# Patient Record
Sex: MALE | Race: WHITE | NOT HISPANIC OR LATINO | Employment: OTHER | URBAN - METROPOLITAN AREA
[De-identification: names, ages, dates, MRNs, and addresses within clinical notes are randomized per-mention and may not be internally consistent; named-entity substitution may affect disease eponyms.]

---

## 2019-07-15 ENCOUNTER — HOSPITAL ENCOUNTER (OUTPATIENT)
Dept: RADIOLOGY | Facility: MEDICAL CENTER | Age: 64
End: 2019-07-15
Attending: INTERNAL MEDICINE
Payer: COMMERCIAL

## 2019-07-15 DIAGNOSIS — S27.809A RUPTURE OF DIAPHRAGM: ICD-10-CM

## 2019-07-15 DIAGNOSIS — K57.30 DIVERTICULOSIS OF LARGE INTESTINE WITHOUT DIVERTICULITIS: ICD-10-CM

## 2019-07-15 DIAGNOSIS — R10.13 ABDOMINAL PAIN, EPIGASTRIC: ICD-10-CM

## 2019-07-15 DIAGNOSIS — Z12.11 SPECIAL SCREENING FOR MALIGNANT NEOPLASMS, COLON: ICD-10-CM

## 2019-07-15 DIAGNOSIS — K31.89 GASTROPTOSIS: ICD-10-CM

## 2019-07-15 PROCEDURE — 74280 X-RAY XM COLON 2CNTRST STD: CPT

## 2019-10-14 ENCOUNTER — OFFICE VISIT (OUTPATIENT)
Dept: URGENT CARE | Facility: PHYSICIAN GROUP | Age: 64
End: 2019-10-14
Payer: COMMERCIAL

## 2019-10-14 ENCOUNTER — APPOINTMENT (OUTPATIENT)
Dept: RADIOLOGY | Facility: IMAGING CENTER | Age: 64
End: 2019-10-14
Attending: PHYSICIAN ASSISTANT
Payer: COMMERCIAL

## 2019-10-14 VITALS
BODY MASS INDEX: 23.7 KG/M2 | HEIGHT: 72 IN | WEIGHT: 175 LBS | SYSTOLIC BLOOD PRESSURE: 114 MMHG | TEMPERATURE: 98.6 F | OXYGEN SATURATION: 94 % | RESPIRATION RATE: 16 BRPM | DIASTOLIC BLOOD PRESSURE: 72 MMHG | HEART RATE: 78 BPM

## 2019-10-14 DIAGNOSIS — S61.411A LACERATION OF RIGHT HAND WITHOUT FOREIGN BODY, INITIAL ENCOUNTER: ICD-10-CM

## 2019-10-14 DIAGNOSIS — Z23 ENCOUNTER FOR ADMINISTRATION OF VACCINE: ICD-10-CM

## 2019-10-14 PROCEDURE — 90715 TDAP VACCINE 7 YRS/> IM: CPT | Performed by: PHYSICIAN ASSISTANT

## 2019-10-14 PROCEDURE — 99203 OFFICE O/P NEW LOW 30 MIN: CPT | Mod: 25 | Performed by: PHYSICIAN ASSISTANT

## 2019-10-14 PROCEDURE — 12001 RPR S/N/AX/GEN/TRNK 2.5CM/<: CPT | Performed by: PHYSICIAN ASSISTANT

## 2019-10-14 PROCEDURE — 73130 X-RAY EXAM OF HAND: CPT | Mod: TC,RT | Performed by: PHYSICIAN ASSISTANT

## 2019-10-14 PROCEDURE — 90471 IMMUNIZATION ADMIN: CPT | Performed by: PHYSICIAN ASSISTANT

## 2019-10-14 RX ORDER — AMOXICILLIN 500 MG/1
CAPSULE ORAL
COMMUNITY
Start: 2019-08-19 | End: 2022-09-30

## 2019-10-14 RX ORDER — CHLORHEXIDINE GLUCONATE ORAL RINSE 1.2 MG/ML
SOLUTION DENTAL
COMMUNITY
Start: 2019-08-19 | End: 2022-09-30

## 2019-10-14 RX ORDER — ROSUVASTATIN CALCIUM 5 MG/1
TABLET, COATED ORAL
COMMUNITY
Start: 2019-08-26 | End: 2022-09-30

## 2019-10-14 RX ORDER — IBUPROFEN 800 MG/1
TABLET ORAL
COMMUNITY
Start: 2019-08-19 | End: 2022-09-30

## 2019-10-14 RX ORDER — HYDROCODONE BITARTRATE AND ACETAMINOPHEN 10; 325 MG/1; MG/1
TABLET ORAL
COMMUNITY
Start: 2019-08-19 | End: 2022-09-30

## 2019-10-14 RX ORDER — INFLUENZA A VIRUS A/BRISBANE/02/2018 (H1N1) RECOMBINANT HEMAGGLUTININ ANTIGEN, INFLUENZA A VIRUS A/KANSAS/14/2017 (H3N2) RECOMBINANT HEMAGGLUTININ ANTIGEN, INFLUENZA B VIRUS B/PHUKET/3073/2013 RECOMBINANT HEMAGGLUTININ ANTIGEN, AND INFLUENZA B VIRUS B/MARYLAND/15/2016 RECOMBINANT HEMAGGLUTININ ANTIGEN 45; 45; 45; 45 UG/.5ML; UG/.5ML; UG/.5ML; UG/.5ML
INJECTION INTRAMUSCULAR
COMMUNITY
Start: 2019-09-23 | End: 2022-09-30

## 2019-10-14 RX ORDER — ONDANSETRON 4 MG/1
TABLET, ORALLY DISINTEGRATING ORAL
COMMUNITY
Start: 2019-08-19 | End: 2022-09-30

## 2019-10-14 ASSESSMENT — ENCOUNTER SYMPTOMS
DIZZINESS: 0
TINGLING: 0

## 2019-10-14 NOTE — PROGRESS NOTES
Subjective:   Eduardo Aponte is a 64 y.o. male who presents for Laceration (got struck by saw, Rt hand X this morning)        Patient cut hand on oscillating saw this morning.  He states that he is he is having bone pain is concerned that he may have fractured the underlying bone.  Unsure of date of last tetanus.  Denies swelling, pain, active bleeding, distal numbness and tingling, reduced range of motion.    Laceration    The incident occurred 1 to 3 hours ago. The laceration is located on the right hand. The laceration is 1 cm in size. The laceration mechanism was a metal edge. The pain is mild. The pain has been constant since onset. He reports no foreign bodies present. His tetanus status is unknown.     Review of Systems   Skin:        Laceration on back of right hand   Neurological: Negative for dizziness and tingling.       PMH:  has a past medical history of Dermatitis. He also has no past medical history of GERD (gastroesophageal reflux disease) or Ulcer.  MEDS:   Current Outpatient Medications:   •  rosuvastatin (CRESTOR) 5 MG Tab, , Disp: , Rfl:   •  ibuprofen (MOTRIN) 800 MG Tab, , Disp: , Rfl:   •  ondansetron (ZOFRAN ODT) 4 MG TABLET DISPERSIBLE, , Disp: , Rfl:   •  FLUBLOK QUADRIVALENT 0.5 ML Solution Prefilled Syringe injection, , Disp: , Rfl:   •  HYDROcodone/acetaminophen (NORCO)  MG Tab, , Disp: , Rfl:   •  chlorhexidine (PERIDEX) 0.12 % Solution, , Disp: , Rfl:   •  amoxicillin (AMOXIL) 500 MG Cap, , Disp: , Rfl:   ALLERGIES:   Allergies   Allergen Reactions   • Bee      SURGHX: No past surgical history on file.  SOCHX:  reports that he quit smoking about 9 years ago. His smoking use included cigarettes. He smoked 1.00 pack per day. He has never used smokeless tobacco. He reports that he drinks alcohol. He reports that he does not use drugs.  FH: Family history was reviewed, no pertinent findings to report   Objective:   /72   Pulse 78   Temp 37 °C (98.6 °F)   Resp 16   Ht 1.829  m (6')   Wt 79.4 kg (175 lb)   SpO2 94%   BMI 23.73 kg/m²   Physical Exam   Constitutional: He is oriented to person, place, and time. He appears well-developed and well-nourished.  Non-toxic appearance. No distress.   HENT:   Head: Normocephalic and atraumatic.   Right Ear: External ear normal.   Left Ear: External ear normal.   Nose: Nose normal.   Eyes: Conjunctivae and lids are normal.   Neck: Neck supple.   Cardiovascular: Normal rate and regular rhythm.   Pulmonary/Chest: Effort normal and breath sounds normal. No respiratory distress.   Abdominal: Normal appearance.   Musculoskeletal:   Right hand ranges fully.  Strength 5 out of 5.  Sensation intact and even bilaterally.  Cap refill less than 2.  Proximal second metacarpal tender to palpation.  No step-offs or visible deformity.   Neurological: He is alert and oriented to person, place, and time. No cranial nerve deficit or sensory deficit.   Skin: Skin is warm, dry and intact. Capillary refill takes less than 2 seconds.   1 cm V-shaped full-thickness laceration on dorsal aspect of right hand between digits 1 and 2.  No active bleeding.  No visible foreign body.  No tendon damage.   Psychiatric: He has a normal mood and affect. His speech is normal and behavior is normal. Judgment and thought content normal. Cognition and memory are normal.   Vitals reviewed.        Assessment/Plan:   1. Laceration of right hand without foreign body, initial encounter  - DX-HAND 3+ RIGHT; Future  - Tdap =>6yo IM    2. Encounter for administration of vaccine  - Tdap =>6yo IM    Other orders  - rosuvastatin (CRESTOR) 5 MG Tab  - ondansetron (ZOFRAN ODT) 4 MG TABLET DISPERSIBLE  - FLUBLOK QUADRIVALENT 0.5 ML Solution Prefilled Syringe injection  - ibuprofen (MOTRIN) 800 MG Tab  - HYDROcodone/acetaminophen (NORCO)  MG Tab  - chlorhexidine (PERIDEX) 0.12 % Solution  - amoxicillin (AMOXIL) 500 MG Cap    XR: No fracture or dislocation by my read.   Radiology  review:      FINDINGS:    There is a soft tissue defect projecting between the 1st and 2nd metacarpal heads.      There is no fracture.    Alignment is normal.    There are multiple sites of osteoarthritis including scaphoid/trapezium articulation, 1st carpometacarpal joint where there is radial subluxation, 1st metacarpophalangeal joint and multiple distal interphalangeal joint.    There is probably an old 5th metacarpal fracture.      Impression       1.  Negative for fracture or foreign body    2.  Multiple sites of osteoarthritis     Patient advised that there is no evidence of fracture.  Laceration repaired.  Tetanus updated.  Patient to return to clinic in 9 to 10 days for suture removal.  Monitor for infection.    Signs of infection, to include: redness, swelling, increased pain, purulent discharge, red streaking from wound site, N/V, and F/C discussed with patient.  Pt instructed to RTC or go to the ER if he or she should experience these.    Differential diagnosis, natural history, supportive care, and indications for immediate follow-up discussed.

## 2019-10-14 NOTE — PROCEDURES
Laceration Repair  Date/Time: 10/14/2019 10:02 AM  Performed by: Tim Perez P.A.-C.  Authorized by: Tim Perez P.A.-C.   Body area: upper extremity  Location details: right hand  Laceration length: 1 cm  Foreign bodies: no foreign bodies  Tendon involvement: none  Nerve involvement: none  Vascular damage: no  Anesthesia: local infiltration    Anesthesia:  Local Anesthetic: lidocaine 1% with epinephrine  Anesthetic total: 0.5 mL    Sedation:  Patient sedated: no    Irrigation solution: saline  Irrigation method: syringe  Amount of cleaning: standard  Debridement: none  Degree of undermining: none  Skin closure: 4-0 nylon  Number of sutures: 1  Technique: simple  Approximation: close  Approximation difficulty: simple  Dressing: pressure dressing

## 2019-10-23 ENCOUNTER — OFFICE VISIT (OUTPATIENT)
Dept: URGENT CARE | Facility: PHYSICIAN GROUP | Age: 64
End: 2019-10-23
Payer: COMMERCIAL

## 2019-10-23 VITALS
WEIGHT: 175 LBS | HEIGHT: 72 IN | HEART RATE: 80 BPM | OXYGEN SATURATION: 99 % | SYSTOLIC BLOOD PRESSURE: 106 MMHG | BODY MASS INDEX: 23.7 KG/M2 | DIASTOLIC BLOOD PRESSURE: 78 MMHG | TEMPERATURE: 97.7 F | RESPIRATION RATE: 16 BRPM

## 2019-10-23 DIAGNOSIS — Z48.02 ENCOUNTER FOR REMOVAL OF SUTURES: ICD-10-CM

## 2019-10-23 PROCEDURE — 99212 OFFICE O/P EST SF 10 MIN: CPT | Performed by: NURSE PRACTITIONER

## 2019-10-23 ASSESSMENT — ENCOUNTER SYMPTOMS
CHILLS: 0
SENSORY CHANGE: 0
FOCAL WEAKNESS: 0
FEVER: 0
MYALGIAS: 0
DIZZINESS: 0
TINGLING: 0

## 2019-10-23 NOTE — PROGRESS NOTES
Subjective:      Eduardo Aponte is a 64 y.o. male who presents with Suture / Staple Removal (Right hand - 1 suture)            Suture / Staple Removal   Treated in ED: 10/14/2019. He tried nothing since the wound repair. His temperature was unmeasured prior to arrival. There has been no drainage from the wound. There is no redness present. There is no swelling present. There is no pain present. He has no difficulty moving the affected extremity or digit.       Review of Systems   Constitutional: Negative for chills and fever.   Musculoskeletal: Negative for joint pain and myalgias.   Skin: Negative for rash.   Neurological: Negative for dizziness, tingling, sensory change and focal weakness.     Past Medical History:   Diagnosis Date   • Dermatitis     chronic    History reviewed. No pertinent surgical history.   Social History     Socioeconomic History   • Marital status:      Spouse name: Not on file   • Number of children: Not on file   • Years of education: Not on file   • Highest education level: Not on file   Occupational History   • Not on file   Social Needs   • Financial resource strain: Not on file   • Food insecurity:     Worry: Not on file     Inability: Not on file   • Transportation needs:     Medical: Not on file     Non-medical: Not on file   Tobacco Use   • Smoking status: Former Smoker     Packs/day: 1.00     Types: Cigarettes     Last attempt to quit: 2010     Years since quittin.0   • Smokeless tobacco: Never Used   Substance and Sexual Activity   • Alcohol use: Yes     Comment: occasional   • Drug use: No   • Sexual activity: Not on file     Comment:    Lifestyle   • Physical activity:     Days per week: Not on file     Minutes per session: Not on file   • Stress: Not on file   Relationships   • Social connections:     Talks on phone: Not on file     Gets together: Not on file     Attends Worship service: Not on file     Active member of club or organization: Not on  file     Attends meetings of clubs or organizations: Not on file     Relationship status: Not on file   • Intimate partner violence:     Fear of current or ex partner: Not on file     Emotionally abused: Not on file     Physically abused: Not on file     Forced sexual activity: Not on file   Other Topics Concern   • Not on file   Social History Narrative   • Not on file    Allergies: Bee         Objective:     /78   Pulse 80   Temp 36.5 °C (97.7 °F) (Temporal)   Resp 16   Ht 1.829 m (6')   Wt 79.4 kg (175 lb)   SpO2 99%   BMI 23.73 kg/m²      Physical Exam   Constitutional: He is oriented to person, place, and time. Vital signs are normal. He appears well-developed and well-nourished.   Musculoskeletal: Normal range of motion. He exhibits tenderness. He exhibits no edema or deformity.   Proximal second metacarpal TTP. Ecchymosis noted. Strength 5/5. ROM intact. Flexion and extension of hand fully intact   Neurological: He is alert and oriented to person, place, and time.   Skin: Skin is warm and dry. Capillary refill takes less than 2 seconds.   1 cm healing laceration noted to dorsal aspect of right hand between digits 1 and 2.  Granulated tissue noted at wound base.  No drainage noted.  No redness ecchymosis edema or erythema noted.   Psychiatric: He has a normal mood and affect. His behavior is normal. Judgment and thought content normal.   Vitals reviewed.              Assessment/Plan:     1. Encounter for removal of sutures    1 suture removed without difficulty.  Patient tolerated procedure well.    Discussed signs and symptoms of infection with patient and instructed him to return to clinic for any developing symptoms.    Supportive care, differential diagnoses, and indications for immediate follow-up discussed with patient.    Pathogenesis of diagnosis discussed including typical length and natural progression of injury. Patient expresses understanding and agrees to plan.     Please note that  this dictation was created using voice recognition software. I have made every reasonable attempt to correct obvious errors, but I expect that there are errors of grammar and possibly content that I did not discover before finalizing the note.

## 2020-01-27 ENCOUNTER — OFFICE VISIT (OUTPATIENT)
Dept: URGENT CARE | Facility: PHYSICIAN GROUP | Age: 65
End: 2020-01-27
Payer: COMMERCIAL

## 2020-01-27 VITALS
HEART RATE: 76 BPM | BODY MASS INDEX: 23.7 KG/M2 | RESPIRATION RATE: 18 BRPM | WEIGHT: 175 LBS | OXYGEN SATURATION: 98 % | HEIGHT: 72 IN | TEMPERATURE: 97.7 F | SYSTOLIC BLOOD PRESSURE: 118 MMHG | DIASTOLIC BLOOD PRESSURE: 74 MMHG

## 2020-01-27 DIAGNOSIS — H93.8X3 SENSATION OF FULLNESS IN BOTH EARS: ICD-10-CM

## 2020-01-27 DIAGNOSIS — H61.23 BILATERAL IMPACTED CERUMEN: ICD-10-CM

## 2020-01-27 PROCEDURE — 99214 OFFICE O/P EST MOD 30 MIN: CPT | Performed by: NURSE PRACTITIONER

## 2020-01-27 NOTE — PROGRESS NOTES
Subjective:      Eduardo Aponte is a 64 y.o. male who presents with Ear Fullness (left ear is plugged, trouble hearing for both ears, x 3 days)    Past Medical History:   Diagnosis Date   • Dermatitis     chronic     Social History     Socioeconomic History   • Marital status:      Spouse name: Not on file   • Number of children: Not on file   • Years of education: Not on file   • Highest education level: Not on file   Occupational History   • Not on file   Social Needs   • Financial resource strain: Not on file   • Food insecurity:     Worry: Not on file     Inability: Not on file   • Transportation needs:     Medical: Not on file     Non-medical: Not on file   Tobacco Use   • Smoking status: Former Smoker     Packs/day: 1.00     Types: Cigarettes     Last attempt to quit: 2010     Years since quittin.3   • Smokeless tobacco: Never Used   Substance and Sexual Activity   • Alcohol use: Yes     Comment: occasional   • Drug use: No   • Sexual activity: Not on file     Comment:    Lifestyle   • Physical activity:     Days per week: Not on file     Minutes per session: Not on file   • Stress: Not on file   Relationships   • Social connections:     Talks on phone: Not on file     Gets together: Not on file     Attends Jewish service: Not on file     Active member of club or organization: Not on file     Attends meetings of clubs or organizations: Not on file     Relationship status: Not on file   • Intimate partner violence:     Fear of current or ex partner: Not on file     Emotionally abused: Not on file     Physically abused: Not on file     Forced sexual activity: Not on file   Other Topics Concern   • Not on file   Social History Narrative   • Not on file     History reviewed. No pertinent family history.    Allergies: Bee    Patient is a 64-year-old male who presents today with complaint of bilateral impacted cerumen.  Patient has tried to wash his ears out at home without success.         Other   The problem occurs constantly. The problem has been unchanged. Associated symptoms comments: Decreased hearing. Nothing aggravates the symptoms. He has tried nothing for the symptoms.       Review of Systems   HENT: Positive for hearing loss.    All other systems reviewed and are negative.         Objective:     /74 (BP Location: Right arm, Patient Position: Sitting, BP Cuff Size: Adult)   Pulse 76   Temp 36.5 °C (97.7 °F) (Temporal)   Resp 18   Ht 1.829 m (6')   Wt 79.4 kg (175 lb)   SpO2 98%   BMI 23.73 kg/m²      Physical Exam  Vitals signs reviewed.   Constitutional:       Appearance: Normal appearance.   HENT:      Right Ear: External ear normal. There is impacted cerumen.      Left Ear: Ear canal and external ear normal. There is impacted cerumen.      Nose: Nose normal.      Mouth/Throat:      Mouth: Mucous membranes are moist.   Eyes:      Extraocular Movements: Extraocular movements intact.      Conjunctiva/sclera: Conjunctivae normal.      Pupils: Pupils are equal, round, and reactive to light.   Neurological:      Mental Status: He is alert.       EACs bilaterally filled with impacted cerumen.    Post lavage: Left EAC is clear and patient reports significant subjective improvement.  Attempted to irrigate right EAC as well, however patient began to experience pain.  At this time I will have patient continue to use eardrops at home for the right ear and return next week for reattempt.  Return sooner for recurrent pain or any worsening of symptoms          Assessment/Plan:       1. Sensation of fullness in both ears  2.  Bilateral impacted cerumen      See note above  Return otherwise to urgent care for any further questions or concerns

## 2021-06-29 ENCOUNTER — HOSPITAL ENCOUNTER (OUTPATIENT)
Dept: LAB | Facility: MEDICAL CENTER | Age: 66
End: 2021-06-29
Attending: FAMILY MEDICINE
Payer: COMMERCIAL

## 2021-06-29 LAB
ALBUMIN SERPL BCP-MCNC: 4.3 G/DL (ref 3.2–4.9)
ALBUMIN/GLOB SERPL: 1.7 G/DL
ALP SERPL-CCNC: 57 U/L (ref 30–99)
ALT SERPL-CCNC: 34 U/L (ref 2–50)
ANION GAP SERPL CALC-SCNC: 10 MMOL/L (ref 7–16)
AST SERPL-CCNC: 28 U/L (ref 12–45)
BILIRUB SERPL-MCNC: 0.3 MG/DL (ref 0.1–1.5)
BUN SERPL-MCNC: 16 MG/DL (ref 8–22)
CALCIUM SERPL-MCNC: 9.1 MG/DL (ref 8.5–10.5)
CHLORIDE SERPL-SCNC: 104 MMOL/L (ref 96–112)
CO2 SERPL-SCNC: 23 MMOL/L (ref 20–33)
CREAT SERPL-MCNC: 1.09 MG/DL (ref 0.5–1.4)
EST. AVERAGE GLUCOSE BLD GHB EST-MCNC: 128 MG/DL
FASTING STATUS PATIENT QL REPORTED: NORMAL
GLOBULIN SER CALC-MCNC: 2.5 G/DL (ref 1.9–3.5)
GLUCOSE SERPL-MCNC: 107 MG/DL (ref 65–99)
HBA1C MFR BLD: 6.1 % (ref 4–5.6)
POTASSIUM SERPL-SCNC: 4.7 MMOL/L (ref 3.6–5.5)
PROT SERPL-MCNC: 6.8 G/DL (ref 6–8.2)
SODIUM SERPL-SCNC: 137 MMOL/L (ref 135–145)

## 2021-06-29 PROCEDURE — 83036 HEMOGLOBIN GLYCOSYLATED A1C: CPT

## 2021-06-29 PROCEDURE — 36415 COLL VENOUS BLD VENIPUNCTURE: CPT

## 2021-06-29 PROCEDURE — 80053 COMPREHEN METABOLIC PANEL: CPT

## 2021-08-04 ENCOUNTER — HOSPITAL ENCOUNTER (OUTPATIENT)
Dept: RADIOLOGY | Facility: MEDICAL CENTER | Age: 66
End: 2021-08-04
Attending: FAMILY MEDICINE
Payer: COMMERCIAL

## 2021-08-04 DIAGNOSIS — M25.511 RIGHT SHOULDER PAIN, UNSPECIFIED CHRONICITY: ICD-10-CM

## 2021-08-04 PROCEDURE — 73030 X-RAY EXAM OF SHOULDER: CPT | Mod: RT

## 2021-12-20 ENCOUNTER — HOSPITAL ENCOUNTER (OUTPATIENT)
Dept: LAB | Facility: MEDICAL CENTER | Age: 66
End: 2021-12-20
Attending: FAMILY MEDICINE
Payer: MEDICARE

## 2021-12-20 LAB
ALBUMIN SERPL BCP-MCNC: 4.8 G/DL (ref 3.2–4.9)
ALBUMIN/GLOB SERPL: 2.2 G/DL
ALP SERPL-CCNC: 64 U/L (ref 30–99)
ALT SERPL-CCNC: 40 U/L (ref 2–50)
ANION GAP SERPL CALC-SCNC: 11 MMOL/L (ref 7–16)
AST SERPL-CCNC: 27 U/L (ref 12–45)
BASOPHILS # BLD AUTO: 1 % (ref 0–1.8)
BASOPHILS # BLD: 0.08 K/UL (ref 0–0.12)
BILIRUB SERPL-MCNC: 0.7 MG/DL (ref 0.1–1.5)
BUN SERPL-MCNC: 14 MG/DL (ref 8–22)
CALCIUM SERPL-MCNC: 9.4 MG/DL (ref 8.5–10.5)
CHLORIDE SERPL-SCNC: 103 MMOL/L (ref 96–112)
CHOLEST SERPL-MCNC: 181 MG/DL (ref 100–199)
CO2 SERPL-SCNC: 25 MMOL/L (ref 20–33)
CREAT SERPL-MCNC: 1.13 MG/DL (ref 0.5–1.4)
EOSINOPHIL # BLD AUTO: 0.16 K/UL (ref 0–0.51)
EOSINOPHIL NFR BLD: 1.9 % (ref 0–6.9)
ERYTHROCYTE [DISTWIDTH] IN BLOOD BY AUTOMATED COUNT: 44.3 FL (ref 35.9–50)
EST. AVERAGE GLUCOSE BLD GHB EST-MCNC: 123 MG/DL
FASTING STATUS PATIENT QL REPORTED: NORMAL
GLOBULIN SER CALC-MCNC: 2.2 G/DL (ref 1.9–3.5)
GLUCOSE SERPL-MCNC: 98 MG/DL (ref 65–99)
HBA1C MFR BLD: 5.9 % (ref 4–5.6)
HCT VFR BLD AUTO: 57.5 % (ref 42–52)
HDLC SERPL-MCNC: 43 MG/DL
HGB BLD-MCNC: 20 G/DL (ref 14–18)
IMM GRANULOCYTES # BLD AUTO: 0.03 K/UL (ref 0–0.11)
IMM GRANULOCYTES NFR BLD AUTO: 0.4 % (ref 0–0.9)
LDLC SERPL CALC-MCNC: 122 MG/DL
LYMPHOCYTES # BLD AUTO: 3.17 K/UL (ref 1–4.8)
LYMPHOCYTES NFR BLD: 37.8 % (ref 22–41)
MCH RBC QN AUTO: 31.5 PG (ref 27–33)
MCHC RBC AUTO-ENTMCNC: 34.8 G/DL (ref 33.7–35.3)
MCV RBC AUTO: 90.6 FL (ref 81.4–97.8)
MONOCYTES # BLD AUTO: 0.69 K/UL (ref 0–0.85)
MONOCYTES NFR BLD AUTO: 8.2 % (ref 0–13.4)
NEUTROPHILS # BLD AUTO: 4.25 K/UL (ref 1.82–7.42)
NEUTROPHILS NFR BLD: 50.7 % (ref 44–72)
NRBC # BLD AUTO: 0 K/UL
NRBC BLD-RTO: 0 /100 WBC
PLATELET # BLD AUTO: 268 K/UL (ref 164–446)
PMV BLD AUTO: 11.6 FL (ref 9–12.9)
POTASSIUM SERPL-SCNC: 4.7 MMOL/L (ref 3.6–5.5)
PROT SERPL-MCNC: 7 G/DL (ref 6–8.2)
RBC # BLD AUTO: 6.35 M/UL (ref 4.7–6.1)
SODIUM SERPL-SCNC: 139 MMOL/L (ref 135–145)
TRIGL SERPL-MCNC: 79 MG/DL (ref 0–149)
WBC # BLD AUTO: 8.4 K/UL (ref 4.8–10.8)

## 2021-12-20 PROCEDURE — 80061 LIPID PANEL: CPT

## 2021-12-20 PROCEDURE — 85025 COMPLETE CBC W/AUTO DIFF WBC: CPT

## 2021-12-20 PROCEDURE — 36415 COLL VENOUS BLD VENIPUNCTURE: CPT

## 2021-12-20 PROCEDURE — 80053 COMPREHEN METABOLIC PANEL: CPT

## 2021-12-20 PROCEDURE — 83036 HEMOGLOBIN GLYCOSYLATED A1C: CPT | Mod: GA

## 2022-03-28 ENCOUNTER — HOSPITAL ENCOUNTER (OUTPATIENT)
Dept: LAB | Facility: MEDICAL CENTER | Age: 67
End: 2022-03-28
Attending: FAMILY MEDICINE
Payer: MEDICARE

## 2022-03-28 LAB
ALBUMIN SERPL BCP-MCNC: 4.5 G/DL (ref 3.2–4.9)
ALBUMIN/GLOB SERPL: 2.1 G/DL
ALP SERPL-CCNC: 61 U/L (ref 30–99)
ALT SERPL-CCNC: 39 U/L (ref 2–50)
ANION GAP SERPL CALC-SCNC: 13 MMOL/L (ref 7–16)
AST SERPL-CCNC: 27 U/L (ref 12–45)
BILIRUB SERPL-MCNC: 0.8 MG/DL (ref 0.1–1.5)
BUN SERPL-MCNC: 15 MG/DL (ref 8–22)
CALCIUM SERPL-MCNC: 9.7 MG/DL (ref 8.5–10.5)
CHLORIDE SERPL-SCNC: 102 MMOL/L (ref 96–112)
CHOLEST SERPL-MCNC: 177 MG/DL (ref 100–199)
CO2 SERPL-SCNC: 24 MMOL/L (ref 20–33)
CREAT SERPL-MCNC: 1.05 MG/DL (ref 0.5–1.4)
EST. AVERAGE GLUCOSE BLD GHB EST-MCNC: 126 MG/DL
FASTING STATUS PATIENT QL REPORTED: NORMAL
GFR SERPLBLD CREATININE-BSD FMLA CKD-EPI: 79 ML/MIN/1.73 M 2
GLOBULIN SER CALC-MCNC: 2.1 G/DL (ref 1.9–3.5)
GLUCOSE SERPL-MCNC: 94 MG/DL (ref 65–99)
HBA1C MFR BLD: 6 % (ref 4–5.6)
HDLC SERPL-MCNC: 44 MG/DL
LDLC SERPL CALC-MCNC: 113 MG/DL
POTASSIUM SERPL-SCNC: 5.3 MMOL/L (ref 3.6–5.5)
PROT SERPL-MCNC: 6.6 G/DL (ref 6–8.2)
SODIUM SERPL-SCNC: 139 MMOL/L (ref 135–145)
TRIGL SERPL-MCNC: 101 MG/DL (ref 0–149)

## 2022-03-28 PROCEDURE — 83036 HEMOGLOBIN GLYCOSYLATED A1C: CPT | Mod: GA

## 2022-03-28 PROCEDURE — 80061 LIPID PANEL: CPT

## 2022-03-28 PROCEDURE — 36415 COLL VENOUS BLD VENIPUNCTURE: CPT

## 2022-03-28 PROCEDURE — 80053 COMPREHEN METABOLIC PANEL: CPT

## 2022-08-30 ENCOUNTER — HOSPITAL ENCOUNTER (OUTPATIENT)
Dept: LAB | Facility: MEDICAL CENTER | Age: 67
End: 2022-08-30
Attending: FAMILY MEDICINE
Payer: MEDICARE

## 2022-08-30 LAB
ALBUMIN SERPL BCP-MCNC: 4.3 G/DL (ref 3.2–4.9)
ALBUMIN/GLOB SERPL: 1.8 G/DL
ALP SERPL-CCNC: 66 U/L (ref 30–99)
ALT SERPL-CCNC: 54 U/L (ref 2–50)
ANION GAP SERPL CALC-SCNC: 11 MMOL/L (ref 7–16)
AST SERPL-CCNC: 25 U/L (ref 12–45)
BILIRUB SERPL-MCNC: 0.7 MG/DL (ref 0.1–1.5)
BUN SERPL-MCNC: 11 MG/DL (ref 8–22)
CALCIUM SERPL-MCNC: 9.4 MG/DL (ref 8.5–10.5)
CHLORIDE SERPL-SCNC: 103 MMOL/L (ref 96–112)
CHOLEST SERPL-MCNC: 165 MG/DL (ref 100–199)
CO2 SERPL-SCNC: 24 MMOL/L (ref 20–33)
CREAT SERPL-MCNC: 1.03 MG/DL (ref 0.5–1.4)
EST. AVERAGE GLUCOSE BLD GHB EST-MCNC: 120 MG/DL
FASTING STATUS PATIENT QL REPORTED: NORMAL
GFR SERPLBLD CREATININE-BSD FMLA CKD-EPI: 80 ML/MIN/1.73 M 2
GLOBULIN SER CALC-MCNC: 2.4 G/DL (ref 1.9–3.5)
GLUCOSE SERPL-MCNC: 118 MG/DL (ref 65–99)
HBA1C MFR BLD: 5.8 % (ref 4–5.6)
HDLC SERPL-MCNC: 46 MG/DL
LDLC SERPL CALC-MCNC: 102 MG/DL
POTASSIUM SERPL-SCNC: 4.3 MMOL/L (ref 3.6–5.5)
PROT SERPL-MCNC: 6.7 G/DL (ref 6–8.2)
PSA SERPL-MCNC: 0.82 NG/ML (ref 0–4)
SODIUM SERPL-SCNC: 138 MMOL/L (ref 135–145)
TRIGL SERPL-MCNC: 83 MG/DL (ref 0–149)

## 2022-08-30 PROCEDURE — 80061 LIPID PANEL: CPT

## 2022-08-30 PROCEDURE — 84153 ASSAY OF PSA TOTAL: CPT | Mod: GZ

## 2022-08-30 PROCEDURE — 80053 COMPREHEN METABOLIC PANEL: CPT

## 2022-08-30 PROCEDURE — 36415 COLL VENOUS BLD VENIPUNCTURE: CPT

## 2022-08-30 PROCEDURE — 83036 HEMOGLOBIN GLYCOSYLATED A1C: CPT | Mod: GZ

## 2022-09-07 ENCOUNTER — TELEPHONE (OUTPATIENT)
Dept: CARDIOLOGY | Facility: MEDICAL CENTER | Age: 67
End: 2022-09-07
Payer: MEDICARE

## 2022-09-07 NOTE — TELEPHONE ENCOUNTER
Message  Received: Yesterday  CAROL Arciniega R.N. Hey Liz this patient needs a new patient consultation appointment with me for atrial fibrillation.  September 28 at 7:45 AM there is a slot we can use this for his new patient appointment without adjusting anything else is fine with me.  Could you please set this up and let him know?  The referral is coming from Dr. Ordoñez.     Thanks!     TW       Called pt, LM and advised will also send [employer] [facility] message regarding scheduling [facility]. Pt scheduled for 09/28/22 at 7:45am. [employer] message sent.

## 2022-09-28 ENCOUNTER — OFFICE VISIT (OUTPATIENT)
Dept: CARDIOLOGY | Facility: MEDICAL CENTER | Age: 67
End: 2022-09-28
Payer: MEDICARE

## 2022-09-28 ENCOUNTER — TELEPHONE (OUTPATIENT)
Dept: CARDIOLOGY | Facility: MEDICAL CENTER | Age: 67
End: 2022-09-28

## 2022-09-28 VITALS
HEIGHT: 72 IN | OXYGEN SATURATION: 95 % | WEIGHT: 183 LBS | BODY MASS INDEX: 24.79 KG/M2 | DIASTOLIC BLOOD PRESSURE: 88 MMHG | RESPIRATION RATE: 16 BRPM | HEART RATE: 100 BPM | SYSTOLIC BLOOD PRESSURE: 134 MMHG

## 2022-09-28 DIAGNOSIS — I48.0 PAF (PAROXYSMAL ATRIAL FIBRILLATION) (HCC): ICD-10-CM

## 2022-09-28 DIAGNOSIS — Z91.89: ICD-10-CM

## 2022-09-28 DIAGNOSIS — R07.89 PRESSURE IN CHEST: ICD-10-CM

## 2022-09-28 DIAGNOSIS — R06.09 DOE (DYSPNEA ON EXERTION): ICD-10-CM

## 2022-09-28 DIAGNOSIS — E78.2 MIXED HYPERLIPIDEMIA: ICD-10-CM

## 2022-09-28 LAB — EKG IMPRESSION: NORMAL

## 2022-09-28 PROCEDURE — 99204 OFFICE O/P NEW MOD 45 MIN: CPT | Performed by: INTERNAL MEDICINE

## 2022-09-28 PROCEDURE — 93000 ELECTROCARDIOGRAM COMPLETE: CPT | Performed by: INTERNAL MEDICINE

## 2022-09-28 RX ORDER — FAMOTIDINE 10 MG
10 TABLET ORAL 2 TIMES DAILY
Status: ON HOLD | COMMUNITY
End: 2022-10-14

## 2022-09-28 RX ORDER — APIXABAN 5 MG/1
5 TABLET, FILM COATED ORAL 2 TIMES DAILY
COMMUNITY
Start: 2022-09-13 | End: 2023-03-06 | Stop reason: SDUPTHER

## 2022-09-28 RX ORDER — METOPROLOL TARTRATE 50 MG/1
50 TABLET, FILM COATED ORAL 2 TIMES DAILY
Qty: 180 TABLET | Refills: 3 | Status: SHIPPED | OUTPATIENT
Start: 2022-09-28 | End: 2023-01-26

## 2022-09-28 RX ORDER — ROSUVASTATIN CALCIUM 10 MG/1
10 TABLET, COATED ORAL
COMMUNITY
Start: 2022-07-03 | End: 2022-11-29

## 2022-09-28 ASSESSMENT — FIBROSIS 4 INDEX: FIB4 SCORE: 0.84

## 2022-09-28 NOTE — TELEPHONE ENCOUNTER
LM for pt that his new check in at time is 7:00 for a 9:00 Cardioversion on 10-6-22 with Dr. Piper.

## 2022-09-28 NOTE — TELEPHONE ENCOUNTER
Patient is scheduled on 10-6-22 for a Cardioversion w/anesthesia with Dr. Piper. No meds to stop and patient to check in at 9:00 for an 11:00 procedure. H&P was done on 9-28-22 by Dr. Ruff. Pre admit to call patient.This was scheduled with Dr. Piper due to  not available until 10-27-22.

## 2022-09-28 NOTE — PROGRESS NOTES
Chief Complaint   Patient presents with    Shortness of Breath     New patient       Subjective     Eduardo Aponte is a 67 y.o. male who presents today for initial consultation regarding dyspnea on exertion chest discomfort and atrial fibrillation.  He has been noticing several months of slowly progressive dyspnea on exertion particular when mowing the lawn associated with some chest tightness over the more recent weeks.  Presented to his primary physician who noted an irregular rhythm and he was found to be in atrial fibrillation.  He was initiated appropriately on low-dose metoprolol and Eliquis and has been tolerating these well.  He does not smoke currently but has a multi pack-year smoking history and quit 6 months ago.  Does not drink excessively or do drugs.  He is retired.  He has not had rest symptoms.  Today ECG reveals atrial fibrillation at a rate of 93 bpm.    Past Medical History:   Diagnosis Date    Dermatitis     chronic     History reviewed. No pertinent surgical history.  History reviewed. No pertinent family history.  Social History     Socioeconomic History    Marital status:      Spouse name: Not on file    Number of children: Not on file    Years of education: Not on file    Highest education level: Not on file   Occupational History    Not on file   Tobacco Use    Smoking status: Former     Packs/day: 1.00     Types: Cigarettes     Quit date: 2010     Years since quittin.0    Smokeless tobacco: Never   Vaping Use    Vaping Use: Never used   Substance and Sexual Activity    Alcohol use: Yes     Comment: occasional    Drug use: No    Sexual activity: Not on file     Comment:    Other Topics Concern    Not on file   Social History Narrative    Not on file     Social Determinants of Health     Financial Resource Strain: Not on file   Food Insecurity: Not on file   Transportation Needs: Not on file   Physical Activity: Not on file   Stress: Not on file   Social  Connections: Not on file   Intimate Partner Violence: Not on file   Housing Stability: Not on file     Allergies   Allergen Reactions    Bee      Outpatient Encounter Medications as of 9/28/2022   Medication Sig Dispense Refill    ELIQUIS 5 MG Tab 2 times a day.      rosuvastatin (CRESTOR) 10 MG Tab every 48 hours.      famotidine (PEPCID) 10 MG tablet Take 10 mg by mouth 2 times a day.      metoprolol tartrate (LOPRESSOR) 50 MG Tab Take 1 Tablet by mouth 2 times a day. 180 Tablet 3    [DISCONTINUED] metoprolol tartrate (LOPRESSOR) 25 MG Tab 2 times a day.      rosuvastatin (CRESTOR) 5 MG Tab  (Patient not taking: Reported on 9/28/2022)      ondansetron (ZOFRAN ODT) 4 MG TABLET DISPERSIBLE  (Patient not taking: Reported on 9/28/2022)      FLUBLOK QUADRIVALENT 0.5 ML Solution Prefilled Syringe injection       ibuprofen (MOTRIN) 800 MG Tab  (Patient not taking: Reported on 9/28/2022)      HYDROcodone/acetaminophen (NORCO)  MG Tab  (Patient not taking: Reported on 9/28/2022)      chlorhexidine (PERIDEX) 0.12 % Solution  (Patient not taking: Reported on 9/28/2022)      amoxicillin (AMOXIL) 500 MG Cap  (Patient not taking: Reported on 9/28/2022)       No facility-administered encounter medications on file as of 9/28/2022.     Review of Systems   All other systems reviewed and are negative.           Objective     /88 (BP Location: Left arm, Patient Position: Sitting)   Pulse 100   Resp 16   Ht 1.829 m (6')   Wt 83 kg (183 lb)   SpO2 95%   BMI 24.82 kg/m²     Physical Exam  Vitals and nursing note reviewed.   Constitutional:       General: He is not in acute distress.     Appearance: Normal appearance.   HENT:      Head: Normocephalic and atraumatic.      Right Ear: External ear normal.      Left Ear: External ear normal.      Nose: Nose normal.   Eyes:      Conjunctiva/sclera: Conjunctivae normal.   Cardiovascular:      Rate and Rhythm: Normal rate. Rhythm irregular.      Pulses: Normal pulses.       Heart sounds: No murmur heard.  Pulmonary:      Effort: Pulmonary effort is normal. No respiratory distress.      Breath sounds: Normal breath sounds.   Abdominal:      General: There is no distension.      Palpations: Abdomen is soft.   Musculoskeletal:      Cervical back: No rigidity or tenderness.      Right lower leg: No edema.      Left lower leg: No edema.   Skin:     General: Skin is warm and dry.      Capillary Refill: Capillary refill takes 2 to 3 seconds.   Neurological:      General: No focal deficit present.      Mental Status: He is alert and oriented to person, place, and time.   Psychiatric:         Mood and Affect: Mood normal.         Behavior: Behavior normal.         Thought Content: Thought content normal.     LABS:  Lab Results   Component Value Date/Time    CHOLSTRLTOT 165 08/30/2022 08:50 AM     (H) 08/30/2022 08:50 AM    HDL 46 08/30/2022 08:50 AM    TRIGLYCERIDE 83 08/30/2022 08:50 AM       Lab Results   Component Value Date/Time    WBC 8.4 12/20/2021 08:43 AM    RBC 6.35 (H) 12/20/2021 08:43 AM    HEMOGLOBIN 20.0 (H) 12/20/2021 08:43 AM    HEMATOCRIT 57.5 (H) 12/20/2021 08:43 AM    MCV 90.6 12/20/2021 08:43 AM    NEUTSPOLYS 50.70 12/20/2021 08:43 AM    LYMPHOCYTES 37.80 12/20/2021 08:43 AM    MONOCYTES 8.20 12/20/2021 08:43 AM    EOSINOPHILS 1.90 12/20/2021 08:43 AM    BASOPHILS 1.00 12/20/2021 08:43 AM     Lab Results   Component Value Date/Time    SODIUM 138 08/30/2022 08:50 AM    POTASSIUM 4.3 08/30/2022 08:50 AM    CHLORIDE 103 08/30/2022 08:50 AM    CO2 24 08/30/2022 08:50 AM    GLUCOSE 118 (H) 08/30/2022 08:50 AM    BUN 11 08/30/2022 08:50 AM    CREATININE 1.03 08/30/2022 08:50 AM     Lab Results   Component Value Date    HBA1C 5.8 (H) 08/30/2022      Lab Results   Component Value Date/Time    ALKPHOSPHAT 66 08/30/2022 08:50 AM    ASTSGOT 25 08/30/2022 08:50 AM    ALTSGPT 54 (H) 08/30/2022 08:50 AM    TBILIRUBIN 0.7 08/30/2022 08:50 AM      No results found for: BNPBTYPENAT    No results found for: TSH  No results found for: PROTHROMBTM, INR     EKG (9/28/2022 ):  I have personally reviewed the EKG this visit and discussed with the patient.    Atrial fibrillation 93 bpm             Assessment & Plan     1. PAF (paroxysmal atrial fibrillation) (HCC)  EKG    NM-CARDIAC STRESS TEST    EKG    CL-CARDIOVERSION    metoprolol tartrate (LOPRESSOR) 50 MG Tab      2. Mixed hyperlipidemia  EKG      3. Does not achieve target heart rate  EKG      4. ELMORE (dyspnea on exertion)  EC-ECHOCARDIOGRAM COMPLETE W/O CONT    EKG      5. Pressure in chest  EKG    EKG          Medical Decision Making: Today's Assessment/Status/Plan:          He is developed atrial fibrillation.  This may be the primary issue or result of underlying ischemic heart disease given his chest discomfort.  It is symptomatic with dyspnea on exertion.  His heart rates are better but not optimally controlled.  Recommend increasing his beta-blocker and we have discussed the pathophysiology and expected prognosis as well as treatment strategies for atrial fibrillation and ischemic heart disease today extensively.  I recommend stress testing on a treadmill, echocardiogram and increasing his Lopressor to 50 mg twice daily from 25 mg twice daily.  He has been on his anticoagulation without interruption for greater than 3 weeks so therefore I recommend scheduling him with a anesthesia assisted cardioversion without TYRA for symptom improvement.  We will follow-up after testing and procedures are completed.    Thank you for this interesting consultation. It was my pleasure to see Eduardo Aponte today.    Ritesh Ruff MD, FACC, [facility]  Division of Interventional Cardiology  [facility]

## 2022-09-30 ENCOUNTER — PRE-ADMISSION TESTING (OUTPATIENT)
Dept: ADMISSIONS | Facility: MEDICAL CENTER | Age: 67
End: 2022-09-30
Attending: OPHTHALMOLOGY
Payer: MEDICARE

## 2022-09-30 ENCOUNTER — HOSPITAL ENCOUNTER (OUTPATIENT)
Dept: CARDIOLOGY | Facility: MEDICAL CENTER | Age: 67
End: 2022-09-30
Attending: INTERNAL MEDICINE
Payer: MEDICARE

## 2022-09-30 DIAGNOSIS — R06.09 DOE (DYSPNEA ON EXERTION): ICD-10-CM

## 2022-09-30 DIAGNOSIS — Z01.812 PRE-OPERATIVE LABORATORY EXAMINATION: ICD-10-CM

## 2022-09-30 PROCEDURE — 93306 TTE W/DOPPLER COMPLETE: CPT

## 2022-10-02 LAB
LV EJECT FRACT  99904: 65
LV EJECT FRACT MOD 2C 99903: 68.57
LV EJECT FRACT MOD 4C 99902: 56.19
LV EJECT FRACT MOD BP 99901: 63.69

## 2022-10-02 PROCEDURE — 93306 TTE W/DOPPLER COMPLETE: CPT | Mod: 26 | Performed by: INTERNAL MEDICINE

## 2022-10-04 ENCOUNTER — HOSPITAL ENCOUNTER (OUTPATIENT)
Dept: RADIOLOGY | Facility: MEDICAL CENTER | Age: 67
End: 2022-10-04
Attending: INTERNAL MEDICINE
Payer: MEDICARE

## 2022-10-04 ENCOUNTER — PRE-ADMISSION TESTING (OUTPATIENT)
Dept: ADMISSIONS | Facility: MEDICAL CENTER | Age: 67
End: 2022-10-04
Attending: INTERNAL MEDICINE
Payer: MEDICARE

## 2022-10-04 DIAGNOSIS — Z01.812 PRE-OPERATIVE LABORATORY EXAMINATION: ICD-10-CM

## 2022-10-04 DIAGNOSIS — I48.0 PAF (PAROXYSMAL ATRIAL FIBRILLATION) (HCC): ICD-10-CM

## 2022-10-04 LAB
ANION GAP SERPL CALC-SCNC: 15 MMOL/L (ref 7–16)
BUN SERPL-MCNC: 14 MG/DL (ref 8–22)
CALCIUM SERPL-MCNC: 9.6 MG/DL (ref 8.5–10.5)
CHLORIDE SERPL-SCNC: 99 MMOL/L (ref 96–112)
CO2 SERPL-SCNC: 25 MMOL/L (ref 20–33)
CREAT SERPL-MCNC: 1.1 MG/DL (ref 0.5–1.4)
ERYTHROCYTE [DISTWIDTH] IN BLOOD BY AUTOMATED COUNT: 40.3 FL (ref 35.9–50)
GFR SERPLBLD CREATININE-BSD FMLA CKD-EPI: 74 ML/MIN/1.73 M 2
GLUCOSE SERPL-MCNC: 127 MG/DL (ref 65–99)
HCT VFR BLD AUTO: 59.1 % (ref 42–52)
HGB BLD-MCNC: 20.6 G/DL (ref 14–18)
MCH RBC QN AUTO: 30.7 PG (ref 27–33)
MCHC RBC AUTO-ENTMCNC: 34.9 G/DL (ref 33.7–35.3)
MCV RBC AUTO: 88.1 FL (ref 81.4–97.8)
PLATELET # BLD AUTO: 323 K/UL (ref 164–446)
PMV BLD AUTO: 11.8 FL (ref 9–12.9)
POTASSIUM SERPL-SCNC: 4.4 MMOL/L (ref 3.6–5.5)
RBC # BLD AUTO: 6.71 M/UL (ref 4.7–6.1)
SODIUM SERPL-SCNC: 139 MMOL/L (ref 135–145)
WBC # BLD AUTO: 10.2 K/UL (ref 4.8–10.8)

## 2022-10-04 PROCEDURE — 80048 BASIC METABOLIC PNL TOTAL CA: CPT

## 2022-10-04 PROCEDURE — A9502 TC99M TETROFOSMIN: HCPCS

## 2022-10-04 PROCEDURE — 85027 COMPLETE CBC AUTOMATED: CPT

## 2022-10-04 PROCEDURE — 93018 CV STRESS TEST I&R ONLY: CPT | Performed by: INTERNAL MEDICINE

## 2022-10-04 PROCEDURE — 36415 COLL VENOUS BLD VENIPUNCTURE: CPT

## 2022-10-04 PROCEDURE — 700111 HCHG RX REV CODE 636 W/ 250 OVERRIDE (IP)

## 2022-10-04 PROCEDURE — 78452 HT MUSCLE IMAGE SPECT MULT: CPT | Mod: 26 | Performed by: INTERNAL MEDICINE

## 2022-10-04 RX ORDER — REGADENOSON 0.08 MG/ML
0.4 INJECTION, SOLUTION INTRAVENOUS ONCE
Status: COMPLETED | OUTPATIENT
Start: 2022-10-04 | End: 2022-10-04

## 2022-10-04 RX ORDER — REGADENOSON 0.08 MG/ML
INJECTION, SOLUTION INTRAVENOUS
Status: COMPLETED
Start: 2022-10-04 | End: 2022-10-04

## 2022-10-04 RX ORDER — AMINOPHYLLINE 25 MG/ML
100 INJECTION, SOLUTION INTRAVENOUS
Status: DISCONTINUED | OUTPATIENT
Start: 2022-10-04 | End: 2022-10-05 | Stop reason: HOSPADM

## 2022-10-04 RX ADMIN — REGADENOSON 0.4 MG: 0.08 INJECTION, SOLUTION INTRAVENOUS at 09:37

## 2022-10-04 NOTE — OR NURSING
Lab called to state they were unable to run patients pt/ptt due to elevated H&H. Lab will need to be re-drawn DOS and nurse to call lab to request a special lab tube prior to the draw. Note placed on tahoe checklist.

## 2022-10-06 ENCOUNTER — HOSPITAL ENCOUNTER (OUTPATIENT)
Facility: MEDICAL CENTER | Age: 67
End: 2022-10-06
Attending: INTERNAL MEDICINE | Admitting: INTERNAL MEDICINE
Payer: MEDICARE

## 2022-10-06 ENCOUNTER — APPOINTMENT (OUTPATIENT)
Dept: CARDIOLOGY | Facility: MEDICAL CENTER | Age: 67
End: 2022-10-06
Attending: INTERNAL MEDICINE
Payer: MEDICARE

## 2022-10-06 ENCOUNTER — ANESTHESIA (OUTPATIENT)
Dept: CARDIOLOGY | Facility: MEDICAL CENTER | Age: 67
End: 2022-10-06
Payer: MEDICARE

## 2022-10-06 ENCOUNTER — ANESTHESIA EVENT (OUTPATIENT)
Dept: CARDIOLOGY | Facility: MEDICAL CENTER | Age: 67
End: 2022-10-06
Payer: MEDICARE

## 2022-10-06 VITALS
WEIGHT: 182.54 LBS | TEMPERATURE: 97.1 F | RESPIRATION RATE: 20 BRPM | SYSTOLIC BLOOD PRESSURE: 124 MMHG | HEIGHT: 72 IN | DIASTOLIC BLOOD PRESSURE: 89 MMHG | OXYGEN SATURATION: 93 % | HEART RATE: 97 BPM | BODY MASS INDEX: 24.72 KG/M2

## 2022-10-06 DIAGNOSIS — I48.0 PAF (PAROXYSMAL ATRIAL FIBRILLATION) (HCC): ICD-10-CM

## 2022-10-06 DIAGNOSIS — R07.9 CHEST PAIN, UNSPECIFIED TYPE: ICD-10-CM

## 2022-10-06 LAB
EKG IMPRESSION: NORMAL
INR PPP: 1.19 (ref 0.87–1.13)
PROTHROMBIN TIME: 14.9 SEC (ref 12–14.6)

## 2022-10-06 PROCEDURE — 160035 HCHG PACU - 1ST 60 MINS PHASE I

## 2022-10-06 PROCEDURE — 700105 HCHG RX REV CODE 258: Performed by: INTERNAL MEDICINE

## 2022-10-06 PROCEDURE — 85610 PROTHROMBIN TIME: CPT

## 2022-10-06 PROCEDURE — 160002 HCHG RECOVERY MINUTES (STAT)

## 2022-10-06 PROCEDURE — 93005 ELECTROCARDIOGRAM TRACING: CPT | Performed by: INTERNAL MEDICINE

## 2022-10-06 PROCEDURE — 700111 HCHG RX REV CODE 636 W/ 250 OVERRIDE (IP): Performed by: ANESTHESIOLOGY

## 2022-10-06 PROCEDURE — 92960 CARDIOVERSION ELECTRIC EXT: CPT

## 2022-10-06 PROCEDURE — 00410 ANES INTEG SYS CONV ARRHYT: CPT | Performed by: ANESTHESIOLOGY

## 2022-10-06 PROCEDURE — 160046 HCHG PACU - 1ST 60 MINS PHASE II

## 2022-10-06 PROCEDURE — 93010 ELECTROCARDIOGRAM REPORT: CPT | Performed by: INTERNAL MEDICINE

## 2022-10-06 PROCEDURE — 36415 COLL VENOUS BLD VENIPUNCTURE: CPT

## 2022-10-06 RX ORDER — ALBUTEROL SULFATE 2.5 MG/3ML
2.5 SOLUTION RESPIRATORY (INHALATION)
Status: DISCONTINUED | OUTPATIENT
Start: 2022-10-06 | End: 2022-10-06 | Stop reason: HOSPADM

## 2022-10-06 RX ORDER — LABETALOL HYDROCHLORIDE 5 MG/ML
5 INJECTION, SOLUTION INTRAVENOUS
Status: DISCONTINUED | OUTPATIENT
Start: 2022-10-06 | End: 2022-10-06 | Stop reason: HOSPADM

## 2022-10-06 RX ORDER — ONDANSETRON 2 MG/ML
4 INJECTION INTRAMUSCULAR; INTRAVENOUS
Status: DISCONTINUED | OUTPATIENT
Start: 2022-10-06 | End: 2022-10-06 | Stop reason: HOSPADM

## 2022-10-06 RX ORDER — HYDROMORPHONE HYDROCHLORIDE 1 MG/ML
0.2 INJECTION, SOLUTION INTRAMUSCULAR; INTRAVENOUS; SUBCUTANEOUS
Status: DISCONTINUED | OUTPATIENT
Start: 2022-10-06 | End: 2022-10-06 | Stop reason: HOSPADM

## 2022-10-06 RX ORDER — HYDROMORPHONE HYDROCHLORIDE 1 MG/ML
0.1 INJECTION, SOLUTION INTRAMUSCULAR; INTRAVENOUS; SUBCUTANEOUS
Status: DISCONTINUED | OUTPATIENT
Start: 2022-10-06 | End: 2022-10-06 | Stop reason: HOSPADM

## 2022-10-06 RX ORDER — DIPHENHYDRAMINE HYDROCHLORIDE 50 MG/ML
12.5 INJECTION INTRAMUSCULAR; INTRAVENOUS
Status: DISCONTINUED | OUTPATIENT
Start: 2022-10-06 | End: 2022-10-06 | Stop reason: HOSPADM

## 2022-10-06 RX ORDER — SODIUM CHLORIDE, SODIUM LACTATE, POTASSIUM CHLORIDE, CALCIUM CHLORIDE 600; 310; 30; 20 MG/100ML; MG/100ML; MG/100ML; MG/100ML
INJECTION, SOLUTION INTRAVENOUS CONTINUOUS
Status: DISCONTINUED | OUTPATIENT
Start: 2022-10-06 | End: 2022-10-06 | Stop reason: HOSPADM

## 2022-10-06 RX ORDER — HYDROMORPHONE HYDROCHLORIDE 1 MG/ML
0.4 INJECTION, SOLUTION INTRAMUSCULAR; INTRAVENOUS; SUBCUTANEOUS
Status: DISCONTINUED | OUTPATIENT
Start: 2022-10-06 | End: 2022-10-06 | Stop reason: HOSPADM

## 2022-10-06 RX ORDER — HYDRALAZINE HYDROCHLORIDE 20 MG/ML
5 INJECTION INTRAMUSCULAR; INTRAVENOUS
Status: DISCONTINUED | OUTPATIENT
Start: 2022-10-06 | End: 2022-10-06 | Stop reason: HOSPADM

## 2022-10-06 RX ORDER — FLECAINIDE ACETATE 50 MG/1
50 TABLET ORAL 2 TIMES DAILY
Qty: 60 TABLET | Refills: 5 | Status: ON HOLD
Start: 2022-10-06 | End: 2022-10-14

## 2022-10-06 RX ADMIN — PROPOFOL 50 MG: 10 INJECTION, EMULSION INTRAVENOUS at 09:17

## 2022-10-06 RX ADMIN — PROPOFOL 50 MG: 10 INJECTION, EMULSION INTRAVENOUS at 09:19

## 2022-10-06 RX ADMIN — SODIUM CHLORIDE, POTASSIUM CHLORIDE, SODIUM LACTATE AND CALCIUM CHLORIDE: 600; 310; 30; 20 INJECTION, SOLUTION INTRAVENOUS at 09:07

## 2022-10-06 ASSESSMENT — FIBROSIS 4 INDEX: FIB4 SCORE: 0.7

## 2022-10-06 NOTE — OR NURSING
PATIENT ARRIVED FROM PACU WITH RECONVERTED A-FIB S/P CARDIOVERSION. DR. MACEDO TO CALL PATIENT AND FAMILY. PATIENT TO RESUME ELIQUIS. NO PAIN 0/10, NO OBVIOUS BLEEDING AT SHOCK SITE, REDNESS.     PIV REMOVED AT 1040.    WIFE AND PT BELONGING TO ROOM BY 1020    PATIENT STATES HE IS READY TO GO HOME. DISCHARGE INSTRUCTIONS PROVIDED TO PATIENT AND FAMILY.     PATIENT WHEELED TO CAR VIA WHEELCHAIR

## 2022-10-06 NOTE — ANESTHESIA PREPROCEDURE EVALUATION
Date/Time: 10/06/22 0900    Scheduled providers: Robert Torres M.D.; Virgil Odell M.D.    Procedure: CL-CARDIOVERSION    Diagnosis:       PAF (paroxysmal atrial fibrillation) (HCC) [I48.0]      Paroxysmal atrial fibrillation [I48.0]    Indications:       See Associated Dx      With Dr. JEFFERSON on cath lab day please, dont use cath slot    Location: [facility] - [facility]ARDIOLOGY [facility]        Anes H&P:  PAST MEDICAL HISTORY:   67 y.o. male who presents for Procedure(s):  CL-CARDIOVERSION WITH ANESTHESIA - DR. TORRES.  He has current and past medical problems significant for:    Past Medical History:   Diagnosis Date   • Arrhythmia     A FIB   • Arthritis     HANDS AND SHOULDERS   • Breath shortness     WITH EXERTION   • Cataract     IOL 5 YEARS AGO   • Dermatitis     chronic   • Heart burn    • High cholesterol        SMOKING/ALCOHOL/RECREATIONAL DRUG USE:  Social History     Tobacco Use   • Smoking status: Former     Packs/day: 1.00     Types: Cigarettes     Quit date: 2010     Years since quittin.0   • Smokeless tobacco: Never   Vaping Use   • Vaping Use: Never used   Substance Use Topics   • Alcohol use: Yes     Comment: occasional   • Drug use: No     Social History     Substance and Sexual Activity   Drug Use No       PAST SURGICAL HISTORY:  History reviewed. No pertinent surgical history.    ALLERGIES:   Allergies   Allergen Reactions   • Bee        MEDICATIONS:  No current facility-administered medications on file prior to encounter.     Current Outpatient Medications on File Prior to Encounter   Medication Sig Dispense Refill   • ELIQUIS 5 MG Tab 2 times a day.     • rosuvastatin (CRESTOR) 10 MG Tab every 48 hours.     • famotidine (PEPCID) 10 MG tablet Take 10 mg by mouth 2 times a day.     • metoprolol tartrate (LOPRESSOR) 50 MG Tab Take 1 Tablet by mouth 2 times a day. 180 Tablet 3       LABS:  Lab Results   Component Value Date/Time    HEMOGLOBIN 20.6 (H)  10/04/2022 0755    HEMATOCRIT 59.1 (H) 10/04/2022 0755    WBC 10.2 10/04/2022 0755     Lab Results   Component Value Date/Time    SODIUM 139 10/04/2022 0755    POTASSIUM 4.4 10/04/2022 0755    CHLORIDE 99 10/04/2022 0755    CO2 25 10/04/2022 0755    GLUCOSE 127 (H) 10/04/2022 0755    BUN 14 10/04/2022 0755    CALCIUM 9.6 10/04/2022 0755       SARS-CoV2 result: Negative      PREVIOUS ANESTHETICS: See EMR  __________________________________________    Relevant Problems   No relevant active problems       Physical Exam    Airway   Mallampati: II  TM distance: >3 FB  Neck ROM: full       Cardiovascular - normal exam  Rhythm: regular  Rate: normal  (-) murmur     Dental - normal exam        Facial Hair   Pulmonary - normal exam  Breath sounds clear to auscultation     Abdominal    Neurological - normal exam                 Anesthesia Plan    ASA 3   ASA physical status 3 criteria: MI or angina - history (> 3 months)    Plan - MAC               Induction: intravenous    Postoperative Plan: Postoperative administration of opioids is intended.    Pertinent diagnostic labs and testing reviewed    Informed Consent:    Anesthetic plan and risks discussed with patient.    Use of blood products discussed with: patient whom consented to blood products.

## 2022-10-06 NOTE — OR NURSING
0930: Patient arrived to PACU, confirmed patient with OR RN, aox4, VSS, called EKG per orders. Patient denies pain and nausea.    0945: EKG completed, notified Dr. Piper of results via vault, left voicemail to patient's spouse. Patient tolerating po fluids, VSS    1000: Telephone report to SASKIA Lazo. Dr. Piper at bedside, ok to discharge confirmed

## 2022-10-06 NOTE — PROCEDURES
PROCEDURE IN DETAIL: The procedure was explained to the patient with risks and benefits including risk of stroke. The patient understands.  The patient has been compliant with his oral anticoagulation.  The pads were applied in the anterior and posterior approach. With synchronized biphasic waveform at 200J, one shock was successful in restoring sinus rhythm.  The patient had no immediate post-procedure complications. The rhythm was maintained and 12-lead EKG was requested.    IMPRESSION: Successful direct current cardioversion with restoration of sinus rhythm from atrial fibrillation with no immediate complication.

## 2022-10-06 NOTE — DISCHARGE INSTRUCTIONS
HOME CARE INSTRUCTIONS    ACTIVITY: Rest and take it easy for the first 24 hours.  A responsible adult is recommended to remain with you during that time.  It is normal to feel sleepy.  We encourage you to not do anything that requires balance, judgment or coordination.    FOR 24 HOURS DO NOT:  Drive, operate machinery or run household appliances.  Drink beer or alcoholic beverages.  Make important decisions or sign legal documents.    SPECIAL INSTRUCTIONS: Electrical Cardioversion, Care After  This sheet gives you information about how to care for yourself after your procedure. Your health care provider may also give you more specific instructions. If you have problems or questions, contact your health care provider.  What can I expect after the procedure?  After the procedure, it is common to have:  Some redness on the skin where the shocks were given.  Follow these instructions at home:    Do not drive for 24 hours if you were given a medicine to help you relax (sedative).  Take over-the-counter and prescription medicines only as told by your health care provider.  Ask your health care provider how to check your pulse. Check it often.  Rest for 48 hours after the procedure or as told by your health care provider.  Avoid or limit your caffeine use as told by your health care provider.  Contact a health care provider if:  You feel like your heart is beating too quickly or your pulse is not regular.  You have a serious muscle cramp that does not go away.  Get help right away if:    You have discomfort in your chest.  You are dizzy or you feel faint.  You have trouble breathing or you are short of breath.  Your speech is slurred.  You have trouble moving an arm or leg on one side of your body.  Your fingers or toes turn cold or blue.  This information is not intended to replace advice given to you by your health care provider. Make sure you discuss any questions you have with your health care provider.  Document  Released: 10/08/2014 Document Revised: 11/30/2018 Document Reviewed: 06/23/2017  [employer] Patient Education © 2020 [employer]      DIET: To avoid nausea, slowly advance diet as tolerated, avoiding spicy or greasy foods for the first day.  Add more substantial food to your diet according to your physician's instructions.  Babies can be fed formula or breast milk as soon as they are hungry.  INCREASE FLUIDS AND FIBER TO AVOID CONSTIPATION.      MEDICATIONS: Resume taking daily medication.  Take prescribed pain medication with food.  If no medication is prescribed, you may take non-aspirin pain medication if needed.  PAIN MEDICATION CAN BE VERY CONSTIPATING.  Take a stool softener or laxative such as senokot, pericolace, or milk of magnesia if needed.    A follow-up appointment should be arranged with your doctor, call to schedule.    You should CALL YOUR PHYSICIAN if you develop:  Fever greater than 101 degrees F.  Pain not relieved by medication, or persistent nausea or vomiting.  Excessive bleeding (blood soaking through dressing) or unexpected drainage from the wound.  Extreme redness or swelling around the incision site, drainage of pus or foul smelling drainage.  Inability to urinate or empty your bladder within 8 hours.  Problems with breathing or chest pain.    You should call 911 if you develop problems with breathing or chest pain.  If you are unable to contact your doctor or surgical center, you should go to the nearest emergency room or urgent care center.  Physician's telephone #: [phone]    MILD FLU-LIKE SYMPTOMS ARE NORMAL.  YOU MAY EXPERIENCE GENERALIZED MUSCLE ACHES, THROAT IRRITATION, HEADACHE AND/OR SOME NAUSEA.    If any questions arise, call your doctor.  If your doctor is not available, please feel free to call the Surgical Center at [phone].  The Center is open Monday through Friday from 7AM to 7PM.      A registered nurse may call you a few days after your surgery to see how you are  doing after your procedure.    You may also receive a survey in the mail within the next two weeks and we ask that you take a few moments to complete the survey and return it to us.  Our goal is to provide you with very good care and we value your comments.     Depression / Suicide Risk    As you are discharged from this [facility] facility, it is important to learn how to keep safe from harming yourself.    Recognize the warning signs:  Abrupt changes in personality, positive or negative- including increase in energy   Giving away possessions  Change in eating patterns- significant weight changes-  positive or negative  Change in sleeping patterns- unable to sleep or sleeping all the time   Unwillingness or inability to communicate  Depression  Unusual sadness, discouragement and loneliness  Talk of wanting to die  Neglect of personal appearance   Rebelliousness- reckless behavior  Withdrawal from people/activities they love  Confusion- inability to concentrate     If you or a loved one observes any of these behaviors or has concerns about self-harm, here's what you can do:  Talk about it- your feelings and reasons for harming yourself  Remove any means that you might use to hurt yourself (examples: pills, rope, extension cords, firearm)  Get professional help from the community (Mental Health, Substance Abuse, psychological counseling)  Do not be alone:Call your Safe Contact- someone whom you trust who will be there for you.  Call your local CRISIS HOTLINE 109-8532 or 774-624-5195  Call your local Children's Mobile Crisis Response Team [place] (510) 314-6151 or www.[employer]  Call the toll free National Suicide Prevention Hotlines   National Suicide Prevention Lifeline 503-401-ETJE (8802)  [place] [facility] 800-SUICIDE (176-8515)    I acknowledge receipt and understanding of these Home Care instructions.

## 2022-10-06 NOTE — ANESTHESIA TIME REPORT
Anesthesia Start and Stop Event Times     Date Time Event    10/6/2022 0907 Ready for Procedure     0907 Anesthesia Start     0936 Anesthesia Stop        Responsible Staff  10/06/22    Name Role Begin End    Virgil Odell M.D. Anesth 0907 0936        Overtime Reason:  no overtime (within assigned shift)    Comments:

## 2022-10-06 NOTE — ANESTHESIA POSTPROCEDURE EVALUATION
Patient: Eduardo Aponte    Procedure Summary     Date: 10/06/22 Room / Location: [facility] - ECHOCARDIOLOGY [facility]    Anesthesia Start: 0907 Anesthesia Stop: 0936    Procedure: CL-CARDIOVERSION Diagnosis:       PAF (paroxysmal atrial fibrillation) (HCC)      Paroxysmal atrial fibrillation      (See Associated Dx)      (With Dr. JEFFERSON on cath lab day please, dont use cath slot)    Scheduled Providers: Robert Piper M.D.; Virgil Odell M.D. Responsible Provider: Virgil Odell M.D.    Anesthesia Type: MAC ASA Status: 3          Final Anesthesia Type: MAC  Last vitals  BP   Blood Pressure : 114/74    Temp   36.2 °C (97.1 °F)    Pulse   88   Resp   20    SpO2   96 %      Anesthesia Post Evaluation    Patient location during evaluation: PACU  Patient participation: complete - patient participated  Level of consciousness: awake and alert    Airway patency: patent  Anesthetic complications: no  Cardiovascular status: hemodynamically stable  Respiratory status: acceptable  Hydration status: euvolemic    PONV: none          No notable events documented.     Nurse Pain Score: 0 (NPRS)

## 2022-10-07 LAB — EKG IMPRESSION: NORMAL

## 2022-10-07 PROCEDURE — 93010 ELECTROCARDIOGRAM REPORT: CPT | Performed by: INTERNAL MEDICINE

## 2022-10-11 ENCOUNTER — APPOINTMENT (OUTPATIENT)
Dept: RADIOLOGY | Facility: MEDICAL CENTER | Age: 67
DRG: 273 | End: 2022-10-11
Attending: EMERGENCY MEDICINE
Payer: MEDICARE

## 2022-10-11 ENCOUNTER — HOSPITAL ENCOUNTER (INPATIENT)
Facility: MEDICAL CENTER | Age: 67
LOS: 3 days | DRG: 273 | End: 2022-10-14
Attending: EMERGENCY MEDICINE | Admitting: INTERNAL MEDICINE
Payer: MEDICARE

## 2022-10-11 DIAGNOSIS — I50.30 HEART FAILURE WITH PRESERVED EJECTION FRACTION, UNSPECIFIED HF CHRONICITY (HCC): ICD-10-CM

## 2022-10-11 DIAGNOSIS — J96.01 ACUTE RESPIRATORY FAILURE WITH HYPOXIA (HCC): ICD-10-CM

## 2022-10-11 DIAGNOSIS — I48.19 PERSISTENT ATRIAL FIBRILLATION (HCC): ICD-10-CM

## 2022-10-11 DIAGNOSIS — R07.9 CHEST PAIN, UNSPECIFIED TYPE: ICD-10-CM

## 2022-10-11 PROBLEM — D75.1 ERYTHROCYTOSIS: Status: ACTIVE | Noted: 2022-10-11

## 2022-10-11 PROBLEM — I48.91 A-FIB (HCC): Status: ACTIVE | Noted: 2022-10-11

## 2022-10-11 PROBLEM — I50.1 PULMONARY EDEMA CARDIAC CAUSE (HCC): Status: ACTIVE | Noted: 2022-10-11

## 2022-10-11 LAB
ALBUMIN SERPL BCP-MCNC: 4.4 G/DL (ref 3.2–4.9)
ALBUMIN/GLOB SERPL: 2.1 G/DL
ALP SERPL-CCNC: 61 U/L (ref 30–99)
ALT SERPL-CCNC: 37 U/L (ref 2–50)
ANION GAP SERPL CALC-SCNC: 8 MMOL/L (ref 7–16)
AST SERPL-CCNC: 21 U/L (ref 12–45)
BASOPHILS # BLD AUTO: 0.9 % (ref 0–1.8)
BASOPHILS # BLD: 0.12 K/UL (ref 0–0.12)
BILIRUB SERPL-MCNC: 0.5 MG/DL (ref 0.1–1.5)
BUN SERPL-MCNC: 16 MG/DL (ref 8–22)
CALCIUM SERPL-MCNC: 9.2 MG/DL (ref 8.5–10.5)
CHLORIDE SERPL-SCNC: 104 MMOL/L (ref 96–112)
CO2 SERPL-SCNC: 28 MMOL/L (ref 20–33)
CREAT SERPL-MCNC: 1.04 MG/DL (ref 0.5–1.4)
EKG IMPRESSION: NORMAL
EOSINOPHIL # BLD AUTO: 0.19 K/UL (ref 0–0.51)
EOSINOPHIL NFR BLD: 1.5 % (ref 0–6.9)
ERYTHROCYTE [DISTWIDTH] IN BLOOD BY AUTOMATED COUNT: 42.1 FL (ref 35.9–50)
GFR SERPLBLD CREATININE-BSD FMLA CKD-EPI: 79 ML/MIN/1.73 M 2
GLOBULIN SER CALC-MCNC: 2.1 G/DL (ref 1.9–3.5)
GLUCOSE SERPL-MCNC: 123 MG/DL (ref 65–99)
HCT VFR BLD AUTO: 54.4 % (ref 42–52)
HGB BLD-MCNC: 18.6 G/DL (ref 14–18)
IMM GRANULOCYTES # BLD AUTO: 0.04 K/UL (ref 0–0.11)
IMM GRANULOCYTES NFR BLD AUTO: 0.3 % (ref 0–0.9)
LYMPHOCYTES # BLD AUTO: 3.25 K/UL (ref 1–4.8)
LYMPHOCYTES NFR BLD: 25.1 % (ref 22–41)
MAGNESIUM SERPL-MCNC: 1.9 MG/DL (ref 1.5–2.5)
MCH RBC QN AUTO: 30.6 PG (ref 27–33)
MCHC RBC AUTO-ENTMCNC: 34.2 G/DL (ref 33.7–35.3)
MCV RBC AUTO: 89.6 FL (ref 81.4–97.8)
MONOCYTES # BLD AUTO: 1.24 K/UL (ref 0–0.85)
MONOCYTES NFR BLD AUTO: 9.6 % (ref 0–13.4)
NEUTROPHILS # BLD AUTO: 8.1 K/UL (ref 1.82–7.42)
NEUTROPHILS NFR BLD: 62.6 % (ref 44–72)
NRBC # BLD AUTO: 0 K/UL
NRBC BLD-RTO: 0 /100 WBC
NT-PROBNP SERPL IA-MCNC: 2428 PG/ML (ref 0–125)
PHOSPHATE SERPL-MCNC: 2.9 MG/DL (ref 2.5–4.5)
PLATELET # BLD AUTO: 295 K/UL (ref 164–446)
PMV BLD AUTO: 11.7 FL (ref 9–12.9)
POTASSIUM SERPL-SCNC: 4.7 MMOL/L (ref 3.6–5.5)
PROT SERPL-MCNC: 6.5 G/DL (ref 6–8.2)
RBC # BLD AUTO: 6.07 M/UL (ref 4.7–6.1)
SODIUM SERPL-SCNC: 140 MMOL/L (ref 135–145)
TROPONIN T SERPL-MCNC: 10 NG/L (ref 6–19)
TROPONIN T SERPL-MCNC: 11 NG/L (ref 6–19)
TSH SERPL DL<=0.005 MIU/L-ACNC: 2.8 UIU/ML (ref 0.38–5.33)
TSH SERPL DL<=0.005 MIU/L-ACNC: 3.73 UIU/ML (ref 0.38–5.33)
WBC # BLD AUTO: 12.9 K/UL (ref 4.8–10.8)

## 2022-10-11 PROCEDURE — 700111 HCHG RX REV CODE 636 W/ 250 OVERRIDE (IP): Performed by: INTERNAL MEDICINE

## 2022-10-11 PROCEDURE — 36415 COLL VENOUS BLD VENIPUNCTURE: CPT

## 2022-10-11 PROCEDURE — 84484 ASSAY OF TROPONIN QUANT: CPT

## 2022-10-11 PROCEDURE — 71045 X-RAY EXAM CHEST 1 VIEW: CPT

## 2022-10-11 PROCEDURE — 93005 ELECTROCARDIOGRAM TRACING: CPT

## 2022-10-11 PROCEDURE — 96374 THER/PROPH/DIAG INJ IV PUSH: CPT

## 2022-10-11 PROCEDURE — 700105 HCHG RX REV CODE 258: Performed by: EMERGENCY MEDICINE

## 2022-10-11 PROCEDURE — 84100 ASSAY OF PHOSPHORUS: CPT

## 2022-10-11 PROCEDURE — 96375 TX/PRO/DX INJ NEW DRUG ADDON: CPT

## 2022-10-11 PROCEDURE — 80053 COMPREHEN METABOLIC PANEL: CPT

## 2022-10-11 PROCEDURE — 85025 COMPLETE CBC W/AUTO DIFF WBC: CPT

## 2022-10-11 PROCEDURE — A9270 NON-COVERED ITEM OR SERVICE: HCPCS | Performed by: INTERNAL MEDICINE

## 2022-10-11 PROCEDURE — 84443 ASSAY THYROID STIM HORMONE: CPT

## 2022-10-11 PROCEDURE — 700111 HCHG RX REV CODE 636 W/ 250 OVERRIDE (IP): Performed by: EMERGENCY MEDICINE

## 2022-10-11 PROCEDURE — 92960 CARDIOVERSION ELECTRIC EXT: CPT

## 2022-10-11 PROCEDURE — 94660 CPAP INITIATION&MGMT: CPT

## 2022-10-11 PROCEDURE — 770022 HCHG ROOM/CARE - ICU (200)

## 2022-10-11 PROCEDURE — 74175 CTA ABDOMEN W/CONTRAST: CPT

## 2022-10-11 PROCEDURE — 99291 CRITICAL CARE FIRST HOUR: CPT

## 2022-10-11 PROCEDURE — 83735 ASSAY OF MAGNESIUM: CPT

## 2022-10-11 PROCEDURE — 83880 ASSAY OF NATRIURETIC PEPTIDE: CPT

## 2022-10-11 PROCEDURE — 93005 ELECTROCARDIOGRAM TRACING: CPT | Performed by: EMERGENCY MEDICINE

## 2022-10-11 PROCEDURE — 5A2204Z RESTORATION OF CARDIAC RHYTHM, SINGLE: ICD-10-PCS | Performed by: EMERGENCY MEDICINE

## 2022-10-11 PROCEDURE — 99291 CRITICAL CARE FIRST HOUR: CPT | Performed by: INTERNAL MEDICINE

## 2022-10-11 PROCEDURE — 700102 HCHG RX REV CODE 250 W/ 637 OVERRIDE(OP): Performed by: INTERNAL MEDICINE

## 2022-10-11 PROCEDURE — 99222 1ST HOSP IP/OBS MODERATE 55: CPT | Performed by: INTERNAL MEDICINE

## 2022-10-11 PROCEDURE — 700117 HCHG RX CONTRAST REV CODE 255: Performed by: EMERGENCY MEDICINE

## 2022-10-11 RX ORDER — MAGNESIUM SULFATE HEPTAHYDRATE 40 MG/ML
2 INJECTION, SOLUTION INTRAVENOUS ONCE
Status: COMPLETED | OUTPATIENT
Start: 2022-10-11 | End: 2022-10-11

## 2022-10-11 RX ORDER — FUROSEMIDE 10 MG/ML
40 INJECTION INTRAMUSCULAR; INTRAVENOUS ONCE
Status: COMPLETED | OUTPATIENT
Start: 2022-10-11 | End: 2022-10-11

## 2022-10-11 RX ORDER — SODIUM CHLORIDE 9 MG/ML
1000 INJECTION, SOLUTION INTRAVENOUS ONCE
Status: COMPLETED | OUTPATIENT
Start: 2022-10-11 | End: 2022-10-11

## 2022-10-11 RX ORDER — FUROSEMIDE 10 MG/ML
40 INJECTION INTRAMUSCULAR; INTRAVENOUS
Status: COMPLETED | OUTPATIENT
Start: 2022-10-11 | End: 2022-10-12

## 2022-10-11 RX ORDER — DILTIAZEM HYDROCHLORIDE 5 MG/ML
10 INJECTION INTRAVENOUS ONCE
Status: COMPLETED | OUTPATIENT
Start: 2022-10-11 | End: 2022-10-11

## 2022-10-11 RX ORDER — FAMOTIDINE 20 MG/1
10 TABLET, FILM COATED ORAL 2 TIMES DAILY
Status: DISCONTINUED | OUTPATIENT
Start: 2022-10-11 | End: 2022-10-13

## 2022-10-11 RX ORDER — ETOMIDATE 2 MG/ML
INJECTION INTRAVENOUS
Status: COMPLETED | OUTPATIENT
Start: 2022-10-11 | End: 2022-10-11

## 2022-10-11 RX ORDER — FLECAINIDE ACETATE 50 MG/1
50 TABLET ORAL 2 TIMES DAILY
Status: DISCONTINUED | OUTPATIENT
Start: 2022-10-11 | End: 2022-10-11

## 2022-10-11 RX ORDER — METOPROLOL TARTRATE 50 MG/1
50 TABLET, FILM COATED ORAL 2 TIMES DAILY
Status: DISCONTINUED | OUTPATIENT
Start: 2022-10-11 | End: 2022-10-14 | Stop reason: HOSPADM

## 2022-10-11 RX ORDER — ETOMIDATE 2 MG/ML
8 INJECTION INTRAVENOUS ONCE
Status: DISCONTINUED | OUTPATIENT
Start: 2022-10-11 | End: 2022-10-11

## 2022-10-11 RX ORDER — ROSUVASTATIN CALCIUM 10 MG/1
10 TABLET, COATED ORAL
Status: DISCONTINUED | OUTPATIENT
Start: 2022-10-11 | End: 2022-10-14 | Stop reason: HOSPADM

## 2022-10-11 RX ADMIN — FAMOTIDINE 10 MG: 20 TABLET, FILM COATED ORAL at 13:27

## 2022-10-11 RX ADMIN — APIXABAN 5 MG: 5 TABLET, FILM COATED ORAL at 17:57

## 2022-10-11 RX ADMIN — ETOMIDATE 8 MG: 2 INJECTION INTRAVENOUS at 10:29

## 2022-10-11 RX ADMIN — FENTANYL CITRATE 50 MCG: 50 INJECTION, SOLUTION INTRAMUSCULAR; INTRAVENOUS at 10:03

## 2022-10-11 RX ADMIN — AMIODARONE HYDROCHLORIDE 150 MG: 1.5 INJECTION, SOLUTION INTRAVENOUS at 10:41

## 2022-10-11 RX ADMIN — FUROSEMIDE 40 MG: 10 INJECTION, SOLUTION INTRAMUSCULAR; INTRAVENOUS at 17:57

## 2022-10-11 RX ADMIN — SODIUM CHLORIDE 1000 ML: 9 INJECTION, SOLUTION INTRAVENOUS at 08:48

## 2022-10-11 RX ADMIN — FUROSEMIDE 40 MG: 10 INJECTION, SOLUTION INTRAMUSCULAR; INTRAVENOUS at 10:50

## 2022-10-11 RX ADMIN — METOPROLOL TARTRATE 50 MG: 50 TABLET, FILM COATED ORAL at 17:57

## 2022-10-11 RX ADMIN — DILTIAZEM HYDROCHLORIDE 10 MG: 5 INJECTION INTRAVENOUS at 08:48

## 2022-10-11 RX ADMIN — IOHEXOL 100 ML: 350 INJECTION, SOLUTION INTRAVENOUS at 16:30

## 2022-10-11 RX ADMIN — MAGNESIUM SULFATE HEPTAHYDRATE 2 G: 40 INJECTION, SOLUTION INTRAVENOUS at 13:25

## 2022-10-11 RX ADMIN — ROSUVASTATIN CALCIUM 10 MG: 10 TABLET, FILM COATED ORAL at 20:46

## 2022-10-11 ASSESSMENT — ENCOUNTER SYMPTOMS
FEVER: 0
SHORTNESS OF BREATH: 1
SORE THROAT: 0
WEAKNESS: 0
DEPRESSION: 0
SPEECH CHANGE: 0
ORTHOPNEA: 1
BACK PAIN: 0
DIZZINESS: 0
TREMORS: 0
BLOOD IN STOOL: 0
HEMOPTYSIS: 0
ABDOMINAL PAIN: 1
COUGH: 0
NAUSEA: 0
SPUTUM PRODUCTION: 0
MYALGIAS: 0
CONSTIPATION: 0
PND: 1
EYE DISCHARGE: 0
WEIGHT LOSS: 0
HALLUCINATIONS: 0
CLAUDICATION: 0
SENSORY CHANGE: 0
BLURRED VISION: 0
FOCAL WEAKNESS: 0
BRUISES/BLEEDS EASILY: 0
TINGLING: 0

## 2022-10-11 ASSESSMENT — PATIENT HEALTH QUESTIONNAIRE - PHQ9
1. LITTLE INTEREST OR PLEASURE IN DOING THINGS: NOT AT ALL
SUM OF ALL RESPONSES TO PHQ9 QUESTIONS 1 AND 2: 0
2. FEELING DOWN, DEPRESSED, IRRITABLE, OR HOPELESS: NOT AT ALL

## 2022-10-11 ASSESSMENT — COGNITIVE AND FUNCTIONAL STATUS - GENERAL
MOBILITY SCORE: 24
SUGGESTED CMS G CODE MODIFIER MOBILITY: CH
DAILY ACTIVITIY SCORE: 24
SUGGESTED CMS G CODE MODIFIER DAILY ACTIVITY: CH

## 2022-10-11 ASSESSMENT — CHA2DS2 SCORE
CHA2DS2 VASC SCORE: 1
AGE 75 OR GREATER: NO
CHF OR LEFT VENTRICULAR DYSFUNCTION: NO
AGE 65 TO 74: YES
SEX: MALE
DIABETES: NO
HYPERTENSION: NO
VASCULAR DISEASE: NO
PRIOR STROKE OR TIA OR THROMBOEMBOLISM: NO

## 2022-10-11 ASSESSMENT — LIFESTYLE VARIABLES
HAVE YOU EVER FELT YOU SHOULD CUT DOWN ON YOUR DRINKING: NO
CONSUMPTION TOTAL: NEGATIVE
EVER HAD A DRINK FIRST THING IN THE MORNING TO STEADY YOUR NERVES TO GET RID OF A HANGOVER: NO
AVERAGE NUMBER OF DAYS PER WEEK YOU HAVE A DRINK CONTAINING ALCOHOL: 0
ON A TYPICAL DAY WHEN YOU DRINK ALCOHOL HOW MANY DRINKS DO YOU HAVE: 0
ALCOHOL_USE: NO
TOTAL SCORE: 0
EVER FELT BAD OR GUILTY ABOUT YOUR DRINKING: NO
TOTAL SCORE: 0
TOTAL SCORE: 0
EVER_SMOKED: YES
HOW MANY TIMES IN THE PAST YEAR HAVE YOU HAD 5 OR MORE DRINKS IN A DAY: 0
HAVE PEOPLE ANNOYED YOU BY CRITICIZING YOUR DRINKING: NO

## 2022-10-11 ASSESSMENT — COPD QUESTIONNAIRES
DO YOU EVER COUGH UP ANY MUCUS OR PHLEGM?: NO/ONLY WITH OCCASIONAL COLDS OR INFECTIONS
DURING THE PAST 4 WEEKS HOW MUCH DID YOU FEEL SHORT OF BREATH: NONE/LITTLE OF THE TIME
COPD SCREENING SCORE: 4
HAVE YOU SMOKED AT LEAST 100 CIGARETTES IN YOUR ENTIRE LIFE: YES

## 2022-10-11 ASSESSMENT — FIBROSIS 4 INDEX
FIB4 SCORE: 0.7
FIB4 SCORE: 0.77

## 2022-10-11 ASSESSMENT — PULMONARY FUNCTION TESTS: EPAP_CMH2O: 8

## 2022-10-11 NOTE — ASSESSMENT & PLAN NOTE
Hx of protracted failed 2x cardioversion  On metoprolol and flecainide  Cardiology consulting  Check TSH, replace mag  Tele monitoring  Force diuresis  Reduce adrenergic tone with bipap  May need to discuss with EP

## 2022-10-11 NOTE — ED NOTES
Bedside report given to SASKIA Barrow. Patient transferred to [place] [id number] in stable condition.

## 2022-10-11 NOTE — ED PROVIDER NOTES
ED Provider Note    CHIEF COMPLAINT  Chief Complaint   Patient presents with    Atrial Fibrillation     Pt reports to hx new dx of a-fib in Sept, Cardioversion w/shock on Thurs, pt states to still be in a-fib, new med (flecainide) making pt sick. Pt finds himself self to be more SOB, and did not take the Flecainide today.    Chest Pain       HPI  Eduardo Aponte is a 67 y.o. male who presents to the emergency department with complaint of atrial fibrillation.  He states that he had new onset of atrial fibrillation in September and had a cardioversion last week with Dr. Piper.  Unfortunately, cardioversion resulted in a sinus rhythm only for proxy 1 minute and without A. fib.  He is placed on metoprolol 50 mg twice daily as well as flecainide.  Since that time he said chest discomfort in the center of his chest, pain to his joints, weakness in his arms and legs diffusely, and increasing shortness of breath.  Here he states the pain is the same has had before for the last week, slight radiation to his back and his upper arm with no alleviating alleviating factors.  Denies fever, shakes, chills, sweats, nausea, vomiting, swelling of his lower extremities.  His last echocardiogram revealed evidence of an EF of 65% last week and a negative stress test.  He states that he does not like taking flecainide as he believes this is causing his symptoms and his last dose was last night although he took metoprolol 50 mg earlier today.  The patient is taking Eliquis for last 5 weeks and takes it twice daily    REVIEW OF SYSTEMS  Positives as above. Pertinent negatives include fever, nausea, vomiting, swelling of his lower extremities, abdominal pain  All other 10 review of systems are negative    PAST MEDICAL HISTORY  Past Medical History:   Diagnosis Date    Arrhythmia     A FIB    Arthritis     HANDS AND SHOULDERS    Breath shortness     WITH EXERTION    Cataract     IOL 5 YEARS AGO    Dermatitis     chronic    Heart burn      High cholesterol        FAMILY HISTORY  Noncontributory    SOCIAL HISTORY  Social History     Socioeconomic History    Marital status:    Tobacco Use    Smoking status: Former     Packs/day: 1.00     Types: Cigarettes     Quit date: 2010     Years since quittin.0    Smokeless tobacco: Never   Vaping Use    Vaping Use: Never used   Substance and Sexual Activity    Alcohol use: Yes     Comment: occasional    Drug use: No       SURGICAL HISTORY  History reviewed. No pertinent surgical history.    CURRENT MEDICATIONS  Home Medications       Reviewed by Petty Louis (Pharmacy Tech) on 10/11/22 at [employer] [facility]  Med List Status: Complete     Medication Last Dose Status   ELIQUIS 5 MG Tab 10/11/2022 Active   famotidine (PEPCID) 10 MG tablet 10/10/2022 Active   flecainide (TAMBOCOR) 50 MG tablet 10/10/2022 Active   metoprolol tartrate (LOPRESSOR) 50 MG Tab 10/11/2022 Active   rosuvastatin (CRESTOR) 10 MG Tab 10/9/2022 Active                    ALLERGIES  Allergies   Allergen Reactions    Bee        PHYSICAL EXAM  VITAL SIGNS: /72   Pulse 95   Temp 36.7 °C (98.1 °F) (Temporal)   Resp (!) 24   Ht 1.829 m (6')   Wt 83 kg (182 lb 15.7 oz)   SpO2 95%   BMI 24.82 kg/m²      Constitutional: Well developed, Well nourished, No acute distress, Non-toxic appearance.   Eyes: PERRLA, EOMI, Conjunctiva normal, No discharge.   Cardiovascular: Tachycardic with irregular irregular rhythm, no murmurs, No rubs, No gallops, and intact distal pulses.   Thorax & Lungs:  No respiratory distress, slight rales and rhonchi bilateral lower lung field, no chest wall tenderness.   Abdomen: Bowel sounds normal, Soft, No tenderness, No guarding, No rebound, No pulsatile masses.   Skin: Warm, Dry, No erythema, No rash.   Extremities: Full range of motion, no deformity, no pedal edema.  Neurologic: Alert & oriented x 3, No focal deficits noted, acting appropriately on exam.  Psychiatric: Affect normal for clinical  presentation.      Results for orders placed or performed during the hospital encounter of 10/11/22   CBC with Differential   Result Value Ref Range    WBC 12.9 (H) 4.8 - 10.8 K/uL    RBC 6.07 4.70 - 6.10 M/uL    Hemoglobin 18.6 (H) 14.0 - 18.0 g/dL    Hematocrit 54.4 (H) 42.0 - 52.0 %    MCV 89.6 81.4 - 97.8 fL    MCH 30.6 27.0 - 33.0 pg    MCHC 34.2 33.7 - 35.3 g/dL    RDW 42.1 35.9 - 50.0 fL    Platelet Count 295 164 - 446 K/uL    MPV 11.7 9.0 - 12.9 fL    Neutrophils-Polys 62.60 44.00 - 72.00 %    Lymphocytes 25.10 22.00 - 41.00 %    Monocytes 9.60 0.00 - 13.40 %    Eosinophils 1.50 0.00 - 6.90 %    Basophils 0.90 0.00 - 1.80 %    Immature Granulocytes 0.30 0.00 - 0.90 %    Nucleated RBC 0.00 /100 WBC    Neutrophils (Absolute) 8.10 (H) 1.82 - 7.42 K/uL    Lymphs (Absolute) 3.25 1.00 - 4.80 K/uL    Monos (Absolute) 1.24 (H) 0.00 - 0.85 K/uL    Eos (Absolute) 0.19 0.00 - 0.51 K/uL    Baso (Absolute) 0.12 0.00 - 0.12 K/uL    Immature Granulocytes (abs) 0.04 0.00 - 0.11 K/uL    NRBC (Absolute) 0.00 K/uL   Complete Metabolic Panel (CMP)   Result Value Ref Range    Sodium 140 135 - 145 mmol/L    Potassium 4.7 3.6 - 5.5 mmol/L    Chloride 104 96 - 112 mmol/L    Co2 28 20 - 33 mmol/L    Anion Gap 8.0 7.0 - 16.0    Glucose 123 (H) 65 - 99 mg/dL    Bun 16 8 - 22 mg/dL    Creatinine 1.04 0.50 - 1.40 mg/dL    Calcium 9.2 8.5 - 10.5 mg/dL    AST(SGOT) 21 12 - 45 U/L    ALT(SGPT) 37 2 - 50 U/L    Alkaline Phosphatase 61 30 - 99 U/L    Total Bilirubin 0.5 0.1 - 1.5 mg/dL    Albumin 4.4 3.2 - 4.9 g/dL    Total Protein 6.5 6.0 - 8.2 g/dL    Globulin 2.1 1.9 - 3.5 g/dL    A-G Ratio 2.1 g/dL   Troponin   Result Value Ref Range    Troponin T 11 6 - 19 ng/L   MAGNESIUM   Result Value Ref Range    Magnesium 1.9 1.5 - 2.5 mg/dL   proBrain Natriuretic Peptide, NT   Result Value Ref Range    NT-proBNP 2428 (H) 0 - 125 pg/mL   PHOSPHORUS   Result Value Ref Range    Phosphorus 2.9 2.5 - 4.5 mg/dL   ESTIMATED GFR   Result Value Ref Range     GFR (CKD-EPI) 79 >60 mL/min/1.73 m 2   EKG   Result Value Ref Range    Report       [facility] Emergency Dept[facility]    Test Date:  2022-10-11  Pt Name:    [facility]              Department: ER  MRN:        [id number]                      Room:  Gender:     Male                         Technician: [id number]  :        1955                   Requested By:ER TRIAGE PROTOCOL  Order #:    [id number]                    Reading MD: GLORIA LUBIN DO    Measurements  Intervals                                Axis  Rate:       121                          P:  TN:                                      QRS:        29  QRSD:       92                           T:          51  QT:         360  QTc:        511    Interpretive Statements  Atrial fibrillation  Borderline prolonged QT interval  Compared to ECG 10/06/2022 09:42:09  No significant changes  Electronically Signed On 10- 8:21:01 PDT by GLORIA LUBIN DO     EKG (NOW)   Result Value Ref Range    Report       [facility] Emergency Dept.    Test Date:  2022-10-11  Pt Name:    [facility]              Department: ER  MRN:        [id number]                      Room:       RD 10  Gender:     Male                         Technician: [id number]  :        1955                   Requested By:GLORIA LUBIN  Order #:    [id number]                    Reading MD:    Measurements  Intervals                                Axis  Rate:       83                           P:  TN:                                      QRS:        13  QRSD:       92                           T:          40  QT:         364  QTc:        428    Interpretive Statements  Atrial fibrillation  Low voltage, extremity leads  Minimal ST elevation, inferior leads  Compared to ECG 10/11/2022 07:43:16  Low QRS voltage now present  ST (T wave) deviation now present     EKG   Result Value Ref Range    Report       [facility]  Emergency Dept.    Test Date:  2022-10-11  Pt Name:    FABIANO TRONCOSO              Department: ER  MRN:        [id number]                      Room:       RD 10  Gender:     Male                         Technician: [id number]  :        1955                   Requested By:GLORIA LUBIN  Order #:    [id number]                    Reading MD:    Measurements  Intervals                                Axis  Rate:       107                          P:  TN:                                      QRS:        -13  QRSD:       145                          T:          -63  QT:         383  QTc:        511    Interpretive Statements  Atrial fibrillation  Nonspecific intraventricular conduction delay  Borderline abnrm T, anterolateral leads  Artifact in lead(s) I,III,aVR,aVL,aVF,V5  Compared to ECG 10/11/2022 09:30:57  Intraventricular conduction delay now present  ST (T wave) deviation no longer present           RADIOLOGY/PROCEDURES  CT-CTA COMPLETE THORACOABDOMINAL AORTA   Final Result      1.  No aortic aneurysm or dissection identified.   2.  Mild to moderate diffuse atherosclerosis of the aorta and its branch vessels.   3.  Severe diffuse interstitial pulmonary edema.   4.  Diverticulosis without evidence of diverticulitis.                        DX-CHEST-PORTABLE (1 VIEW)   Final Result      Bilateral interstitial edema. No focal consolidation or pleural effusions.        Conscious Sedation Procedure Note    Indication: cardioversion    Consent: I have discussed with the patient and/or the patient representative the indication, alternatives, and the possible risks and/or complications of the planned procedure and the anesthesia methods. The patient and/or patient representative appear to understand and agree to proceed.  Verbal and written consent obtained    Physician Involvement: The attending physician was present and supervising this procedure.    Pre-Sedation Documentation and Exam: I have personally completed a  history, physical exam & review of systems for this patient (see notes).  Vital signs have been reviewed (see flow sheet for vitals).  I have reviewed the patient's history and review of systems.  Airway Assessment: normal  f3  Prior History of Anesthesia Complications: none    ASA Classification: Class 3 - A patient with severe systemic disease that limits activity but is not incapacitating    Sedation/ Anesthesia Plan: intravenous sedation    Medications Used: etomidate intravenously    Monitoring and Safety: The patient was placed on a cardiac monitor and vital signs, pulse oximetry and level of consciousness were continuously evaluated throughout the procedure. The patient was closely monitored until recovery from the medications was complete and the patient had returned to baseline status. Respiratory therapy was on standby at all times during the procedure.      (The following sections must be completed)  Post-Sedation Vital Signs: Vital signs were reviewed and were stable after the procedure (see flow sheet for vitals)            Intraservice Time: Greater than 10 minutes    Post-Sedation Exam: Lungs: Slight rales and rhonchi bilaterally and Cardiovascular: Tachycardic, irregular rhythm           Complications: The patient had profound myoclonus for approximately 2 minutes, no respiratory distress, following that, the patient became more sedate muscle tone normalized    I provided both the sedation and procedure, a nurse was present at the bedside for the entire procedure.     Cardioversion Procedure Note    Indication: atrial fibrillation    Consent: The patient was counseled regarding the procedure, its indications, risks, potential complications and alternatives, and any questions were answered. Consent was obtained to proceed.    Pre-Medication: etomidate 8mg intravenously    Procedure: The patient was placed in the supine position and the chest area was exposed. The cardioversion pads were applied in the  standard manner and configuration.    Attempt #1: The defibrillator was set on the synchronous mode and charged to 200 joules.  A charge was then delivered which resulted in conversion to normal sinus rhythm for approximately 2 minutes then back into atrial fibrillation with RVR    The patient tolerated the procedure  profound myoclonus with etomidate                                                                               .    Complications: Arrhythmia returned with atrial fibrillation after approximate 2 minutes of sinus rhythm.          COURSE & MEDICAL DECISION MAKING  Pertinent Labs & Imaging studies reviewed. (See chart for details)  This is a pleasant 67-year-old gentleman presents with atrial fibrillation with RVR, chest pain, shortness of breath.  Initially reviewed the patient's ejection fraction from last week was 65% and therefore I ordered IV fluids, diltiazem 10 mg IV to slow down his heart rate.  He had a transient hypotensive episode with his blood pressure dropping to 76 systolic but shortly thereafter bounced back up in the 100 teen range.  After the patient's blood pressure was back to normal and the patient's heart rate was normal, he started experiencing severe pain rating to his back and his left arm.  At that time he did notify me that his dad  from an aortic abnormality.  Code aorta was called.  The patient with a CT scan for a CTA thoracicoabdominal evaluation that was negative for acute aortic dissection or aneurysm.  He does have evidence of severe interstitial edema.  IV fluids were stopped after 500 mL.  I did discuss the patient with Dr. Horvath with cardiology who recommends cardioversion of this patient secondary to his increased edema as well as his transient hypotension.  For this reason, cardioversion was attempted.  He did cardiovert to sinus rhythm for approximately 2 minutes but then went back into  atrial fibrillation.  I have started the patient on amiodarone 150 mg  IV for rate control and possible rhythm control.  I discussed the patient Dr. Horvath at 10:45 AM who is at the patient's bedside for evaluation.  I have given the patient Lasix 40 mg IV secondary to his severe pulmonary edema.  After evaluating the patient, Dr. Cliff Guzman states the patient is decompensating with creased work of breathing and hypoxia is recommending BiPAP.  That reason, BiPAP was ordered emergently at 10:55 AM I discussed the patient with Dr. Rao who graciously excepts hospitalization of this patient.  He is placed on BiPAP and is breathing much better.    CRITICAL CARE  The very real possibilty of a deterioration of this patient's condition required the highest level of my preparedness for sudden, emergent intervention.  I provided critical care services, which included medication orders, frequent reevaluations of the patient's condition and response to treatment, ordering and reviewing test results, and discussing the case with various consultants.  The critical care time associated with the care of the patient was 50 minutes. Review chart for interventions. This time is exclusive of any other billable procedures.      FINAL IMPRESSION  Atrial fibrillation with RVR  Acute uncompensated congestive heart failure  Hypoxia  Procedural sedation  Cardioversion  Critical care time 50 minutes           Electronically signed by: Kris Garcia D.O., 10/11/2022 8:22 AM

## 2022-10-11 NOTE — ED NOTES
Rounded with patient, resting in gurney. Work of breathing decreased and patient denies feeling SOB. Vital signs have improved. Patient in stable condition.

## 2022-10-11 NOTE — ED NOTES
Respiratory at bedside. Bipap in place. Oxygen saturation improved to 92%. Patient tolerating well.

## 2022-10-11 NOTE — CONSULTS
Critical Care Consultation    Date of consult: 10/11/2022    Referring Physician  Kris Garcia M.D.    Reason for Consultation  Afib w/ rvr, bipap    History of Presenting Illness  67 y.o. male who presented 10/11/2022 with prior tobacco abuse, afib that has been followed closely by cardiology. He recently was admitted for cardioversion without success and went back into afib 1 minute later so he was placed on flecainide. He since feels like he has been allergic to the flecainide causing him belly pain and fatigue. He then last night was unable to sleep since he was short of breath in bed could lay back or he likes to sleep on his side. He presented today in afib w/ rvr was given diltiazem and some fluids. He then became unstable with hypotension was cardioverted and lasted 2 minutes then went back into afib. There was concerns of aortic dissection and CTA chest and abdomen and no acute findings other then pulmonary edema, diverticulosis and atherosclerosis. He was given amiodarone bolus, lasix and cardiology consulted Dr Felipe Reyes. He was placed on bipap and I was consulted for further evaluation. Patient would like to be full code.     Code Status  Full Code    Review of Systems  Review of Systems   Constitutional:  Positive for malaise/fatigue. Negative for fever and weight loss.   HENT:  Negative for nosebleeds and sore throat.    Eyes:  Negative for blurred vision and discharge.   Respiratory:  Positive for shortness of breath. Negative for cough, hemoptysis and sputum production.    Cardiovascular:  Positive for orthopnea and PND. Negative for chest pain, claudication and leg swelling.   Gastrointestinal:  Positive for abdominal pain. Negative for blood in stool, constipation and nausea.   Genitourinary:  Negative for dysuria, frequency and hematuria.   Musculoskeletal:  Negative for back pain, joint pain and myalgias.   Neurological:  Negative for dizziness, tingling, tremors, sensory change, speech  change, focal weakness and weakness.   Endo/Heme/Allergies:  Does not bruise/bleed easily.   Psychiatric/Behavioral:  Negative for depression and hallucinations.      Past Medical History   has a past medical history of Arrhythmia, Arthritis, Breath shortness, Cataract, Dermatitis, Heart burn, and High cholesterol.    He has no past medical history of GERD (gastroesophageal reflux disease) or Ulcer.    Surgical History   has no past surgical history on file.    Family History  family history is not on file.    Social History   reports that he quit smoking about 12 years ago. His smoking use included cigarettes. He smoked an average of 1 pack per day. He has never used smokeless tobacco. He reports current alcohol use. He reports that he does not use drugs.    Medications  Home Medications       Reviewed by Petty Louis (Pharmacy Tech) on 10/11/22 at [facility]  Med List Status: Complete     Medication Last Dose Status   ELIQUIS 5 MG Tab 10/11/2022 Active   famotidine (PEPCID) 10 MG tablet 10/10/2022 Active   flecainide (TAMBOCOR) 50 MG tablet 10/10/2022 Active   metoprolol tartrate (LOPRESSOR) 50 MG Tab 10/11/2022 Active   rosuvastatin (CRESTOR) 10 MG Tab 10/9/2022 Active                  Current Facility-Administered Medications   Medication Dose Route Frequency Provider Last Rate Last Admin    furosemide (LASIX) injection 40 mg  40 mg Intravenous BID DIURETIC Kennedy Rao M.D.        apixaban (ELIQUIS) tablet 5 mg  5 mg Oral BID Kennedy Rao M.D.        famotidine (PEPCID) tablet 10 mg  10 mg Oral BID Kennedy Rao M.D.        flecainide (TAMBOCOR) tablet 50 mg  50 mg Oral BID Kennedy Rao M.D.        metoprolol tartrate (LOPRESSOR) tablet 50 mg  50 mg Oral BID Kennedy Rao M.D.        rosuvastatin (CRESTOR) tablet 10 mg  10 mg Oral Q48HRS Kennedy Rao M.D.        magnesium sulfate IVPB premix 2 g  2 g Intravenous Once Kennedy Rao M.D.           Allergies  Allergies   Allergen  Reactions    Bee        Vital Signs last 24 hours  Temp:  [36.5 °C (97.7 °F)-36.7 °C (98.1 °F)] 36.5 °C (97.7 °F)  Pulse:  [] 98  Resp:  [15-39] 16  BP: ()/() 114/81  SpO2:  [88 %-99 %] 96 %    Physical Exam  Physical Exam  Vitals and nursing note reviewed.   Constitutional:       General: He is not in acute distress.     Appearance: Normal appearance. He is not ill-appearing.      Comments: Patient sitting up on Bipap   HENT:      Head: Normocephalic.      Nose: No congestion.      Mouth/Throat:      Mouth: Mucous membranes are moist.   Eyes:      Pupils: Pupils are equal, round, and reactive to light.   Cardiovascular:      Rate and Rhythm: Normal rate. Rhythm irregular.      Heart sounds: No murmur heard.    No friction rub.   Pulmonary:      Effort: No respiratory distress.      Breath sounds: No stridor. No wheezing, rhonchi or rales.   Abdominal:      General: There is no distension.      Palpations: There is no mass.      Tenderness: There is no abdominal tenderness. There is no guarding or rebound.      Hernia: No hernia is present.   Musculoskeletal:         General: No swelling or tenderness.      Cervical back: No rigidity.   Skin:     Coloration: Skin is not jaundiced or pale.   Neurological:      Mental Status: He is alert and oriented to person, place, and time.      Cranial Nerves: No cranial nerve deficit.      Sensory: No sensory deficit.   Psychiatric:         Mood and Affect: Mood normal.       Fluids    Intake/Output Summary (Last 24 hours) at 10/11/2022 1306  Last data filed at 10/11/2022 1204  Gross per 24 hour   Intake --   Output 150 ml   Net -150 ml       Laboratory  Recent Results (from the past 48 hour(s))   EKG    Collection Time: 10/11/22  7:43 AM   Result Value Ref Range    Report       [facility] Emergency Dept[facility]    Test Date:  2022-10-11  Pt Name:    FABIANO TRONCOSO              Department: ER  MRN:        [id number]                       Room:  Gender:     Male                         Technician: [id number]  :        1955                   Requested By:ER TRIAGE PROTOCOL  Order #:    [id number]                    Reading MD: GLORIA LUBIN DO    Measurements  Intervals                                Axis  Rate:       121                          P:  MD:                                      QRS:        29  QRSD:       92                           T:          51  QT:         360  QTc:        511    Interpretive Statements  Atrial fibrillation  Borderline prolonged QT interval  Compared to ECG 10/06/2022 09:42:09  No significant changes  Electronically Signed On 10- 8:21:01 PDT by GLORIA LUBIN DO     CBC with Differential    Collection Time: 10/11/22  7:57 AM   Result Value Ref Range    WBC 12.9 (H) 4.8 - 10.8 K/uL    RBC 6.07 4.70 - 6.10 M/uL    Hemoglobin 18.6 (H) 14.0 - 18.0 g/dL    Hematocrit 54.4 (H) 42.0 - 52.0 %    MCV 89.6 81.4 - 97.8 fL    MCH 30.6 27.0 - 33.0 pg    MCHC 34.2 33.7 - 35.3 g/dL    RDW 42.1 35.9 - 50.0 fL    Platelet Count 295 164 - 446 K/uL    MPV 11.7 9.0 - 12.9 fL    Neutrophils-Polys 62.60 44.00 - 72.00 %    Lymphocytes 25.10 22.00 - 41.00 %    Monocytes 9.60 0.00 - 13.40 %    Eosinophils 1.50 0.00 - 6.90 %    Basophils 0.90 0.00 - 1.80 %    Immature Granulocytes 0.30 0.00 - 0.90 %    Nucleated RBC 0.00 /100 WBC    Neutrophils (Absolute) 8.10 (H) 1.82 - 7.42 K/uL    Lymphs (Absolute) 3.25 1.00 - 4.80 K/uL    Monos (Absolute) 1.24 (H) 0.00 - 0.85 K/uL    Eos (Absolute) 0.19 0.00 - 0.51 K/uL    Baso (Absolute) 0.12 0.00 - 0.12 K/uL    Immature Granulocytes (abs) 0.04 0.00 - 0.11 K/uL    NRBC (Absolute) 0.00 K/uL   Complete Metabolic Panel (CMP)    Collection Time: 10/11/22  7:57 AM   Result Value Ref Range    Sodium 140 135 - 145 mmol/L    Potassium 4.7 3.6 - 5.5 mmol/L    Chloride 104 96 - 112 mmol/L    Co2 28 20 - 33 mmol/L    Anion Gap 8.0 7.0 - 16.0    Glucose 123 (H) 65 - 99 mg/dL    Bun 16 8 - 22  mg/dL    Creatinine 1.04 0.50 - 1.40 mg/dL    Calcium 9.2 8.5 - 10.5 mg/dL    AST(SGOT) 21 12 - 45 U/L    ALT(SGPT) 37 2 - 50 U/L    Alkaline Phosphatase 61 30 - 99 U/L    Total Bilirubin 0.5 0.1 - 1.5 mg/dL    Albumin 4.4 3.2 - 4.9 g/dL    Total Protein 6.5 6.0 - 8.2 g/dL    Globulin 2.1 1.9 - 3.5 g/dL    A-G Ratio 2.1 g/dL   Troponin    Collection Time: 10/11/22  7:57 AM   Result Value Ref Range    Troponin T 11 6 - 19 ng/L   MAGNESIUM    Collection Time: 10/11/22  7:57 AM   Result Value Ref Range    Magnesium 1.9 1.5 - 2.5 mg/dL   proBrain Natriuretic Peptide, NT    Collection Time: 10/11/22  7:57 AM   Result Value Ref Range    NT-proBNP 2428 (H) 0 - 125 pg/mL   PHOSPHORUS    Collection Time: 10/11/22  7:57 AM   Result Value Ref Range    Phosphorus 2.9 2.5 - 4.5 mg/dL   ESTIMATED GFR    Collection Time: 10/11/22  7:57 AM   Result Value Ref Range    GFR (CKD-EPI) 79 >60 mL/min/1.73 m 2   TSH WITH REFLEX TO FT4    Collection Time: 10/11/22  7:57 AM   Result Value Ref Range    TSH 3.730 0.380 - 5.330 uIU/mL   EKG (NOW)    Collection Time: 10/11/22  9:30 AM   Result Value Ref Range    Report       [facility] Emergency Dept.    Test Date:  2022-10-11  Pt Name:    FABIANO TRONCOSO              Department: ER  MRN:        [id number]                      Room:        10  Gender:     Male                         Technician: [id number]  :        1955                   Requested By:GLORIA LUBIN  Order #:    [id number]                    Reading MD: GLORIA LUBIN, DO    Measurements  Intervals                                Axis  Rate:       83                           P:  ID:                                      QRS:        13  QRSD:       92                           T:          40  QT:         364  QTc:        428    Interpretive Statements  Atrial fibrillation  Low voltage, extremity leads  Minimal ST elevation, inferior leads  Compared to ECG 10/11/2022 07:43:16  Low QRS voltage now  present  ST (T wave) deviation now present  Electronically Signed On 10- 10:46:52 PDT by GLORIA LUBIN DO     EKG    Collection Time: 10/11/22 10:31 AM   Result Value Ref Range    Report       [facility] Emergency Dept.    Test Date:  2022-10-11  Pt Name:    FABIANO TRONCOSO              Department: ER  MRN:        [id number]                      Room:        10  Gender:     Male                         Technician: [id number]  :        1955                   Requested By:GLORIA LUBIN  Order #:    [id number]                    Reading MD: GLORIA LUBIN DO    Measurements  Intervals                                Axis  Rate:       107                          P:  MS:                                      QRS:        -13  QRSD:       145                          T:          -63  QT:         383  QTc:        511    Interpretive Statements  Atrial fibrillation  Nonspecific intraventricular conduction delay  Borderline abnrm T, anterolateral leads  Artifact in lead(s) I,III,aVR,aVL,aVF,V5  Compared to ECG 10/11/2022 09:30:57  Intraventricular conduction delay now present  ST (T wave) deviation no longer present  Electronically Signed On 10-11-20 22 10:46:48 PDT by GLORIA LUBIN DO     TROPONIN    Collection Time: 10/11/22 11:00 AM   Result Value Ref Range    Troponin T 10 6 - 19 ng/L       Imaging  CT scan and CXR, EKG    Assessment/Plan  A-fib (HCC)  Assessment & Plan  Hx of protracted failed 2x cardioversion  On metoprolol and flecainide  Cardiology consulting  Check TSH, replace mag  Tele monitoring  Force diuresis  Reduce adrenergic tone with bipap  May need to discuss with EP    Acute respiratory failure with hypoxia (HCC)  Assessment & Plan  Related to pulmonary edema related to afib with RVR  Force diuresis  Bipap PRN was able to remove in ER monitor need to restart  Oxy mask at 10L  Rate control afib    Erythrocytosis  Assessment & Plan  Hx of tobacco  abuse  Serial monitor   Currently hypoxic so workup negated      Discussed patient condition and risk of morbidity and/or mortality with RN, RT, Code status disscussed, Patient, and cardiology.    The patient remains critically ill from afib w/ RVR and bipap therapy.  Critical care time = 55 minutes in directly providing and coordinating critical care and extensive data review.  No time overlap and excludes procedures.

## 2022-10-11 NOTE — ED NOTES
Patient's wife called RN to room for worsening chest pain and SOB. Patient oxygen saturation at 86% on 6L. Switched oxygen delivery device to oxymask at 10L, improved to 90%.

## 2022-10-11 NOTE — ASSESSMENT & PLAN NOTE
Related to pulmonary edema related to afib with RVR  Force diuresis  Bipap PRN was able to remove in ER monitor need to restart  Oxy mask at 10L  Rate control afib

## 2022-10-11 NOTE — ED NOTES
Patient complaining of increased SOB, increased oxygen to 15L oxymask. Cardiologist ordered for bipap. Respiratory therapist called.

## 2022-10-11 NOTE — CARE PLAN
Problem: Pain - Standard  Goal: Alleviation of pain or a reduction in pain to the patient’s comfort goal  Outcome: Progressing     Problem: Knowledge Deficit - Standard  Goal: Patient and family/care givers will demonstrate understanding of plan of care, disease process/condition, diagnostic tests and medications  Outcome: Progressing   The patient is Stable - Low risk of patient condition declining or worsening         Progress made toward(s) clinical / shift goals:  Discussed plan of care and pain management with patient. Patient understands and agrees with plan

## 2022-10-11 NOTE — CONSULTS
"      CARDIOLOGY CONSULTATION NOTE      Date of Consultation: 10/11/2022  Consulting Provider: Kris Garcia MD    Patient Name: Eduardo Aponte    YOB: 1955  MRN: [id number]    Reason for Consultation:   Symptomatic A fib with RVR and hypotension    History of Present Illness:   Eduardo Aponte is a 67-year-old male followed in our clinic with  for paroxysmal atrial fibrillation last seen on 22. Also with polycythemia, dyslipidemia, prediabetes, prior smoker recently quit 2022, his metoprolol was increased to 50 mg twice daily after his recent cardiology visit, with briefly successful cardioversion on 10/6/2022, with recent echocardiogram showing normal LVEF with moderate MR, signs of pulmonary hypertension and a nuclear stress test without evidence of ischemia, is presenting with recurrent symptomatic atrial fibrillation on flecainide and anticoagulation, intermittently hypotensive in the ER down to 79 mmHg systolic, on 2L-8L oxygen supplementation.  He was found to have elevated NT proBNP at 2428 with normal troponin T.    He reports feeling unwell over last week or so, worsening SOB with any exertion, orthopnea, low appetite, palpitations, occasional chest and abdominal pain with back pain. Also noticed feeling more \"puffy\" last few days.     He attributes not feeling well partially to flecainide and prefers not to resume it. Adherent to Apixaban BID.    In the ER, was given 500ml IV fluids, 10mg IV diltiazem, tachypneic, unsuccessful DCCV x 1, rates 100's in A flutter. Given 40mg IV lasix and 150mg IV amiodarone bolus.    Wife at bedside.    CTA in ER excluded Ao dissection (his father  of AAA rupture in his 80's).    No TSH value available for my review.    Family History: father with AAA,  in his 80's.    Medical History:     Past Medical History:   Diagnosis Date    Arrhythmia     A FIB    Arthritis     HANDS AND SHOULDERS    Breath shortness     WITH " EXERTION    Cataract     IOL 5 YEARS AGO    Dermatitis     chronic    Heart burn     High cholesterol        Surgical History:   History reviewed. No pertinent surgical history.    Family History:   No family history on file.    Social History:    reports that he quit smoking about 12 years ago. His smoking use included cigarettes. He smoked an average of 1 pack per day. He has never used smokeless tobacco. He reports current alcohol use. He reports that he does not use drugs.  The patient is a prior smoker.    Medications and Allergies:     No current facility-administered medications for this encounter.     Current Outpatient Medications   Medication Sig Dispense Refill    flecainide (TAMBOCOR) 50 MG tablet Take 1 Tablet by mouth 2 times a day. 60 Tablet 5    ELIQUIS 5 MG Tab 2 times a day.      rosuvastatin (CRESTOR) 10 MG Tab every 48 hours.      famotidine (PEPCID) 10 MG tablet Take 10 mg by mouth 2 times a day.      metoprolol tartrate (LOPRESSOR) 50 MG Tab Take 1 Tablet by mouth 2 times a day. 180 Tablet 3       Allergies   Allergen Reactions    Bee        Review of Systems:   A pertinent review of systems was performed and was unremarkable except as per HPI above.    Vital Signs:   BP (!) 79/63   Pulse 91   Temp 36.7 °C (98.1 °F) (Temporal)   Resp (!) 21   Ht 1.829 m (6')   Wt 83 kg (182 lb 15.7 oz)   SpO2 90%   BMI 24.82 kg/m²   Vitals:    10/11/22 0747 10/11/22 0818 10/11/22 0820 10/11/22 0907   BP:   (!) 87/69 (!) 79/63   Pulse:   (!) 101 91   Resp:  16  (!) 21   Temp:       TempSrc:       SpO2:   92% 90%   Weight: 83 kg (182 lb 15.7 oz)      Height:         Body mass index is 24.82 kg/m².  Oxygen Therapy:  Pulse Oximetry: 90 %, O2 (LPM): 3, O2 Delivery Device: Nasal Cannula  Physical Exam    Physical Examination:     General: Ill appearing, tachypneic  HEENT: EOM grossly intact, no scleral icterus, no pharyngeal erythema.   Neck:  + JVD, no bruits, trachea midline  Cardiovascular: irregular rate  and rhythm Normal S1, S2. Grade 2/6 holosystolic murmur around the apex, no R/G. No LE edema.  2+ radial pulses  Pulmonary: CTAB. No wheezing but with bibasilar crackles. tachypneic  Abdomen: Soft, NT, no darlene hepatomegaly.  MSK/Ext: No clubbing or cyanosis.  Skin: Warm and dry, no rashes.  Neurological: CN III-XII grossly intact. No gross focal motor deficits.   Psych: A&O x 3, appropriate affect.    Laboratories:   Estimated Creatinine Clearance: 76.7 mL/min (by C-G formula based on SCr of 1.04 mg/dL).  Recent Labs     10/11/22  0757   CREATININE 1.04   BUN 16   POTASSIUM 4.7   SODIUM 140   CALCIUM 9.2   MAGNESIUM 1.9   CO2 28   ALBUMIN 4.4     Recent Labs     10/11/22  0757   GLUCOSE 123*     Recent Labs     10/11/22  0757   ASTSGOT 21   ALTSGPT 37   ALKPHOSPHAT 61     Recent Labs     10/11/22  0757   WBC 12.9*   HEMOGLOBIN 18.6*   PLATELETCT 295     Recent Labs     10/11/22  0757   TROPONINT 11   NTPROBNP 2428*     Lab Results   Component Value Date/Time     (H) 08/30/2022 0850     (H) 03/28/2022 0828     (H) 12/20/2021 0843     Lab Results   Component Value Date/Time    HDL 46 08/30/2022 0850    HDL 44 03/28/2022 0828    HDL 43 12/20/2021 0843       Lab Results   Component Value Date/Time    TRIGLYCERIDE 83 08/30/2022 0850    TRIGLYCERIDE 101 03/28/2022 0828    TRIGLYCERIDE 79 12/20/2021 0843       Lab Results   Component Value Date/Time    CHOLSTRLTOT 165 08/30/2022 0850    CHOLSTRLTOT 177 03/28/2022 0828    CHOLSTRLTOT 181 12/20/2021 0843           Assessment and Recommendations:   # Recurrent atrial flutter with RVR, unsuccessful DCCV attempts x 2 over last 2 weeks  # Hypoxemia with tachypnea, Pulmonary edema, HFpEF with underlying MR and A flutter with RVR. Elevated NTproBNP  # On chronic anticoagulation, adherent  # On flecainide 50mg BID, high risk medication  # Polycythemia  # Mild dyslipidemia    - IV diuresis, BiPAP if needed  - continue amiodarone drip protocol for at least next  24 hours  - continue home metoprolol 50mg BID  - continue Apixaban 5mg PO BID  - continue rosuvastatin   - hold flecainide  - monitor on telemetry  - plan for TYRA to better assess MR once close to euvolemia and hypoxemia resolves, can be done as an OP as well.  - referral to EP for A flutter ablation will be placed    We will continue to follow. Please contact me with any questions.    Felipe Horvath MD, MPH [facility]  Interventional Cardiologist  [facility]   of Clinical Internal Medicine - [facility] Surjit CISNEROS

## 2022-10-11 NOTE — ED TRIAGE NOTES
Chief Complaint   Patient presents with    Atrial Fibrillation     Pt reports to hx new dx of a-fib in Sept, Cardioversion w/shock on Thurs, pt states to still be in a-fib, new med (flecainide) making pt sick. Pt finds himself self to be more SOB, and did not take the Flecainide today.    Chest Pain     Explained to pt triage process, made pt aware to tell this RN/staff of any changes/concerns, pt verbalized understanding of process and instructions given. Pt to ER vu.

## 2022-10-11 NOTE — ED NOTES
Patient ambulated to Red 10 with steady gait but SOB. Patient changed into gown and placed on monitor. Bed locked and in lowest position. Call light within reach. Warm blanket provided. Wife at bedside.

## 2022-10-12 PROBLEM — E87.6 HYPOKALEMIA: Status: ACTIVE | Noted: 2022-10-12

## 2022-10-12 LAB
ANION GAP SERPL CALC-SCNC: 11 MMOL/L (ref 7–16)
ANION GAP SERPL CALC-SCNC: 11 MMOL/L (ref 7–16)
BUN SERPL-MCNC: 13 MG/DL (ref 8–22)
BUN SERPL-MCNC: 17 MG/DL (ref 8–22)
CALCIUM SERPL-MCNC: 5.5 MG/DL (ref 8.5–10.5)
CALCIUM SERPL-MCNC: 8.8 MG/DL (ref 8.5–10.5)
CHLORIDE SERPL-SCNC: 115 MMOL/L (ref 96–112)
CHLORIDE SERPL-SCNC: 96 MMOL/L (ref 96–112)
CO2 SERPL-SCNC: 19 MMOL/L (ref 20–33)
CO2 SERPL-SCNC: 28 MMOL/L (ref 20–33)
CREAT SERPL-MCNC: 0.69 MG/DL (ref 0.5–1.4)
CREAT SERPL-MCNC: 1.25 MG/DL (ref 0.5–1.4)
ERYTHROCYTE [DISTWIDTH] IN BLOOD BY AUTOMATED COUNT: 41.4 FL (ref 35.9–50)
GFR SERPLBLD CREATININE-BSD FMLA CKD-EPI: 102 ML/MIN/1.73 M 2
GFR SERPLBLD CREATININE-BSD FMLA CKD-EPI: 63 ML/MIN/1.73 M 2
GLUCOSE BLD STRIP.AUTO-MCNC: 155 MG/DL (ref 65–99)
GLUCOSE SERPL-MCNC: 260 MG/DL (ref 65–99)
GLUCOSE SERPL-MCNC: 91 MG/DL (ref 65–99)
HCT VFR BLD AUTO: 41.9 % (ref 42–52)
HGB BLD-MCNC: 14.7 G/DL (ref 14–18)
MAGNESIUM SERPL-MCNC: 1.5 MG/DL (ref 1.5–2.5)
MAGNESIUM SERPL-MCNC: 3.2 MG/DL (ref 1.5–2.5)
MCH RBC QN AUTO: 31.1 PG (ref 27–33)
MCHC RBC AUTO-ENTMCNC: 35.1 G/DL (ref 33.7–35.3)
MCV RBC AUTO: 88.6 FL (ref 81.4–97.8)
PLATELET # BLD AUTO: 207 K/UL (ref 164–446)
PMV BLD AUTO: 11.2 FL (ref 9–12.9)
POTASSIUM SERPL-SCNC: 2.6 MMOL/L (ref 3.6–5.5)
POTASSIUM SERPL-SCNC: 3.9 MMOL/L (ref 3.6–5.5)
RBC # BLD AUTO: 4.73 M/UL (ref 4.7–6.1)
SODIUM SERPL-SCNC: 135 MMOL/L (ref 135–145)
SODIUM SERPL-SCNC: 145 MMOL/L (ref 135–145)
WBC # BLD AUTO: 11.8 K/UL (ref 4.8–10.8)

## 2022-10-12 PROCEDURE — 770020 HCHG ROOM/CARE - TELE (206)

## 2022-10-12 PROCEDURE — 99221 1ST HOSP IP/OBS SF/LOW 40: CPT | Performed by: HOSPITALIST

## 2022-10-12 PROCEDURE — 99232 SBSQ HOSP IP/OBS MODERATE 35: CPT | Mod: FS | Performed by: INTERNAL MEDICINE

## 2022-10-12 PROCEDURE — 83735 ASSAY OF MAGNESIUM: CPT

## 2022-10-12 PROCEDURE — 700111 HCHG RX REV CODE 636 W/ 250 OVERRIDE (IP): Performed by: STUDENT IN AN ORGANIZED HEALTH CARE EDUCATION/TRAINING PROGRAM

## 2022-10-12 PROCEDURE — 700111 HCHG RX REV CODE 636 W/ 250 OVERRIDE (IP): Performed by: INTERNAL MEDICINE

## 2022-10-12 PROCEDURE — 80048 BASIC METABOLIC PNL TOTAL CA: CPT

## 2022-10-12 PROCEDURE — 700102 HCHG RX REV CODE 250 W/ 637 OVERRIDE(OP): Performed by: STUDENT IN AN ORGANIZED HEALTH CARE EDUCATION/TRAINING PROGRAM

## 2022-10-12 PROCEDURE — 85027 COMPLETE CBC AUTOMATED: CPT

## 2022-10-12 PROCEDURE — 700102 HCHG RX REV CODE 250 W/ 637 OVERRIDE(OP): Performed by: INTERNAL MEDICINE

## 2022-10-12 PROCEDURE — A9270 NON-COVERED ITEM OR SERVICE: HCPCS | Performed by: STUDENT IN AN ORGANIZED HEALTH CARE EDUCATION/TRAINING PROGRAM

## 2022-10-12 PROCEDURE — 82962 GLUCOSE BLOOD TEST: CPT

## 2022-10-12 PROCEDURE — A9270 NON-COVERED ITEM OR SERVICE: HCPCS | Performed by: INTERNAL MEDICINE

## 2022-10-12 PROCEDURE — 36415 COLL VENOUS BLD VENIPUNCTURE: CPT

## 2022-10-12 RX ORDER — POTASSIUM CHLORIDE 20 MEQ/1
40 TABLET, EXTENDED RELEASE ORAL 2 TIMES DAILY
Status: DISCONTINUED | OUTPATIENT
Start: 2022-10-12 | End: 2022-10-12

## 2022-10-12 RX ORDER — POTASSIUM CHLORIDE 7.45 MG/ML
10 INJECTION INTRAVENOUS
Status: DISCONTINUED | OUTPATIENT
Start: 2022-10-12 | End: 2022-10-12

## 2022-10-12 RX ORDER — MAGNESIUM SULFATE HEPTAHYDRATE 40 MG/ML
4 INJECTION, SOLUTION INTRAVENOUS ONCE
Status: COMPLETED | OUTPATIENT
Start: 2022-10-12 | End: 2022-10-12

## 2022-10-12 RX ORDER — CALCIUM CARBONATE 500 MG/1
500 TABLET, CHEWABLE ORAL DAILY
Status: DISCONTINUED | OUTPATIENT
Start: 2022-10-12 | End: 2022-10-12

## 2022-10-12 RX ORDER — POTASSIUM CHLORIDE 20 MEQ/1
40 TABLET, EXTENDED RELEASE ORAL 3 TIMES DAILY
Status: DISCONTINUED | OUTPATIENT
Start: 2022-10-12 | End: 2022-10-12

## 2022-10-12 RX ORDER — POTASSIUM CHLORIDE 20 MEQ/1
40 TABLET, EXTENDED RELEASE ORAL DAILY
Status: COMPLETED | OUTPATIENT
Start: 2022-10-13 | End: 2022-10-14

## 2022-10-12 RX ORDER — CALCIUM CARBONATE 500 MG/1
500 TABLET, CHEWABLE ORAL DAILY
Status: DISCONTINUED | OUTPATIENT
Start: 2022-10-12 | End: 2022-10-14 | Stop reason: HOSPADM

## 2022-10-12 RX ADMIN — FAMOTIDINE 10 MG: 20 TABLET, FILM COATED ORAL at 06:26

## 2022-10-12 RX ADMIN — POTASSIUM CHLORIDE 40 MEQ: 1500 TABLET, EXTENDED RELEASE ORAL at 03:43

## 2022-10-12 RX ADMIN — APIXABAN 5 MG: 5 TABLET, FILM COATED ORAL at 06:26

## 2022-10-12 RX ADMIN — APIXABAN 5 MG: 5 TABLET, FILM COATED ORAL at 17:12

## 2022-10-12 RX ADMIN — POTASSIUM CHLORIDE 10 MEQ: 7.46 INJECTION, SOLUTION INTRAVENOUS at 07:59

## 2022-10-12 RX ADMIN — METOPROLOL TARTRATE 50 MG: 50 TABLET, FILM COATED ORAL at 17:12

## 2022-10-12 RX ADMIN — MAGNESIUM SULFATE HEPTAHYDRATE 4 G: 40 INJECTION, SOLUTION INTRAVENOUS at 03:44

## 2022-10-12 RX ADMIN — FUROSEMIDE 40 MG: 10 INJECTION, SOLUTION INTRAMUSCULAR; INTRAVENOUS at 17:13

## 2022-10-12 RX ADMIN — POTASSIUM CHLORIDE 10 MEQ: 7.46 INJECTION, SOLUTION INTRAVENOUS at 06:50

## 2022-10-12 RX ADMIN — FUROSEMIDE 40 MG: 10 INJECTION, SOLUTION INTRAMUSCULAR; INTRAVENOUS at 06:26

## 2022-10-12 RX ADMIN — CALCIUM CARBONATE 500 MG: 500 TABLET, CHEWABLE ORAL at 03:43

## 2022-10-12 RX ADMIN — FAMOTIDINE 10 MG: 20 TABLET, FILM COATED ORAL at 17:12

## 2022-10-12 RX ADMIN — METOPROLOL TARTRATE 50 MG: 50 TABLET, FILM COATED ORAL at 06:26

## 2022-10-12 ASSESSMENT — ENCOUNTER SYMPTOMS
HEARTBURN: 0
EYES NEGATIVE: 1
FALLS: 0
SPUTUM PRODUCTION: 0
DOUBLE VISION: 0
HEMATOLOGIC/LYMPHATIC NEGATIVE: 1
CONSTITUTIONAL NEGATIVE: 1
NAUSEA: 0
LOSS OF CONSCIOUSNESS: 0
DIZZINESS: 1
SORE THROAT: 0
CHILLS: 0
PALPITATIONS: 0
FEVER: 0
BACK PAIN: 0
MUSCULOSKELETAL NEGATIVE: 1
ABDOMINAL PAIN: 0
COUGH: 0
GASTROINTESTINAL NEGATIVE: 1
HEADACHES: 0
BLURRED VISION: 0
SHORTNESS OF BREATH: 0
CHEST TIGHTNESS: 0
ENDOCRINE NEGATIVE: 1
PSYCHIATRIC NEGATIVE: 1
NEUROLOGICAL NEGATIVE: 1

## 2022-10-12 ASSESSMENT — FIBROSIS 4 INDEX: FIB4 SCORE: 1.1

## 2022-10-12 ASSESSMENT — PAIN DESCRIPTION - PAIN TYPE: TYPE: ACUTE PAIN

## 2022-10-12 NOTE — CARE PLAN
The patient is Watcher - Medium risk of patient condition declining or worsening    Shift Goals  Clinical Goals: Stable HR and rhythm    Progress made toward(s) clinical / shift goals:    Problem: Pain - Standard  Goal: Alleviation of pain or a reduction in pain to the patient’s comfort goal  10/12/2022 0515 by Christin Bush R.N.  Outcome: Progressing  10/12/2022 0514 by Christin Bush R.N.  Outcome: Progressing     Problem: Knowledge Deficit - Standard  Goal: Patient and family/care givers will demonstrate understanding of plan of care, disease process/condition, diagnostic tests and medications  10/12/2022 0515 by Christin Bush R.N.  Outcome: Progressing  10/12/2022 0514 by Christin Bush R.N.  Outcome: Progressing     Problem: Hemodynamics  Goal: Patient's hemodynamics, fluid balance and neurologic status will be stable or improve  Outcome: Progressing

## 2022-10-12 NOTE — PROGRESS NOTES
"Cardiology Follow Up Progress Note    Date of Service  10/12/2022    Attending Physician  Elizabeth Orellana M.D.    Chief Complaint   Afib with RVR    HPI  Eduardo Aponte is a 67 y.o. male with a history of paroxysmal afib, polycythemia, DLD, prediabetes, former smoker (quit 2022).  admitted 10/11/2022 with afib and shortness of breath. He was recently seen by Dr. Ruff in 2022 for new onset afib, his metoprolol was increased to 50 mg BID and he was referred at that time for TYRA cardioversion on 10/06/2022 which was initially successful, however based on EKG results from that day it appears that he went back into afib shortly after DCCV. He was then started on flecainide. Recent echo shows a LVEF of 65%, moderate MR and mild TR, and stress test from 10/04/2022 shows normal LV size, EF, and wall motion. In the ED he was complaining of \"feeling unwell over last week or so, worsening SOB with any exertion, orthopnea, low appetite, palpitations, occasional chest and abdominal pain with back pain. Also noticed feeling more \"puffy\" last few days.\" He prefers to not continue with flecainide as he feels this was partially responsible for his symptoms.    \"In the ER, was given 500ml IV fluids, 10mg IV diltiazem, tachypneic, unsuccessful DCCV x 1, rates 100's in A flutter. Given 40mg IV lasix and 150mg IV amiodarone bolus. CTA in ER excluded Ao dissection (his father  of AAA rupture in his 80's).\"    Interim Events  No overnight cardiac events  Telemetry: aflutter -/.082/- rate 80-90's  SBP   No chest pain, no palpitations.  He says that he is feeling much better, he has not ambulated but had no shortness of breath with standing or at rest. Currently on 1L NC. He reports never wearing the BiPAP as he was unable to tolerate the mask.  Mild to moderate JVD present, no ROSE, crackles heard over RLL.      Review of Systems  Review of Systems   Constitutional: Negative.    HENT: Negative.     Eyes: " Negative.    Respiratory:  Negative for chest tightness and shortness of breath.    Cardiovascular:  Negative for chest pain, palpitations and leg swelling.   Gastrointestinal: Negative.    Endocrine: Negative.    Genitourinary: Negative.    Musculoskeletal: Negative.    Skin: Negative.    Neurological: Negative.    Hematological: Negative.    Psychiatric/Behavioral: Negative.       Vital signs in last 24 hours  Temp:  [35.9 °C (96.7 °F)-36.5 °C (97.7 °F)] 36 °C (96.8 °F)  Pulse:  [] 83  Resp:  [10-43] 20  BP: ()/() 107/74  SpO2:  [88 %-99 %] 95 %    Physical Exam  Physical Exam  Constitutional:       Appearance: Normal appearance.   HENT:      Head: Normocephalic and atraumatic.      Mouth/Throat:      Pharynx: Oropharynx is clear.   Eyes:      Extraocular Movements: Extraocular movements intact.      Pupils: Pupils are equal, round, and reactive to light.   Neck:      Vascular: JVD present.      Comments: Mild-moderate JVD  Cardiovascular:      Rate and Rhythm: Normal rate. Rhythm irregularly irregular.      Pulses: Normal pulses.      Heart sounds: Normal heart sounds. No murmur heard.    No friction rub.   Pulmonary:      Effort: Pulmonary effort is normal. No respiratory distress.      Breath sounds: Examination of the left-lower field reveals decreased breath sounds. Decreased breath sounds present.      Comments: Crackles in RLL  Abdominal:      General: Abdomen is flat.      Palpations: Abdomen is soft.   Musculoskeletal:         General: Normal range of motion.      Cervical back: Normal range of motion and neck supple.   Skin:     General: Skin is warm and dry.   Neurological:      General: No focal deficit present.      Mental Status: He is alert and oriented to person, place, and time.   Psychiatric:         Mood and Affect: Mood normal.         Behavior: Behavior normal.       Lab Review  Lab Results   Component Value Date/Time    WBC 11.8 (H) 10/12/2022 01:30 AM    RBC 4.73 10/12/2022  01:30 AM    HEMOGLOBIN 14.7 10/12/2022 01:30 AM    HEMATOCRIT 41.9 (L) 10/12/2022 01:30 AM    MCV 88.6 10/12/2022 01:30 AM    MCH 31.1 10/12/2022 01:30 AM    MCHC 35.1 10/12/2022 01:30 AM    MPV 11.2 10/12/2022 01:30 AM      Lab Results   Component Value Date/Time    SODIUM 145 10/12/2022 01:30 AM    POTASSIUM 2.6 (LL) 10/12/2022 01:30 AM    CHLORIDE 115 (H) 10/12/2022 01:30 AM    CO2 19 (L) 10/12/2022 01:30 AM    GLUCOSE 91 10/12/2022 01:30 AM    BUN 13 10/12/2022 01:30 AM    CREATININE 0.69 10/12/2022 01:30 AM      Lab Results   Component Value Date/Time    ASTSGOT 21 10/11/2022 07:57 AM    ALTSGPT 37 10/11/2022 07:57 AM     Lab Results   Component Value Date/Time    CHOLSTRLTOT 165 08/30/2022 08:50 AM     (H) 08/30/2022 08:50 AM    HDL 46 08/30/2022 08:50 AM    TRIGLYCERIDE 83 08/30/2022 08:50 AM    TROPONINT 10 10/11/2022 11:00 AM       Recent Labs     10/11/22  0757   NTPROBNP 2428*       Cardiac Imaging and Procedures Review  EKG:  My personal interpretation of the EKG dated 10/11/2022 is afib -/.085/-    Echocardiogram:  09/30/2022  CONCLUSIONS  No prior study is available for comparison.   Normal left ventricular size, thickness, and systolic function.  The left ventricular ejection fraction is visually estimated to be 65%.  Moderate mitral regurgitation with eccentric jet.  Mild tricuspid regurgitation.  Estimated right ventricular systolic pressure is 30-35 mmHg.    Imaging  Chest X-Ray:  10/11/2022  IMPRESSION:   Bilateral interstitial edema. No focal consolidation or pleural effusions.     Stress Test:  10/04/2022  No evidence of significant jeopardized viable myocardium or prior myocardial    infarction.   Normal left ventricular size, ejection fraction, and wall motion.    CTA 10/11/2022  IMPRESSION:  1.  No aortic aneurysm or dissection identified.  2.  Mild to moderate diffuse atherosclerosis of the aorta and its branch vessels.  3.  Severe diffuse interstitial pulmonary edema.  4.   Diverticulosis without evidence of diverticulitis.    Assessment/Plan  Recurrent atrial flutter with RVR  Hypoxemia with tachypnea, Pulmonary edema, HFpEF with underlying MR and A flutter with RVR. Elevated NTproBNP  Chronic anticoagulation, adherent  On flecainide 50mg BID, high risk medication (patient stopped taking)  Polycythemia  Dyslipidemia    -unsuccessful DCCV attempts x 2 over last 2 weeks  -plan for repeat TYRA/DCCV 10/12/2022, NPO at midnight  - better assessment of Mitral valve with TYRA  -EP eval for possible ablation  -wean O2  -strict I&Os  -ambulate  -apixaban (Eliquis) 5 mg PO BID  -Lasix 40 mg IV, responding well, near euvolemic, last dose this afternoon  -rosuvastatin 10 mg PO q 48 hrs    Thank you for allowing me to participate in the care of this patient.  We will continue to follow.    Please contact me with any questions.    I personally spent a total of 13 minutes which includes face-to-face time and non-face-to-face time spent on preparing to see the patient, reviewing hospital notes and tests, obtaining history from the patient, performing a medically appropriate exam, counseling and educating the patient, ordering medications/tests/procedures/referrals as clinically indicated, and documenting information in the electronic medical record.      BINU Castle.   Cardiologist, [facility]  [phone]

## 2022-10-12 NOTE — PROGRESS NOTES
Report called to SASKIA Bianchi. Pt transferred to room T819 with all belongings. Wife notified of room change.

## 2022-10-12 NOTE — PROGRESS NOTES
4 Eyes Skin Assessment Completed by Pushpa RN and Roxanne RN.    Head WDL  Ears WDL  Nose WDL  Mouth WDL  Neck WDL  Breast/Chest WDL  Shoulder Blades WDL  Spine WDL  (R) Arm/Elbow/Hand WDL  (L) Arm/Elbow/Hand WDL  Abdomen WDL  Groin WDL  Scrotum/Coccyx/Buttocks Redness and Blanching  (R) Leg WDL  (L) Leg WDL  (R) Heel/Foot/Toe WDL  (L) Heel/Foot/Toe WDL          Devices In Places ECG, Blood Pressure Cuff, and Pulse Ox      Interventions In Place Gray Ear Foams, Pillows, and Low Air Loss Mattress    Possible Skin Injury No    Pictures Uploaded Into Epic N/A  Wound Consult Placed N/A  RN Wound Prevention Protocol Ordered No

## 2022-10-12 NOTE — ASSESSMENT & PLAN NOTE
Secondary to pulmonary edema from A. fib with RVR  Status post IV diuresis  Wean oxygen as tolerated

## 2022-10-12 NOTE — PROGRESS NOTES
4 Eyes Skin Assessment Completed by SASKIA Bianchi and SASKIA Lazo.    Head WDL  Ears WDL  Nose WDL  Mouth WDL  Neck WDL  Breast/Chest WDL  Shoulder Blades WDL  Spine WDL  (R) Arm/Elbow/Hand Redness and Blanching  (L) Arm/Elbow/Hand Redness and Blanching  Abdomen WDL  Groin WDL  Scrotum/Coccyx/Buttocks Redness and Blanching  (R) Leg Rash  (L) Leg Rash  (R) Heel/Foot/Toe Redness and Blanching  (L) Heel/Foot/Toe Redness and Blanching          Devices In Places Tele Box, Pulse Ox, and Nasal Cannula      Interventions In Place NC W/Ear Foams, Pillows, and Low Air Loss Mattress    Possible Skin Injury No    Pictures Uploaded Into Epic N/A  Wound Consult Placed N/A  RN Wound Prevention Protocol Ordered No

## 2022-10-12 NOTE — CONSULTS
Hospital Medicine Consultation    Date of Service  10/12/2022    Referring Physician  Yu Bhagat M.D.    Consulting Physician  Yu Bhagat M.D.    Reason for Consultation  Medical management    History of Presenting Illness  67 y.o. male with past medical history of PAF s/p unsuccessful cardioversion on 10/6/22, polycythemia, DL D, prediabetes who presented 10/11/2022 with feeling as if he is still in A. fib.  After his cardioversion on 10/6 he was placed on flecainide and anticoagulation.  While in ER he was given diltiazem and fluids however shortly thereafter became hypotensive and was cardioverted however after 2 minutes again returned into A. fib.  CTA of chest and abdomen were done with no acute findings.  He was given amiodarone bolus and started on IV Lasix.  He was briefly placed on BiPAP for acute hypoxic respiratory failure secondary to pulmonary edema related to A. fib with RVR.  He has now been weaned off of supplemental oxygen.  Plan for TYRA on 10/13 with reattempt of cardioversion and to assess his MR severity.    This morning during my exam patient is feeling well and eating lunch.  He states earlier today while he was receiving the IV potassium he suddenly began to feel dizzy, clammy.  This resolved after cessation of IV potassium.  He currently denies any complaints.    Review of Systems  Review of Systems   Constitutional:  Negative for chills and fever.   HENT:  Negative for congestion, hearing loss and sore throat.    Eyes:  Negative for blurred vision and double vision.   Respiratory:  Negative for cough, sputum production and shortness of breath.    Cardiovascular:  Negative for chest pain and palpitations.   Gastrointestinal:  Negative for abdominal pain, heartburn and nausea.   Genitourinary:  Negative for dysuria and urgency.   Musculoskeletal:  Negative for back pain and falls.   Skin:  Negative for itching and rash.   Neurological:  Positive for dizziness. Negative for loss of  consciousness and headaches.   All other systems reviewed and are negative.    Past Medical History   has a past medical history of Arrhythmia, Arthritis, Breath shortness, Cataract, Dermatitis, Heart burn, and High cholesterol.    He has no past medical history of GERD (gastroesophageal reflux disease) or Ulcer.    Surgical History   has no past surgical history on file.    Family History  family history is not on file.    Social History   reports that he quit smoking about 12 years ago. His smoking use included cigarettes. He smoked an average of 1 pack per day. He has never used smokeless tobacco. He reports current alcohol use. He reports that he does not use drugs.    Medications  Prior to Admission Medications   Prescriptions Last Dose Informant Patient Reported? Taking?   ELIQUIS 5 MG Tab 10/11/2022 at AM Patient Yes No   Sig: Take 5 mg by mouth 2 times a day.   famotidine (PEPCID) 10 MG tablet 10/10/2022 at PM Patient Yes No   Sig: Take 10 mg by mouth 2 times a day.   flecainide (TAMBOCOR) 50 MG tablet 10/10/2022 at PM Patient No No   Sig: Take 1 Tablet by mouth 2 times a day.   metoprolol tartrate (LOPRESSOR) 50 MG Tab 10/11/2022 at AM Patient No No   Sig: Take 1 Tablet by mouth 2 times a day.   rosuvastatin (CRESTOR) 10 MG Tab 10/9/2022 at UNK Patient Yes No   Sig: Take 10 mg by mouth every 48 hours.      Facility-Administered Medications: None       Allergies  Allergies   Allergen Reactions    Bee        Physical Exam  Temp:  [35.9 °C (96.7 °F)-36.5 °C (97.7 °F)] 36.5 °C (97.7 °F)  Pulse:  [] 77  Resp:  [10-43] 16  BP: ()/(60-85) 92/65  SpO2:  [91 %-98 %] 91 %    Physical Exam  Vitals and nursing note reviewed.   Constitutional:       Appearance: Normal appearance.   HENT:      Head: Normocephalic and atraumatic.      Right Ear: External ear normal.      Left Ear: External ear normal.      Nose: Nose normal.      Mouth/Throat:      Mouth: Mucous membranes are moist.      Pharynx: Oropharynx is  clear.   Eyes:      Extraocular Movements: Extraocular movements intact.      Pupils: Pupils are equal, round, and reactive to light.   Cardiovascular:      Rate and Rhythm: Normal rate. Rhythm irregular.      Heart sounds: No murmur heard.  Pulmonary:      Effort: Pulmonary effort is normal. No respiratory distress.      Breath sounds: Normal breath sounds.   Abdominal:      General: Abdomen is flat. Bowel sounds are normal. There is no distension.      Palpations: Abdomen is soft.      Tenderness: There is no abdominal tenderness.   Musculoskeletal:      Cervical back: Normal range of motion and neck supple.      Right lower leg: No edema.      Left lower leg: No edema.   Skin:     General: Skin is warm and dry.   Neurological:      General: No focal deficit present.      Mental Status: He is alert and oriented to person, place, and time.   Psychiatric:         Mood and Affect: Mood normal.         Behavior: Behavior normal.       Fluids  Date 10/12/22 0700 - 10/13/22 0659   Shift 6484-6863 6185-7142 9655-0234 24 Hour Total   INTAKE   Shift Total       OUTPUT   Urine 950   950   Shift Total 950   950   Weight (kg) 80.2 80.2 80.2 80.2       Laboratory  Recent Labs     10/11/22  0757 10/12/22  0130   WBC 12.9* 11.8*   RBC 6.07 4.73   HEMOGLOBIN 18.6* 14.7   HEMATOCRIT 54.4* 41.9*   MCV 89.6 88.6   MCH 30.6 31.1   MCHC 34.2 35.1   RDW 42.1 41.4   PLATELETCT 295 207   MPV 11.7 11.2     Recent Labs     10/11/22  0757 10/12/22  0130 10/12/22  1025   SODIUM 140 145 135   POTASSIUM 4.7 2.6* 3.9   CHLORIDE 104 115* 96   CO2 28 19* 28   GLUCOSE 123* 91 260*   BUN 16 13 17   CREATININE 1.04 0.69 1.25   CALCIUM 9.2 5.5* 8.8                     Imaging  CT-CTA COMPLETE THORACOABDOMINAL AORTA   Final Result      1.  No aortic aneurysm or dissection identified.   2.  Mild to moderate diffuse atherosclerosis of the aorta and its branch vessels.   3.  Severe diffuse interstitial pulmonary edema.   4.  Diverticulosis without  evidence of diverticulitis.                        DX-CHEST-PORTABLE (1 VIEW)   Final Result      Bilateral interstitial edema. No focal consolidation or pleural effusions.      EC-TYRA W/O CONT    (Results Pending)   CL-CARDIOVERSION    (Results Pending)       Assessment/Plan  * Pulmonary edema cardiac cause (HCC)- (present on admission)  Assessment & Plan  Continue IV diuresis  Due to above    Hypokalemia  Assessment & Plan  Was critically low at 2.6 this a.m.  Patient is status post aggressive repletion with both IV and p.o. potassium  Is now improved to normal  Check potassium tomorrow    Acute respiratory failure with hypoxia (HCC)  Assessment & Plan  Secondary to pulmonary edema from A. fib with RVR  Continue IV diuresis until tonight  Wean oxygen as tolerated    A-fib (HCC)  Assessment & Plan  Patient has had 2 unsuccessful cardioversions  Plan for TYRA on 10/13 for another attempt of cardioversion along with to further assess mitral valve severity

## 2022-10-12 NOTE — PROGRESS NOTES
Brief ICU progress note  Patient was admitted overnight with A. fib RVR, and hypoxemic respiratory failure from what appears to be acute CHF exacerbation likely exacerbated in part by A. fib RVR into flash pulmonary edema.   Patient briefly on BiPAP overnight, has been off since yesterday afternoon, on room air at this time satting 98%, with heart rate in the 80s to 90s since yesterday afternoon. no complaints of respiratory distress chest pain, physical exam is noted for continued lower extremity edema, minimal crackles.  Patient diuresed 3 L yesterday.     Discussed with cardiology plan to continue diuresing today, with plan for TYRA and retrial of cardioversion tomorrow.  Agrees okay for downgrade to telemetry.     Discussed with hospitalist, patient stable for downgrade to telemetry.

## 2022-10-12 NOTE — ASSESSMENT & PLAN NOTE
Patient has had 2 unsuccessful cardioversions  Rate note controlled with lopressor and flecainide not maintaining NSR  Patient to undergo EP study with ablation on 10/13  He will need colchicine 0.6 mg twice daily for 2 weeks and PPI for 1 month  Continue Eliquis

## 2022-10-13 ENCOUNTER — APPOINTMENT (OUTPATIENT)
Dept: CARDIOLOGY | Facility: MEDICAL CENTER | Age: 67
DRG: 273 | End: 2022-10-13
Attending: NURSE PRACTITIONER
Payer: MEDICARE

## 2022-10-13 ENCOUNTER — ANESTHESIA EVENT (OUTPATIENT)
Dept: CARDIOLOGY | Facility: MEDICAL CENTER | Age: 67
DRG: 273 | End: 2022-10-13
Payer: MEDICARE

## 2022-10-13 ENCOUNTER — PATIENT OUTREACH (OUTPATIENT)
Dept: SCHEDULING | Facility: IMAGING CENTER | Age: 67
End: 2022-10-13
Payer: MEDICARE

## 2022-10-13 ENCOUNTER — ANESTHESIA (OUTPATIENT)
Dept: CARDIOLOGY | Facility: MEDICAL CENTER | Age: 67
DRG: 273 | End: 2022-10-13
Payer: MEDICARE

## 2022-10-13 ENCOUNTER — APPOINTMENT (OUTPATIENT)
Dept: CARDIOLOGY | Facility: MEDICAL CENTER | Age: 67
DRG: 273 | End: 2022-10-13
Attending: PHYSICIAN ASSISTANT
Payer: MEDICARE

## 2022-10-13 LAB
ANION GAP SERPL CALC-SCNC: 15 MMOL/L (ref 7–16)
BUN SERPL-MCNC: 23 MG/DL (ref 8–22)
CALCIUM SERPL-MCNC: 9.1 MG/DL (ref 8.5–10.5)
CHLORIDE SERPL-SCNC: 100 MMOL/L (ref 96–112)
CO2 SERPL-SCNC: 22 MMOL/L (ref 20–33)
CREAT SERPL-MCNC: 1 MG/DL (ref 0.5–1.4)
EKG IMPRESSION: NORMAL
ERYTHROCYTE [DISTWIDTH] IN BLOOD BY AUTOMATED COUNT: 40.4 FL (ref 35.9–50)
GFR SERPLBLD CREATININE-BSD FMLA CKD-EPI: 83 ML/MIN/1.73 M 2
GLUCOSE SERPL-MCNC: 114 MG/DL (ref 65–99)
HCT VFR BLD AUTO: 56.5 % (ref 42–52)
HGB BLD-MCNC: 19.5 G/DL (ref 14–18)
MAGNESIUM SERPL-MCNC: 2.5 MG/DL (ref 1.5–2.5)
MCH RBC QN AUTO: 30.2 PG (ref 27–33)
MCHC RBC AUTO-ENTMCNC: 34.5 G/DL (ref 33.7–35.3)
MCV RBC AUTO: 87.6 FL (ref 81.4–97.8)
PLATELET # BLD AUTO: 254 K/UL (ref 164–446)
PMV BLD AUTO: 11.9 FL (ref 9–12.9)
POTASSIUM SERPL-SCNC: 4.2 MMOL/L (ref 3.6–5.5)
RBC # BLD AUTO: 6.45 M/UL (ref 4.7–6.1)
SODIUM SERPL-SCNC: 137 MMOL/L (ref 135–145)
WBC # BLD AUTO: 11.5 K/UL (ref 4.8–10.8)

## 2022-10-13 PROCEDURE — 36620 INSERTION CATHETER ARTERY: CPT | Performed by: ANESTHESIOLOGY

## 2022-10-13 PROCEDURE — C1894 INTRO/SHEATH, NON-LASER: HCPCS

## 2022-10-13 PROCEDURE — A9270 NON-COVERED ITEM OR SERVICE: HCPCS | Performed by: STUDENT IN AN ORGANIZED HEALTH CARE EDUCATION/TRAINING PROGRAM

## 2022-10-13 PROCEDURE — 700102 HCHG RX REV CODE 250 W/ 637 OVERRIDE(OP): Performed by: STUDENT IN AN ORGANIZED HEALTH CARE EDUCATION/TRAINING PROGRAM

## 2022-10-13 PROCEDURE — 700105 HCHG RX REV CODE 258: Performed by: ANESTHESIOLOGY

## 2022-10-13 PROCEDURE — 5A2204Z RESTORATION OF CARDIAC RHYTHM, SINGLE: ICD-10-PCS | Performed by: INTERNAL MEDICINE

## 2022-10-13 PROCEDURE — A9270 NON-COVERED ITEM OR SERVICE: HCPCS | Performed by: HOSPITALIST

## 2022-10-13 PROCEDURE — 85347 COAGULATION TIME ACTIVATED: CPT

## 2022-10-13 PROCEDURE — 85027 COMPLETE CBC AUTOMATED: CPT

## 2022-10-13 PROCEDURE — 700102 HCHG RX REV CODE 250 W/ 637 OVERRIDE(OP): Performed by: HOSPITALIST

## 2022-10-13 PROCEDURE — 02583ZZ DESTRUCTION OF CONDUCTION MECHANISM, PERCUTANEOUS APPROACH: ICD-10-PCS | Performed by: INTERNAL MEDICINE

## 2022-10-13 PROCEDURE — 93005 ELECTROCARDIOGRAM TRACING: CPT | Performed by: INTERNAL MEDICINE

## 2022-10-13 PROCEDURE — 93010 ELECTROCARDIOGRAM REPORT: CPT | Mod: 59 | Performed by: INTERNAL MEDICINE

## 2022-10-13 PROCEDURE — 700102 HCHG RX REV CODE 250 W/ 637 OVERRIDE(OP): Performed by: INTERNAL MEDICINE

## 2022-10-13 PROCEDURE — 700101 HCHG RX REV CODE 250: Performed by: ANESTHESIOLOGY

## 2022-10-13 PROCEDURE — 700111 HCHG RX REV CODE 636 W/ 250 OVERRIDE (IP): Performed by: ANESTHESIOLOGY

## 2022-10-13 PROCEDURE — 80048 BASIC METABOLIC PNL TOTAL CA: CPT

## 2022-10-13 PROCEDURE — A9270 NON-COVERED ITEM OR SERVICE: HCPCS | Performed by: INTERNAL MEDICINE

## 2022-10-13 PROCEDURE — 93312 ECHO TRANSESOPHAGEAL: CPT | Mod: 26,59 | Performed by: ANESTHESIOLOGY

## 2022-10-13 PROCEDURE — 4A0234Z MEASUREMENT OF CARDIAC ELECTRICAL ACTIVITY, PERCUTANEOUS APPROACH: ICD-10-PCS | Performed by: INTERNAL MEDICINE

## 2022-10-13 PROCEDURE — 93320 DOPPLER ECHO COMPLETE: CPT | Mod: 26 | Performed by: ANESTHESIOLOGY

## 2022-10-13 PROCEDURE — 160002 HCHG RECOVERY MINUTES (STAT)

## 2022-10-13 PROCEDURE — 00537 ANES CARDIAC EP PROCEDURES: CPT | Performed by: ANESTHESIOLOGY

## 2022-10-13 PROCEDURE — 93312 ECHO TRANSESOPHAGEAL: CPT

## 2022-10-13 PROCEDURE — 99233 SBSQ HOSP IP/OBS HIGH 50: CPT | Performed by: HOSPITALIST

## 2022-10-13 PROCEDURE — 770020 HCHG ROOM/CARE - TELE (206)

## 2022-10-13 PROCEDURE — 160035 HCHG PACU - 1ST 60 MINS PHASE I

## 2022-10-13 PROCEDURE — 93656 COMPRE EP EVAL ABLTJ ATR FIB: CPT | Performed by: INTERNAL MEDICINE

## 2022-10-13 PROCEDURE — 93657 TX L/R ATRIAL FIB ADDL: CPT | Performed by: INTERNAL MEDICINE

## 2022-10-13 PROCEDURE — 160036 HCHG PACU - EA ADDL 30 MINS PHASE I

## 2022-10-13 PROCEDURE — 02K83ZZ MAP CONDUCTION MECHANISM, PERCUTANEOUS APPROACH: ICD-10-PCS | Performed by: INTERNAL MEDICINE

## 2022-10-13 PROCEDURE — 99223 1ST HOSP IP/OBS HIGH 75: CPT | Mod: 25 | Performed by: INTERNAL MEDICINE

## 2022-10-13 PROCEDURE — 83735 ASSAY OF MAGNESIUM: CPT

## 2022-10-13 PROCEDURE — 93655 ICAR CATH ABLTJ DSCRT ARRHYT: CPT | Performed by: INTERNAL MEDICINE

## 2022-10-13 PROCEDURE — 93319 3D ECHO IMG CGEN CAR ANOMAL: CPT | Performed by: ANESTHESIOLOGY

## 2022-10-13 PROCEDURE — 700111 HCHG RX REV CODE 636 W/ 250 OVERRIDE (IP)

## 2022-10-13 RX ORDER — HYDROMORPHONE HYDROCHLORIDE 1 MG/ML
0.4 INJECTION, SOLUTION INTRAMUSCULAR; INTRAVENOUS; SUBCUTANEOUS
Status: DISCONTINUED | OUTPATIENT
Start: 2022-10-13 | End: 2022-10-13 | Stop reason: HOSPADM

## 2022-10-13 RX ORDER — SUCRALFATE 1 G/1
1 TABLET ORAL
Status: DISCONTINUED | OUTPATIENT
Start: 2022-10-13 | End: 2022-10-14 | Stop reason: HOSPADM

## 2022-10-13 RX ORDER — LIDOCAINE HYDROCHLORIDE 40 MG/ML
SOLUTION TOPICAL PRN
Status: DISCONTINUED | OUTPATIENT
Start: 2022-10-13 | End: 2022-10-13 | Stop reason: SURG

## 2022-10-13 RX ORDER — SODIUM CHLORIDE, SODIUM LACTATE, POTASSIUM CHLORIDE, CALCIUM CHLORIDE 600; 310; 30; 20 MG/100ML; MG/100ML; MG/100ML; MG/100ML
INJECTION, SOLUTION INTRAVENOUS
Status: DISCONTINUED | OUTPATIENT
Start: 2022-10-13 | End: 2022-10-13 | Stop reason: SURG

## 2022-10-13 RX ORDER — OXYCODONE HYDROCHLORIDE AND ACETAMINOPHEN 5; 325 MG/1; MG/1
1 TABLET ORAL
Status: DISCONTINUED | OUTPATIENT
Start: 2022-10-13 | End: 2022-10-13 | Stop reason: HOSPADM

## 2022-10-13 RX ORDER — HYDROMORPHONE HYDROCHLORIDE 1 MG/ML
0.2 INJECTION, SOLUTION INTRAMUSCULAR; INTRAVENOUS; SUBCUTANEOUS
Status: DISCONTINUED | OUTPATIENT
Start: 2022-10-13 | End: 2022-10-13 | Stop reason: HOSPADM

## 2022-10-13 RX ORDER — LIDOCAINE HYDROCHLORIDE 20 MG/ML
INJECTION, SOLUTION EPIDURAL; INFILTRATION; INTRACAUDAL; PERINEURAL PRN
Status: DISCONTINUED | OUTPATIENT
Start: 2022-10-13 | End: 2022-10-13 | Stop reason: SURG

## 2022-10-13 RX ORDER — HEPARIN SODIUM 1000 [USP'U]/ML
INJECTION, SOLUTION INTRAVENOUS; SUBCUTANEOUS
Status: COMPLETED
Start: 2022-10-13 | End: 2022-10-13

## 2022-10-13 RX ORDER — COLCHICINE 0.6 MG/1
0.6 TABLET ORAL DAILY
Status: DISCONTINUED | OUTPATIENT
Start: 2022-10-13 | End: 2022-10-13

## 2022-10-13 RX ORDER — HEPARIN SODIUM 200 [USP'U]/100ML
INJECTION, SOLUTION INTRAVENOUS
Status: COMPLETED
Start: 2022-10-13 | End: 2022-10-13

## 2022-10-13 RX ORDER — MIDAZOLAM HYDROCHLORIDE 1 MG/ML
INJECTION INTRAMUSCULAR; INTRAVENOUS
Status: COMPLETED
Start: 2022-10-13 | End: 2022-10-13

## 2022-10-13 RX ORDER — SODIUM CHLORIDE, SODIUM LACTATE, POTASSIUM CHLORIDE, CALCIUM CHLORIDE 600; 310; 30; 20 MG/100ML; MG/100ML; MG/100ML; MG/100ML
INJECTION, SOLUTION INTRAVENOUS CONTINUOUS
Status: DISCONTINUED | OUTPATIENT
Start: 2022-10-13 | End: 2022-10-13 | Stop reason: HOSPADM

## 2022-10-13 RX ORDER — OMEPRAZOLE 20 MG/1
20 CAPSULE, DELAYED RELEASE ORAL
Status: DISCONTINUED | OUTPATIENT
Start: 2022-10-14 | End: 2022-10-13

## 2022-10-13 RX ORDER — PHENYLEPHRINE HYDROCHLORIDE 10 MG/ML
INJECTION, SOLUTION INTRAMUSCULAR; INTRAVENOUS; SUBCUTANEOUS PRN
Status: DISCONTINUED | OUTPATIENT
Start: 2022-10-13 | End: 2022-10-13 | Stop reason: SURG

## 2022-10-13 RX ORDER — DIPHENHYDRAMINE HYDROCHLORIDE 50 MG/ML
12.5 INJECTION INTRAMUSCULAR; INTRAVENOUS
Status: DISCONTINUED | OUTPATIENT
Start: 2022-10-13 | End: 2022-10-13 | Stop reason: HOSPADM

## 2022-10-13 RX ORDER — ONDANSETRON 2 MG/ML
4 INJECTION INTRAMUSCULAR; INTRAVENOUS
Status: DISCONTINUED | OUTPATIENT
Start: 2022-10-13 | End: 2022-10-13 | Stop reason: HOSPADM

## 2022-10-13 RX ORDER — ACETAMINOPHEN 325 MG/1
650 TABLET ORAL EVERY 4 HOURS PRN
Status: DISCONTINUED | OUTPATIENT
Start: 2022-10-13 | End: 2022-10-14 | Stop reason: HOSPADM

## 2022-10-13 RX ORDER — OMEPRAZOLE 20 MG/1
20 CAPSULE, DELAYED RELEASE ORAL DAILY
Status: DISCONTINUED | OUTPATIENT
Start: 2022-10-13 | End: 2022-10-14 | Stop reason: HOSPADM

## 2022-10-13 RX ORDER — HALOPERIDOL 5 MG/ML
1 INJECTION INTRAMUSCULAR
Status: DISCONTINUED | OUTPATIENT
Start: 2022-10-13 | End: 2022-10-13 | Stop reason: HOSPADM

## 2022-10-13 RX ORDER — IPRATROPIUM BROMIDE AND ALBUTEROL SULFATE 2.5; .5 MG/3ML; MG/3ML
3 SOLUTION RESPIRATORY (INHALATION)
Status: DISCONTINUED | OUTPATIENT
Start: 2022-10-13 | End: 2022-10-13 | Stop reason: HOSPADM

## 2022-10-13 RX ORDER — HYDROMORPHONE HYDROCHLORIDE 1 MG/ML
0.1 INJECTION, SOLUTION INTRAMUSCULAR; INTRAVENOUS; SUBCUTANEOUS
Status: DISCONTINUED | OUTPATIENT
Start: 2022-10-13 | End: 2022-10-13 | Stop reason: HOSPADM

## 2022-10-13 RX ORDER — MEPERIDINE HYDROCHLORIDE 25 MG/ML
12.5 INJECTION INTRAMUSCULAR; INTRAVENOUS; SUBCUTANEOUS
Status: DISCONTINUED | OUTPATIENT
Start: 2022-10-13 | End: 2022-10-13 | Stop reason: HOSPADM

## 2022-10-13 RX ORDER — LIDOCAINE HYDROCHLORIDE 40 MG/ML
SOLUTION TOPICAL
Status: COMPLETED
Start: 2022-10-13 | End: 2022-10-13

## 2022-10-13 RX ORDER — PROTAMINE SULFATE 10 MG/ML
INJECTION, SOLUTION INTRAVENOUS
Status: COMPLETED
Start: 2022-10-13 | End: 2022-10-13

## 2022-10-13 RX ORDER — ONDANSETRON 2 MG/ML
INJECTION INTRAMUSCULAR; INTRAVENOUS PRN
Status: DISCONTINUED | OUTPATIENT
Start: 2022-10-13 | End: 2022-10-13 | Stop reason: SURG

## 2022-10-13 RX ORDER — OXYCODONE HYDROCHLORIDE AND ACETAMINOPHEN 5; 325 MG/1; MG/1
2 TABLET ORAL
Status: DISCONTINUED | OUTPATIENT
Start: 2022-10-13 | End: 2022-10-13 | Stop reason: HOSPADM

## 2022-10-13 RX ORDER — COLCHICINE 0.6 MG/1
0.6 TABLET ORAL 2 TIMES DAILY
Status: DISCONTINUED | OUTPATIENT
Start: 2022-10-13 | End: 2022-10-14 | Stop reason: HOSPADM

## 2022-10-13 RX ORDER — DEXAMETHASONE SODIUM PHOSPHATE 4 MG/ML
INJECTION, SOLUTION INTRA-ARTICULAR; INTRALESIONAL; INTRAMUSCULAR; INTRAVENOUS; SOFT TISSUE PRN
Status: DISCONTINUED | OUTPATIENT
Start: 2022-10-13 | End: 2022-10-13 | Stop reason: SURG

## 2022-10-13 RX ORDER — AMIODARONE HYDROCHLORIDE 200 MG/1
200 TABLET ORAL DAILY
Status: DISCONTINUED | OUTPATIENT
Start: 2022-10-13 | End: 2022-10-14 | Stop reason: HOSPADM

## 2022-10-13 RX ADMIN — CALCIUM CARBONATE 500 MG: 500 TABLET, CHEWABLE ORAL at 06:08

## 2022-10-13 RX ADMIN — ONDANSETRON 4 MG: 2 INJECTION INTRAMUSCULAR; INTRAVENOUS at 14:04

## 2022-10-13 RX ADMIN — FENTANYL CITRATE 25 MCG: 50 INJECTION, SOLUTION INTRAMUSCULAR; INTRAVENOUS at 15:01

## 2022-10-13 RX ADMIN — PROPOFOL 20 MG: 10 INJECTION, EMULSION INTRAVENOUS at 14:06

## 2022-10-13 RX ADMIN — SODIUM CHLORIDE, POTASSIUM CHLORIDE, SODIUM LACTATE AND CALCIUM CHLORIDE: 600; 310; 30; 20 INJECTION, SOLUTION INTRAVENOUS at 11:43

## 2022-10-13 RX ADMIN — MIDAZOLAM 2 MG: 1 INJECTION INTRAMUSCULAR; INTRAVENOUS at 11:47

## 2022-10-13 RX ADMIN — METOPROLOL TARTRATE 50 MG: 50 TABLET, FILM COATED ORAL at 06:08

## 2022-10-13 RX ADMIN — POTASSIUM CHLORIDE 40 MEQ: 1500 TABLET, EXTENDED RELEASE ORAL at 06:09

## 2022-10-13 RX ADMIN — PROTAMINE SULFATE 50 MG: 10 INJECTION, SOLUTION INTRAVENOUS at 14:06

## 2022-10-13 RX ADMIN — HEPARIN SODIUM 10000 UNITS: 1000 INJECTION, SOLUTION INTRAVENOUS; SUBCUTANEOUS at 14:03

## 2022-10-13 RX ADMIN — METOPROLOL TARTRATE 50 MG: 50 TABLET, FILM COATED ORAL at 19:06

## 2022-10-13 RX ADMIN — APIXABAN 5 MG: 5 TABLET, FILM COATED ORAL at 06:08

## 2022-10-13 RX ADMIN — ROCURONIUM BROMIDE 20 MG: 10 INJECTION, SOLUTION INTRAVENOUS at 12:35

## 2022-10-13 RX ADMIN — PHENYLEPHRINE HYDROCHLORIDE 200 MCG: 10 INJECTION INTRAVENOUS at 13:53

## 2022-10-13 RX ADMIN — AMIODARONE HYDROCHLORIDE 200 MG: 200 TABLET ORAL at 15:28

## 2022-10-13 RX ADMIN — OMEPRAZOLE 20 MG: 20 CAPSULE, DELAYED RELEASE ORAL at 19:06

## 2022-10-13 RX ADMIN — FAMOTIDINE 10 MG: 20 TABLET, FILM COATED ORAL at 06:09

## 2022-10-13 RX ADMIN — APIXABAN 5 MG: 5 TABLET, FILM COATED ORAL at 19:06

## 2022-10-13 RX ADMIN — HEPARIN SODIUM 8000 UNITS: 200 INJECTION, SOLUTION INTRAVENOUS at 11:49

## 2022-10-13 RX ADMIN — PROPOFOL 30 MG: 10 INJECTION, EMULSION INTRAVENOUS at 12:36

## 2022-10-13 RX ADMIN — LIDOCAINE HYDROCHLORIDE 100 MG: 20 INJECTION, SOLUTION EPIDURAL; INFILTRATION; INTRACAUDAL at 11:51

## 2022-10-13 RX ADMIN — NOREPINEPHRINE BITARTRATE 6 MCG/MIN: 1 INJECTION, SOLUTION, CONCENTRATE INTRAVENOUS at 11:52

## 2022-10-13 RX ADMIN — PROPOFOL 150 MG: 10 INJECTION, EMULSION INTRAVENOUS at 11:51

## 2022-10-13 RX ADMIN — FENTANYL CITRATE 25 MCG: 50 INJECTION, SOLUTION INTRAMUSCULAR; INTRAVENOUS at 15:45

## 2022-10-13 RX ADMIN — COLCHICINE 0.6 MG: 0.6 TABLET, FILM COATED ORAL at 19:06

## 2022-10-13 RX ADMIN — DEXAMETHASONE SODIUM PHOSPHATE 8 MG: 4 INJECTION, SOLUTION INTRA-ARTICULAR; INTRALESIONAL; INTRAMUSCULAR; INTRAVENOUS; SOFT TISSUE at 11:51

## 2022-10-13 RX ADMIN — SUGAMMADEX 200 MG: 100 INJECTION, SOLUTION INTRAVENOUS at 14:07

## 2022-10-13 RX ADMIN — SUCRALFATE 1 G: 1 TABLET ORAL at 20:51

## 2022-10-13 RX ADMIN — ROCURONIUM BROMIDE 70 MG: 10 INJECTION, SOLUTION INTRAVENOUS at 11:51

## 2022-10-13 RX ADMIN — LIDOCAINE HYDROCHLORIDE 4 ML: 40 SOLUTION TOPICAL at 11:52

## 2022-10-13 RX ADMIN — ROSUVASTATIN CALCIUM 10 MG: 10 TABLET, FILM COATED ORAL at 20:51

## 2022-10-13 ASSESSMENT — PAIN DESCRIPTION - PAIN TYPE
TYPE: SURGICAL PAIN
TYPE: CHRONIC PAIN
TYPE: SURGICAL PAIN

## 2022-10-13 ASSESSMENT — PAIN SCALES - GENERAL: PAIN_LEVEL: 0

## 2022-10-13 ASSESSMENT — COGNITIVE AND FUNCTIONAL STATUS - GENERAL
SUGGESTED CMS G CODE MODIFIER DAILY ACTIVITY: CH
SUGGESTED CMS G CODE MODIFIER MOBILITY: CH
DAILY ACTIVITIY SCORE: 24
MOBILITY SCORE: 24

## 2022-10-13 ASSESSMENT — ENCOUNTER SYMPTOMS
FEVER: 0
EYES NEGATIVE: 1
GASTROINTESTINAL NEGATIVE: 1
HEMATOLOGIC/LYMPHATIC NEGATIVE: 1
NEUROLOGICAL NEGATIVE: 1
PALPITATIONS: 0
PSYCHIATRIC NEGATIVE: 1
CONSTITUTIONAL NEGATIVE: 1
COUGH: 0
ABDOMINAL PAIN: 0
CHEST TIGHTNESS: 0
ENDOCRINE NEGATIVE: 1
MUSCULOSKELETAL NEGATIVE: 1
HEADACHES: 0
VOMITING: 0
SHORTNESS OF BREATH: 0
CHILLS: 0
DIZZINESS: 0
NAUSEA: 0

## 2022-10-13 ASSESSMENT — FIBROSIS 4 INDEX: FIB4 SCORE: 0.9

## 2022-10-13 NOTE — PROGRESS NOTES
Hospital Medicine Daily Progress Note    Date of Service  10/13/2022    Chief Complaint  Eduardo Aponte is a 67 y.o. male admitted 10/11/2022 with a fib with RVR    Hospital Course  67 y.o. male with past medical history of PAF s/p unsuccessful cardioversion on 10/6/22, polycythemia, DL D, prediabetes who presented 10/11/2022 with feeling as if he is still in A. fib.  After his cardioversion on 10/6 he was placed on flecainide and anticoagulation.  While in ER he was given diltiazem and fluids however shortly thereafter became hypotensive and was cardioverted however after 2 minutes again returned into A. fib.  CTA of chest and abdomen were done with no acute findings.  He was given amiodarone bolus and started on IV Lasix.  He was briefly placed on BiPAP for acute hypoxic respiratory failure secondary to pulmonary edema related to A. fib with RVR.  He has now been weaned off of supplemental oxygen.  Pt underwent EP study with A. fib ablation on 10/13.       Interval Problem Update  No acute events overnight  Patient pending TYRA ablation today  He denies any new complaints      I have discussed this patient's plan of care and discharge plan at IDT rounds today with Case Management, Nursing, Nursing leadership, and other members of the IDT team.    Consultants/Specialty  cardiology    Code Status  Full Code    Disposition  Patient is not medically cleared for discharge.   Anticipate discharge to to home with close outpatient follow-up.  I have placed the appropriate orders for post-discharge needs.    Review of Systems  Review of Systems   Constitutional:  Negative for chills and fever.   Respiratory:  Negative for cough and shortness of breath.    Cardiovascular:  Negative for chest pain and palpitations.   Gastrointestinal:  Negative for abdominal pain, nausea and vomiting.   Genitourinary:  Negative for dysuria and urgency.   Neurological:  Negative for dizziness and headaches.   All other systems reviewed and  are negative.     Physical Exam  Temp:  [36.2 °C (97.1 °F)-37.2 °C (99 °F)] 36.2 °C (97.1 °F)  Pulse:  [] 90  Resp:  [16-18] 16  BP: ()/(60-78) 131/64  SpO2:  [90 %-97 %] 97 %    Physical Exam  Vitals and nursing note reviewed.   Constitutional:       Appearance: Normal appearance.   Cardiovascular:      Rate and Rhythm: Normal rate and regular rhythm.      Heart sounds: No murmur heard.  Pulmonary:      Effort: Pulmonary effort is normal. No respiratory distress.      Breath sounds: Normal breath sounds.   Neurological:      General: No focal deficit present.      Mental Status: He is alert and oriented to person, place, and time.       Fluids    Intake/Output Summary (Last 24 hours) at 10/13/2022 1504  Last data filed at 10/13/2022 1432  Gross per 24 hour   Intake 900 ml   Output 1345 ml   Net -445 ml       Laboratory  Recent Labs     10/11/22  0757 10/12/22  0130 10/13/22  0128   WBC 12.9* 11.8* 11.5*   RBC 6.07 4.73 6.45*   HEMOGLOBIN 18.6* 14.7 19.5*   HEMATOCRIT 54.4* 41.9* 56.5*   MCV 89.6 88.6 87.6   MCH 30.6 31.1 30.2   MCHC 34.2 35.1 34.5   RDW 42.1 41.4 40.4   PLATELETCT 295 207 254   MPV 11.7 11.2 11.9     Recent Labs     10/12/22  0130 10/12/22  1025 10/13/22  0330   SODIUM 145 135 137   POTASSIUM 2.6* 3.9 4.2   CHLORIDE 115* 96 100   CO2 19* 28 22   GLUCOSE 91 260* 114*   BUN 13 17 23*   CREATININE 0.69 1.25 1.00   CALCIUM 5.5* 8.8 9.1                   Imaging  EC-TYRA W/O CONT         CT-CTA COMPLETE THORACOABDOMINAL AORTA   Final Result      1.  No aortic aneurysm or dissection identified.   2.  Mild to moderate diffuse atherosclerosis of the aorta and its branch vessels.   3.  Severe diffuse interstitial pulmonary edema.   4.  Diverticulosis without evidence of diverticulitis.                        DX-CHEST-PORTABLE (1 VIEW)   Final Result      Bilateral interstitial edema. No focal consolidation or pleural effusions.      CL-CARDIOVERSION    (Results Pending)   CL-EP ABLATION  AFIB-AFLUTTER    (Results Pending)        Assessment/Plan  * Atrial fibrillation with RVR (HCC)  Assessment & Plan  Patient has had 2 unsuccessful cardioversions  Rate note controlled with lopressor and flecainide not maintaining NSR  Patient to undergo EP study with ablation on 10/13  He will need colchicine 0.6 mg twice daily for 2 weeks and PPI for 1 month  Continue Eliquis    Hypokalemia  Assessment & Plan  Resolved    Acute respiratory failure with hypoxia (HCC)  Assessment & Plan  Secondary to pulmonary edema from A. fib with RVR  Status post IV diuresis  Wean oxygen as tolerated    Pulmonary edema cardiac cause (HCC)- (present on admission)  Assessment & Plan  Resolved  Due to above       VTE prophylaxis: therapeutic anticoagulation with Eliquis    I have performed a physical exam and reviewed and updated ROS and Plan today (10/13/2022). In review of yesterday's note (10/12/2022), there are no changes except as documented above.

## 2022-10-13 NOTE — HOSPITAL COURSE
67 y.o. male with past medical history of PAF s/p unsuccessful cardioversion on 10/6/22, polycythemia, DL D, prediabetes who presented 10/11/2022 with feeling as if he is still in A. fib.  After his cardioversion on 10/6 he was placed on flecainide and anticoagulation.  While in ER he was given diltiazem and fluids however shortly thereafter became hypotensive and was cardioverted however after 2 minutes again returned into A. fib.  CTA of chest and abdomen were done with no acute findings.  He was given amiodarone bolus and started on IV Lasix.  He was briefly placed on BiPAP for acute hypoxic respiratory failure secondary to pulmonary edema related to A. fib with RVR.  He has now been weaned off of supplemental oxygen.  Pt underwent EP study with A. fib ablation on 10/13.

## 2022-10-13 NOTE — HEART FAILURE PROGRAM
Patient is admitted primarily for rapid atrial fibrillation which has caused decompensation of heart failure.    Patient to undergo TYRA/ablation this evening with anesthesia.    Decompensated heart failure with preserved ejection fraction (HFpEF).   EF: 65  Valvular concerns: moderate MR, messaged Valve Coordinator    Discharge Planning  Residence: local  Insurance: Medicare and [place]  Referral to disease management program specializing in heart failure care- requested that schedulers notify me if patient cannot be scheduled at the Heart Failure [facility]  Date of Last Discharge: 10/6/22- that admission coded for atrial fibrillation  LACE Risk Score: 26  General Risk Score: 4  Opted in for Meds to Beds? Yes  Placed SW order to inquire as to whether or not patient's insurance covers outpatient care coordination    Special Clinical Considerations Pertinent to HF    Pneumococcal vaccine: missing, messaged pharmacist and BSRN  Influenza vaccine: same  Nicotine Status: quit 12 y/a per hospitalist consult on 10/12/22  Documentation of nicotine cessation counseling: n/a  DM dx: dm not diagnosed  Atrial arrhythmia dx: yes on apixaban    Nurses: Please document HF education in the education tab and populate the new and improved HF Care Plan. These tools will guide day to day care of the HF patient.    Providers: below are Guideline Directed Medical Therapy (GDMT) for HFpEF. If any cannot be prescribed by discharge, would you please note the clinical reason for each in your discharge summary? Thanks! Violet  Anticoagulation for atrial arrhythmia, if applicable  Glycemic control for DM + HF, if applicable  Lipid lowering medication for DM + HF, if applicable  Pneumococcal vaccine, if not previously received, If refused PLEASE DOCUMENT  Influenza vaccine for the current flu season, if not previously received If refused PLEASE DOCUMENT  Documentation of nicotine cessation counseling, if applicable  Acute Referral to disease  management program specializing in heart failure care- asked that schedulers notify me if patient is not able to be seen at the Heart Failure Clinic  7 calendar day f/u appointment scheduled prior to discharge, appearing on signed after visit summary.

## 2022-10-13 NOTE — OR NURSING
1430 Patient and handoff received from Cath lab team. Patient awake, slightly confused post anesthesia, VSS. Patient in NSR on monitor. Bilateral groin sites CDI and soft to palpation.     1500 Patient waking up more, oriented now. Reports pain in right groin, will medicate per MAR. VSS.     1520 EKG at bedside for post-op STAT EKG ordered.     1525 Clarified with Dr. Glover regarding ordered amiodarone dose, Dr. Glover requests to be given now.     1600 report called to Melva KRUGER on T8. Patient resting, VSS.    1615 Patient Hob elevated to 30 degrees. Groin sites remain CDI and soft. Patient placed on transport.     1633 Patient transported via bed back to assigned room by Lauryn KRUGER and JANIE on tele monitoring. No belongings with patient in PACU.

## 2022-10-13 NOTE — OP REPORT
[facility]   Electrophysiology Procedure Note    Procedure(s) Performed:   1) Atrial fibrillation ablation and EP study  2) Ablation of additional atrial fibrillation (LA posterior wall)  3) Ablation of additional atrial fibrillation (anterior LA wall AF focal trigger ablation)  4) Ablation of additional mechanism of tachycardia (anterior mitral line)  5) Ablation of additional mechanism of tachycardia (RA flutter line)    Indication(s):  Persistent atrial fibrillation    Physician(s): Sherice Glover M.D.     Resident/Assistant(s): None     Anesthesia: General endotracheal anesthetic.     Specimen(s) Removed: None     Estimated Blood Loss:  30cc     Complications:  None     Description of Procedure:   After informed written consent, the patient was brought to the EP lab in the fasting, non-sedated state. The patient was prepped and draped in the usual sterile fashion. Femoral venous access was obtained using the modified Seldinger technique. In the left femoral vein, 1 sheaths (8 Fr) was inserted over 0.035” guidewires. In the right femoral vein, 3 sheaths (8,8,7 Fr) were inserted over 0.035” guidewires. A deflectable decapolar catheter was advanced to the CS position. Baseline rhythm was coarse atrial fibrillation. An intracardiac echo (ICE) catheter was advanced to the right atrium. ICE was used to identify the CTI, atrial septum, left atrial appendage, and pulmonary veins and kory the anatomic structures to superimpose on our 3D electroanatomic map using CARTOSound. During the procedure, ICE was utilized to localize the ablation catheter, monitor for thrombus formation, and to exclude pericardial effusion. We proceeded for an 8 Fr RAMP sheath and ablation was performed along the CTI to prevent RA flutter. This was done with a Biosense ST SF ablation catheter at 40W. Patient remained in AF after flutter line. We attempted cardioversion at 200J biphasic energy. Patient remained in sinus very briefly before  immediately reverting back into AF. We were unable to check for durable block along the line. Pt did spontaneously terminate fib intermittently after the flutter line was complete only to immediate revert back into AF. Each time with spontaneous termination with noted run of somewhat consistent PACs that initiated AF. Intravenous heparin was administered to maintain -350 seconds. Double transseptal left heart catheterization was performed under intracardiac echo and hemodynamic guidance. A KIYATEC needle was inserted into the 8.5 Fr sheaths (ML1) for transseptal puncture and placement of sheaths in the left atrium. The initial ML1 was exchanged for a [facility] ablation catheter. The ML1 was then re-used for a second trans-septal. Mapping of the pulmonary veins and left atrium was performed using CARTO3 FAM and a deflectable multipolar mapping catheter (Skipjump). This showed 4 pulmonary veins and low anterior wall scar. During intermittent spontaneous termination we noted PACs initiating sustained runs of AF with earliest signal mapping to the anterior wall patchy scar. This area was ablated. AF would spontaneously terminate during ablation and restart. This area was consolidated. However patient reverted back to sustained AF.   Wide  circumferential ablation was performed using an 3.5 mm deflectable irrigated force contact catheter (Biosense ST SF) at primarily 30-40 W power using a single loop approach starting from the floor line (L sided esophagus), up the coumadin ridge, and then across the roof and down the anterior R sided PV antrum. During ablation of our roof line, AF spontaneously organized into AFL  msec with distal to proximal CS activation. After single loop was created we went ahead and completed an anterior flutter line connecting the area of patchy scar we initially targeted for AF triggers to the roof line. This terminated the AFL, resulting in durable sinus rhythm. We  noted delayed activation across the mitral line with latest activation just lateral to the line with septal activation consistent with block. We noted very delayed activation within PVs and LA posterior wall. This mapped to a leak along the mid roof and ablation here isolated the posterior wall and PVs. Next we checked for durable block along the CTI pacing from the septum, showing block along the CTI with differential pacing and trans-isthmus conduction time of > 180 msec. At the end of the procedure, heparin was reversed with protamine, the catheter and sheaths were removed, and hemostasis was achieved by manual compression along with Vascade MVP closure device. Following recovery from anesthesia, the patient was transferred to the PACU in good condition.       Total ablation time: 2425 seconds    Fluoroscopy time: 4.2 minutes     Electrophysiologic Findings:    1. Sinus  740 ms, HV 57 ms.   2. Atypical clockwise mitral re-entry ( ms)    Impressions:    1. Persistent fibrillation  2. Successful RF ablation pulmonary vein isolation procedure.    3. Successful RF ablation of LA anterior wall focal triggers  4. Successful RF ablation of CTI dependent flutter  5. Successful RF ablation of mitral flutter     Recommendations:  1. Resume anticoagulation.  2. Start colchicine x 2 weeks, carafate x 2 weeks and daily PPI x 1 month.  3. Transfer to monitored bedrest.

## 2022-10-13 NOTE — ANESTHESIA TIME REPORT
Anesthesia Start and Stop Event Times     Date Time Event    10/13/2022 1142 Ready for Procedure     1143 Anesthesia Start     1432 Anesthesia Stop        Responsible Staff  10/13/22    Name Role Begin End    Vincent Pat M.D. Anesth 1143 1432        Overtime Reason:  no overtime (within assigned shift)    Comments:

## 2022-10-13 NOTE — ANESTHESIA PROCEDURE NOTES
Arterial Line  Performed by: Vincent Pat M.D.  Authorized by: Vincent Pat M.D.     Start Time:  10/13/2022 12:01 PM  Localization: ultrasound guidance  Image captured, interpreted and electronically stored.  Patient Location:  OR  Indication: continuous blood pressure monitoring        Catheter Size:  20 G  Seldinger Technique?: Yes    Laterality:  Right  Site:  Radial artery  Line Secured:  Antimicrobial disc, tape and transparent dressing  Events: patient tolerated procedure well with no complications     Placed with one attempt. Positional, required armboard.

## 2022-10-13 NOTE — PROGRESS NOTES
"Cardiology Follow Up Progress Note    Date of Service  10/13/2022    Attending Physician  Yu Bhagat M.D.    Chief Complaint   Afib with RVR     HPI  Eduardo Aponte is a 67 y.o. male with a history of paroxysmal afib, polycythemia, DLD, prediabetes, former smoker (quit 2022).  admitted 10/11/2022 with afib and shortness of breath. He was recently seen by Dr. Ruff in 2022 for new onset afib, his metoprolol was increased to 50 mg BID and he was referred at that time for TYRA cardioversion on 10/06/2022 which was initially successful, however based on EKG results from that day it appears that he went back into afib shortly after DCCV. He was then started on flecainide. Recent echo shows a LVEF of 65%, moderate MR and mild TR, and stress test from 10/04/2022 shows normal LV size, EF, and wall motion. In the ED he was complaining of \"feeling unwell over last week or so, worsening SOB with any exertion, orthopnea, low appetite, palpitations, occasional chest and abdominal pain with back pain. Also noticed feeling more \"puffy\" last few days.\" He prefers to not continue with flecainide as he feels this was partially responsible for his symptoms.     \"In the ER, was given 500ml IV fluids, 10mg IV diltiazem, tachypneic, unsuccessful DCCV x 1, rates 100's in A flutter. Given 40mg IV lasix and 150mg IV amiodarone bolus. CTA in ER excluded Ao dissection (his father  of AAA rupture in his 80's).\"    Interim Events  No overnight cardiac events   Telemetry: aflutter rate , -/.07/-  SBP   No chest pain  Ablation 10/13/2022, patient in SR on exam      Review of Systems  Review of Systems   Constitutional: Negative.    HENT: Negative.     Eyes: Negative.    Respiratory:  Negative for chest tightness and shortness of breath.    Cardiovascular:  Negative for chest pain, palpitations and leg swelling.   Gastrointestinal: Negative.    Endocrine: Negative.    Genitourinary: Negative.  "   Musculoskeletal: Negative.    Skin: Negative.    Neurological: Negative.    Hematological: Negative.    Psychiatric/Behavioral: Negative.       Vital signs in last 24 hours  Temp:  [36.5 °C (97.7 °F)-37.2 °C (99 °F)] 36.9 °C (98.4 °F)  Pulse:  [] 111  Resp:  [16-19] 17  BP: ()/(60-78) 106/78  SpO2:  [90 %-98 %] 92 %    Physical Exam  Physical Exam  Constitutional:       Appearance: Normal appearance.   HENT:      Head: Normocephalic and atraumatic.      Mouth/Throat:      Pharynx: Oropharynx is clear.   Eyes:      Extraocular Movements: Extraocular movements intact.      Pupils: Pupils are equal, round, and reactive to light.   Cardiovascular:      Rate and Rhythm: Normal rate and regular rhythm.      Pulses: Normal pulses.      Heart sounds: Normal heart sounds. No murmur heard.    No friction rub.   Pulmonary:      Effort: Pulmonary effort is normal. No respiratory distress.      Breath sounds: Normal breath sounds.   Abdominal:      General: Abdomen is flat.      Palpations: Abdomen is soft.   Musculoskeletal:         General: Normal range of motion.      Cervical back: Normal range of motion and neck supple.   Skin:     General: Skin is warm and dry.   Neurological:      General: No focal deficit present.      Mental Status: He is alert and oriented to person, place, and time.   Psychiatric:         Mood and Affect: Mood normal.         Behavior: Behavior normal.       Lab Review  Lab Results   Component Value Date/Time    WBC 11.5 (H) 10/13/2022 01:28 AM    RBC 6.45 (H) 10/13/2022 01:28 AM    HEMOGLOBIN 19.5 (H) 10/13/2022 01:28 AM    HEMATOCRIT 56.5 (H) 10/13/2022 01:28 AM    MCV 87.6 10/13/2022 01:28 AM    MCH 30.2 10/13/2022 01:28 AM    MCHC 34.5 10/13/2022 01:28 AM    MPV 11.9 10/13/2022 01:28 AM      Lab Results   Component Value Date/Time    SODIUM 137 10/13/2022 03:30 AM    POTASSIUM 4.2 10/13/2022 03:30 AM    CHLORIDE 100 10/13/2022 03:30 AM    CO2 22 10/13/2022 03:30 AM    GLUCOSE 114 (H)  10/13/2022 03:30 AM    BUN 23 (H) 10/13/2022 03:30 AM    CREATININE 1.00 10/13/2022 03:30 AM      Lab Results   Component Value Date/Time    ASTSGOT 21 10/11/2022 07:57 AM    ALTSGPT 37 10/11/2022 07:57 AM     Lab Results   Component Value Date/Time    CHOLSTRLTOT 165 08/30/2022 08:50 AM     (H) 08/30/2022 08:50 AM    HDL 46 08/30/2022 08:50 AM    TRIGLYCERIDE 83 08/30/2022 08:50 AM    TROPONINT 10 10/11/2022 11:00 AM       Recent Labs     10/11/22  0757   NTPROBNP 2428*       Cardiac Imaging and Procedures Review  EKG:  My personal interpretation of the EKG dated 10/11/2022 is afib -/.085/-     Echocardiogram:  09/30/2022  CONCLUSIONS  No prior study is available for comparison.   Normal left ventricular size, thickness, and systolic function.  The left ventricular ejection fraction is visually estimated to be 65%.  Moderate mitral regurgitation with eccentric jet.  Mild tricuspid regurgitation.  Estimated right ventricular systolic pressure is 30-35 mmHg.     Imaging  Chest X-Ray:  10/11/2022  IMPRESSION:   Bilateral interstitial edema. No focal consolidation or pleural effusions.      Stress Test:  10/04/2022  No evidence of significant jeopardized viable myocardium or prior myocardial    infarction.   Normal left ventricular size, ejection fraction, and wall motion.     CTA 10/11/2022  IMPRESSION:  1.  No aortic aneurysm or dissection identified.  2.  Mild to moderate diffuse atherosclerosis of the aorta and its branch vessels.  3.  Severe diffuse interstitial pulmonary edema.  4.  Diverticulosis without evidence of diverticulitis.     Assessment/Plan  Recurrent atrial flutter with RVR  Hypoxemia with tachypnea, Pulmonary edema, HFpEF with underlying MR and A flutter with RVR. Elevated NTproBNP  Chronic anticoagulation, adherent  On flecainide 50mg BID, high risk medication (patient stopped taking)  Polycythemia  Dyslipidemia     -unsuccessful DCCV attempts x 2 over last 2 weeks  -TYRA/DCCV 10/12/2022,  cancelled in favor of ablation   - better assessment of Mitral valve with TYRA   -wean O2  -strict I&Os  -ambulate  -apixaban (Eliquis) 5 mg PO BID  -rosuvastatin 10 mg PO q 48 hrs    I personally spent a total of 13 minutes which includes face-to-face time and non-face-to-face time spent on preparing to see the patient, reviewing hospital notes and tests, obtaining history from the patient, performing a medically appropriate exam, counseling and educating the patient, ordering medications/tests/procedures/referrals as clinically indicated, and documenting information in the electronic medical record.     Thank you for allowing me to participate in the care of this patient.  We will continue to follow.    Please contact me with any questions.    [email or web address].   Cardiologist, [facility]  [phone]

## 2022-10-13 NOTE — CARE PLAN
The patient is Watcher - Medium risk of patient condition declining or worsening    Shift Goals Manage heart rate and O2 needs  Clinical Goals: Manage heart rate, and O2  Patient Goals: sleep  Family Goals: n/a    Progress made toward(s) clinical / shift goals:    Problem: Pain - Standard  Goal: Alleviation of pain or a reduction in pain to the patient’s comfort goal  Outcome: Progressing   Q4 pain assessments in place. Pt educated on pain scale. RN aware of pt's goal pain. PRN medication orders followed.   Problem: Fall Risk  Goal: Patient will remain free from falls  Outcome: Progressing   Barba angel fall scale utilized. Bed alarm in place. Pt is up self. Educated pt and family to call for appropriate assistance prior to ambulating.     Patient is not progressing towards the following goals:

## 2022-10-13 NOTE — ANESTHESIA PROCEDURE NOTES
Airway    Date/Time: 10/13/2022 11:52 AM  Performed by: Vincent Pat M.D.  Authorized by: Vincent Pat M.D.     Location:  OR  Urgency:  Elective  Indications for Airway Management:  Anesthesia      Spontaneous Ventilation: absent    Sedation Level:  Deep  Preoxygenated: Yes    Patient Position:  Sniffing  Final Airway Type:  Endotracheal airway  Final Endotracheal Airway:  ETT  Cuffed: Yes    Technique Used for Successful ETT Placement:  Direct laryngoscopy  Devices/Methods Used in Placement:  Cricoid pressure    Insertion Site:  Oral  Blade Type:  Carmela  Laryngoscope Blade/Videolaryngoscope Blade Size:  4  ETT Size (mm):  8.0  Measured from:  Teeth  ETT to Teeth (cm):  23  Placement Verified by: auscultation and capnometry    Cormack-Lehane Classification:  Grade IIa - partial view of glottis  Number of Attempts at Approach:  1

## 2022-10-13 NOTE — PROGRESS NOTES
Assumed care, bedside report received from Vincent KRUGER. Pt. Is Afib on the monitor. Initial assessment completed, orders reviewed, call light within reach, and hourly rounding in place. POC addressed with patient, no additional questions at this time.

## 2022-10-13 NOTE — CONSULTS
EP Consult Note    DOS: 10/13/2022    Consulting physician: Felipe Horvath    Chief complaint/Reason for consult: Persistent AF    HPI:  Pt is a 68 yo M. He has a history of HL. He has been progressively more short of breath with exertion for several months. Similarly has felt intermittent palpitations. Found to be in AF and saw Dr. Ruff last month. At that time echo and nuclear stress were ordered as well as cardioversion and initiation of antiarrhythmics with flecainide. Echo showed preserved function with mild-moderate eccentric MR. Normal perfusion study. Cardioversion lasted only minutes.    He presented again in rapid AF and mild congestive failure. Underwent diuresis and now feeling better. Plan was for TYRA/DCCV later today but cardiology wondering if he may be a candidate for AF ablation.    ROS (+ in BOLD):  Constitutional: Fevers/chills/fatigue/weightloss  HEENT: Blurry vision/eye pain/sore throat/hearing loss  Respiratory: Shortness of breath/cough  Cardiovascular: Chest pain/palpitations/edema/orthopnea/syncope  GI: Nausea/vomitting/diarrhea  MSK: Arthralgias/myagias/muscle weakness  Skin: Rash/sores  Neurological: Numbness/tremors/vertigo  Endocrine: Excessive thirst/polyuria/cold intolerance/heat intolerance  Psych: Depression/anxiety    Past Medical History:   Diagnosis Date    Arrhythmia     A FIB    Arthritis     HANDS AND SHOULDERS    Breath shortness     WITH EXERTION    Cataract     IOL 5 YEARS AGO    Dermatitis     chronic    Heart burn     High cholesterol        History reviewed. No pertinent surgical history.    Social History     Socioeconomic History    Marital status:      Spouse name: Not on file    Number of children: Not on file    Years of education: Not on file    Highest education level: Not on file   Occupational History    Not on file   Tobacco Use    Smoking status: Former     Packs/day: 1.00     Types: Cigarettes     Quit date: 2010     Years since quittin.0     Smokeless tobacco: Never   Vaping Use    Vaping Use: Never used   Substance and Sexual Activity    Alcohol use: Yes     Comment: occasional    Drug use: No    Sexual activity: Not on file     Comment:    Other Topics Concern    Not on file   Social History Narrative    Not on file     Social Determinants of Health     Financial Resource Strain: Not on file   Food Insecurity: Not on file   Transportation Needs: Not on file   Physical Activity: Not on file   Stress: Not on file   Social Connections: Not on file   Intimate Partner Violence: Not on file   Housing Stability: Not on file       FHx: Father with AF    Allergies   Allergen Reactions    Bee        Current Facility-Administered Medications   Medication Dose Route Frequency Provider Last Rate Last Admin    calcium carbonate (Tums) chewable tab 500 mg  500 mg Oral DAILY Sravani Hernandez M.D.   500 mg at 10/13/22 0608    potassium chloride SA (Kdur) tablet 40 mEq  40 mEq Oral DAILY Jaskarin CAROL Bhagat   40 mEq at 10/13/22 0609    apixaban (ELIQUIS) tablet 5 mg  5 mg Oral BID Kennedy Rao M.D.   5 mg at 10/13/22 0608    famotidine (PEPCID) tablet 10 mg  10 mg Oral BID Kennedy Rao M.D.   10 mg at 10/13/22 0609    metoprolol tartrate (LOPRESSOR) tablet 50 mg  50 mg Oral BID Kenneyd Rao M.D.   50 mg at 10/13/22 0608    rosuvastatin (CRESTOR) tablet 10 mg  10 mg Oral Q48HRS Kennedy Rao M.D.   10 mg at 10/11/22 2046       Physical Exam:  Vitals:    10/12/22 1900 10/12/22 2300 10/13/22 0600 10/13/22 0900   BP: 110/75 110/75 106/78 107/75   Pulse: 90 88 (!) 111 92   Resp: 18 17 17 17   Temp: 37.2 °C (99 °F) 37.1 °C (98.8 °F) 36.9 °C (98.4 °F)    TempSrc: Temporal Temporal Temporal Temporal   SpO2: 91% 90% 92% 92%   Weight:       Height:         General appearance: NAD, conversant   Eyes: anicteric sclerae, moist conjunctivae; no lid-lag; PERRLA  HENT: Atraumatic; oropharynx clear with moist mucous membranes and no mucosal ulcerations;  normal hard and soft palate  Neck: Trachea midline; FROM, supple, no thyromegaly or lymphadenopathy  Lungs: CTA, with normal respiratory effort and no intercostal retractions  CV: irregularly irregular, 2/6 systolic murmur, no JVD   Abdomen: Soft, non-tender; no masses or HSM  Extremities: No peripheral edema or extremity lymphadenopathy  Skin: Normal temperature, turgor and texture; no rash, ulcers or subcutaneous nodules  Psych: Appropriate affect, alert and oriented to person, place and time    Data:  Labs reviewed    Prior echo/stress results reviewed:  Normal EF, eccentric MR, normal perfusion study    CXR interpreted by me:  Bilateral interstitial edema    EKG interpreted by me:   AF    Impression/Plan:  1) Persistent atrial fibrillation  2) Acute on chronic HFPEF  3) Eccentric MR    -I discussed options for rhythm control for him with symptomatic persistent AF causing HF symptoms failing initial trial of IC antiarrhythmic  -We discussed amiodarone along with alternatives such as sotalol therapy  -We discussed overall more durable outcomes with ablation   -Risks/benefits of each strategy discussed, all questions answered, and he would like to proceed with AF ablation  -All questions answered, will plan on TYRA/ablation this PM with anesthesia, keep NPO    Sherice Glover MD

## 2022-10-13 NOTE — CARE PLAN
The patient is Stable - Low risk of patient condition declining or worsening    Shift Goals  Clinical Goals: Monitor Vitals  Patient Goals: cardioversion  Family Goals: n/a    Problem: Knowledge Deficit - Standard  Goal: Patient and family/care givers will demonstrate understanding of plan of care, disease process/condition, diagnostic tests and medications  Outcome: Progressing     Problem: Hemodynamics  Goal: Patient's hemodynamics, fluid balance and neurologic status will be stable or improve  Outcome: Progressing

## 2022-10-13 NOTE — ANESTHESIA PROCEDURE NOTES
TYRA    Date/Time: 10/13/2022 12:08 PM  Performed by: Vincent Pat M.D.  Authorized by: Vincent Pat M.D.     Start Time:10/13/2022 12:08 PM  Preanesthetic Checklist: patient identified, IV checked, site marked, risks and benefits discussed, surgical consent, monitors and equipment checked, pre-op evaluation and timeout performed    Indication for TYRA: diagnostic   Patient Location: OR  Intubated: Yes  Bite Block: Yes  Heart Visualized: Yes  Insertion: atraumatic    **See FULL TYRA report in patient's chart via CV Syn[employer]**

## 2022-10-13 NOTE — PROGRESS NOTES
Assumed care of patient at bedside report from day RN. Updated on POC. Patient currently A & O x 4; on  room air; up self; without complaints of acute pain. Assessment completed Call light within reach. Whiteboard updated. Fall precautions in place. Bed locked and in lowest position. All questions answered. No other needs indicated at this time.

## 2022-10-13 NOTE — ANESTHESIA PREPROCEDURE EVALUATION
Date/Time: 10/13/22 1130    Scheduled providers: Sherice Glover M.D.    Procedure: CL-EP ABLATION AFIB-AFLUTTER    Diagnosis:       Pulmonary edema cardiac cause (HCC) [I50.1]      Pulmonary edema cardiac cause (HCC) [I50.1]    Indications: See Assoicated Dx    [facility]          Relevant Problems   CARDIAC   (positive) A-fib (HCC)      Other   (positive) Acute respiratory failure with hypoxia (HCC)   (positive) Pulmonary edema cardiac cause (HCC)   - Hyperlipidemia  - GERD  - No previous problems with anesthesia    TTE 10/2/2022:  CONCLUSIONS  No prior study is available for comparison.   Normal left ventricular size, thickness, and systolic function.  The left ventricular ejection fraction is visually estimated to be 65%.  Moderate mitral regurgitation with eccentric jet.  Mild tricuspid regurgitation.  Estimated right ventricular systolic pressure is 30-35 mmHg.    Physical Exam    Airway   Mallampati: II  TM distance: >3 FB  Neck ROM: full       Cardiovascular   Rhythm: irregular  Rate: abnormal  (-) murmur     Dental - normal exam           Pulmonary - normal exam  Breath sounds clear to auscultation     Abdominal    Neurological - normal exam                 Anesthesia Plan    ASA 3   ASA physical status 3 criteria: respiratory insufficiency or compromise    Plan - general       Airway plan will be ETT  TYRA Planned  (Possible arterial line)      Induction: intravenous    Postoperative Plan: Postoperative administration of opioids is intended.    Pertinent diagnostic labs and testing reviewed    Informed Consent:    Anesthetic plan and risks discussed with patient.    Use of blood products discussed with: patient whom consented to blood products.

## 2022-10-14 ENCOUNTER — PHARMACY VISIT (OUTPATIENT)
Dept: PHARMACY | Facility: MEDICAL CENTER | Age: 67
End: 2022-10-14
Payer: COMMERCIAL

## 2022-10-14 VITALS
BODY MASS INDEX: 23.92 KG/M2 | RESPIRATION RATE: 16 BRPM | WEIGHT: 176.59 LBS | HEIGHT: 72 IN | HEART RATE: 88 BPM | TEMPERATURE: 98.1 F | OXYGEN SATURATION: 93 % | DIASTOLIC BLOOD PRESSURE: 79 MMHG | SYSTOLIC BLOOD PRESSURE: 118 MMHG

## 2022-10-14 PROBLEM — J96.01 ACUTE RESPIRATORY FAILURE WITH HYPOXIA (HCC): Status: RESOLVED | Noted: 2022-10-11 | Resolved: 2022-10-14

## 2022-10-14 PROBLEM — E87.6 HYPOKALEMIA: Status: RESOLVED | Noted: 2022-10-12 | Resolved: 2022-10-14

## 2022-10-14 LAB
ANION GAP SERPL CALC-SCNC: 9 MMOL/L (ref 7–16)
BUN SERPL-MCNC: 22 MG/DL (ref 8–22)
CALCIUM SERPL-MCNC: 8.5 MG/DL (ref 8.5–10.5)
CHLORIDE SERPL-SCNC: 100 MMOL/L (ref 96–112)
CO2 SERPL-SCNC: 26 MMOL/L (ref 20–33)
CREAT SERPL-MCNC: 1.03 MG/DL (ref 0.5–1.4)
ERYTHROCYTE [DISTWIDTH] IN BLOOD BY AUTOMATED COUNT: 40.5 FL (ref 35.9–50)
GFR SERPLBLD CREATININE-BSD FMLA CKD-EPI: 80 ML/MIN/1.73 M 2
GLUCOSE SERPL-MCNC: 164 MG/DL (ref 65–99)
HCT VFR BLD AUTO: 45.7 % (ref 42–52)
HGB BLD-MCNC: 15.9 G/DL (ref 14–18)
LV EJECT FRACT  99904: 65
MAGNESIUM SERPL-MCNC: 1.9 MG/DL (ref 1.5–2.5)
MCH RBC QN AUTO: 30.6 PG (ref 27–33)
MCHC RBC AUTO-ENTMCNC: 34.8 G/DL (ref 33.7–35.3)
MCV RBC AUTO: 88.1 FL (ref 81.4–97.8)
PLATELET # BLD AUTO: 226 K/UL (ref 164–446)
PMV BLD AUTO: 11.5 FL (ref 9–12.9)
POTASSIUM SERPL-SCNC: 4.7 MMOL/L (ref 3.6–5.5)
RBC # BLD AUTO: 5.19 M/UL (ref 4.7–6.1)
SODIUM SERPL-SCNC: 135 MMOL/L (ref 135–145)
WBC # BLD AUTO: 19.6 K/UL (ref 4.8–10.8)

## 2022-10-14 PROCEDURE — 700111 HCHG RX REV CODE 636 W/ 250 OVERRIDE (IP): Performed by: HOSPITALIST

## 2022-10-14 PROCEDURE — 700102 HCHG RX REV CODE 250 W/ 637 OVERRIDE(OP): Performed by: INTERNAL MEDICINE

## 2022-10-14 PROCEDURE — 3E0234Z INTRODUCTION OF SERUM, TOXOID AND VACCINE INTO MUSCLE, PERCUTANEOUS APPROACH: ICD-10-PCS | Performed by: INTERNAL MEDICINE

## 2022-10-14 PROCEDURE — 3E02340 INTRODUCTION OF INFLUENZA VACCINE INTO MUSCLE, PERCUTANEOUS APPROACH: ICD-10-PCS | Performed by: INTERNAL MEDICINE

## 2022-10-14 PROCEDURE — 700102 HCHG RX REV CODE 250 W/ 637 OVERRIDE(OP): Performed by: STUDENT IN AN ORGANIZED HEALTH CARE EDUCATION/TRAINING PROGRAM

## 2022-10-14 PROCEDURE — 99239 HOSP IP/OBS DSCHRG MGMT >30: CPT | Performed by: INTERNAL MEDICINE

## 2022-10-14 PROCEDURE — 700111 HCHG RX REV CODE 636 W/ 250 OVERRIDE (IP): Performed by: NURSE PRACTITIONER

## 2022-10-14 PROCEDURE — RXMED WILLOW AMBULATORY MEDICATION CHARGE: Performed by: INTERNAL MEDICINE

## 2022-10-14 PROCEDURE — 90471 IMMUNIZATION ADMIN: CPT

## 2022-10-14 PROCEDURE — 85027 COMPLETE CBC AUTOMATED: CPT

## 2022-10-14 PROCEDURE — 90677 PCV20 VACCINE IM: CPT | Performed by: HOSPITALIST

## 2022-10-14 PROCEDURE — 700102 HCHG RX REV CODE 250 W/ 637 OVERRIDE(OP): Performed by: HOSPITALIST

## 2022-10-14 PROCEDURE — A9270 NON-COVERED ITEM OR SERVICE: HCPCS | Performed by: STUDENT IN AN ORGANIZED HEALTH CARE EDUCATION/TRAINING PROGRAM

## 2022-10-14 PROCEDURE — 90662 IIV NO PRSV INCREASED AG IM: CPT | Performed by: HOSPITALIST

## 2022-10-14 PROCEDURE — A9270 NON-COVERED ITEM OR SERVICE: HCPCS | Performed by: INTERNAL MEDICINE

## 2022-10-14 PROCEDURE — 80048 BASIC METABOLIC PNL TOTAL CA: CPT

## 2022-10-14 PROCEDURE — A9270 NON-COVERED ITEM OR SERVICE: HCPCS | Performed by: HOSPITALIST

## 2022-10-14 PROCEDURE — 83735 ASSAY OF MAGNESIUM: CPT

## 2022-10-14 RX ORDER — FUROSEMIDE 20 MG/1
20 TABLET ORAL
Qty: 30 TABLET | Refills: 0 | Status: SHIPPED | OUTPATIENT
Start: 2022-10-14 | End: 2022-10-24 | Stop reason: SDUPTHER

## 2022-10-14 RX ORDER — COLCHICINE 0.6 MG/1
0.6 TABLET ORAL 2 TIMES DAILY
Qty: 28 TABLET | Refills: 0 | Status: SHIPPED | OUTPATIENT
Start: 2022-10-14 | End: 2022-10-28

## 2022-10-14 RX ORDER — FUROSEMIDE 10 MG/ML
20 INJECTION INTRAMUSCULAR; INTRAVENOUS ONCE
Status: COMPLETED | OUTPATIENT
Start: 2022-10-14 | End: 2022-10-14

## 2022-10-14 RX ORDER — ACETAMINOPHEN 325 MG/1
650 TABLET ORAL EVERY 4 HOURS PRN
Qty: 30 TABLET | Refills: 0 | COMMUNITY
Start: 2022-10-14 | End: 2022-10-24

## 2022-10-14 RX ORDER — SUCRALFATE 1 G/1
1 TABLET ORAL
Qty: 56 TABLET | Refills: 0 | Status: SHIPPED | OUTPATIENT
Start: 2022-10-14 | End: 2022-10-28

## 2022-10-14 RX ORDER — OMEPRAZOLE 20 MG/1
20 CAPSULE, DELAYED RELEASE ORAL DAILY
Qty: 30 CAPSULE | Refills: 0 | Status: SHIPPED | OUTPATIENT
Start: 2022-10-15 | End: 2022-11-29

## 2022-10-14 RX ORDER — AMIODARONE HYDROCHLORIDE 200 MG/1
200 TABLET ORAL DAILY
Qty: 30 TABLET | Refills: 0 | Status: SHIPPED | OUTPATIENT
Start: 2022-10-15 | End: 2022-11-22

## 2022-10-14 RX ORDER — POTASSIUM CHLORIDE 750 MG/1
20 TABLET, EXTENDED RELEASE ORAL
Qty: 30 EACH | Refills: 0 | Status: SHIPPED | OUTPATIENT
Start: 2022-10-14 | End: 2022-10-24 | Stop reason: SDUPTHER

## 2022-10-14 RX ADMIN — METOPROLOL TARTRATE 50 MG: 50 TABLET, FILM COATED ORAL at 04:40

## 2022-10-14 RX ADMIN — SUCRALFATE 1 G: 1 TABLET ORAL at 11:56

## 2022-10-14 RX ADMIN — SUCRALFATE 1 G: 1 TABLET ORAL at 06:24

## 2022-10-14 RX ADMIN — INFLUENZA A VIRUS A/VICTORIA/2570/2019 IVR-215 (H1N1) ANTIGEN (FORMALDEHYDE INACTIVATED), INFLUENZA A VIRUS A/DARWIN/9/2021 SAN-010 (H3N2) ANTIGEN (FORMALDEHYDE INACTIVATED), INFLUENZA B VIRUS B/PHUKET/3073/2013 ANTIGEN (FORMALDEHYDE INACTIVATED), AND INFLUENZA B VIRUS B/MICHIGAN/01/2021 ANTIGEN (FORMALDEHYDE INACTIVATED) 0.7 ML: 60; 60; 60; 60 INJECTION, SUSPENSION INTRAMUSCULAR at 14:00

## 2022-10-14 RX ADMIN — APIXABAN 5 MG: 5 TABLET, FILM COATED ORAL at 04:40

## 2022-10-14 RX ADMIN — PNEUMOCOCCAL 20-VALENT CONJUGATE VACCINE 0.5 ML
2.2; 2.2; 2.2; 2.2; 2.2; 2.2; 2.2; 2.2; 2.2; 2.2; 2.2; 2.2; 2.2; 2.2; 2.2; 2.2; 4.4; 2.2; 2.2; 2.2 INJECTION, SUSPENSION INTRAMUSCULAR at 14:55

## 2022-10-14 RX ADMIN — AMIODARONE HYDROCHLORIDE 200 MG: 200 TABLET ORAL at 04:40

## 2022-10-14 RX ADMIN — COLCHICINE 0.6 MG: 0.6 TABLET, FILM COATED ORAL at 04:39

## 2022-10-14 RX ADMIN — FUROSEMIDE 20 MG: 10 INJECTION, SOLUTION INTRAMUSCULAR; INTRAVENOUS at 09:26

## 2022-10-14 RX ADMIN — CALCIUM CARBONATE 500 MG: 500 TABLET, CHEWABLE ORAL at 04:39

## 2022-10-14 RX ADMIN — POTASSIUM CHLORIDE 40 MEQ: 1500 TABLET, EXTENDED RELEASE ORAL at 04:40

## 2022-10-14 ASSESSMENT — ENCOUNTER SYMPTOMS
PSYCHIATRIC NEGATIVE: 1
ENDOCRINE NEGATIVE: 1
CHEST TIGHTNESS: 0
MUSCULOSKELETAL NEGATIVE: 1
HEMATOLOGIC/LYMPHATIC NEGATIVE: 1
FEVER: 0
DIZZINESS: 0
SHORTNESS OF BREATH: 0
BLOOD IN STOOL: 0
EYES NEGATIVE: 1
ABDOMINAL PAIN: 0
CONSTITUTIONAL NEGATIVE: 1
NEUROLOGICAL NEGATIVE: 1
PALPITATIONS: 0
GASTROINTESTINAL NEGATIVE: 1

## 2022-10-14 NOTE — PROGRESS NOTES
Cardiology Follow Up Progress Note    Date of Service  10/14/2022    Attending Physician  Shey Watts M.D.    Chief Complaint   RAMÍREZ Aponte is a 67 y.o. male admitted 10/11/2022 with HFpEF and recurrent AFL with RVR.    S/P unsuccessful DCCV X 2 over last 2 weeks. EP consulted, underwent successful AF/AFL ablation with Dr. Glover on 10/13/2022.     Interim Events  No acute events overnight.   Remains in NSR.   Bilateral femoral access sites are soft with CDI dressing in place.   Some GI upset with AM medications.     Review of Systems  Review of Systems   Constitutional:  Negative for fever.   Respiratory:  Negative for chest tightness and shortness of breath.    Cardiovascular:  Negative for chest pain, palpitations and leg swelling.   Gastrointestinal:  Negative for abdominal pain and blood in stool.   Genitourinary:  Negative for hematuria.   Musculoskeletal:  Negative for gait problem.   Neurological:  Negative for dizziness and syncope.   All other systems reviewed and are negative.    Vital signs in last 24 hours  Temp:  [36.2 °C (97.1 °F)-36.8 °C (98.2 °F)] 36.7 °C (98.1 °F)  Pulse:  [77-96] 88  Resp:  [11-20] 16  BP: (100-131)/(64-83) 118/79  SpO2:  [89 %-98 %] 93 %    Physical Exam  Physical Exam  Constitutional:       General: He is not in acute distress.     Appearance: Normal appearance.   HENT:      Head: Normocephalic.   Eyes:      Extraocular Movements: Extraocular movements intact.   Cardiovascular:      Rate and Rhythm: Normal rate and regular rhythm.      Pulses: Normal pulses.      Heart sounds: Normal heart sounds. No murmur heard.  Pulmonary:      Effort: Pulmonary effort is normal.      Breath sounds: Normal breath sounds.   Abdominal:      General: There is no distension.      Palpations: Abdomen is soft.      Tenderness: There is no abdominal tenderness.   Musculoskeletal:         General: No swelling.      Cervical back: Normal range of motion.      Right lower leg: No  edema.      Left lower leg: No edema.   Skin:     General: Skin is warm and dry.      Comments: Bilateral femoral access sites are soft with CDI dressing in place.    Neurological:      Mental Status: He is alert and oriented to person, place, and time.   Psychiatric:         Mood and Affect: Mood normal.         Thought Content: Thought content normal.         Judgment: Judgment normal.       Lab Review  Lab Results   Component Value Date/Time    WBC 19.6 (H) 10/14/2022 05:52 AM    RBC 5.19 10/14/2022 05:52 AM    HEMOGLOBIN 15.9 10/14/2022 05:52 AM    HEMATOCRIT 45.7 10/14/2022 05:52 AM    MCV 88.1 10/14/2022 05:52 AM    MCH 30.6 10/14/2022 05:52 AM    MCHC 34.8 10/14/2022 05:52 AM    MPV 11.5 10/14/2022 05:52 AM      Lab Results   Component Value Date/Time    SODIUM 135 10/14/2022 05:52 AM    POTASSIUM 4.7 10/14/2022 05:52 AM    CHLORIDE 100 10/14/2022 05:52 AM    CO2 26 10/14/2022 05:52 AM    GLUCOSE 164 (H) 10/14/2022 05:52 AM    BUN 22 10/14/2022 05:52 AM    CREATININE 1.03 10/14/2022 05:52 AM      Lab Results   Component Value Date/Time    ASTSGOT 21 10/11/2022 07:57 AM    ALTSGPT 37 10/11/2022 07:57 AM     Lab Results   Component Value Date/Time    CHOLSTRLTOT 165 08/30/2022 08:50 AM     (H) 08/30/2022 08:50 AM    HDL 46 08/30/2022 08:50 AM    TRIGLYCERIDE 83 08/30/2022 08:50 AM    TROPONINT 10 10/11/2022 11:00 AM       No results for input(s): NTPROBNP in the last 72 hours.    Cardiac Imaging and Procedures Review  Echocardiogram:  09/30/2022  CONCLUSIONS  No prior study is available for comparison.   Normal left ventricular size, thickness, and systolic function.  The left ventricular ejection fraction is visually estimated to be 65%.  Moderate mitral regurgitation with eccentric jet.  Mild tricuspid regurgitation.  Estimated right ventricular systolic pressure is 30-35 mmHg.     TYRA  10/13/2022  Intraoperative TYRA performed prior to Afib ablation by Dr. Glover.  Ejection fraction visually estimated at  65%. Normal ventricular   function.  No notable thrombus in GENET.  Moderate mitral regurgitation. A2 leaflet prolapse with posteriorly   directed eccentric regurgitant jet.     Imaging  Chest X-Ray:  10/11/2022  IMPRESSION:   Bilateral interstitial edema. No focal consolidation or pleural effusions.      Stress Test:  10/04/2022  No evidence of significant jeopardized viable myocardium or prior myocardial    infarction.   Normal left ventricular size, ejection fraction, and wall motion.     CTA 10/11/2022  IMPRESSION:  1.  No aortic aneurysm or dissection identified.  2.  Mild to moderate diffuse atherosclerosis of the aorta and its branch vessels.  3.  Severe diffuse interstitial pulmonary edema.  4.  Diverticulosis without evidence of diverticulitis.    Assessment/Plan  Persistent Atrial Fibrillation:  - S/P successful AF/AFL ablation (PVI, LA anterior wall, CTI and mitral flutter).  - Continue Amiodarone 200 mg daily, will re-evaluate continuation at follow up.  - OAC with Eliquis 5 mg BID, tolerating well no bleeding problems.   - Healing expectations and duration discussed. Rationale for PPI, Carafate and colchicine was explained. Discussed the importance of uninterrupted OAC for at least 2 months.   - Post ablation discharge instructions reviewed as below.   - Okay to reduced colchicine to daily if GI upset continues.     Thank you for allowing me to participate in the care of this patient.    EP will sign off.     Please contact me with any questions.    BINU Ribeiro.   Cardiology, [facility]  [phone]    [facility] Post Ablation Patient Instructions:  No lifting > 10 lbs x 1 week.      No soaking in baths, hot tubs, pools x 1 week.  May shower the day after discharge and take off groin dressings and leave  sites uncovered.  Continue to monitor sites daily for warmth, redness, discolored drainage.  It is common to  have a small lump in the area where the cather was (usually the size of a marble); this will go away but takes approximately 6 weeks to normalize.     3.   Please take all medications as prescribed to you; please do not stop any medications prescribed post ablation unless directed by your healthcare provider.      4.   Please do not miss any doses of your blood thinner (if you have been started on, or take chronic blood thinners) without discussion with your healthcare provider first.     5.   Please walk and take deep breaths after discharge.  After discharge, if you experience neurological changes/signs of stroke or high fever you should be seen in the emergency dept.     6.   It is possible you may experience some chest discomfort or chest tightness post ablation.  This is usually secondary to inflammation and irritation of the tissues at the area of the ablation.  If this occurs, it is advised to try 400 mg of Ibuprofen with food as needed up to three times a day for a maximum of two days.  This should help to decrease pain and tissue inflammation.          **Please notify the office ([phone]) if this occurs.         ** DO NOT TAKE Ibuprofen IF HISTORY OF ALLERGY, SIGNIFICANT BLEEDING OR KIDNEY DISEASE WITHOUT DISCUSSING WITH YOUR CARDIOLOGY PROVIDER FIRST.          ** If pain becomes severe or you have additional symptoms you may need to be medically evaluated; please contact the cardiology office ([phone]) for further direction.     7. It is possible that you may experience arrhythmia/Atrial Fibrillation post ablation.  This is secondary to irritation and inflammation of the cardiac tissues from the ablation.  If you have atrial fibrillation all day or feel poorly with it, please notify your cardiologist's via phone ([phone]) or [employer].      8.  Please contact call our office ([phone]) or message via adjust message if you have any questions or concerns post procedurally.    9. You need to  be seen for post ablation follow up 3-4 weeks post procedure. An appointment is scheduled for you.  Please contact the office ([phone]) if you need to change your appointment.

## 2022-10-14 NOTE — ANESTHESIA POSTPROCEDURE EVALUATION
Patient: Eduardo Aponte    Procedure Summary     Date: 10/13/22 Room / Location: [facility] - CATH LAB [facility]    Anesthesia Start: 1143 Anesthesia Stop: 1432    Procedure: CL-EP ABLATION AFIB-AFLUTTER Diagnosis:       Pulmonary edema cardiac cause (HCC)      Pulmonary edema cardiac cause (HCC)      (See Assoicated Dx)    Scheduled Providers: Sherice Glover M.D.; Vincent Pat M.D. Responsible Provider: Vincent Pat M.D.    Anesthesia Type: general ASA Status: 3          Final Anesthesia Type: general  Last vitals  BP   Blood Pressure : 109/72    Temp   36.5 °C (97.7 °F)    Pulse   96   Resp   17    SpO2   93 %      Anesthesia Post Evaluation    Patient location during evaluation: PACU  Patient participation: complete - patient participated  Level of consciousness: awake and alert  Pain score: 0    Airway patency: patent  Anesthetic complications: no  Cardiovascular status: hemodynamically stable  Respiratory status: acceptable  Hydration status: euvolemic    PONV: none          There were no known notable events for this encounter.     Nurse Pain Score: 0 (NPRS)

## 2022-10-14 NOTE — DISCHARGE SUMMARY
Case Management Discharge Planning    Admission Date: 10/11/2022  GMLOS: 3.4  ALOS: 3    6-Clicks ADL Score: 24  6-Clicks Mobility Score: 24      Anticipated Discharge Dispo: Discharge Disposition: Discharged to home/self care (01)    DME Needed: Yes    DME Ordered: Yes    Action(s) Taken: Choice obtained, Referral(s) sent, and DME Face to Face     Escalations Completed: None    Medically Clear: Yes    Next Steps: Order received for home oxygen. Discussed with patient. Choice form completed. Referrals sent. Pending an accepting provider.    Barriers to Discharge: DME

## 2022-10-14 NOTE — CARE PLAN
The patient is Stable - Low risk of patient condition declining or worsening    Shift Goals  Clinical Goals: monitor surgical sites  Patient Goals: no oxygen  Family Goals: na    Progress made toward(s) clinical / shift goals:    Problem: Pain - Standard  Goal: Alleviation of pain or a reduction in pain to the patient’s comfort goal  Outcome: Progressing  Note: Patient denies pain and knows to inform staff if pain starts to occur.      Problem: Fall Risk  Goal: Patient will remain free from falls  Outcome: Progressing  Note: Patient calls appropriately and does not try to get out of bed by himself.       Patient is not progressing towards the following goals:

## 2022-10-14 NOTE — CARE PLAN
The patient is Stable - Low risk of patient condition declining or worsening    Shift Goals  Clinical Goals: discharge  Patient Goals: go home  Family Goals: NA    Progress made toward(s) clinical / shift goals:    Problem: Pain - Standard  Goal: Alleviation of pain or a reduction in pain to the patient’s comfort goal  Outcome: Met     Problem: Knowledge Deficit - Standard  Goal: Patient and family/care givers will demonstrate understanding of plan of care, disease process/condition, diagnostic tests and medications  Outcome: Met     Problem: Hemodynamics  Goal: Patient's hemodynamics, fluid balance and neurologic status will be stable or improve  Outcome: Progressing       Patient is not progressing towards the following goals:

## 2022-10-14 NOTE — FACE TO FACE
"Face to Face Note  -  Durable Medical Equipment    Shey Watts M.D. - NPI: [id number]  I certify that this patient is under my care and that they had a durable medical equipment(DME)face to face encounter by myself that meets the physician DME face-to-face encounter requirements with this patient on:    Date of encounter:   Patient:                    MRN:                       YOB: 2021  [facility]  [id number]  1955     The encounter with the patient was in whole, or in part, for the following medical condition, which is the primary reason for durable medical equipment:  CHF    I certify that, based on my findings, the following durable medical equipment is medically necessary:    Oxygen   HOME O2 Saturation Measurements:(Values must be present for Home Oxygen orders)  Room air sat at rest: 89  Room air sat with amb: 86  With liters of O2: 2, O2 sat at rest with O2: 93  With Liters of O2: 2, O2 sat with amb with O2 : 90  Is the patient mobile?: Yes  If patient feels more short of breath, they can go up to 6 liters per minute and contact healthcare provider.    Supporting Symptoms: The patient requires supplemental oxygen, as the following interventions have been tried with limited or no improvement: \"Incentive spirometry.    My Clinical findings support the need for the above equipment due to:  Hypoxia  "

## 2022-10-14 NOTE — DISCHARGE PLANNING
Received Choice form at 1315  Agency/Facility Name: [facility]  Referral sent per Choice form @ [phone]  Agency/Facility Name: [facility]  Spoke To: Torito   Outcome: DPA confirmed portable concentrator will be delivered bedside within 1 - 2 hours.    RN MASON notified

## 2022-10-14 NOTE — PROGRESS NOTES
D/c instructions given, educated on worsening s/s. Pt understands and questions answered. Iv removed and pt has meds

## 2022-10-14 NOTE — DISCHARGE PLANNING
Meds-to-Beds: Discharge prescription orders listed below delivered to patient's bedside. SASKIA Kaur notified. Written information regarding the dispensed prescriptions was provided to the patient including the phone number of the pharmacy to call for any questions. Patient elected to have co-payment billed to patient account.      Other discharge prescription orders placed after time of delivery.    Current Outpatient Medications   Medication Sig Dispense Refill    [START ON 10/15/2022] amiodarone (CORDARONE) 200 MG Tab Take 1 Tablet by mouth every day. 30 Tablet 0    colchicine (COLCRYS) 0.6 MG Tab Take 1 Tablet by mouth 2 times a day for 14 days. 28 Tablet 0    [START ON 10/15/2022] omeprazole (PRILOSEC) 20 MG delayed-release capsule Take 1 Capsule by mouth every day. 30 Capsule 0    sucralfate (CARAFATE) 1 GM Tab Take 1 Tablet by mouth 4 Times a Day,Before Meals and at Bedtime for 14 days. 56 Tablet 0    furosemide (LASIX) 20 MG Tab Take 1 Tablet by mouth 1 time a day as needed (shortness of breath, weight gain 3lbs overnight/ 5lbs within a week). 30 Tablet 0      Mira Gilmore, PharmD

## 2022-10-14 NOTE — PROGRESS NOTES
Assumed care of patient, received bedside report from Melva KRUGER. Patient is A&O X 4. Pain 0/10. Vital signs stable during day shift, on 2 L of oxygen NC. On tele monitor, SR 87. POC discussed with patient. Patient verbalized understanding. All questions answered. Call light within reach and fall precautions in place. Bed alarm on, bed locked and in lowest position.

## 2022-10-14 NOTE — PROGRESS NOTES
Pt back from EP. VSS. Pt A&Ox4 denies pain. Access sites soft, no evidence of hematoma,  dressing CDI. NSR on monitor.

## 2022-10-14 NOTE — DISCHARGE INSTRUCTIONS
[facility] Post Ablation Patient Instructions:  No lifting > 10 lbs x 1 week.      No soaking in baths, hot tubs, pools x 1 week.  May shower the day after discharge and take off groin dressings and leave  sites uncovered.  Continue to monitor sites daily for warmth, redness, discolored drainage.  It is common to have a small lump in the area where the cather was (usually the size of a marble); this will go away but takes approximately 6 weeks to normalize.     3.   Please take all medications as prescribed to you; please do not stop any medications prescribed post ablation unless directed by your healthcare provider.      4.   Please do not miss any doses of your blood thinner (if you have been started on, or take chronic blood thinners) without discussion with your healthcare provider first.     5.   Please walk and take deep breaths after discharge.  After discharge, if you experience neurological changes/signs of stroke or high fever you should be seen in the emergency dept.     6.   It is possible you may experience some chest discomfort or chest tightness post ablation.  This is usually secondary to inflammation and irritation of the tissues at the area of the ablation.  If this occurs, it is advised to try 400 mg of Ibuprofen with food as needed up to three times a day for a maximum of two days.  This should help to decrease pain and tissue inflammation.          **Please notify the office ([phone]) if this occurs.         ** DO NOT TAKE Ibuprofen IF HISTORY OF ALLERGY, SIGNIFICANT BLEEDING OR KIDNEY DISEASE WITHOUT DISCUSSING WITH YOUR CARDIOLOGY PROVIDER FIRST.          ** If pain becomes severe or you have additional symptoms you may need to be medically evaluated; please contact the cardiology office ([phone]) for further direction.     7. It is possible that you may experience arrhythmia/Atrial Fibrillation post ablation.  This is secondary to irritation and  inflammation of the cardiac tissues from the ablation.  If you have atrial fibrillation all day or feel poorly with it, please notify your cardiologist's via phone ([phone]) or mychart.      8.  Please contact call our office ([phone]) or message via [employer] message if you have any questions or concerns post procedurally.    9. You need to be seen for post ablation follow up 3-4 weeks post procedure. An appointment is scheduled for you.  Please contact the office ([phone]) if you need to change your appointment.          Discharge Instructions per Shey Watts M.D.    Mr. Aponte,    You were admitted to [facility] with atrial fibrillation and underwent an ablation.  You will be started on colchicine x2 weeks, Carafate x2 weeks and omeprazole for a month.  You were started on lasix/furosemide 20mg daily as needed for shortness of breath, lower extremity edema or weight gain 3lbs in 1 day or 5lbs in a week.  You will have follow-up with your primary care provider as well as cardiology.        Return to ER if you develop chest pain, shortness of breath, lightheadedness/dizziness, palpitations, fevers or any other concerning symptoms.      Discharge Instructions    Discharged to home by car with relative. Discharged via wheelchair, hospital escort: Yes.  Special equipment needed: Not Applicable    Be sure to schedule a follow-up appointment with your primary care doctor or any specialists as instructed.     Discharge Plan:   Diet Plan: Discussed  Activity Level: Discussed  Confirmed Follow up Appointment: Patient to Call and Schedule Appointment  Confirmed Symptoms Management: Discussed  Medication Reconciliation Updated: Yes  Influenza Vaccine Given - only chart on this line when given: Influenza Vaccine Given (See MAR)    I understand that a diet low in cholesterol, fat, and sodium is recommended for good health. Unless I have been given specific instructions below for another diet, I accept this  instruction as my diet prescription.   Other diet: cardiac    Special Instructions: None    -Is this patient being discharged with medication to prevent blood clots?  No    Is patient discharged on Warfarin / Coumadin?   No

## 2022-10-14 NOTE — PROGRESS NOTES
"Cardiology Follow Up Progress Note    Date of Service  10/14/2022    Attending Physician  Yu Bhagat M.D.    Chief Complaint   Afib with RVR     HPI  Eduardo Aponte is a 67 y.o. male with a past medical history of paroxysmal afib, polycythemia, DLD, prediabetes, former smoker (quit 2022).  admitted 10/11/2022 with afib and shortness of breath. He was recently seen by Dr. Ruff in 2022 for new onset afib, his metoprolol was increased to 50 mg BID and he was referred at that time for TYRA cardioversion on 10/06/2022 which was initially successful, however based on EKG results from that day it appears that he went back into afib shortly after DCCV. He was then started on flecainide. Recent echo shows a LVEF of 65%, moderate MR and mild TR, and stress test from 10/04/2022 shows normal LV size, EF, and wall motion. In the ED he was complaining of \"feeling unwell over last week or so, worsening SOB with any exertion, orthopnea, low appetite, palpitations, occasional chest and abdominal pain with back pain.      \"In the ER, was given 500ml IV fluids, 10mg IV diltiazem, tachypneic, unsuccessful DCCV x 1, rates 100's in A flutter. Given 40mg IV lasix and 150mg IV amiodarone bolus. CTA in ER excluded Ao dissection (his father  of AAA rupture in his 80's).\"    Interim Events  No overnight cardiac events   Telemetry: NSR   Vs stable   On 2L of oxygen therapy   Ablation access site bilateral groin is soft and good peripheral pulses       Review of Systems  Review of Systems   Constitutional: Negative.    HENT: Negative.     Eyes: Negative.    Respiratory:  Negative for chest tightness and shortness of breath.    Cardiovascular:  Negative for chest pain, palpitations and leg swelling.   Gastrointestinal: Negative.    Endocrine: Negative.    Genitourinary: Negative.    Musculoskeletal: Negative.    Skin: Negative.    Neurological: Negative.    Hematological: Negative.    Psychiatric/Behavioral: Negative.  "      Vital signs in last 24 hours  Temp:  [36.2 °C (97.1 °F)-36.8 °C (98.2 °F)] 36.8 °C (98.2 °F)  Pulse:  [77-96] 85  Resp:  [11-20] 18  BP: (100-131)/(64-83) 117/78  SpO2:  [89 %-98 %] 89 %    Physical Exam  Physical Exam  Constitutional:       Appearance: Normal appearance.   HENT:      Head: Normocephalic and atraumatic.      Mouth/Throat:      Pharynx: Oropharynx is clear.   Eyes:      Extraocular Movements: Extraocular movements intact.      Pupils: Pupils are equal, round, and reactive to light.   Cardiovascular:      Rate and Rhythm: Normal rate and regular rhythm.      Pulses: Normal pulses.      Heart sounds: Normal heart sounds. No murmur heard.    No friction rub.   Pulmonary:      Effort: Pulmonary effort is normal. No respiratory distress.      Breath sounds: Normal breath sounds.   Abdominal:      General: Abdomen is flat.      Palpations: Abdomen is soft.   Musculoskeletal:         General: Normal range of motion.      Cervical back: Normal range of motion and neck supple.   Skin:     General: Skin is warm and dry.   Neurological:      General: No focal deficit present.      Mental Status: He is alert and oriented to person, place, and time.   Psychiatric:         Mood and Affect: Mood normal.         Behavior: Behavior normal.       Lab Review  Lab Results   Component Value Date/Time    WBC 19.6 (H) 10/14/2022 05:52 AM    RBC 5.19 10/14/2022 05:52 AM    HEMOGLOBIN 15.9 10/14/2022 05:52 AM    HEMATOCRIT 45.7 10/14/2022 05:52 AM    MCV 88.1 10/14/2022 05:52 AM    MCH 30.6 10/14/2022 05:52 AM    MCHC 34.8 10/14/2022 05:52 AM    MPV 11.5 10/14/2022 05:52 AM      Lab Results   Component Value Date/Time    SODIUM 135 10/14/2022 05:52 AM    POTASSIUM 4.7 10/14/2022 05:52 AM    CHLORIDE 100 10/14/2022 05:52 AM    CO2 26 10/14/2022 05:52 AM    GLUCOSE 164 (H) 10/14/2022 05:52 AM    BUN 22 10/14/2022 05:52 AM    CREATININE 1.03 10/14/2022 05:52 AM      Lab Results   Component Value Date/Time    ASTSGOT 21  10/11/2022 07:57 AM    ALTSGPT 37 10/11/2022 07:57 AM     Lab Results   Component Value Date/Time    CHOLSTRLTOT 165 08/30/2022 08:50 AM     (H) 08/30/2022 08:50 AM    HDL 46 08/30/2022 08:50 AM    TRIGLYCERIDE 83 08/30/2022 08:50 AM    TROPONINT 10 10/11/2022 11:00 AM       No results for input(s): NTPROBNP in the last 72 hours.      Cardiac Imaging and Procedures Review  EKG:  My personal interpretation of the EKG dated 10/13/2022 in NSR      Echocardiogram:  09/30/2022  CONCLUSIONS  No prior study is available for comparison.   Normal left ventricular size, thickness, and systolic function.  The left ventricular ejection fraction is visually estimated to be 65%.  Moderate mitral regurgitation with eccentric jet.  Mild tricuspid regurgitation.  Estimated right ventricular systolic pressure is 30-35 mmHg.    TYRA  10/13/2022  Intraoperative TYRA performed prior to Afib ablation by Dr. Glover.  Ejection fraction visually estimated at 65%. Normal ventricular   function.  No notable thrombus in GENET.  Moderate mitral regurgitation. A2 leaflet prolapse with posteriorly   directed eccentric regurgitant jet.     Imaging  Chest X-Ray:  10/11/2022  IMPRESSION:   Bilateral interstitial edema. No focal consolidation or pleural effusions.      Stress Test:  10/04/2022  No evidence of significant jeopardized viable myocardium or prior myocardial    infarction.   Normal left ventricular size, ejection fraction, and wall motion.     CTA 10/11/2022  IMPRESSION:  1.  No aortic aneurysm or dissection identified.  2.  Mild to moderate diffuse atherosclerosis of the aorta and its branch vessels.  3.  Severe diffuse interstitial pulmonary edema.  4.  Diverticulosis without evidence of diverticulitis.     Assessment/Plan     Recurrent atrial flutter with RVR   -unsuccessful DCCV attempts x 2 over last 2 weeks  - underwent successful Successful RF ablation pulmonary vein isolation procedure.  Successful RF ablation of LA anterior wall  focal triggers. Successful RF ablation of CTI dependent flutter. Successful RF ablation of mitral flutter with Dr. Glover 10/13/2022  - continue eliquis 5mg BID   - wean off oxygen   - continue colchicine x 2 weeks (if diarrhea occurs can reduce to once a day), carafate x 2 weeks and daily PPI x 1 month.         2. HFpEF   With afib with RVR and moderate MR  - one time dose of furosemide 20mg IV  - recommend prn furosemide 20mg qd (for SOB or weight gain 3lbs overnight/ 5lbs within a week)  - TYRA showed moderate MR     Future Appointments   Date Time Provider Department Center   10/24/2022  8:45 AM BINU Puckett. [facility] None         I personally spent a total of 18 minutes which includes face-to-face time and non-face-to-face time spent on preparing to see the patient, reviewing hospital notes and tests, obtaining history from the patient, performing a medically appropriate exam, counseling and educating the patient, ordering medications/tests/procedures/referrals as clinically indicated, and documenting information in the electronic medical record.     Thank you for allowing me to participate in the care of this patient.  Cardiology will sign off.     Please contact me with any questions.    BINU Jose.

## 2022-10-14 NOTE — HEART FAILURE PROGRAM
Message bedside RN, Vincent Sena. Let him know that the influenza and pneumococcal vaccines are on the MAR but they are about to time out. Asked that he please offer them to Mr. Fay GANDHI and if he refuses them, 'administer' them as refused on the MAR.    Appointments are appropriate:     FACUNDO Youssef D.O. PCP - General Family Medicine [phone] [address] [facility] [place] [address]     Next Steps: Go on 10/19/2022  Appointment:   Instructions: Please go to your follow up appointment with Garth Ordoñez D.O. on Wednesday October 19,2022 at 10:15am.           Oct 24, 2022  8:45 AM   Heart Failure New Patient with CARMEN Puckett   [facility] (--) [address]   [place] [address]   [phone]      Nov 29, 2022  1:30 PM   Established Patient with CARMEN Mireles   [facility] (--) [address]   [place] [address]   [phone]

## 2022-10-18 LAB — ACT BLD: 416 SEC (ref 74–137)

## 2022-10-24 ENCOUNTER — PATIENT MESSAGE (OUTPATIENT)
Dept: CARDIOLOGY | Facility: MEDICAL CENTER | Age: 67
End: 2022-10-24

## 2022-10-24 ENCOUNTER — TELEPHONE (OUTPATIENT)
Dept: CARDIOLOGY | Facility: MEDICAL CENTER | Age: 67
End: 2022-10-24

## 2022-10-24 ENCOUNTER — OFFICE VISIT (OUTPATIENT)
Dept: CARDIOLOGY | Facility: MEDICAL CENTER | Age: 67
End: 2022-10-24
Payer: MEDICARE

## 2022-10-24 VITALS
OXYGEN SATURATION: 93 % | HEART RATE: 75 BPM | BODY MASS INDEX: 25.06 KG/M2 | RESPIRATION RATE: 16 BRPM | WEIGHT: 185 LBS | HEIGHT: 72 IN | SYSTOLIC BLOOD PRESSURE: 110 MMHG | DIASTOLIC BLOOD PRESSURE: 70 MMHG

## 2022-10-24 DIAGNOSIS — I48.0 PAF (PAROXYSMAL ATRIAL FIBRILLATION) (HCC): ICD-10-CM

## 2022-10-24 DIAGNOSIS — I50.30 HEART FAILURE WITH PRESERVED EJECTION FRACTION, UNSPECIFIED HF CHRONICITY (HCC): ICD-10-CM

## 2022-10-24 DIAGNOSIS — Z98.890 STATUS POST ABLATION OF ATRIAL FIBRILLATION: ICD-10-CM

## 2022-10-24 DIAGNOSIS — I50.1 PULMONARY EDEMA CARDIAC CAUSE (HCC): ICD-10-CM

## 2022-10-24 DIAGNOSIS — J96.01 ACUTE RESPIRATORY FAILURE WITH HYPOXIA (HCC): ICD-10-CM

## 2022-10-24 DIAGNOSIS — Z86.79 STATUS POST ABLATION OF ATRIAL FIBRILLATION: ICD-10-CM

## 2022-10-24 DIAGNOSIS — I34.0 MODERATE MITRAL REGURGITATION BY PRIOR ECHOCARDIOGRAM: ICD-10-CM

## 2022-10-24 DIAGNOSIS — D68.69 SECONDARY HYPERCOAGULABILITY DISORDER (HCC): ICD-10-CM

## 2022-10-24 DIAGNOSIS — I48.91 ATRIAL FIBRILLATION WITH RVR (HCC): ICD-10-CM

## 2022-10-24 LAB — EKG IMPRESSION: NORMAL

## 2022-10-24 PROCEDURE — 93000 ELECTROCARDIOGRAM COMPLETE: CPT | Performed by: INTERNAL MEDICINE

## 2022-10-24 PROCEDURE — 99215 OFFICE O/P EST HI 40 MIN: CPT | Performed by: NURSE PRACTITIONER

## 2022-10-24 RX ORDER — FUROSEMIDE 20 MG/1
20 TABLET ORAL DAILY
Qty: 90 TABLET | Refills: 3 | Status: SHIPPED
Start: 2022-10-24 | End: 2023-04-26

## 2022-10-24 RX ORDER — POTASSIUM CHLORIDE 750 MG/1
20 TABLET, EXTENDED RELEASE ORAL
Qty: 30 EACH | Refills: 5 | Status: SHIPPED
Start: 2022-10-24 | End: 2022-12-02

## 2022-10-24 RX ORDER — TRAMADOL HYDROCHLORIDE 50 MG/1
TABLET ORAL
COMMUNITY
Start: 2022-10-20 | End: 2023-02-01

## 2022-10-24 ASSESSMENT — ENCOUNTER SYMPTOMS
ORTHOPNEA: 0
BRUISES/BLEEDS EASILY: 0
ABDOMINAL PAIN: 0
BLOOD IN STOOL: 0
COUGH: 0
FALLS: 0
LOSS OF CONSCIOUSNESS: 0
PND: 0
CLAUDICATION: 0
MYALGIAS: 0
PALPITATIONS: 0
DIZZINESS: 0
TINGLING: 0
WEIGHT LOSS: 0
DIARRHEA: 1
BLURRED VISION: 0
SHORTNESS OF BREATH: 1
NAUSEA: 1
FEVER: 0
VOMITING: 0

## 2022-10-24 ASSESSMENT — MINNESOTA LIVING WITH HEART FAILURE QUESTIONNAIRE (MLHF)
MAKING YOU WORRY: 4
TIRED, FATIGUED OR LOW ON ENERGY: 4
COSTING YOU MONEY FOR MEDICAL CARE: 1
DIFFICULTY SOCIALIZING WITH FAMILY OR FRIENDS: 3
SWELLING IN ANKLES OR LEGS: 0
HAVING TO SIT OR LIE DOWN DURING THE DAY: 5
MAKING YOU FEEL DEPRESSED: 2
DIFFICULTY SLEEPING WELL AT NIGHT: 3
DIFFICULTY GOING AWAY FROM HOME: 4
DIFFICULTY WITH RECREATIONAL PASTIMES, SPORTS, HOBBIES: 5
WORKING AROUND THE HOUSE OR YARD DIFFICULT: 4
DIFFICULTY WITH SEXUAL ACTIVITIES: 4
WALKING ABOUT OR CLIMBING STAIRS DIFFICULT: 4
DIFFICULTY WORKING TO EARN A LIVING: 0
MAKING YOU STAY IN A HOSPITAL: 4
EATING LESS FOODS YOU LIKE: 2
DIFFICULTY TO CONCENTRATE OR REMEMBERING THINGS: 2
FEELING LIKE A BURDEN TO FAMILY AND FRIENDS: 3
TOTAL_SCORE: 62
MAKING YOU SHORT OF BREATH: 4
LOSS OF SELF CONTROL IN YOUR LIFE: 1
GIVING YOU SIDE EFFECTS FROM TREATMENTS: 3

## 2022-10-24 ASSESSMENT — FIBROSIS 4 INDEX: FIB4 SCORE: 1.01

## 2022-10-24 ASSESSMENT — 6 MINUTE WALK TEST (6MWT): TOTAL DISTANCE WALKED (METERS): 0

## 2022-10-24 NOTE — TELEPHONE ENCOUNTER
Patient called back, his phone battery had  during the previous call. Scheduled consult with Dr. Hua.

## 2022-10-24 NOTE — PROGRESS NOTES
Chief Complaint   Patient presents with    Atrial Fibrillation     F/v dx: PAF (paroxysmal atrial fibrillation) (HCC)    Congestive Heart Failure       Subjective     Eduardo Aponte is a 67 y.o. male who presents today as heart failure new after recent successful A. fib/flutter ablation with Dr. Glover on 10/13/2022.  Moderate MR was noted on TYRA.  Patient was last seen in office by Dr. Ruff on 9/28/2022.  Patient has additional medical problems with polycythemia, dyslipidemia, pre-DM, former smoker (quit 4/2020).    Today, patient reports he continues to experience dyspnea with exertion including activities around his home.  Patient also reports decreased appetite due to abdominal fullness which she was previously treating with Pepcid.  Patient reports he is only used his Lasix 3 times last week since discharge.  Patient also reports nausea and loose stool with diuretic and colchicine therapy.  Patient's colchicine therapy to completed in 2 days.  We will follow-up with patient regarding his loose stool.     EKG in office personally interpreted by me as sinus rhythm.     Based on physical examination findings, patient is slightly fluid volume overloaded.  + JVD, lungs are clear to auscultation, no pitting edema in bilateral lower extremities, + ascites. Dry weight is TBD.  Weight in office today is 185 pounds. Groin site CDI. Bruising noted on forearms.     We discussed increasing Lasix with potassium supplementation daily.  We will also initiate Jardiance after colchicine therapy is completed to reduce the risk of major adverse cardiovascular events (cardiovascular death, non-fatal MI or non-fatal stroke) and to reduce the juan of cardiovascular death and hospitalization for heart failure in adults with heart failure with NYHA class II-IV as Class 2a recommendation from ACC guidelines for HFpEF.  We will have patient follow-up if no improvement in nausea or diarrhea.  Moderate MR noted on TYRA.  Once patient  euvolemic, we will reevaluate.  Close follow-up with pharmacotherapy clinic and EP for further medication optimization.      Past Medical History:   Diagnosis Date    Arrhythmia     A FIB    Arthritis     HANDS AND SHOULDERS    Breath shortness     WITH EXERTION    Cataract     IOL 5 YEARS AGO    Dermatitis     chronic    Heart burn     High cholesterol      History reviewed. No pertinent surgical history.  History reviewed. No pertinent family history.  Social History     Socioeconomic History    Marital status:      Spouse name: Not on file    Number of children: Not on file    Years of education: Not on file    Highest education level: Not on file   Occupational History    Not on file   Tobacco Use    Smoking status: Former     Packs/day: 1.00     Types: Cigarettes     Quit date: 2010     Years since quittin.0    Smokeless tobacco: Never   Vaping Use    Vaping Use: Never used   Substance and Sexual Activity    Alcohol use: Yes     Comment: occasional    Drug use: No    Sexual activity: Not on file     Comment:    Other Topics Concern    Not on file   Social History Narrative    Not on file     Social Determinants of Health     Financial Resource Strain: Not on file   Food Insecurity: Not on file   Transportation Needs: Not on file   Physical Activity: Not on file   Stress: Not on file   Social Connections: Not on file   Intimate Partner Violence: Not on file   Housing Stability: Not on file     Allergies   Allergen Reactions    Bee      Outpatient Encounter Medications as of 10/24/2022   Medication Sig Dispense Refill    traMADol (ULTRAM) 50 MG Tab       potassium chloride SA (K-DUR) 10 MEQ Tab CR Take 2 Tablets by mouth 1 time a day as needed (when taking lasix/furosemide). 30 Each 5    furosemide (LASIX) 20 MG Tab Take 1 Tablet by mouth every day. 90 Tablet 3    Empagliflozin 10 MG Tab Take 1 Tablet by mouth every day. 90 Tablet 3    amiodarone (CORDARONE) 200 MG Tab Take 1 Tablet by  mouth every day. 30 Tablet 0    colchicine (COLCRYS) 0.6 MG Tab Take 1 Tablet by mouth 2 times a day for 14 days. 28 Tablet 0    omeprazole (PRILOSEC) 20 MG delayed-release capsule Take 1 Capsule by mouth every day. 30 Capsule 0    sucralfate (CARAFATE) 1 GM Tab Take 1 Tablet by mouth 4 Times a Day,Before Meals and at Bedtime for 14 days. 56 Tablet 0    ELIQUIS 5 MG Tab Take 5 mg by mouth 2 times a day.      rosuvastatin (CRESTOR) 10 MG Tab Take 10 mg by mouth every 48 hours. 5mg      metoprolol tartrate (LOPRESSOR) 50 MG Tab Take 1 Tablet by mouth 2 times a day. 180 Tablet 3    [DISCONTINUED] acetaminophen (TYLENOL) 325 MG Tab Take 2 Tablets by mouth every four hours as needed (pain). (Patient not taking: Reported on 10/24/2022) 30 Tablet 0    [DISCONTINUED] furosemide (LASIX) 20 MG Tab Take 1 Tablet by mouth 1 time a day as needed (shortness of breath, weight gain 3lbs overnight/ 5lbs within a week). 30 Tablet 0    [DISCONTINUED] potassium chloride SA (K-DUR) 10 MEQ Tab CR Take 2 Tablets by mouth 1 time a day as needed (when taking lasix/furosemide). 30 Each 0     No facility-administered encounter medications on file as of 10/24/2022.     Review of Systems   Constitutional:  Positive for malaise/fatigue. Negative for fever and weight loss.   Eyes:  Negative for blurred vision.   Respiratory:  Positive for shortness of breath. Negative for cough.    Cardiovascular:  Negative for chest pain, palpitations, orthopnea, claudication, leg swelling and PND.   Gastrointestinal:  Positive for diarrhea and nausea. Negative for abdominal pain, blood in stool and vomiting.   Genitourinary:  Negative for dysuria, frequency and hematuria.   Musculoskeletal:  Negative for falls and myalgias.   Neurological:  Negative for dizziness, tingling and loss of consciousness.   Endo/Heme/Allergies:  Does not bruise/bleed easily.            Objective     /70 (BP Location: Left arm, Patient Position: Sitting, BP Cuff Size: Adult)    Pulse 75   Resp 16   Ht 1.829 m (6')   Wt 83.9 kg (185 lb)   SpO2 93%   BMI 25.09 kg/m²     Physical Exam  Vitals reviewed.   Constitutional:       Appearance: He is well-developed.   HENT:      Head: Normocephalic and atraumatic.   Eyes:      Pupils: Pupils are equal, round, and reactive to light.   Neck:      Vascular: JVD present.   Cardiovascular:      Rate and Rhythm: Normal rate and regular rhythm.      Heart sounds: Normal heart sounds. No murmur heard.    No friction rub. No gallop.   Pulmonary:      Effort: Pulmonary effort is normal. No respiratory distress.   Abdominal:      General: Bowel sounds are normal. There is distension.   Musculoskeletal:      Right lower leg: No edema.      Left lower leg: No edema.   Skin:     General: Skin is warm and dry.      Findings: No erythema.   Neurological:      Mental Status: He is alert and oriented to person, place, and time.   Psychiatric:         Behavior: Behavior normal.          Lab Results   Component Value Date/Time    CHOLSTRLTOT 165 08/30/2022 08:50 AM     (H) 08/30/2022 08:50 AM    HDL 46 08/30/2022 08:50 AM    TRIGLYCERIDE 83 08/30/2022 08:50 AM       Lab Results   Component Value Date/Time    SODIUM 135 10/14/2022 05:52 AM    POTASSIUM 4.7 10/14/2022 05:52 AM    CHLORIDE 100 10/14/2022 05:52 AM    CO2 26 10/14/2022 05:52 AM    GLUCOSE 164 (H) 10/14/2022 05:52 AM    BUN 22 10/14/2022 05:52 AM    CREATININE 1.03 10/14/2022 05:52 AM     Lab Results   Component Value Date/Time    ALKPHOSPHAT 61 10/11/2022 07:57 AM    ASTSGOT 21 10/11/2022 07:57 AM    ALTSGPT 37 10/11/2022 07:57 AM    TBILIRUBIN 0.5 10/11/2022 07:57 AM      Echocardiogram:  09/30/2022  CONCLUSIONS  No prior study is available for comparison.   Normal left ventricular size, thickness, and systolic function.  The left ventricular ejection fraction is visually estimated to be 65%.  Moderate mitral regurgitation with eccentric jet.  Mild tricuspid regurgitation.  Estimated right  ventricular systolic pressure is 30-35 mmHg.     TYRA  10/13/2022  Intraoperative TYRA performed prior to Afib ablation by Dr. Glover.  Ejection fraction visually estimated at 65%. Normal ventricular   function.  No notable thrombus in GENET.  Moderate mitral regurgitation. A2 leaflet prolapse with posteriorly   directed eccentric regurgitant jet.    Stress Test:  10/04/2022  No evidence of significant jeopardized viable myocardium or prior myocardial    infarction.   Normal left ventricular size, ejection fraction, and wall motion.     CTA 10/11/2022  IMPRESSION:  1.  No aortic aneurysm or dissection identified.  2.  Mild to moderate diffuse atherosclerosis of the aorta and its branch vessels.  3.  Severe diffuse interstitial pulmonary edema.  4.  Diverticulosis without evidence of diverticulitis.    Assessment & Plan     1. Atrial fibrillation with RVR ([facility])  EKG    Referral to Pharmacotherapy Service    Basic Metabolic Panel    REFERRAL TO CARDIOLOGY      2. PAF (paroxysmal atrial fibrillation) ([facility])  Referral to Pharmacotherapy Service    Basic Metabolic Panel    REFERRAL TO CARDIOLOGY      3. Acute respiratory failure with hypoxia ([facility])  potassium chloride SA (K-DUR) 10 MEQ Tab CR    Referral to Pharmacotherapy Service    Basic Metabolic Panel    REFERRAL TO CARDIOLOGY      4. Pulmonary edema cardiac cause ([facility])  Referral to Pharmacotherapy Service    Basic Metabolic Panel    REFERRAL TO CARDIOLOGY      5. Moderate mitral regurgitation by prior echocardiogram  Referral to Pharmacotherapy Service    Basic Metabolic Panel    REFERRAL TO CARDIOLOGY      6. Heart failure with preserved ejection fraction, unspecified HF chronicity ([facility])  furosemide (LASIX) 20 MG Tab    Referral to Pharmacotherapy Service    Basic Metabolic Panel    REFERRAL TO CARDIOLOGY      7. Secondary hypercoagulability disorder ([facility])        8. Status post ablation of atrial fibrillation            Medical Decision Making: Today's  Assessment/Status/Plan:        Afib/Aflutter S/P ablation  -EKG shows SR  -groin sites CDI  - Successful RF ablation pulmonary vein isolation procedure.  Successful RF ablation of LA anterior wall focal triggers. Successful RF ablation of CTI dependent flutter. Successful RF ablation of mitral flutter with Dr. Glover 10/13/2022  - continue eliquis 5mg BID   -continue colchicine x 2 weeks (if diarrhea occurs can reduce to once a day), carafate x 2 weeks and daily PPI x 1 month.  -Continue medications until FU with RANULFO Murry Due to possible reoccurrence of afib.     Valvular cardiomyopathy; moderate MR on TYRA  Stage C, Class III, LVEF 65%:   -Increase Lasix to 20 mg daily with 20 M EQ potassium supplementation  -Add Jardiance 10 mg daily  -BMP in 2 weeks  -Consider spironolactone once tolerating Jardiance  -Pharmacotherapy clinic follow-up for medication optimization  -Reinforced s/sx of worsening heart failure with patient and weight monitoring. Pt verbalizes understanding. Pt to call office or RTC if present.   -Referral to structural heart clinic placed    FU in clinic in 4 weeks with pharmacotherapy clinic or heart failure clinic. Sooner if needed.    Patient verbalizes understanding and agrees with the plan of care.     I personally spent a total of 46 minutes which includes face-to-face time and non-face-to-face time spent on preparing to see the patient, reviewing prior notes and tests, obtaining history from the patient, performing a medically appropriate exam, counseling and educating the patient, ordering medications/tests/procedures/referrals as clinically indicated, and documenting information in the electronic medical record.    SARAH PuckettRLEONARDO.   [facility]  [phone]    PLEASE NOTE: This Note was created using voice recognition Software. I have made every reasonable attempt to correct obvious errors, but I expect that there are errors of  grammar and possibly content that I did not discover before finalizing the note

## 2022-11-01 ENCOUNTER — OFFICE VISIT (OUTPATIENT)
Dept: CARDIOLOGY | Facility: MEDICAL CENTER | Age: 67
End: 2022-11-01
Payer: MEDICARE

## 2022-11-01 VITALS
OXYGEN SATURATION: 93 % | BODY MASS INDEX: 24.52 KG/M2 | DIASTOLIC BLOOD PRESSURE: 66 MMHG | HEIGHT: 72 IN | RESPIRATION RATE: 16 BRPM | WEIGHT: 181 LBS | SYSTOLIC BLOOD PRESSURE: 102 MMHG | HEART RATE: 78 BPM

## 2022-11-01 DIAGNOSIS — I34.0 NONRHEUMATIC MITRAL VALVE REGURGITATION: ICD-10-CM

## 2022-11-01 PROCEDURE — 99214 OFFICE O/P EST MOD 30 MIN: CPT | Performed by: INTERNAL MEDICINE

## 2022-11-01 RX ORDER — SUCRALFATE 1 G/1
1 TABLET ORAL
COMMUNITY
End: 2022-11-22

## 2022-11-01 RX ORDER — COLCHICINE 0.6 MG/1
0.6 TABLET ORAL DAILY
COMMUNITY
End: 2022-11-22

## 2022-11-01 ASSESSMENT — FIBROSIS 4 INDEX: FIB4 SCORE: 1.01

## 2022-11-01 NOTE — PROGRESS NOTES
Cardiology follow-up consultation Note    Date of note:    11/1/2022      Patient Name: Eduardo Aponte     YOB: 1955  MRN:              [id number]    Chief Complaint: Mitral regurgitation    Eduardo Aponte is a 67 y.o. male  patient presented today for consultation regarding mitral regurgitation.  Recently he underwent atrial fibrillation ablation.  He continues to feel tired, shortness of breath with exertion.  Denies chest pains.        Past Medical History:   Diagnosis Date    Arrhythmia     A FIB    Arthritis     HANDS AND SHOULDERS    Breath shortness     WITH EXERTION    Cataract     IOL 5 YEARS AGO    Dermatitis     chronic    Heart burn     High cholesterol              Current Outpatient Medications   Medication Sig Dispense Refill    colchicine (COLCRYS) 0.6 MG Tab Take 0.6 mg by mouth every day.      sucralfate (CARAFATE) 1 GM Tab Take 1 g by mouth 4 Times a Day,Before Meals and at Bedtime.      traMADol (ULTRAM) 50 MG Tab       potassium chloride SA (K-DUR) 10 MEQ Tab CR Take 2 Tablets by mouth 1 time a day as needed (when taking lasix/furosemide). 30 Each 5    furosemide (LASIX) 20 MG Tab Take 1 Tablet by mouth every day. 90 Tablet 3    Empagliflozin 10 MG Tab Take 1 Tablet by mouth every day. 90 Tablet 3    amiodarone (CORDARONE) 200 MG Tab Take 1 Tablet by mouth every day. 30 Tablet 0    omeprazole (PRILOSEC) 20 MG delayed-release capsule Take 1 Capsule by mouth every day. 30 Capsule 0    ELIQUIS 5 MG Tab Take 5 mg by mouth 2 times a day.      rosuvastatin (CRESTOR) 10 MG Tab Take 10 mg by mouth every 48 hours. 5mg      metoprolol tartrate (LOPRESSOR) 50 MG Tab Take 1 Tablet by mouth 2 times a day. 180 Tablet 3     No current facility-administered medications for this visit.             Physical Exam:  Ambulatory Vitals  /66 (BP Location: Left arm, Patient Position: Sitting, BP Cuff Size: Adult)   Pulse 78   Resp 16   Ht 1.829 m (6')   Wt 82.1 kg (181 lb)   SpO2 93%     Oxygen Therapy:  Pulse Oximetry: 93 %  BP Readings from Last 4 Encounters:   11/01/22 102/66   10/24/22 110/70   10/14/22 118/79   10/06/22 124/89       Weight/BMI: Body mass index is 24.55 kg/m².  Wt Readings from Last 4 Encounters:   11/01/22 82.1 kg (181 lb)   10/24/22 83.9 kg (185 lb)   10/13/22 80.1 kg (176 lb 9.4 oz)   10/06/22 82.8 kg (182 lb 8.7 oz)           General: Well appearing and in no apparent distress  Neck: carotid bruits absent  Lungs: respiratory sounds  normal  Heart: Regular rhythm,  No palpable thrills on palpation, murmurs 2 x 6 systolic murmur apical area, no rubs,   Lower extremity edema absent.     Transesophageal echocardiogram reviewed, independently interpreted, moderate mitral regurgitation    Medical Decision Making:  Problem List Items Addressed This Visit    None  Visit Diagnoses       Nonrheumatic mitral valve regurgitation        Relevant Orders    EC-ECHOCARDIOGRAM COMPLETE W/O CONT          His mitral regurgitation likely will improve now he is in sinus rhythm.  Advised to continue to monitor his symptoms, repeat echocardiogram in 3 months.  He can stop amiodarone.  Continue furosemide for now, Farxiga, Crestor.      This note was dictated using Dragon speech recognition software.    Kyle TALAMANTES  Interventional cardiologist  [facility], [address]  [address] [address]  [place] [address]  Phone: [phone]  Fax: [phone]

## 2022-11-04 ENCOUNTER — PATIENT MESSAGE (OUTPATIENT)
Dept: HEALTH INFORMATION MANAGEMENT | Facility: OTHER | Age: 67
End: 2022-11-04

## 2022-11-07 ENCOUNTER — HOSPITAL ENCOUNTER (OUTPATIENT)
Dept: LAB | Facility: MEDICAL CENTER | Age: 67
End: 2022-11-07
Attending: NURSE PRACTITIONER
Payer: MEDICARE

## 2022-11-07 DIAGNOSIS — J96.01 ACUTE RESPIRATORY FAILURE WITH HYPOXIA (HCC): ICD-10-CM

## 2022-11-07 DIAGNOSIS — I50.1 PULMONARY EDEMA CARDIAC CAUSE (HCC): ICD-10-CM

## 2022-11-07 DIAGNOSIS — I50.30 HEART FAILURE WITH PRESERVED EJECTION FRACTION, UNSPECIFIED HF CHRONICITY (HCC): ICD-10-CM

## 2022-11-07 DIAGNOSIS — I48.0 PAF (PAROXYSMAL ATRIAL FIBRILLATION) (HCC): ICD-10-CM

## 2022-11-07 DIAGNOSIS — I34.0 MODERATE MITRAL REGURGITATION BY PRIOR ECHOCARDIOGRAM: ICD-10-CM

## 2022-11-07 DIAGNOSIS — I48.91 ATRIAL FIBRILLATION WITH RVR (HCC): ICD-10-CM

## 2022-11-07 LAB
ANION GAP SERPL CALC-SCNC: 11 MMOL/L (ref 7–16)
BUN SERPL-MCNC: 15 MG/DL (ref 8–22)
CALCIUM SERPL-MCNC: 9.3 MG/DL (ref 8.5–10.5)
CHLORIDE SERPL-SCNC: 103 MMOL/L (ref 96–112)
CO2 SERPL-SCNC: 24 MMOL/L (ref 20–33)
CREAT SERPL-MCNC: 1.23 MG/DL (ref 0.5–1.4)
GFR SERPLBLD CREATININE-BSD FMLA CKD-EPI: 65 ML/MIN/1.73 M 2
GLUCOSE SERPL-MCNC: 131 MG/DL (ref 65–99)
POTASSIUM SERPL-SCNC: 4.1 MMOL/L (ref 3.6–5.5)
SODIUM SERPL-SCNC: 138 MMOL/L (ref 135–145)

## 2022-11-07 PROCEDURE — 36415 COLL VENOUS BLD VENIPUNCTURE: CPT

## 2022-11-07 PROCEDURE — 80048 BASIC METABOLIC PNL TOTAL CA: CPT

## 2022-11-22 ENCOUNTER — NON-PROVIDER VISIT (OUTPATIENT)
Dept: CARDIOLOGY | Facility: MEDICAL CENTER | Age: 67
End: 2022-11-22
Payer: MEDICARE

## 2022-11-22 VITALS
HEIGHT: 72 IN | WEIGHT: 183 LBS | SYSTOLIC BLOOD PRESSURE: 120 MMHG | HEART RATE: 84 BPM | BODY MASS INDEX: 24.79 KG/M2 | DIASTOLIC BLOOD PRESSURE: 70 MMHG

## 2022-11-22 DIAGNOSIS — I48.91 ATRIAL FIBRILLATION WITH RVR (HCC): ICD-10-CM

## 2022-11-22 DIAGNOSIS — I50.30 HEART FAILURE WITH PRESERVED EJECTION FRACTION, UNSPECIFIED HF CHRONICITY (HCC): ICD-10-CM

## 2022-11-22 PROCEDURE — 99211 OFF/OP EST MAY X REQ PHY/QHP: CPT | Performed by: INTERNAL MEDICINE

## 2022-11-22 RX ORDER — EMPAGLIFLOZIN 10 MG/1
10 TABLET, FILM COATED ORAL DAILY
Qty: 30 TABLET | Refills: 0 | Status: SHIPPED | OUTPATIENT
Start: 2022-11-22 | End: 2022-11-29

## 2022-11-22 RX ORDER — EMPAGLIFLOZIN 10 MG/1
10 TABLET, FILM COATED ORAL DAILY
Qty: 30 TABLET | Refills: 6 | Status: SHIPPED | OUTPATIENT
Start: 2022-11-22 | End: 2022-11-29

## 2022-11-22 ASSESSMENT — FIBROSIS 4 INDEX: FIB4 SCORE: 1.01

## 2022-11-22 NOTE — PATIENT INSTRUCTIONS
Aayush Jacques, PharmD, MS, BCACP, LCC  [facility]  Phone: [phone]  Fax: [phone]  On call: [phone]  General scheduling/information [phone]  For emergencies please dial [phone]  Please do not use Visualead for urgent matters, call the phone numbers listed above.  Visualead messages are answered Monday through Friday.              W Plasty Text: The lesion was extirpated to the level of the fat with a #15 scalpel blade.  Given the location of the defect, shape of the defect and the proximity to free margins a W-plasty was deemed most appropriate for repair.  Using a sterile surgical marker, the appropriate transposition arms of the W-plasty were drawn incorporating the defect and placing the expected incisions within the relaxed skin tension lines where possible.    The area thus outlined was incised deep to adipose tissue with a #15 scalpel blade.  The skin margins were undermined to an appropriate distance in all directions utilizing iris scissors.  The opposing transposition arms were then transposed into place in opposite direction and anchored with interrupted buried subcutaneous sutures.

## 2022-11-22 NOTE — PROGRESS NOTES
CHF Pharmacotherapy visit:  11/22/22  [unfilled]  [phone]  [phone]    Informed written consent was given on: 11/22/2022    MARIA ESTHER Aponte is here for CHF with preserved ejection fraction  Pertinent Interval History since last visit:   New patient appointment  Most recent EF:   CONCLUSIONS 9/30/2022  No prior study is available for comparison.   Normal left ventricular size, thickness, and systolic function.  The left ventricular ejection fraction is visually estimated to be 65%.  Moderate mitral regurgitation with eccentric jet.  Mild tricuspid regurgitation.  Estimated right ventricular systolic pressure is 30-35 mmHg  Diastolic function is difficult to assess with arrhythmia.    Vitals:    11/22/22 0918   BP: 120/70   Pulse: 84     Home BP and HR:  N/a  Change in weight: Stable  Exercise habits: no regular exercise program   Diet: common adult      Current Outpatient Medications:     Jardiance, 10 mg, Oral, DAILY    Jardiance, 10 mg, Oral, DAILY    potassium chloride SA, 20 mEq, Oral, QDAY PRN, Taking    furosemide, 20 mg, Oral, DAILY, Taking    Eliquis, 5 mg, Oral, BID, Taking    rosuvastatin, 10 mg, Oral, Q48HRS, Taking    metoprolol tartrate, 50 mg, Oral, BID, Taking    traMADol,     omeprazole, 20 mg, Oral, DAILY    Current Adherence to CHF and other therapies:  Partial    DATA REVIEW  INR   Date Value Ref Range Status   10/06/2022 1.19 (H) 0.87 - 1.13 Final     Comment:     INR - Non-therapeutic Reference Range: 0.87-1.13  INR - Therapeutic Reference Range: 2.0-4.0       No results found for: POCINR   Lab Results   Component Value Date/Time    HBA1C 5.8 (H) 08/30/2022 08:50 AM          Lab Results   Component Value Date/Time    CHOLSTRLTOT 165 08/30/2022 08:50 AM     (H) 08/30/2022 08:50 AM    HDL 46 08/30/2022 08:50 AM    TRIGLYCERIDE 83 08/30/2022 08:50 AM       Lab Results   Component Value Date/Time    SODIUM 138 11/07/2022 10:09 AM    POTASSIUM 4.1 11/07/2022 10:09 AM     CHLORIDE 103 2022 10:09 AM    CO2 24 2022 10:09 AM    GLUCOSE 131 (H) 2022 10:09 AM    BUN 15 2022 10:09 AM    CREATININE 1.23 2022 10:09 AM     Lab Results   Component Value Date/Time    ALKPHOSPHAT 61 10/11/2022 07:57 AM    ASTSGOT 21 10/11/2022 07:57 AM    ALTSGPT 37 10/11/2022 07:57 AM    TBILIRUBIN 0.5 10/11/2022 07:57 AM    INR 1.19 (H) 10/06/2022 07:43 AM    ALBUMIN 4.4 10/11/2022 07:57 AM      No results found for: MALBCRT, MICROALBUR  Calculated creatinine clearance: 65 ml/min   Significant changes to laboratory values since last visit that require repeat labs:  N/a  Other Pertinent Blood Work:   N/a    Immunization History   Administered Date(s) Administered    Influenza Vaccine Adult HD 10/16/2021, 10/14/2022    Influenza Vaccine Quad Inj (Preserved) 10/22/2018    Influenza Vaccine Quad Recombinant 2019    Pneumococcal Conjugate Vaccine (PCV20) 10/14/2022    Tdap Vaccine 10/14/2019         SOCIAL HISTORY  Social History     Tobacco Use   Smoking Status Former    Packs/day: 1.00    Types: Cigarettes    Quit date: 2010    Years since quittin.1   Smokeless Tobacco Never        Recent Imaging Studies:    None since last visit    ASSESSMENT AND PLAN  CHF & HTN:  BP at goal   SOB with exertion   No Edema   Status post ablation for Afib -still on Eliquis 5mg bid   Mitral regurgitation  CHF medications:  Entresto or ACE/ARB: None  Beta blocker: Metoprolol tartrate 50 mg twice daily (has 90days and would like to use them up first before going to the )  Diuretic: Furosemide 20mg daily and as needed for weight gain  Aldosterone antagonist: None  SGLT-2 Inhibitor: Start Jardiance 10 mg once daily   To reduce the risk of major adverse cardiovascular events (cardiovascular death, non-fatal MI or non-fatal stroke) and to reduce the risk of cardiovascular death and hospitalization for heart failure in adults with heart failure with NYHA class II-IV as Class 2a  recommendation from ACC guidelines for HFpEF     2. DM   Pre-DM       Lifestyle   Lifestyle Recommendations From Today's Visit:   Continue to eat DASH/MED style diet.   Continue to exercise as tolerated.  Salt restriction to <2300mg daily       Blood Work Ordered At Today's visit: cmp  Studies Ordered at Todays Visit:  Follow-Up: 12 week(s)    Aayush Jacques, Marie  [facility]  Phone: [phone], Fax: [phone]    This note was created using voice recognition software (Dragon). The accuracy of the dictation is limited by the abilities of the software. I have reviewed the note prior to signing, however some errors in grammar and context are still possible. If you have any questions related to this note please do not hesitate to contact our office.     CC:  FACUNDO Youssef, ANGÉLICA Land*

## 2022-11-29 ENCOUNTER — TELEPHONE (OUTPATIENT)
Dept: CARDIOLOGY | Facility: MEDICAL CENTER | Age: 67
End: 2022-11-29
Payer: MEDICARE

## 2022-11-29 ENCOUNTER — OFFICE VISIT (OUTPATIENT)
Dept: CARDIOLOGY | Facility: MEDICAL CENTER | Age: 67
End: 2022-11-29
Payer: MEDICARE

## 2022-11-29 VITALS
HEART RATE: 74 BPM | RESPIRATION RATE: 16 BRPM | DIASTOLIC BLOOD PRESSURE: 70 MMHG | HEIGHT: 72 IN | OXYGEN SATURATION: 95 % | WEIGHT: 181 LBS | BODY MASS INDEX: 24.52 KG/M2 | SYSTOLIC BLOOD PRESSURE: 118 MMHG

## 2022-11-29 DIAGNOSIS — I34.0 MODERATE MITRAL REGURGITATION BY PRIOR ECHOCARDIOGRAM: ICD-10-CM

## 2022-11-29 DIAGNOSIS — I48.91 ATRIAL FIBRILLATION WITH RVR (HCC): ICD-10-CM

## 2022-11-29 DIAGNOSIS — I50.32 CHRONIC HEART FAILURE WITH PRESERVED EJECTION FRACTION (HCC): ICD-10-CM

## 2022-11-29 DIAGNOSIS — Z79.01 CHRONIC ANTICOAGULATION: ICD-10-CM

## 2022-11-29 DIAGNOSIS — Z98.890 H/O CARDIAC RADIOFREQUENCY ABLATION: ICD-10-CM

## 2022-11-29 PROCEDURE — 93000 ELECTROCARDIOGRAM COMPLETE: CPT | Performed by: NURSE PRACTITIONER

## 2022-11-29 PROCEDURE — 99214 OFFICE O/P EST MOD 30 MIN: CPT | Mod: 25 | Performed by: NURSE PRACTITIONER

## 2022-11-29 RX ORDER — FLECAINIDE ACETATE 50 MG/1
TABLET ORAL
COMMUNITY
Start: 2022-11-05 | End: 2022-11-29

## 2022-11-29 RX ORDER — FAMOTIDINE 20 MG/1
20 TABLET, FILM COATED ORAL
Status: ON HOLD | COMMUNITY
End: 2023-04-04

## 2022-11-29 RX ORDER — EMPAGLIFLOZIN 10 MG/1
10 TABLET, FILM COATED ORAL DAILY
Qty: 30 TABLET | Refills: 6 | Status: SHIPPED | OUTPATIENT
Start: 2022-11-29 | End: 2023-04-26

## 2022-11-29 ASSESSMENT — ENCOUNTER SYMPTOMS
MUSCULOSKELETAL NEGATIVE: 1
TINGLING: 0
PND: 0
WHEEZING: 0
HEADACHES: 0
PSYCHIATRIC NEGATIVE: 1
TREMORS: 0
NAUSEA: 0
COUGH: 0
DIZZINESS: 0
FOCAL WEAKNESS: 0
HEMOPTYSIS: 0
PALPITATIONS: 0
SPUTUM PRODUCTION: 0
FEVER: 0
DOUBLE VISION: 0
SORE THROAT: 0
SENSORY CHANGE: 0
CHILLS: 0
LOSS OF CONSCIOUSNESS: 0
BLURRED VISION: 0
VOMITING: 0
SPEECH CHANGE: 0
SHORTNESS OF BREATH: 1
WEIGHT LOSS: 0
STRIDOR: 0
HEARTBURN: 0
ORTHOPNEA: 0

## 2022-11-29 ASSESSMENT — FIBROSIS 4 INDEX: FIB4 SCORE: 1.01

## 2022-11-29 NOTE — TELEPHONE ENCOUNTER
Received surgical clearance for dental procedure from [facility].   F: [phone]    SC signed clearance. Faxed to [facility].     Received fax confirmation, scanned into Vudu

## 2022-11-29 NOTE — PROGRESS NOTES
Chief Complaint   Patient presents with    Atrial Fibrillation     F/V Dx: Atrial fibrillation with RVR (HCC)       Rosario Aponte is a 67 y.o. male who presents today for follow-up atrial fibrillation/flutter.  Was admitted inpatient in October for rapid A. fib in addition to HFpEF exacerbation.  Was taken for successful atrial flutter atrial fibrillation ablation by Dr. Glover 10/13/2022.    He is followed by Dr. Hua for his moderate mitral regurgitation.  Seen by Dr. Glover in consultation inpatient.  Additional medical history also significant for use of anticoagulation    Since his ablation he reports that he has not had signs of potential arrhythmia.  He has been checking his pulse rate via pulse ox and reports heart rate is staying mostly within 60 to 80 bpm.  He does continue to endorse shortness of breath, especially exertional.  He was seen last week by Dr. Hua, Plan to recheck echo and status of mitral valve in 3 months.  Has also been established with pharmacotherapy program for heart failure.    He reports his weight has been stable with daily dosing of Lasix.  Reports insurance has denied Jardiance, states he believes they would like him to try another medication before it will be authorized but does not have details on this at this time.    Past Medical History:   Diagnosis Date    Arrhythmia     A FIB    Arthritis     HANDS AND SHOULDERS    Breath shortness     WITH EXERTION    Cataract     IOL 5 YEARS AGO    Dermatitis     chronic    Heart burn     High cholesterol      No past surgical history on file.  No family history on file.  Social History     Socioeconomic History    Marital status:      Spouse name: Not on file    Number of children: Not on file    Years of education: Not on file    Highest education level: Not on file   Occupational History    Not on file   Tobacco Use    Smoking status: Former     Packs/day: 1.00     Types: Cigarettes     Quit date:  2010     Years since quittin.1    Smokeless tobacco: Never   Vaping Use    Vaping Use: Never used   Substance and Sexual Activity    Alcohol use: Not Currently     Comment: occasional    Drug use: No    Sexual activity: Not on file     Comment:    Other Topics Concern    Not on file   Social History Narrative    Not on file     Social Determinants of Health     Financial Resource Strain: Not on file   Food Insecurity: Not on file   Transportation Needs: Not on file   Physical Activity: Not on file   Stress: Not on file   Social Connections: Not on file   Intimate Partner Violence: Not on file   Housing Stability: Not on file     Allergies   Allergen Reactions    Bee      Outpatient Encounter Medications as of 2022   Medication Sig Dispense Refill    Empagliflozin (JARDIANCE) 10 MG Tab Take 1 Tablet by mouth every day. 30 Tablet 6    Empagliflozin (JARDIANCE) 10 MG Tab Take 1 Tablet by mouth every day. Please provide samples to the patient while waiting on prior authorization. 30 Tablet 0    traMADol (ULTRAM) 50 MG Tab       potassium chloride SA (K-DUR) 10 MEQ Tab CR Take 2 Tablets by mouth 1 time a day as needed (when taking lasix/furosemide). 30 Each 5    furosemide (LASIX) 20 MG Tab Take 1 Tablet by mouth every day. 90 Tablet 3    omeprazole (PRILOSEC) 20 MG delayed-release capsule Take 1 Capsule by mouth every day. 30 Capsule 0    ELIQUIS 5 MG Tab Take 5 mg by mouth 2 times a day.      rosuvastatin (CRESTOR) 10 MG Tab Take 10 mg by mouth every 48 hours. 5mg      metoprolol tartrate (LOPRESSOR) 50 MG Tab Take 1 Tablet by mouth 2 times a day. 180 Tablet 3     No facility-administered encounter medications on file as of 2022.     Review of Systems   Constitutional:  Negative for chills, fever, malaise/fatigue and weight loss.   HENT:  Negative for congestion, sore throat and tinnitus.    Eyes:  Negative for blurred vision and double vision.   Respiratory:  Positive for shortness of  breath (Exertional). Negative for cough, hemoptysis, sputum production, wheezing and stridor.    Cardiovascular:  Negative for chest pain, palpitations, orthopnea, leg swelling and PND.   Gastrointestinal:  Negative for heartburn, nausea and vomiting.   Genitourinary: Negative.    Musculoskeletal: Negative.    Skin:  Negative for rash.   Neurological:  Negative for dizziness, tingling, tremors, sensory change, speech change, focal weakness, loss of consciousness and headaches.   Psychiatric/Behavioral: Negative.              Objective     /70 (BP Location: Right arm, Patient Position: Sitting, BP Cuff Size: Adult)   Pulse 74   Resp 16   Ht 1.829 m (6')   Wt 82.1 kg (181 lb)   SpO2 95%   BMI 24.55 kg/m²     Physical Exam  Constitutional:       Appearance: Normal appearance. He is well-developed.   HENT:      Head: Normocephalic and atraumatic.      Mouth/Throat:      Mouth: Mucous membranes are moist.      Pharynx: Oropharynx is clear.   Eyes:      Extraocular Movements: Extraocular movements intact.      Conjunctiva/sclera: Conjunctivae normal.      Pupils: Pupils are equal, round, and reactive to light.   Neck:      Vascular: No JVD.   Cardiovascular:      Rate and Rhythm: Normal rate and regular rhythm.      Pulses: Normal pulses.      Heart sounds: Murmur heard.     No friction rub. No gallop.   Pulmonary:      Effort: Pulmonary effort is normal.      Breath sounds: Normal breath sounds. No wheezing or rales.   Chest:      Chest wall: No tenderness.   Musculoskeletal:         General: Normal range of motion.      Cervical back: Normal range of motion and neck supple.      Right lower leg: No edema.      Left lower leg: No edema.   Skin:     General: Skin is warm and dry.      Capillary Refill: Capillary refill takes 2 to 3 seconds.   Neurological:      Mental Status: He is alert and oriented to person, place, and time.   Psychiatric:         Mood and Affect: Mood normal.         Behavior: Behavior  normal.         Thought Content: Thought content normal.         Judgment: Judgment normal.              Assessment & Plan     1. Atrial fibrillation with RVR ([facility])  EKG      2. H/O cardiac radiofrequency ablation 10/18/22 Dr Glover        3. Moderate mitral regurgitation by prior echocardiogram        4. Chronic heart failure with preserved ejection fraction (HCC)        5. Chronic anticoagulation            Medical Decision Making: Today's Assessment/Status/Plan:   1.  Atrial fibrillation  2.  Radiofrequency ablation  - Status post ablation for persistent rapid atrial fibrillation and flutter 10/18/2022 by Dr. Glover.  Ablation completed with PVI, LA anterior wall focal triggers, CTI dependent flutter and mitral flutter.  - He has been off the antiarrhythmics (amiodarone) for over 4 weeks.  -We discussed healing expectations and timeframe post ablation today.    3.  Moderate mitral regurgitation  - Following closely with valve clinic.  Plan for repeat echocardiogram in 3 months to reevaluate mitral valve.  If can maintain sinus rhythm there is a chance MR may improve.  Follow-up echo in 3 months.    4.  HFpEF  - Reports stable weights at home with use of furosemide.  Still having exertional shortness of breath.  Suspect multifactorial cause.  -Sees heart failure clinic, MADDY Christian next week.  -Established now with [facility] program as well.  -We will check into Jardiance coverage with insurance.  I have sent message to medication team.    5.  Anticoagulation  - Eliquis 5 mg twice daily.  To continue at this time.     Return to clinic as scheduled for follow-up appointments.  Would like him to be seen in EP in approximately 2 months.  He is advised he can contact the clinic should questions or concerns arise.

## 2022-11-30 ASSESSMENT — ENCOUNTER SYMPTOMS
BLURRED VISION: 0
ABDOMINAL PAIN: 0
WEIGHT LOSS: 0
MYALGIAS: 0
PALPITATIONS: 0
LOSS OF CONSCIOUSNESS: 0
CLAUDICATION: 0
TINGLING: 0
SHORTNESS OF BREATH: 1
PND: 0
DIZZINESS: 0
COUGH: 0
BRUISES/BLEEDS EASILY: 0
ORTHOPNEA: 0
FALLS: 0
VOMITING: 0
FEVER: 0
BLOOD IN STOOL: 0

## 2022-11-30 NOTE — PROGRESS NOTES
Chief Complaint   Patient presents with    Atrial Fibrillation    Chest Pain       Subjective     Eduardo Aponte is a 67 y.o. male who presents today as heart failure follow-up after recent successful A. fib/flutter ablation with Dr. Glover on 10/13/2022.  Moderate MR was noted on TYRA.  Patient of Dr. Ruff.  Patient also followed by EP, MADDY Murry on Tuesday, and was last seen by myself on 10/24/2022. Patient has additional medical problems with polycythemia, dyslipidemia, pre-DM, former smoker (quit 8 months ago).    Today, patient reports persistent ELMORE described as NYHA class III symptoms.  Otherwise, patient notes minimal/non-bothersome chest pain and denies shortness of breath at rest, palpitations, dizziness/lightheadedness, orthopnea, PND or Edema.  Patient appears euvolemic on exam, dry weight 186 pounds.  Patient is concerned that shortness of breath may be related to his smoking history, offered referral to pulmonary, patient declines at this time.  He is awaiting prior Auth for his Jardiance which was resent on Tuesday by pharmacotherapy clinic.     We will add spironolactone to his medication regimen as he did not started Jardiance with BMP/BNP in 2 weeks.  Patient to follow-up in 3 months.      Past Medical History:   Diagnosis Date    Arrhythmia     A FIB    Arthritis     HANDS AND SHOULDERS    Breath shortness     WITH EXERTION    Cataract     IOL 5 YEARS AGO    Dermatitis     chronic    Heart burn     High cholesterol      History reviewed. No pertinent surgical history.  History reviewed. No pertinent family history.  Social History     Socioeconomic History    Marital status:      Spouse name: Not on file    Number of children: Not on file    Years of education: Not on file    Highest education level: Not on file   Occupational History    Not on file   Tobacco Use    Smoking status: Former     Packs/day: 1.00     Types: Cigarettes     Quit date: 9/27/2010     Years since  quittin.1    Smokeless tobacco: Never   Vaping Use    Vaping Use: Never used   Substance and Sexual Activity    Alcohol use: Not Currently     Comment: occasional    Drug use: No    Sexual activity: Not on file     Comment:    Other Topics Concern    Not on file   Social History Narrative    Not on file     Social Determinants of Health     Financial Resource Strain: Not on file   Food Insecurity: Not on file   Transportation Needs: Not on file   Physical Activity: Not on file   Stress: Not on file   Social Connections: Not on file   Intimate Partner Violence: Not on file   Housing Stability: Not on file     Allergies   Allergen Reactions    Bee      Outpatient Encounter Medications as of 2022   Medication Sig Dispense Refill    rosuvastatin (CRESTOR) 5 MG Tab Take 5 mg by mouth every evening.      spironolactone (ALDACTONE) 25 MG Tab Take 1 Tablet by mouth every day. 90 Tablet 3    Famotidine (PEPCID PO) Take  by mouth.      traMADol (ULTRAM) 50 MG Tab       furosemide (LASIX) 20 MG Tab Take 1 Tablet by mouth every day. 90 Tablet 3    ELIQUIS 5 MG Tab Take 5 mg by mouth 2 times a day.      metoprolol tartrate (LOPRESSOR) 50 MG Tab Take 1 Tablet by mouth 2 times a day. 180 Tablet 3    Empagliflozin (JARDIANCE) 10 MG Tab Take 1 Tablet by mouth every day. (Patient not taking: Reported on 2022) 30 Tablet 6    [DISCONTINUED] potassium chloride SA (K-DUR) 10 MEQ Tab CR Take 2 Tablets by mouth 1 time a day as needed (when taking lasix/furosemide). 30 Each 5     No facility-administered encounter medications on file as of 2022.     Review of Systems   Constitutional:  Negative for fever, malaise/fatigue and weight loss.   Eyes:  Negative for blurred vision.   Respiratory:  Positive for shortness of breath. Negative for cough.    Cardiovascular:  Negative for chest pain, palpitations, orthopnea, claudication, leg swelling and PND.   Gastrointestinal:  Negative for abdominal pain, blood in stool  and vomiting.   Genitourinary:  Negative for dysuria, frequency and hematuria.   Musculoskeletal:  Negative for falls and myalgias.   Neurological:  Negative for dizziness, tingling and loss of consciousness.   Endo/Heme/Allergies:  Does not bruise/bleed easily.            Objective     /70 (BP Location: Left arm, Patient Position: Sitting, BP Cuff Size: Adult)   Pulse 68   Resp 16   Ht 1.829 m (6')   Wt 84.4 kg (186 lb)   SpO2 95%   BMI 25.23 kg/m²     Physical Exam  Vitals reviewed.   Constitutional:       Appearance: He is well-developed.   HENT:      Head: Normocephalic and atraumatic.   Eyes:      Pupils: Pupils are equal, round, and reactive to light.   Neck:      Vascular: No JVD.   Cardiovascular:      Rate and Rhythm: Normal rate and regular rhythm.      Heart sounds: Normal heart sounds. No murmur heard.    No friction rub. No gallop.   Pulmonary:      Effort: Pulmonary effort is normal. No respiratory distress.      Breath sounds: Examination of the right-lower field reveals decreased breath sounds. Examination of the left-lower field reveals decreased breath sounds. Decreased breath sounds present.   Abdominal:      General: Bowel sounds are normal. There is no distension.      Palpations: Abdomen is soft.   Musculoskeletal:      Right lower leg: No edema.      Left lower leg: No edema.   Skin:     General: Skin is warm and dry.      Findings: No erythema.   Neurological:      Mental Status: He is alert and oriented to person, place, and time.   Psychiatric:         Behavior: Behavior normal.          Lab Results   Component Value Date/Time    CHOLSTRLTOT 165 08/30/2022 08:50 AM     (H) 08/30/2022 08:50 AM    HDL 46 08/30/2022 08:50 AM    TRIGLYCERIDE 83 08/30/2022 08:50 AM       Lab Results   Component Value Date/Time    SODIUM 138 11/07/2022 10:09 AM    POTASSIUM 4.1 11/07/2022 10:09 AM    CHLORIDE 103 11/07/2022 10:09 AM    CO2 24 11/07/2022 10:09 AM    GLUCOSE 131 (H) 11/07/2022  10:09 AM    BUN 15 11/07/2022 10:09 AM    CREATININE 1.23 11/07/2022 10:09 AM     Lab Results   Component Value Date/Time    ALKPHOSPHAT 61 10/11/2022 07:57 AM    ASTSGOT 21 10/11/2022 07:57 AM    ALTSGPT 37 10/11/2022 07:57 AM    TBILIRUBIN 0.5 10/11/2022 07:57 AM      Echocardiogram:  09/30/2022  CONCLUSIONS  No prior study is available for comparison.   Normal left ventricular size, thickness, and systolic function.  The left ventricular ejection fraction is visually estimated to be 65%.  Moderate mitral regurgitation with eccentric jet.  Mild tricuspid regurgitation.  Estimated right ventricular systolic pressure is 30-35 mmHg.     TYRA  10/13/2022  Intraoperative TYRA performed prior to Afib ablation by Dr. Glover.  Ejection fraction visually estimated at 65%. Normal ventricular   function.  No notable thrombus in GENET.  Moderate mitral regurgitation. A2 leaflet prolapse with posteriorly   directed eccentric regurgitant jet.    Stress Test:  10/04/2022  No evidence of significant jeopardized viable myocardium or prior myocardial    infarction.   Normal left ventricular size, ejection fraction, and wall motion.     CTA 10/11/2022  IMPRESSION:  1.  No aortic aneurysm or dissection identified.  2.  Mild to moderate diffuse atherosclerosis of the aorta and its branch vessels.  3.  Severe diffuse interstitial pulmonary edema.  4.  Diverticulosis without evidence of diverticulitis.    Assessment & Plan     1. Atrial fibrillation with RVR (HCC)  Basic Metabolic Panel    proBrain Natriuretic Peptide, NT      2. Moderate mitral regurgitation by prior echocardiogram  Basic Metabolic Panel    proBrain Natriuretic Peptide, NT      3. Chronic heart failure with preserved ejection fraction (HCC)  Basic Metabolic Panel    proBrain Natriuretic Peptide, NT      4. H/O cardiac radiofrequency ablation  Basic Metabolic Panel    proBrain Natriuretic Peptide, NT      5. Chronic anticoagulation  Basic Metabolic Panel    proBrain  Natriuretic Peptide, NT      6. Heart failure with preserved ejection fraction, unspecified HF chronicity (HCC)  Basic Metabolic Panel    proBrain Natriuretic Peptide, NT      7. Nonrheumatic mitral valve regurgitation  Basic Metabolic Panel    proBrain Natriuretic Peptide, NT      8. High risk medication use            Medical Decision Making: Today's Assessment/Status/Plan:        Afib/Aflutter S/P ablation  -EKG showed SR  - Successful RF ablation pulmonary vein isolation procedure.  Successful RF ablation of LA anterior wall focal triggers. Successful RF ablation of CTI dependent flutter. Successful RF ablation of mitral flutter with Dr. 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  -BMP in 2 weeks  -Pharmacotherapy clinic follow-up for medication optimization  -Reinforced s/sx of worsening heart failure with patient and weight monitoring. Pt verbalizes understanding. Pt to call office or RTC if present.   -Follow-up with structural heart clinic in 3 months    Dyslipidemia  -Crestor 5 mg every evening    FU in clinic in 3 months.  Sooner if needed    Patient verbalizes understanding and agrees with the plan of care.       BINU Puckett.   [facility]  [phone]    PLEASE NOTE: This Note was created using voice recognition Software. I have made every reasonable attempt to correct obvious errors, but I expect that there are errors of grammar and possibly content that I did not discover before finalizing the note

## 2022-12-01 ENCOUNTER — TELEPHONE (OUTPATIENT)
Dept: CARDIOLOGY | Facility: MEDICAL CENTER | Age: 67
End: 2022-12-01
Payer: MEDICARE

## 2022-12-01 NOTE — TELEPHONE ENCOUNTER
PA approved    FABIANO TRONCOSO Key: [id number] - PA Case ID: [id number] - Rx #: [id number] help? Call us at [phone]  Outcome  Approvedtoday  Your PA request has been approved. Additional information will be provided in the approval communication. (Message 1143)  Drug  Jardiance 10MG tablets  Form  [employer] Electronic PA Form (2017 [employer])  Original Claim Info  76 DRUG REQUIRES PRIOR AUTHORIZATION

## 2022-12-01 NOTE — TELEPHONE ENCOUNTER
PA started    Pharmacy claim for Jardiance 10MG tablets, prescriber Elizabeth Miner, has been rejected and requires prior authorization for coverage.  Non-preferred medications may have a higher patient co-pay than the health insurance plan's preferred medications.  If clinically appropriate, you may change the prescription. A therapeutic alternative may be available. Questions on alternatives? Contact [facility] at [phone]. You can also review [facility]'s formulary online or call them directly. Compare the original medication with possible alternatives:  Drug Name  PA Requirement*  Jardiance 10MG tablets Required  Alogliptin Benzoate Not Required  Glimepiride Not Required  GlipiZIDE Not Required  GlipiZIDE ER Not Required  GlipiZIDE-MetFORMIN HCl Not Required  Januvia Not Required  MetFORMIN HCl Not Required  Pioglitazone HCl Not Required  Victoza Not Required  Victoza Not Required  Farxiga Required  Glyxambi Required  Invokana Required  MetFORMIN HCl ER Required  Steglatro Required  Synjardy Required  Synjardy XR Required  Tradjenta Required  Trijardy XR Required

## 2022-12-02 ENCOUNTER — OFFICE VISIT (OUTPATIENT)
Dept: CARDIOLOGY | Facility: MEDICAL CENTER | Age: 67
End: 2022-12-02
Payer: MEDICARE

## 2022-12-02 VITALS
HEIGHT: 72 IN | DIASTOLIC BLOOD PRESSURE: 70 MMHG | RESPIRATION RATE: 16 BRPM | WEIGHT: 186 LBS | BODY MASS INDEX: 25.19 KG/M2 | SYSTOLIC BLOOD PRESSURE: 110 MMHG | OXYGEN SATURATION: 95 % | HEART RATE: 68 BPM

## 2022-12-02 DIAGNOSIS — I50.30 HEART FAILURE WITH PRESERVED EJECTION FRACTION, UNSPECIFIED HF CHRONICITY (HCC): ICD-10-CM

## 2022-12-02 DIAGNOSIS — Z98.890 H/O CARDIAC RADIOFREQUENCY ABLATION: ICD-10-CM

## 2022-12-02 DIAGNOSIS — Z79.899 HIGH RISK MEDICATION USE: ICD-10-CM

## 2022-12-02 DIAGNOSIS — I34.0 NONRHEUMATIC MITRAL VALVE REGURGITATION: ICD-10-CM

## 2022-12-02 DIAGNOSIS — I50.32 CHRONIC HEART FAILURE WITH PRESERVED EJECTION FRACTION (HCC): ICD-10-CM

## 2022-12-02 DIAGNOSIS — Z79.01 CHRONIC ANTICOAGULATION: ICD-10-CM

## 2022-12-02 DIAGNOSIS — I34.0 MODERATE MITRAL REGURGITATION BY PRIOR ECHOCARDIOGRAM: ICD-10-CM

## 2022-12-02 DIAGNOSIS — I48.91 ATRIAL FIBRILLATION WITH RVR (HCC): ICD-10-CM

## 2022-12-02 PROCEDURE — 99214 OFFICE O/P EST MOD 30 MIN: CPT | Performed by: NURSE PRACTITIONER

## 2022-12-02 RX ORDER — SPIRONOLACTONE 25 MG/1
25 TABLET ORAL DAILY
Qty: 90 TABLET | Refills: 3 | Status: SHIPPED
Start: 2022-12-02 | End: 2023-01-26

## 2022-12-02 RX ORDER — ROSUVASTATIN CALCIUM 5 MG/1
5 TABLET, COATED ORAL DAILY
COMMUNITY
End: 2023-04-26 | Stop reason: SDUPTHER

## 2022-12-02 ASSESSMENT — FIBROSIS 4 INDEX: FIB4 SCORE: 1.01

## 2022-12-14 LAB — EKG IMPRESSION: NORMAL

## 2022-12-16 ENCOUNTER — HOSPITAL ENCOUNTER (OUTPATIENT)
Dept: LAB | Facility: MEDICAL CENTER | Age: 67
End: 2022-12-16
Attending: NURSE PRACTITIONER
Payer: MEDICARE

## 2022-12-16 DIAGNOSIS — Z79.01 CHRONIC ANTICOAGULATION: ICD-10-CM

## 2022-12-16 DIAGNOSIS — I34.0 MODERATE MITRAL REGURGITATION BY PRIOR ECHOCARDIOGRAM: ICD-10-CM

## 2022-12-16 DIAGNOSIS — I50.32 CHRONIC HEART FAILURE WITH PRESERVED EJECTION FRACTION (HCC): ICD-10-CM

## 2022-12-16 DIAGNOSIS — I48.91 ATRIAL FIBRILLATION WITH RVR (HCC): ICD-10-CM

## 2022-12-16 DIAGNOSIS — I34.0 NONRHEUMATIC MITRAL VALVE REGURGITATION: ICD-10-CM

## 2022-12-16 DIAGNOSIS — I50.30 HEART FAILURE WITH PRESERVED EJECTION FRACTION, UNSPECIFIED HF CHRONICITY (HCC): ICD-10-CM

## 2022-12-16 DIAGNOSIS — Z98.890 H/O CARDIAC RADIOFREQUENCY ABLATION: ICD-10-CM

## 2022-12-16 LAB
ANION GAP SERPL CALC-SCNC: 9 MMOL/L (ref 7–16)
BUN SERPL-MCNC: 14 MG/DL (ref 8–22)
CALCIUM SERPL-MCNC: 9.7 MG/DL (ref 8.5–10.5)
CHLORIDE SERPL-SCNC: 102 MMOL/L (ref 96–112)
CO2 SERPL-SCNC: 28 MMOL/L (ref 20–33)
CREAT SERPL-MCNC: 1.12 MG/DL (ref 0.5–1.4)
GFR SERPLBLD CREATININE-BSD FMLA CKD-EPI: 72 ML/MIN/1.73 M 2
GLUCOSE SERPL-MCNC: 138 MG/DL (ref 65–99)
NT-PROBNP SERPL IA-MCNC: 791 PG/ML (ref 0–125)
POTASSIUM SERPL-SCNC: 4 MMOL/L (ref 3.6–5.5)
SODIUM SERPL-SCNC: 139 MMOL/L (ref 135–145)

## 2022-12-16 PROCEDURE — 80048 BASIC METABOLIC PNL TOTAL CA: CPT

## 2022-12-16 PROCEDURE — 83880 ASSAY OF NATRIURETIC PEPTIDE: CPT

## 2022-12-16 PROCEDURE — 36415 COLL VENOUS BLD VENIPUNCTURE: CPT

## 2023-01-11 ENCOUNTER — HOSPITAL ENCOUNTER (OUTPATIENT)
Dept: CARDIOLOGY | Facility: MEDICAL CENTER | Age: 68
End: 2023-01-11
Attending: INTERNAL MEDICINE
Payer: MEDICARE

## 2023-01-11 ENCOUNTER — HOSPITAL ENCOUNTER (OUTPATIENT)
Dept: LAB | Facility: MEDICAL CENTER | Age: 68
End: 2023-01-11
Attending: INTERNAL MEDICINE
Payer: MEDICARE

## 2023-01-11 DIAGNOSIS — I34.0 NONRHEUMATIC MITRAL VALVE REGURGITATION: ICD-10-CM

## 2023-01-11 LAB
ALBUMIN SERPL BCP-MCNC: 4.7 G/DL (ref 3.2–4.9)
ALBUMIN/GLOB SERPL: 1.6 G/DL
ALP SERPL-CCNC: 78 U/L (ref 30–99)
ALT SERPL-CCNC: 26 U/L (ref 2–50)
ANION GAP SERPL CALC-SCNC: 13 MMOL/L (ref 7–16)
AST SERPL-CCNC: 24 U/L (ref 12–45)
BASOPHILS # BLD AUTO: 1 % (ref 0–1.8)
BASOPHILS # BLD: 0.09 K/UL (ref 0–0.12)
BILIRUB SERPL-MCNC: 0.6 MG/DL (ref 0.1–1.5)
BUN SERPL-MCNC: 12 MG/DL (ref 8–22)
CALCIUM ALBUM COR SERPL-MCNC: 8.8 MG/DL (ref 8.5–10.5)
CALCIUM SERPL-MCNC: 9.4 MG/DL (ref 8.4–10.2)
CHLORIDE SERPL-SCNC: 102 MMOL/L (ref 96–112)
CHOLEST SERPL-MCNC: 171 MG/DL (ref 100–199)
CO2 SERPL-SCNC: 25 MMOL/L (ref 20–33)
CREAT SERPL-MCNC: 1.13 MG/DL (ref 0.5–1.4)
EOSINOPHIL # BLD AUTO: 0.22 K/UL (ref 0–0.51)
EOSINOPHIL NFR BLD: 2.5 % (ref 0–6.9)
ERYTHROCYTE [DISTWIDTH] IN BLOOD BY AUTOMATED COUNT: 41.1 FL (ref 35.9–50)
EST. AVERAGE GLUCOSE BLD GHB EST-MCNC: 111 MG/DL
FASTING STATUS PATIENT QL REPORTED: NORMAL
GFR SERPLBLD CREATININE-BSD FMLA CKD-EPI: 72 ML/MIN/1.73 M 2
GLOBULIN SER CALC-MCNC: 3 G/DL (ref 1.9–3.5)
GLUCOSE SERPL-MCNC: 128 MG/DL (ref 65–99)
HBA1C MFR BLD: 5.5 % (ref 4–5.6)
HCT VFR BLD AUTO: 58.1 % (ref 42–52)
HDLC SERPL-MCNC: 46 MG/DL
HGB BLD-MCNC: 19.3 G/DL (ref 14–18)
IMM GRANULOCYTES # BLD AUTO: 0.02 K/UL (ref 0–0.11)
IMM GRANULOCYTES NFR BLD AUTO: 0.2 % (ref 0–0.9)
LDLC SERPL CALC-MCNC: 106 MG/DL
LV EJECT FRACT  99904: 65
LYMPHOCYTES # BLD AUTO: 2.26 K/UL (ref 1–4.8)
LYMPHOCYTES NFR BLD: 25.5 % (ref 22–41)
MCH RBC QN AUTO: 29.9 PG (ref 27–33)
MCHC RBC AUTO-ENTMCNC: 33.2 G/DL (ref 33.7–35.3)
MCV RBC AUTO: 90.1 FL (ref 81.4–97.8)
MONOCYTES # BLD AUTO: 0.87 K/UL (ref 0–0.85)
MONOCYTES NFR BLD AUTO: 9.8 % (ref 0–13.4)
NEUTROPHILS # BLD AUTO: 5.41 K/UL (ref 1.82–7.42)
NEUTROPHILS NFR BLD: 61 % (ref 44–72)
NRBC # BLD AUTO: 0 K/UL
NRBC BLD-RTO: 0 /100 WBC
PLATELET # BLD AUTO: 330 K/UL (ref 164–446)
PMV BLD AUTO: 10.9 FL (ref 9–12.9)
POTASSIUM SERPL-SCNC: 3.9 MMOL/L (ref 3.6–5.5)
PROT SERPL-MCNC: 7.7 G/DL (ref 6–8.2)
RBC # BLD AUTO: 6.45 M/UL (ref 4.7–6.1)
SODIUM SERPL-SCNC: 140 MMOL/L (ref 135–145)
TRIGL SERPL-MCNC: 94 MG/DL (ref 0–149)
WBC # BLD AUTO: 8.9 K/UL (ref 4.8–10.8)

## 2023-01-11 PROCEDURE — 80061 LIPID PANEL: CPT

## 2023-01-11 PROCEDURE — 85025 COMPLETE CBC W/AUTO DIFF WBC: CPT

## 2023-01-11 PROCEDURE — 80053 COMPREHEN METABOLIC PANEL: CPT

## 2023-01-11 PROCEDURE — 93306 TTE W/DOPPLER COMPLETE: CPT | Mod: 26 | Performed by: INTERNAL MEDICINE

## 2023-01-11 PROCEDURE — 36415 COLL VENOUS BLD VENIPUNCTURE: CPT

## 2023-01-11 PROCEDURE — 83036 HEMOGLOBIN GLYCOSYLATED A1C: CPT | Mod: GA

## 2023-01-11 PROCEDURE — 93306 TTE W/DOPPLER COMPLETE: CPT

## 2023-01-26 ENCOUNTER — OFFICE VISIT (OUTPATIENT)
Dept: CARDIOLOGY | Facility: MEDICAL CENTER | Age: 68
End: 2023-01-26
Payer: MEDICARE

## 2023-01-26 VITALS
WEIGHT: 181 LBS | OXYGEN SATURATION: 95 % | DIASTOLIC BLOOD PRESSURE: 68 MMHG | HEIGHT: 72 IN | RESPIRATION RATE: 16 BRPM | HEART RATE: 77 BPM | BODY MASS INDEX: 24.52 KG/M2 | SYSTOLIC BLOOD PRESSURE: 124 MMHG

## 2023-01-26 DIAGNOSIS — Z87.891 SMOKING HISTORY: ICD-10-CM

## 2023-01-26 DIAGNOSIS — R06.02 SHORTNESS OF BREATH: ICD-10-CM

## 2023-01-26 DIAGNOSIS — I48.0 PAF (PAROXYSMAL ATRIAL FIBRILLATION) (HCC): ICD-10-CM

## 2023-01-26 DIAGNOSIS — I34.0 MODERATE MITRAL REGURGITATION BY PRIOR ECHOCARDIOGRAM: ICD-10-CM

## 2023-01-26 DIAGNOSIS — Z98.890 H/O CARDIAC RADIOFREQUENCY ABLATION: ICD-10-CM

## 2023-01-26 DIAGNOSIS — I48.0 PAROXYSMAL ATRIAL FIBRILLATION (HCC): ICD-10-CM

## 2023-01-26 PROCEDURE — 99214 OFFICE O/P EST MOD 30 MIN: CPT | Performed by: NURSE PRACTITIONER

## 2023-01-26 PROCEDURE — 93000 ELECTROCARDIOGRAM COMPLETE: CPT | Performed by: INTERNAL MEDICINE

## 2023-01-26 RX ORDER — HYDROCODONE BITARTRATE AND ACETAMINOPHEN 5; 325 MG/1; MG/1
TABLET ORAL
COMMUNITY
Start: 2022-12-21 | End: 2023-02-09

## 2023-01-26 RX ORDER — CHLORHEXIDINE GLUCONATE ORAL RINSE 1.2 MG/ML
SOLUTION DENTAL
COMMUNITY
Start: 2022-12-21 | End: 2023-01-26

## 2023-01-26 RX ORDER — AMOXICILLIN 500 MG/1
TABLET, FILM COATED ORAL
COMMUNITY
Start: 2022-12-21 | End: 2023-01-26

## 2023-01-26 RX ORDER — METOPROLOL TARTRATE 50 MG/1
25 TABLET, FILM COATED ORAL 2 TIMES DAILY
Qty: 180 TABLET | Refills: 3 | Status: ON HOLD
Start: 2023-01-26 | End: 2023-03-08

## 2023-01-26 ASSESSMENT — ENCOUNTER SYMPTOMS
HEMOPTYSIS: 0
DOUBLE VISION: 0
WHEEZING: 0
NAUSEA: 0
PSYCHIATRIC NEGATIVE: 1
DIZZINESS: 0
COUGH: 0
SPUTUM PRODUCTION: 0
CHILLS: 0
HEARTBURN: 0
HEADACHES: 0
BLOOD IN STOOL: 0
SHORTNESS OF BREATH: 1
FOCAL WEAKNESS: 0
FEVER: 0
VOMITING: 0
SPEECH CHANGE: 0
WEIGHT LOSS: 0
LOSS OF CONSCIOUSNESS: 0
TINGLING: 0
PND: 0
ORTHOPNEA: 0
BLURRED VISION: 0
TREMORS: 0
PALPITATIONS: 0
SENSORY CHANGE: 0

## 2023-01-26 ASSESSMENT — FIBROSIS 4 INDEX: FIB4 SCORE: .941357448663283353

## 2023-01-26 NOTE — PROGRESS NOTES
Chief Complaint   Patient presents with    Atrial Fibrillation     Atrial fibrillation with RVR (HCC)       Subjective     Eduardo Aponte is a 67 y.o. male who presents today for follow-up atrial fibrillation and flutter.    He is status post inpatient ablation by Dr. Glover on 10/13/2022 with successful RF pulmonary vein isolation, RF LA anterior wall focal t triggers, RA CTI and RF ablation of mitral flutter for rapid atrial fibrillation with HFpEF exacerbation.    He is followed by Dr. Hua for his moderate mitral regurgitation in the heart failure clinic.  Last seen by MADDY Christian.  Additional medical history significant for dyslipidemia, prediabetes, former smoker and use of anticoagulation.    Today in follow-up he reports from a arrhythmia standpoint he believes his rhythm has been stable.  He has not felt any symptoms of arrhythmia or palpitations.  His home monitoring has revealed normal readings.  He continues to endorse shortness of breath, states worse with exertion.  He has been followed by the heart failure clinic for HFpEF.  Recent echocardiogram completed revealed preserved ejection fraction with LVEF of approximately 65%, normal LV thickness, no WMA, normal RV function and moderate mitral regurgitation.    Past Medical History:   Diagnosis Date    Arrhythmia     A FIB    Arthritis     HANDS AND SHOULDERS    Breath shortness     WITH EXERTION    Cataract     IOL 5 YEARS AGO    Dermatitis     chronic    Heart burn     High cholesterol      No past surgical history on file.  No family history on file.  Social History     Socioeconomic History    Marital status:      Spouse name: Not on file    Number of children: Not on file    Years of education: Not on file    Highest education level: Not on file   Occupational History    Not on file   Tobacco Use    Smoking status: Former     Packs/day: 1.00     Types: Cigarettes     Quit date: 2010     Years since quittin.3     Smokeless tobacco: Never   Vaping Use    Vaping Use: Never used   Substance and Sexual Activity    Alcohol use: Not Currently     Comment: occasional    Drug use: No    Sexual activity: Not on file     Comment:    Other Topics Concern    Not on file   Social History Narrative    Not on file     Social Determinants of Health     Financial Resource Strain: Not on file   Food Insecurity: Not on file   Transportation Needs: Not on file   Physical Activity: Not on file   Stress: Not on file   Social Connections: Not on file   Intimate Partner Violence: Not on file   Housing Stability: Not on file     Allergies   Allergen Reactions    Bee      Outpatient Encounter Medications as of 1/26/2023   Medication Sig Dispense Refill    rosuvastatin (CRESTOR) 5 MG Tab Take 5 mg by mouth every evening.      spironolactone (ALDACTONE) 25 MG Tab Take 1 Tablet by mouth every day. 90 Tablet 3    Famotidine (PEPCID PO) Take  by mouth.      Empagliflozin (JARDIANCE) 10 MG Tab Take 1 Tablet by mouth every day. (Patient not taking: Reported on 12/2/2022) 30 Tablet 6    traMADol (ULTRAM) 50 MG Tab       furosemide (LASIX) 20 MG Tab Take 1 Tablet by mouth every day. 90 Tablet 3    ELIQUIS 5 MG Tab Take 5 mg by mouth 2 times a day.      metoprolol tartrate (LOPRESSOR) 50 MG Tab Take 1 Tablet by mouth 2 times a day. 180 Tablet 3     No facility-administered encounter medications on file as of 1/26/2023.     Review of Systems   Constitutional:  Negative for chills, fever, malaise/fatigue and weight loss.   HENT:  Negative for congestion and tinnitus.    Eyes:  Negative for blurred vision and double vision.   Respiratory:  Positive for shortness of breath. Negative for cough, hemoptysis, sputum production and wheezing.    Cardiovascular:  Negative for chest pain, palpitations, orthopnea, leg swelling and PND.   Gastrointestinal:  Negative for blood in stool, heartburn, nausea and vomiting.   Skin:  Negative for rash.   Neurological:   Negative for dizziness, tingling, tremors, sensory change, speech change, focal weakness, loss of consciousness and headaches.   Psychiatric/Behavioral: Negative.              Objective     /68 (BP Location: Left arm, Patient Position: Sitting, BP Cuff Size: Adult)   Pulse 77   Resp 16   Ht 1.829 m (6')   Wt 82.1 kg (181 lb)   SpO2 95%   BMI 24.55 kg/m²     Physical Exam  Vitals reviewed.   Constitutional:       Appearance: Normal appearance. He is well-developed.   HENT:      Head: Normocephalic and atraumatic.      Mouth/Throat:      Mouth: Mucous membranes are moist.      Pharynx: Oropharynx is clear.   Eyes:      Extraocular Movements: Extraocular movements intact.      Conjunctiva/sclera: Conjunctivae normal.      Pupils: Pupils are equal, round, and reactive to light.   Neck:      Vascular: No JVD.   Cardiovascular:      Rate and Rhythm: Normal rate and regular rhythm.      Pulses: Normal pulses.      Heart sounds: Normal heart sounds. No murmur heard.    No friction rub. No gallop.   Pulmonary:      Effort: Pulmonary effort is normal.      Breath sounds: Normal breath sounds. No wheezing or rales.   Chest:      Chest wall: No tenderness.   Musculoskeletal:         General: Normal range of motion.      Cervical back: Normal range of motion and neck supple.      Right lower leg: No edema.      Left lower leg: No edema.   Skin:     General: Skin is warm and dry.      Capillary Refill: Capillary refill takes 2 to 3 seconds.   Neurological:      Mental Status: He is alert and oriented to person, place, and time.   Psychiatric:         Mood and Affect: Mood normal.         Behavior: Behavior normal.         Thought Content: Thought content normal.         Judgment: Judgment normal.              Assessment & Plan     1. Paroxysmal atrial fibrillation (HCC)  EKG      2. H/O cardiac radiofrequency ablation 10/18/22 Dr Glover        3. Shortness of breath  Referral to Pulmonary and Sleep Medicine      4.  Moderate mitral regurgitation by prior echocardiogram        5. Smoking history  Referral to Pulmonary and Sleep Medicine      6. PAF (paroxysmal atrial fibrillation) (HCC)  metoprolol tartrate (LOPRESSOR) 50 MG Tab    Referral to Pulmonary and Sleep Medicine          Medical Decision Making: Today's Assessment/Status/Plan:   1.  Paroxysmal atrial fibrillation  2.  Status post ablation  - Status post inpatient ablation 10/13/2022 during admission for rapid A. fib with HFpEF exacerbation.  Ablation is successfully completed with RF pulmonary vein isolation, RF LA anterior wall focal t triggers, RA CTI and RF ablation of mitral flutter.  -He is now over 3 months post ablation and we have not had any indication of recurrence of arrhythmia thus far.  He endorses no potential signs or symptoms of arrhythmia.  He has been off amiodarone since November.  -Plan to continue metoprolol, trial of decreasing dose to 25 mg twice daily.  Continue Eliquis 5 mg twice daily at this time.    3.  Shortness of breath  - Unclear etiology.  Valvular vs pulm. His HFpEF appears to be under good control.  Recent echocardiogram with reassuring findings, stable moderate mitral regurgitation.  No evidence of recurrent arrhythmias.  -He is recent former tobacco user.  Today we discussed consultation with pulmonary medicine for pulmonary function testing and evaluation chronic dyspnea    4.  Moderate mitral regurgitation  - Has follow-up planned with Dr. Hua 2/1.    5.  Smoking history  - Referral to pulmonary medicine as discussed above.      Return to EP clinic in April, sooner if clinical condition changes.  He will contact our office via [employer] or telephone call should any questions or concerns arise.

## 2023-01-27 ENCOUNTER — TELEPHONE (OUTPATIENT)
Dept: CARDIOLOGY | Facility: MEDICAL CENTER | Age: 68
End: 2023-01-27
Payer: MEDICARE

## 2023-01-28 NOTE — TELEPHONE ENCOUNTER
Received fax request from pharmacy to verify Metoprolol dose. Per EA OV 1/26/23:    -Plan to continue metoprolol, trial of decreasing dose to 25 mg twice daily.    Called and spoke with pharmacist at [facility] [phone]. Per pharmacist, they will fill RX as now verified.     Scanned into [employer]

## 2023-02-01 ENCOUNTER — OFFICE VISIT (OUTPATIENT)
Dept: CARDIOLOGY | Facility: MEDICAL CENTER | Age: 68
End: 2023-02-01
Payer: MEDICARE

## 2023-02-01 VITALS
HEART RATE: 75 BPM | SYSTOLIC BLOOD PRESSURE: 100 MMHG | WEIGHT: 182 LBS | OXYGEN SATURATION: 94 % | BODY MASS INDEX: 24.65 KG/M2 | DIASTOLIC BLOOD PRESSURE: 64 MMHG | RESPIRATION RATE: 16 BRPM | HEIGHT: 72 IN

## 2023-02-01 DIAGNOSIS — I48.91 ATRIAL FIBRILLATION WITH RVR (HCC): ICD-10-CM

## 2023-02-01 DIAGNOSIS — I34.0 MODERATE MITRAL REGURGITATION BY PRIOR ECHOCARDIOGRAM: ICD-10-CM

## 2023-02-01 PROCEDURE — 99214 OFFICE O/P EST MOD 30 MIN: CPT | Performed by: INTERNAL MEDICINE

## 2023-02-01 ASSESSMENT — FIBROSIS 4 INDEX: FIB4 SCORE: .941357448663283353

## 2023-02-01 NOTE — PROGRESS NOTES
Cardiology Follow-up Consultation Note        CC: Mitral regurgitation    Patient ID/HPI:   67-year-old male patient with atrial fibrillation status post ablation with eccentric mitral regurgitation here for follow-up.  Has been more than 3 months since his ablation, most recent echocardiogram still showed eccentric mitral regurgitation appears to be moderate by surface echocardiogram.  He still has significant symptoms of shortness of breath with dyspnea, class III symptoms.        Past Medical History:   Diagnosis Date    Arrhythmia     A FIB    Arthritis     HANDS AND SHOULDERS    Breath shortness     WITH EXERTION    Cataract     IOL 5 YEARS AGO    Dermatitis     chronic    Heart burn     High cholesterol          Current Outpatient Medications   Medication Sig Dispense Refill    metoprolol tartrate (LOPRESSOR) 50 MG Tab Take 0.5 Tablets by mouth 2 times a day. (Patient taking differently: Take 50 mg by mouth 2 times a day.) 180 Tablet 3    rosuvastatin (CRESTOR) 5 MG Tab Take 5 mg by mouth every evening.      Famotidine (PEPCID PO) Take  by mouth.      Empagliflozin (JARDIANCE) 10 MG Tab Take 1 Tablet by mouth every day. 30 Tablet 6    furosemide (LASIX) 20 MG Tab Take 1 Tablet by mouth every day. 90 Tablet 3    ELIQUIS 5 MG Tab Take 5 mg by mouth 2 times a day.      HYDROcodone-acetaminophen (NORCO) 5-325 MG Tab per tablet        No current facility-administered medications for this visit.         Physical Exam:  Ambulatory Vitals  /64 (BP Location: Left arm, Patient Position: Sitting, BP Cuff Size: Adult)   Pulse 75   Resp 16   Ht 1.829 m (6')   Wt 82.6 kg (182 lb)   SpO2 94%    Oxygen Therapy:  Pulse Oximetry: 94 %  BP Readings from Last 4 Encounters:   02/01/23 100/64   01/26/23 124/68   12/02/22 110/70   11/29/22 118/70       Weight/BMI: Body mass index is 24.68 kg/m².  Wt Readings from Last 4 Encounters:   02/01/23 82.6 kg (182 lb)   01/26/23 82.1 kg (181 lb)   12/02/22 84.4 kg (186 lb)    11/29/22 82.1 kg (181 lb)       General: Well appearing and in no apparent distress  Neck: JVP absent, carotid bruits absent  Lungs: respiratory sounds  normal, additional breath sounds absent  Heart: Regular rhythm,   No palpable thrills on palpation, murmurs absent, no rubs,   Lower extremity edema absent.     Echocardiogram 1/11/2023 reviewed, independently interpreted shows moderate appearing eccentric mitral regurgitation      Medical Decision Making:  Problem List Items Addressed This Visit       Atrial fibrillation with RVR (HCC)    Moderate mitral regurgitation by prior echocardiogram     He has significant shortness of breath, class III symptoms.  He also has high hemoglobin suggesting hypoxia with exertion.  He has 40-year history of smoking, likely has underlying COPD.  He has an appointment with pulmonary next week.  If his symptoms are attributed to COPD I would wait to reevaluate MR.  If his PFTs are normal, would recommend a transesophageal echocardiogram, coronary angiogram to further evaluate mitral regurgitation.    Return in about 1 year (around 2/1/2024).      Kyle TALAMANTES  Interventional cardiologist  [facility], [facility]  [address] [address]  Phone: [phone]  Fax: [phone]

## 2023-02-08 ENCOUNTER — OFFICE VISIT (OUTPATIENT)
Dept: SLEEP MEDICINE | Facility: MEDICAL CENTER | Age: 68
End: 2023-02-08
Attending: NURSE PRACTITIONER
Payer: MEDICARE

## 2023-02-08 ENCOUNTER — NON-PROVIDER VISIT (OUTPATIENT)
Dept: SLEEP MEDICINE | Facility: MEDICAL CENTER | Age: 68
End: 2023-02-08
Attending: STUDENT IN AN ORGANIZED HEALTH CARE EDUCATION/TRAINING PROGRAM
Payer: MEDICARE

## 2023-02-08 VITALS
WEIGHT: 183 LBS | HEIGHT: 72 IN | SYSTOLIC BLOOD PRESSURE: 116 MMHG | DIASTOLIC BLOOD PRESSURE: 72 MMHG | HEART RATE: 74 BPM | BODY MASS INDEX: 24.79 KG/M2 | OXYGEN SATURATION: 96 %

## 2023-02-08 VITALS — HEIGHT: 72 IN | BODY MASS INDEX: 24.79 KG/M2 | WEIGHT: 183 LBS

## 2023-02-08 DIAGNOSIS — R06.09 DYSPNEA ON EXERTION: ICD-10-CM

## 2023-02-08 PROCEDURE — 94060 EVALUATION OF WHEEZING: CPT | Performed by: STUDENT IN AN ORGANIZED HEALTH CARE EDUCATION/TRAINING PROGRAM

## 2023-02-08 PROCEDURE — 94726 PLETHYSMOGRAPHY LUNG VOLUMES: CPT | Performed by: STUDENT IN AN ORGANIZED HEALTH CARE EDUCATION/TRAINING PROGRAM

## 2023-02-08 PROCEDURE — 99205 OFFICE O/P NEW HI 60 MIN: CPT | Mod: 25 | Performed by: STUDENT IN AN ORGANIZED HEALTH CARE EDUCATION/TRAINING PROGRAM

## 2023-02-08 PROCEDURE — 94729 DIFFUSING CAPACITY: CPT | Performed by: STUDENT IN AN ORGANIZED HEALTH CARE EDUCATION/TRAINING PROGRAM

## 2023-02-08 RX ORDER — BUDESONIDE AND FORMOTEROL FUMARATE DIHYDRATE 80; 4.5 UG/1; UG/1
2 AEROSOL RESPIRATORY (INHALATION) 2 TIMES DAILY
Qty: 41.72 G | Refills: 3 | Status: SHIPPED | OUTPATIENT
Start: 2023-02-08 | End: 2023-08-18 | Stop reason: SDUPTHER

## 2023-02-08 ASSESSMENT — PULMONARY FUNCTION TESTS
FEV1/FVC_PERCENT_CHANGE: 75
FEV1_PREDICTED: 3.58
FEV1_PERCENT_PREDICTED: 88
FEV1/FVC_PERCENT_LLN: 64
FEV1: 3.17
FVC: 4.6
FEV1/FVC: 63
FEV1_PERCENT_PREDICTED: 83
FVC: 5.01
FEV1/FVC_PERCENT_PREDICTED: 76
FEV1_PERCENT_CHANGE: 6
FEV1/FVC: 63.27
FEV1/FVC: 65
FEV1: 2.98
FEV1/FVC: 65
FEV1_LLN: 2.99
FEV1/FVC_PERCENT_CHANGE: -2
FVC_PREDICTED: 4.72
FEV1_PERCENT_CHANGE: 8
FEV1/FVC_PREDICTED: 76
FEV1/FVC_PERCENT_PREDICTED: 83
FEV1/FVC_PERCENT_PREDICTED: 86
FEV1/FVC_PERCENT_PREDICTED: 84
FVC_LLN: 3.94
FVC_PERCENT_PREDICTED: 97
FEV1/FVC_PERCENT_PREDICTED: 82
FVC_PERCENT_PREDICTED: 106

## 2023-02-08 ASSESSMENT — FIBROSIS 4 INDEX
FIB4 SCORE: .941357448663283353
FIB4 SCORE: .941357448663283353

## 2023-02-08 NOTE — PROCEDURES
Technician: OUMAR Medina    Technician Comment:  Good patient effort & cooperation.  The results of this test meet the ATS/ERS standards for acceptability & reproducibility.  Test was performed on the [employer] Body Plethysmograph-Elite DX system.  Predicted values were GLI-2012 for spirometry, GLI-2017 for DLCO, ITS for Lung Volumes.  The DLCO was uncorrected for Hgb.  A bronchodilator of Ventolin HFA -2puffs via spacer administered.  DLCO performed during dilation period.    Interpretation:     There is a mild obstructive ventilatory defect. There is no significant bronchodilator response.  Reduced DLCO and DL/VA are consistent with pulmonary emphysema.      Diana Castellanos MD  Pulmonary and Critical Care Medicine  [facility]

## 2023-02-08 NOTE — PROGRESS NOTES
Pulmonary Clinic Note    Chief Complaint:  Chief Complaint   Patient presents with    Establish Care     Referred by CARMEN Mireles for SOB     Results     CT-CTA 10/11/22, DX-Chest 10/11/22     HPI:   Eduardo Aponte is a very pleasant 67 y.o. male with history of tobacco smoking 40pkyr quit 2022, atrial fibrillation s/p albation w/eccentric MR, HFpEF, polycythemia, DLD who presents to pulmonary clinic for dyspnea.    Patient reports that his dyspnea on exertion first in 2022 and has been progressively worsening. Symptoms are improved now with better a better cardiac regimen (control of afib and heart failure) but still with ELMORE. MMRC 2.  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   Has chronic dermatitis - gets allergy shots twice a year that help significantly.  No family history of pulmonary disease.   No marijuana or other drug use.       Past Medical History:   Diagnosis Date    Arrhythmia     A FIB    Arthritis     HANDS AND SHOULDERS    Breath shortness     WITH EXERTION    Cataract     IOL 5 YEARS AGO    Dermatitis     chronic    Heart burn     High cholesterol     Shortness of breath        History reviewed. No pertinent surgical history.    Social History     Socioeconomic History    Marital status:      Spouse name: Not on file    Number of children: Not on file    Years of education: Not on file    Highest education level: Not on file   Occupational History    Not on file   Tobacco Use    Smoking status: Former     Packs/day: 1.00     Types: Cigarettes     Quit date: 2010     Years since quittin.3    Smokeless  tobacco: Never   Vaping Use    Vaping Use: Never used   Substance and Sexual Activity    Alcohol use: Not Currently    Drug use: No    Sexual activity: Not on file     Comment:    Other Topics Concern    Not on file   Social History Narrative    Not on file     Social Determinants of Health     Financial Resource Strain: Not on file   Food Insecurity: Not on file   Transportation Needs: Not on file   Physical Activity: Not on file   Stress: Not on file   Social Connections: Not on file   Intimate Partner Violence: Not on file   Housing Stability: Not on file          No family history on file.  There is no pertinent family history.     Current Outpatient Medications on File Prior to Visit   Medication Sig Dispense Refill    metoprolol tartrate (LOPRESSOR) 50 MG Tab Take 0.5 Tablets by mouth 2 times a day. (Patient taking differently: Take 50 mg by mouth 2 times a day.) 180 Tablet 3    rosuvastatin (CRESTOR) 5 MG Tab Take 5 mg by mouth every evening.      Famotidine (PEPCID PO) Take  by mouth.      Empagliflozin (JARDIANCE) 10 MG Tab Take 1 Tablet by mouth every day. 30 Tablet 6    furosemide (LASIX) 20 MG Tab Take 1 Tablet by mouth every day. 90 Tablet 3    ELIQUIS 5 MG Tab Take 5 mg by mouth 2 times a day.       No current facility-administered medications on file prior to visit.       Allergies: Bee      ROS:   Review of Systems   Constitutional:  Negative for chills and fever.   HENT:  Negative for hearing loss.    Eyes:  Negative for discharge.   Respiratory:  Positive for shortness of breath. Negative for cough, hemoptysis, sputum production and wheezing.    Cardiovascular:  Negative for chest pain.   Gastrointestinal:  Negative for nausea and vomiting.   Musculoskeletal:  Negative for falls.   Skin:  Negative for rash.   Neurological:  Negative for focal weakness.     Vitals:  /72 (BP Location: Right arm, Patient Position: Sitting, BP Cuff Size: Adult)   Pulse 74   Ht 1.829 m (6')   Wt 83 kg  (183 lb)   SpO2 96%     Physical Exam:  Physical Exam  Vitals and nursing note reviewed.   Constitutional:       General: He is not in acute distress.     Appearance: Normal appearance. He is not ill-appearing or toxic-appearing.   HENT:      Head: Normocephalic and atraumatic.      Nose: Nose normal.      Mouth/Throat:      Mouth: Mucous membranes are moist.   Eyes:      General: No scleral icterus.     Conjunctiva/sclera: Conjunctivae normal.   Cardiovascular:      Rate and Rhythm: Normal rate and regular rhythm.   Pulmonary:      Effort: Pulmonary effort is normal. No respiratory distress.      Breath sounds: No wheezing, rhonchi or rales.   Abdominal:      Palpations: Abdomen is soft.   Musculoskeletal:         General: No deformity or signs of injury. Normal range of motion.      Cervical back: Normal range of motion.   Skin:     General: Skin is warm and dry.   Neurological:      General: No focal deficit present.      Mental Status: He is alert. Mental status is at baseline.   Psychiatric:         Mood and Affect: Mood normal.         Behavior: Behavior normal.       Data:    Reviewed     Assessment/Plan:    Problem List Items Addressed This Visit       Dyspnea on exertion     Patient's symptoms are likely 2/2 his heart failure/cardiac issues and likely COPD given his smoking history and e/o emphysema on imaging. While his last CT findings are mostly 2/2 fluid overload at that time, I'm concerned for underlying interstitial changes concerning for pneumoconiosis especially with his extensive exposure history.    - repeat CT chest now that patient is optimized from a cardiac standpoint  - PFTs  - start Symbicort BID for COPD/emphysema component   - continue routine f/u with cardiology            Relevant Medications    budesonide-formoterol (SYMBICORT) 80-4.5 MCG/ACT Aerosol    Other Relevant Orders    PULMONARY FUNCTION TESTS -Test requested: Complete Pulmonary Function Test    CT-CHEST, HIGH RESOLUTION LUNG  (Completed)       Return after CT chest and PFTs.     This note was generated using voice recognition software which has a chance of producing errors of grammar and possibly content.  I have made every reasonable attempt to find and correct any obvious errors, but it should be expected that some may not be found prior to finalization of this note.    Time spent in record review prior to patient arrival, reviewing results, and in face-to-face encounter totaled 60 min, excluding any procedures if performed.    Diana Castellanos MD  Pulmonary and Critical Care Medicine  [facility]

## 2023-02-09 ENCOUNTER — HOSPITAL ENCOUNTER (OUTPATIENT)
Dept: RADIOLOGY | Facility: MEDICAL CENTER | Age: 68
End: 2023-02-09
Attending: STUDENT IN AN ORGANIZED HEALTH CARE EDUCATION/TRAINING PROGRAM
Payer: MEDICARE

## 2023-02-09 ENCOUNTER — OFFICE VISIT (OUTPATIENT)
Dept: SLEEP MEDICINE | Facility: MEDICAL CENTER | Age: 68
End: 2023-02-09
Payer: MEDICARE

## 2023-02-09 VITALS
HEIGHT: 72 IN | DIASTOLIC BLOOD PRESSURE: 70 MMHG | BODY MASS INDEX: 24.79 KG/M2 | WEIGHT: 183 LBS | HEART RATE: 81 BPM | SYSTOLIC BLOOD PRESSURE: 118 MMHG | OXYGEN SATURATION: 96 %

## 2023-02-09 DIAGNOSIS — Z87.891 PERSONAL HISTORY OF NICOTINE DEPENDENCE: ICD-10-CM

## 2023-02-09 DIAGNOSIS — J44.9 CHRONIC OBSTRUCTIVE PULMONARY DISEASE, UNSPECIFIED COPD TYPE (HCC): ICD-10-CM

## 2023-02-09 DIAGNOSIS — J43.9 PULMONARY EMPHYSEMA, UNSPECIFIED EMPHYSEMA TYPE (HCC): ICD-10-CM

## 2023-02-09 DIAGNOSIS — J84.9 INTERSTITIAL PULMONARY DISEASE (HCC): ICD-10-CM

## 2023-02-09 PROCEDURE — 99215 OFFICE O/P EST HI 40 MIN: CPT | Performed by: STUDENT IN AN ORGANIZED HEALTH CARE EDUCATION/TRAINING PROGRAM

## 2023-02-09 PROCEDURE — 71250 CT THORAX DX C-: CPT

## 2023-02-09 RX ORDER — ALBUTEROL SULFATE 90 UG/1
2 AEROSOL, METERED RESPIRATORY (INHALATION) EVERY 6 HOURS PRN
Qty: 18 G | Refills: 3 | Status: SHIPPED | OUTPATIENT
Start: 2023-02-09 | End: 2023-08-19 | Stop reason: SDUPTHER

## 2023-02-09 ASSESSMENT — FIBROSIS 4 INDEX: FIB4 SCORE: .941357448663283353

## 2023-02-10 PROBLEM — J43.9 PULMONARY EMPHYSEMA (HCC): Status: ACTIVE | Noted: 2023-02-10

## 2023-02-10 PROBLEM — R06.09 DYSPNEA ON EXERTION: Status: ACTIVE | Noted: 2023-02-10

## 2023-02-10 ASSESSMENT — ENCOUNTER SYMPTOMS
FOCAL WEAKNESS: 0
SPUTUM PRODUCTION: 0
HEMOPTYSIS: 0
FEVER: 0
SPUTUM PRODUCTION: 0
COUGH: 0
FEVER: 0
FOCAL WEAKNESS: 0
FALLS: 0
SHORTNESS OF BREATH: 1
HEMOPTYSIS: 0
CHILLS: 0
SHORTNESS OF BREATH: 1
EYE DISCHARGE: 0
CHILLS: 0
COUGH: 0
NAUSEA: 0
NAUSEA: 0
VOMITING: 0
VOMITING: 0
EYE DISCHARGE: 0
WHEEZING: 0
FALLS: 0
WHEEZING: 0

## 2023-02-10 NOTE — ASSESSMENT & PLAN NOTE
Patient's symptoms are likely 2/2 his heart failure/cardiac issues and likely COPD given his smoking history and e/o emphysema on imaging. While his last CT findings are mostly 2/2 fluid overload at that time, I'm concerned for underlying interstitial changes concerning for pneumoconiosis especially with his extensive exposure history.    - repeat CT chest now that patient is optimized from a cardiac standpoint  - PFTs  - start Symbicort BID for COPD/emphysema component   - continue routine f/u with cardiology

## 2023-02-10 NOTE — PROGRESS NOTES
Pulmonary Clinic Note    Chief Complaint:  Chief Complaint   Patient presents with    Follow-Up     Last seen 2/8/23 with Dr. Castellanos     Results     Ct-Chest 2/9/23, PFT 2/8/23     HPI:   Eduardo Aponte is a very pleasant 67 y.o. male with history of tobacco smoking 40pkyr quit 4/2022, atrial fibrillation s/p albation w/eccentric MR, HFpEF, polycythemia, DLD who presents to pulmonary clinic for dyspnea.    Patient reports that his dyspnea on exertion first in 4/2022 and has been progressively worsening. Symptoms are improved now with better a better cardiac regimen (control of afib and heart failure) but still with ELMORE. MMRC 2.  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   Has chronic dermatitis - gets allergy shots twice a year that help significantly.  No family history of pulmonary disease.   No marijuana or other drug use.     2/9/23: Ordered HRCT and PFTs at last visit for further evaluation of underlying ILD/pneumoconiosis - he is here for f/u of those results. Hasn't gotten symbicort yet.       Past Medical History:   Diagnosis Date    Arrhythmia     A FIB    Arthritis     HANDS AND SHOULDERS    Breath shortness     WITH EXERTION    Cataract     IOL 5 YEARS AGO    Dermatitis     chronic    Heart burn     High cholesterol     Shortness of breath        History reviewed. No pertinent surgical history.    Social History     Socioeconomic History    Marital status:      Spouse name: Not on file    Number of children: Not on file    Years of education: Not on file    Highest education level: Not on file   Occupational History    Not on file    Tobacco Use    Smoking status: Former     Packs/day: 1.00     Types: Cigarettes     Quit date: 2010     Years since quittin.3    Smokeless tobacco: Never   Vaping Use    Vaping Use: Never used   Substance and Sexual Activity    Alcohol use: Not Currently    Drug use: No    Sexual activity: Not on file     Comment:    Other Topics Concern    Not on file   Social History Narrative    Not on file     Social Determinants of Health     Financial Resource Strain: Not on file   Food Insecurity: Not on file   Transportation Needs: Not on file   Physical Activity: Not on file   Stress: Not on file   Social Connections: Not on file   Intimate Partner Violence: Not on file   Housing Stability: Not on file          No family history on file.  There is no pertinent family history.     Current Outpatient Medications on File Prior to Visit   Medication Sig Dispense Refill    budesonide-formoterol (SYMBICORT) 80-4.5 MCG/ACT Aerosol Inhale 2 Puffs 2 times a day. Use spacer. Rinse mouth after each use. 41.72 g 3    metoprolol tartrate (LOPRESSOR) 50 MG Tab Take 0.5 Tablets by mouth 2 times a day. (Patient taking differently: Take 50 mg by mouth 2 times a day.) 180 Tablet 3    rosuvastatin (CRESTOR) 5 MG Tab Take 5 mg by mouth every evening.      Famotidine (PEPCID PO) Take  by mouth.      Empagliflozin (JARDIANCE) 10 MG Tab Take 1 Tablet by mouth every day. 30 Tablet 6    furosemide (LASIX) 20 MG Tab Take 1 Tablet by mouth every day. 90 Tablet 3    ELIQUIS 5 MG Tab Take 5 mg by mouth 2 times a day.       No current facility-administered medications on file prior to visit.       Allergies: Bee      ROS:   Review of Systems   Constitutional:  Negative for chills and fever.   HENT:  Negative for hearing loss.    Eyes:  Negative for discharge.   Respiratory:  Positive for shortness of breath. Negative for cough, hemoptysis, sputum production and wheezing.    Cardiovascular:  Negative for chest pain.    Gastrointestinal:  Negative for nausea and vomiting.   Musculoskeletal:  Negative for falls.   Skin:  Negative for rash.   Neurological:  Negative for focal weakness.     Vitals:  /70 (BP Location: Right arm, Patient Position: Sitting, BP Cuff Size: Adult)   Pulse 81   Ht 1.829 m (6')   Wt 83 kg (183 lb)   SpO2 96%     Physical Exam:  Physical Exam  Vitals and nursing note reviewed.   Constitutional:       General: He is not in acute distress.     Appearance: Normal appearance. He is not ill-appearing or toxic-appearing.   HENT:      Head: Normocephalic and atraumatic.      Nose: Nose normal.      Mouth/Throat:      Mouth: Mucous membranes are moist.   Eyes:      General: No scleral icterus.     Conjunctiva/sclera: Conjunctivae normal.   Cardiovascular:      Rate and Rhythm: Normal rate and regular rhythm.   Pulmonary:      Effort: Pulmonary effort is normal. No respiratory distress.      Breath sounds: No wheezing, rhonchi or rales.   Abdominal:      Palpations: Abdomen is soft.   Musculoskeletal:         General: No deformity or signs of injury. Normal range of motion.      Cervical back: Normal range of motion.   Skin:     General: Skin is warm and dry.   Neurological:      General: No focal deficit present.      Mental Status: He is alert. Mental status is at baseline.   Psychiatric:         Mood and Affect: Mood normal.         Behavior: Behavior normal.       Data:    PFT 2/8/23: mild obstruction w/o significant BD response. Reduced DCLO and DL/VA are c/w pulmonary emphysema.      Assessment/Plan:    Problem List Items Addressed This Visit       Pulmonary emphysema (HCC)     Patient's symptoms are 2/2 his heart failure/cardiac issues and pulmonary emphysema/COPD given his smoking history and e/o emphysema on imaging. Repeat CT chest shows significant emphysema but no e/o significant ILD/pneumoconiosis. PFTs show mild obstruction and no e/o restriction. Reduced DLCO and DL/VA are c/w his emphysema.       - start Symbicort BID  - prn albuterol   - lung cancer screening program - patient agreeable   - continue routine f/u with cardiology              Relevant Medications    albuterol 108 (90 Base) MCG/ACT Aero Soln inhalation aerosol     Other Visit Diagnoses       Chronic obstructive pulmonary disease, unspecified COPD type (HCC)        Relevant Medications    albuterol 108 (90 Base) MCG/ACT Aero Soln inhalation aerosol    Other Relevant Orders    REFERRAL TO LUNG CANCER SCREENING PROGRAM    Personal history of nicotine dependence        Relevant Orders    REFERRAL TO LUNG CANCER SCREENING PROGRAM            Return 3-6 months.     This note was generated using voice recognition software which has a chance of producing errors of grammar and possibly content.  I have made every reasonable attempt to find and correct any obvious errors, but it should be expected that some may not be found prior to finalization of this note.    Time spent in record review prior to patient arrival, reviewing results, and in face-to-face encounter totaled 45 min, excluding any procedures if performed.    Diana Castellanos MD  Pulmonary and Critical Care Medicine  Formerly [facility], later [facility]

## 2023-02-11 NOTE — ASSESSMENT & PLAN NOTE
Patient's symptoms are 2/2 his heart failure/cardiac issues and pulmonary emphysema/COPD given his smoking history and e/o emphysema on imaging. Repeat CT chest shows significant emphysema but no e/o significant ILD/pneumoconiosis. PFTs show mild obstruction and no e/o restriction. Reduced DLCO and DL/VA are c/w his emphysema.      - start Symbicort BID  - prn albuterol   - lung cancer screening program - patient agreeable   - continue routine f/u with cardiology

## 2023-02-14 ENCOUNTER — TELEPHONE (OUTPATIENT)
Dept: HEMATOLOGY ONCOLOGY | Facility: MEDICAL CENTER | Age: 68
End: 2023-02-14
Payer: MEDICARE

## 2023-02-14 NOTE — TELEPHONE ENCOUNTER
The patient completed a CT Chest on 2/9/23, their referral to the Lung Cancer Screening Program will be deferred until 2/1/2024

## 2023-02-16 ENCOUNTER — TELEPHONE (OUTPATIENT)
Dept: CARDIOLOGY | Facility: MEDICAL CENTER | Age: 68
End: 2023-02-16
Payer: MEDICARE

## 2023-02-16 DIAGNOSIS — R06.09 DOE (DYSPNEA ON EXERTION): ICD-10-CM

## 2023-02-16 DIAGNOSIS — I50.32 CHRONIC HEART FAILURE WITH PRESERVED EJECTION FRACTION (HCC): ICD-10-CM

## 2023-02-16 DIAGNOSIS — I34.0 MODERATE MITRAL REGURGITATION BY PRIOR ECHOCARDIOGRAM: ICD-10-CM

## 2023-02-16 DIAGNOSIS — R06.02 SHORTNESS OF BREATH: ICD-10-CM

## 2023-02-16 NOTE — TELEPHONE ENCOUNTER
Called patient and notified of MD's recommendations. The patient states he obtained the new inhaler Symbicort and is using it as directed without any improvement. Advised to continue using for the next two weeks and then let me know how he's feeling. He requested I send him my phone number and the date to contact me back via [employer].

## 2023-02-16 NOTE — TELEPHONE ENCOUNTER
----- Message from Kyle Hua M.D. sent at 2/13/2023  1:06 PM PST -----  Regarding: RE: FU Pulm Note  Have him call back in a couple of weeks to see if he has improvement in symptoms with inhalers prescribed by pulmonary.    If symptoms improved, 6 month follow up.    If symptoms NOT improved, set up TYRA/LHC with [facility] and referral to [facility]. [place]  ----- Message -----  From: Christina Brown R.N.  Sent: 2/13/2023   9:21 AM PST  To: Kyle Hua M.D.  Subject: FW: FU Pulm Note                                 Reminder to check pulmonology note.     Please advise if you want patient to proceed with TYRA/angiogram/referral to Dr. Hebert or not.   ----- Message -----  From: Christina Brown R.N.  Sent: 2/9/2023   8:00 AM PST  To: Christina Brown R.N.  Subject: FU Pulm Note                                     Check pulm note from 2/8    Possible TYRA/angiogram and referral to Dr. Hebert

## 2023-02-22 NOTE — TELEPHONE ENCOUNTER
Kyle Hua M.D.  You 32 minutes ago (3:04 PM)     We can schedule LHC and TYRA      Called patient to discuss. He is agreeable to the plan. Advised I would have Keeli reach out to him to schedule.

## 2023-02-22 NOTE — TELEPHONE ENCOUNTER
Received VM from patient yesterday stating he's been using his inhaler for the last week and is feeling worse. He would like to proceed with further workup as Dr. Hua was recommending.     Message to Dr. Hua to confirm ok to order LHC/TYRA and refer to [facility]

## 2023-02-23 ENCOUNTER — TELEPHONE (OUTPATIENT)
Dept: CARDIOLOGY | Facility: MEDICAL CENTER | Age: 68
End: 2023-02-23
Payer: MEDICARE

## 2023-02-23 NOTE — TELEPHONE ENCOUNTER
Patient is scheduled on 3-8-23 for a Pre mitral clip angio w/TYRA with . Patient was told to hold eliquis for 2 days prior and to hold jardiance and lasix AM day of procedure. Patient to check in at 7:30 for a 9:30 procedure. Updated H&P to be done on admit by NP. Pre admit to call patient.

## 2023-02-23 NOTE — TELEPHONE ENCOUNTER
----- Message from Christina Brown R.N. sent at 2/22/2023  4:34 PM PST -----  Regarding: RE: TYRA, pre mitral cath  Yes  ----- Message -----  From: Leno Lange  Sent: 2/22/2023   4:24 PM PST  To: Christina Brown R.N.  Subject: RE: TYRA, pre mitral cath                         Can these be done together?  ----- Message -----  From: Christina Brown R.N.  Sent: 2/22/2023   3:46 PM PST  To: Leno Lange  Subject: TYRA, pre mitral cath                             Can you please call patient to schedule TYRA and pre Mitral cath? Patient's first available. Thanks!

## 2023-03-06 ENCOUNTER — OFFICE VISIT (OUTPATIENT)
Dept: CARDIOLOGY | Facility: MEDICAL CENTER | Age: 68
End: 2023-03-06
Payer: MEDICARE

## 2023-03-06 VITALS
HEIGHT: 72 IN | RESPIRATION RATE: 18 BRPM | HEART RATE: 72 BPM | BODY MASS INDEX: 24.92 KG/M2 | DIASTOLIC BLOOD PRESSURE: 60 MMHG | WEIGHT: 184 LBS | SYSTOLIC BLOOD PRESSURE: 124 MMHG | OXYGEN SATURATION: 93 %

## 2023-03-06 DIAGNOSIS — I50.32 CHRONIC HEART FAILURE WITH PRESERVED EJECTION FRACTION (HCC): ICD-10-CM

## 2023-03-06 DIAGNOSIS — I34.0 MODERATE MITRAL REGURGITATION BY PRIOR ECHOCARDIOGRAM: ICD-10-CM

## 2023-03-06 DIAGNOSIS — I48.0 PAROXYSMAL ATRIAL FIBRILLATION (HCC): ICD-10-CM

## 2023-03-06 DIAGNOSIS — R06.09 DOE (DYSPNEA ON EXERTION): ICD-10-CM

## 2023-03-06 DIAGNOSIS — Z79.899 HIGH RISK MEDICATION USE: ICD-10-CM

## 2023-03-06 DIAGNOSIS — I48.0 PAF (PAROXYSMAL ATRIAL FIBRILLATION) (HCC): ICD-10-CM

## 2023-03-06 DIAGNOSIS — I34.0 NONRHEUMATIC MITRAL VALVE REGURGITATION: ICD-10-CM

## 2023-03-06 DIAGNOSIS — I48.91 ATRIAL FIBRILLATION WITH RVR (HCC): ICD-10-CM

## 2023-03-06 DIAGNOSIS — Z79.01 CHRONIC ANTICOAGULATION: ICD-10-CM

## 2023-03-06 DIAGNOSIS — Z98.890 H/O CARDIAC RADIOFREQUENCY ABLATION: ICD-10-CM

## 2023-03-06 LAB — EKG IMPRESSION: NORMAL

## 2023-03-06 PROCEDURE — 99214 OFFICE O/P EST MOD 30 MIN: CPT | Performed by: NURSE PRACTITIONER

## 2023-03-06 RX ORDER — TRAMADOL HYDROCHLORIDE 50 MG/1
50 TABLET ORAL EVERY 4 HOURS PRN
Status: ON HOLD | COMMUNITY
End: 2023-04-04

## 2023-03-06 RX ORDER — APIXABAN 5 MG/1
5 TABLET, FILM COATED ORAL 2 TIMES DAILY
Qty: 180 TABLET | Refills: 3 | Status: ON HOLD
Start: 2023-03-06 | End: 2023-04-04

## 2023-03-06 ASSESSMENT — MINNESOTA LIVING WITH HEART FAILURE QUESTIONNAIRE (MLHF)
WALKING ABOUT OR CLIMBING STAIRS DIFFICULT: 3
DIFFICULTY WITH RECREATIONAL PASTIMES, SPORTS, HOBBIES: 4
MAKING YOU SHORT OF BREATH: 4
FEELING LIKE A BURDEN TO FAMILY AND FRIENDS: 1
DIFFICULTY WORKING TO EARN A LIVING: 0
MAKING YOU STAY IN A HOSPITAL: 0
DIFFICULTY SOCIALIZING WITH FAMILY OR FRIENDS: 2
GIVING YOU SIDE EFFECTS FROM TREATMENTS: 2
HAVING TO SIT OR LIE DOWN DURING THE DAY: 4
MAKING YOU FEEL DEPRESSED: 0
EATING LESS FOODS YOU LIKE: 3
DIFFICULTY GOING AWAY FROM HOME: 2
MAKING YOU WORRY: 2
DIFFICULTY WITH SEXUAL ACTIVITIES: 3
COSTING YOU MONEY FOR MEDICAL CARE: 2
DIFFICULTY TO CONCENTRATE OR REMEMBERING THINGS: 0
WORKING AROUND THE HOUSE OR YARD DIFFICULT: 4
TOTAL_SCORE: 42
TIRED, FATIGUED OR LOW ON ENERGY: 2
SWELLING IN ANKLES OR LEGS: 0
DIFFICULTY SLEEPING WELL AT NIGHT: 2
LOSS OF SELF CONTROL IN YOUR LIFE: 2

## 2023-03-06 ASSESSMENT — ENCOUNTER SYMPTOMS
BLOOD IN STOOL: 0
ABDOMINAL PAIN: 0
VOMITING: 0
CLAUDICATION: 0
WEIGHT LOSS: 0
BLURRED VISION: 0
DIZZINESS: 0
FALLS: 0
PND: 0
PALPITATIONS: 0
MYALGIAS: 0
LOSS OF CONSCIOUSNESS: 0
FEVER: 0
SHORTNESS OF BREATH: 1
TINGLING: 0
BRUISES/BLEEDS EASILY: 0
ORTHOPNEA: 0
COUGH: 0

## 2023-03-06 ASSESSMENT — FIBROSIS 4 INDEX: FIB4 SCORE: .941357448663283353

## 2023-03-06 NOTE — PROGRESS NOTES
Chief Complaint   Patient presents with    Atrial Fibrillation     F/V Dx: Atrial fibrillation with RVR (HCC)      Shortness of Breath     F/V Dx: Dyspnea on exertion         Subjective     Eduardo Aponte is a 67 y.o. male who presents today as heart failure follow-up after recent successful A. fib/flutter ablation with Dr. Glover on 10/13/2022.  Moderate MR was noted on TYRA.  Patient of Dr. Ruff.  Patient also followed by EP, MADDY Murry on Tuesday, and was last seen by myself on 12/2/2022. Patient has additional medical problems with polycythemia, dyslipidemia, pre-DM, former smoker (quit 8 months ago).    Since patient was last seen, echocardiogram showed moderate MR despite optimize GDMT and rhythm control.  Patient established with structural heart, Dr. Hua for GILBERT consideration.  Patient undergo pre-MitraClip left heart cath and follow-up TYRA tomorrow.  Patient established with pulmonary.     Today, patient continues to report NYHA class III symptoms;  no improvement after initiating inhaler medications. No significant changes since last seen and aprears euvolemic on exam at dry weight of 184 lbs. Patient requesting oac refills as his primary care provider recently retired.  GDMT optimized.  Patient undergo preop lab testing today.  Patient to continue follow-up with structural heart clinic.  As long as patient remains stable, we will discharge him from heart failure clinic and patient to follow-up with general cardiology.    Past Medical History:   Diagnosis Date    Arrhythmia     A FIB    Arthritis     HANDS AND SHOULDERS    Breath shortness     WITH EXERTION    Cataract     IOL 5 YEARS AGO    Dermatitis     chronic    Heart burn     High cholesterol     Shortness of breath      History reviewed. No pertinent surgical history.  History reviewed. No pertinent family history.  Social History     Socioeconomic History    Marital status:      Spouse name: Not on file    Number of  children: Not on file    Years of education: Not on file    Highest education level: Not on file   Occupational History    Not on file   Tobacco Use    Smoking status: Former     Packs/day: 1.00     Types: Cigarettes     Quit date: 2010     Years since quittin.4    Smokeless tobacco: Never   Vaping Use    Vaping Use: Never used   Substance and Sexual Activity    Alcohol use: Not Currently    Drug use: No    Sexual activity: Not on file     Comment:    Other Topics Concern    Not on file   Social History Narrative    Not on file     Social Determinants of Health     Financial Resource Strain: Not on file   Food Insecurity: Not on file   Transportation Needs: Not on file   Physical Activity: Not on file   Stress: Not on file   Social Connections: Not on file   Intimate Partner Violence: Not on file   Housing Stability: Not on file     Allergies   Allergen Reactions    Bee      Outpatient Encounter Medications as of 3/6/2023   Medication Sig Dispense Refill    traMADol (ULTRAM) 50 MG Tab Take 50 mg by mouth every four hours as needed.      ELIQUIS 5 MG Tab Take 1 Tablet by mouth 2 times a day. 180 Tablet 3    albuterol 108 (90 Base) MCG/ACT Aero Soln inhalation aerosol Inhale 2 Puffs every 6 hours as needed for Shortness of Breath. 18 g 3    budesonide-formoterol (SYMBICORT) 80-4.5 MCG/ACT Aerosol Inhale 2 Puffs 2 times a day. Use spacer. Rinse mouth after each use. 41.72 g 3    metoprolol tartrate (LOPRESSOR) 50 MG Tab Take 0.5 Tablets by mouth 2 times a day. (Patient taking differently: Take 50 mg by mouth 2 times a day.) 180 Tablet 3    rosuvastatin (CRESTOR) 5 MG Tab Take 5 mg by mouth every evening.      Famotidine (PEPCID PO) Take  by mouth.      Empagliflozin (JARDIANCE) 10 MG Tab Take 1 Tablet by mouth every day. 30 Tablet 6    furosemide (LASIX) 20 MG Tab Take 1 Tablet by mouth every day. 90 Tablet 3    [DISCONTINUED] ELIQUIS 5 MG Tab Take 5 mg by mouth 2 times a day.       No  facility-administered encounter medications on file as of 3/6/2023.     Review of Systems   Constitutional:  Negative for fever, malaise/fatigue and weight loss.   Eyes:  Negative for blurred vision.   Respiratory:  Positive for shortness of breath. Negative for cough.    Cardiovascular:  Negative for chest pain, palpitations, orthopnea, claudication, leg swelling and PND.   Gastrointestinal:  Negative for abdominal pain, blood in stool and vomiting.   Genitourinary:  Negative for dysuria, frequency and hematuria.   Musculoskeletal:  Negative for falls and myalgias.   Neurological:  Negative for dizziness, tingling and loss of consciousness.   Endo/Heme/Allergies:  Does not bruise/bleed easily.            Objective     /60 (BP Location: Left arm, Patient Position: Sitting, BP Cuff Size: Adult)   Pulse 72   Resp 18   Ht 1.829 m (6')   Wt 83.5 kg (184 lb)   SpO2 93%   BMI 24.95 kg/m²     Physical Exam  Vitals reviewed.   Constitutional:       Appearance: He is well-developed.   HENT:      Head: Normocephalic and atraumatic.   Eyes:      Pupils: Pupils are equal, round, and reactive to light.   Neck:      Vascular: No JVD.   Cardiovascular:      Rate and Rhythm: Normal rate and regular rhythm.      Heart sounds: Normal heart sounds. No murmur heard.    No friction rub. No gallop.   Pulmonary:      Effort: Pulmonary effort is normal. No respiratory distress.      Breath sounds: Examination of the right-lower field reveals decreased breath sounds. Examination of the left-lower field reveals decreased breath sounds. Decreased breath sounds present.   Abdominal:      General: Bowel sounds are normal. There is no distension.      Palpations: Abdomen is soft.   Musculoskeletal:      Right lower leg: No edema.      Left lower leg: No edema.   Skin:     General: Skin is warm and dry.      Findings: No erythema.   Neurological:      Mental Status: He is alert and oriented to person, place, and time.   Psychiatric:          Behavior: Behavior normal.          Lab Results   Component Value Date/Time    CHOLSTRLTOT 171 01/11/2023 08:36 AM     (H) 01/11/2023 08:36 AM    HDL 46 01/11/2023 08:36 AM    TRIGLYCERIDE 94 01/11/2023 08:36 AM       Lab Results   Component Value Date/Time    SODIUM 140 01/11/2023 08:36 AM    POTASSIUM 3.9 01/11/2023 08:36 AM    CHLORIDE 102 01/11/2023 08:36 AM    CO2 25 01/11/2023 08:36 AM    GLUCOSE 128 (H) 01/11/2023 08:36 AM    BUN 12 01/11/2023 08:36 AM    CREATININE 1.13 01/11/2023 08:36 AM     Lab Results   Component Value Date/Time    ALKPHOSPHAT 78 01/11/2023 08:36 AM    ASTSGOT 24 01/11/2023 08:36 AM    ALTSGPT 26 01/11/2023 08:36 AM    TBILIRUBIN 0.6 01/11/2023 08:36 AM      Echocardiogram:  09/30/2022  CONCLUSIONS  No prior study is available for comparison.   Normal left ventricular size, thickness, and systolic function.  The left ventricular ejection fraction is visually estimated to be 65%.  Moderate mitral regurgitation with eccentric jet.  Mild tricuspid regurgitation.  Estimated right ventricular systolic pressure is 30-35 mmHg.     TYRA  10/13/2022  Intraoperative TYRA performed prior to Afib ablation by Dr. Glover.  Ejection fraction visually estimated at 65%. Normal ventricular   function.  No notable thrombus in GENET.  Moderate mitral regurgitation. A2 leaflet prolapse with posteriorly   directed eccentric regurgitant jet.    Stress Test:  10/04/2022  No evidence of significant jeopardized viable myocardium or prior myocardial    infarction.   Normal left ventricular size, ejection fraction, and wall motion.     CTA 10/11/2022  IMPRESSION:  1.  No aortic aneurysm or dissection identified.  2.  Mild to moderate diffuse atherosclerosis of the aorta and its branch vessels.  3.  Severe diffuse interstitial pulmonary edema.  4.  Diverticulosis without evidence of diverticulitis.    Assessment & Plan     1. Moderate mitral regurgitation by prior echocardiogram        2. ELMORE (dyspnea on  exertion)        3. Paroxysmal atrial fibrillation (HCC)        4. PAF (paroxysmal atrial fibrillation) ([facility])        5. H/O cardiac radiofrequency ablation 10/18/22 Dr Glover        6. Atrial fibrillation with RVR ([facility])        7. Chronic heart failure with preserved ejection fraction ([facility])        8. Chronic anticoagulation        9. Nonrheumatic mitral valve regurgitation        10. High risk medication use            Medical Decision Making: Today's Assessment/Status/Plan:        Afib/Aflutter S/P ablation  -Previous EKG showed SR  - Successful RF ablation pulmonary vein isolation procedure.  Successful RF ablation of LA anterior wall focal triggers. Successful RF ablation of CTI dependent flutter. Successful RF ablation of mitral flutter with Dr. Glover 10/13/2022  -Metoprolol 50 mg twice daily  -Eliquis 5 mg twice daily.  Holding currently for procedure.  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 Pt verbalizes understanding. Pt to call office or RTC if present.   -Follow-up with structural heart clinic     Dyslipidemia  -Crestor 5 mg every evening    FU in clinic per structural heart clinic; 6 months with general cardiology.  Sooner if needed    Patient verbalizes understanding and agrees with the plan of care.     WAYNE Puckett.R.N.   [facility]  [phone]    PLEASE NOTE: This Note was created using voice recognition Software. I have made every reasonable attempt to correct obvious errors, but I expect that there are  errors of grammar and possibly content that I did not discover before finalizing the note

## 2023-03-07 ENCOUNTER — PRE-ADMISSION TESTING (OUTPATIENT)
Dept: ADMISSIONS | Facility: MEDICAL CENTER | Age: 68
End: 2023-03-07
Attending: INTERNAL MEDICINE
Payer: MEDICARE

## 2023-03-07 DIAGNOSIS — Z01.810 PRE-OPERATIVE CARDIOVASCULAR EXAMINATION: ICD-10-CM

## 2023-03-07 DIAGNOSIS — Z01.812 PRE-OPERATIVE LABORATORY EXAMINATION: ICD-10-CM

## 2023-03-07 LAB
ALBUMIN SERPL BCP-MCNC: 4.6 G/DL (ref 3.2–4.9)
ALBUMIN/GLOB SERPL: 1.6 G/DL
ALP SERPL-CCNC: 83 U/L (ref 30–99)
ALT SERPL-CCNC: 21 U/L (ref 2–50)
ANION GAP SERPL CALC-SCNC: 13 MMOL/L (ref 7–16)
APTT PPP: 33.4 SEC (ref 24.7–36)
AST SERPL-CCNC: 19 U/L (ref 12–45)
BILIRUB SERPL-MCNC: 0.9 MG/DL (ref 0.1–1.5)
BUN SERPL-MCNC: 14 MG/DL (ref 8–22)
CALCIUM ALBUM COR SERPL-MCNC: 9.3 MG/DL (ref 8.5–10.5)
CALCIUM SERPL-MCNC: 9.8 MG/DL (ref 8.5–10.5)
CHLORIDE SERPL-SCNC: 103 MMOL/L (ref 96–112)
CO2 SERPL-SCNC: 23 MMOL/L (ref 20–33)
CREAT SERPL-MCNC: 1 MG/DL (ref 0.5–1.4)
ERYTHROCYTE [DISTWIDTH] IN BLOOD BY AUTOMATED COUNT: 42.1 FL (ref 35.9–50)
GFR SERPLBLD CREATININE-BSD FMLA CKD-EPI: 83 ML/MIN/1.73 M 2
GLOBULIN SER CALC-MCNC: 2.8 G/DL (ref 1.9–3.5)
GLUCOSE SERPL-MCNC: 128 MG/DL (ref 65–99)
HCT VFR BLD AUTO: 58 % (ref 42–52)
HGB BLD-MCNC: 19.5 G/DL (ref 14–18)
INR PPP: 1.01 (ref 0.87–1.13)
MCH RBC QN AUTO: 28.9 PG (ref 27–33)
MCHC RBC AUTO-ENTMCNC: 33.6 G/DL (ref 33.7–35.3)
MCV RBC AUTO: 85.9 FL (ref 81.4–97.8)
PLATELET # BLD AUTO: 295 K/UL (ref 164–446)
PMV BLD AUTO: 11.9 FL (ref 9–12.9)
POTASSIUM SERPL-SCNC: 4 MMOL/L (ref 3.6–5.5)
PROT SERPL-MCNC: 7.4 G/DL (ref 6–8.2)
PROTHROMBIN TIME: 13.2 SEC (ref 12–14.6)
RBC # BLD AUTO: 6.75 M/UL (ref 4.7–6.1)
SODIUM SERPL-SCNC: 139 MMOL/L (ref 135–145)
WBC # BLD AUTO: 8.3 K/UL (ref 4.8–10.8)

## 2023-03-07 PROCEDURE — 36415 COLL VENOUS BLD VENIPUNCTURE: CPT

## 2023-03-07 PROCEDURE — 85027 COMPLETE CBC AUTOMATED: CPT

## 2023-03-07 PROCEDURE — 93005 ELECTROCARDIOGRAM TRACING: CPT

## 2023-03-07 PROCEDURE — 80053 COMPREHEN METABOLIC PANEL: CPT

## 2023-03-07 PROCEDURE — 85730 THROMBOPLASTIN TIME PARTIAL: CPT

## 2023-03-07 PROCEDURE — 85610 PROTHROMBIN TIME: CPT

## 2023-03-07 ASSESSMENT — FIBROSIS 4 INDEX: FIB4 SCORE: .941357448663283353

## 2023-03-08 ENCOUNTER — APPOINTMENT (OUTPATIENT)
Dept: CARDIOLOGY | Facility: MEDICAL CENTER | Age: 68
End: 2023-03-08
Attending: INTERNAL MEDICINE
Payer: MEDICARE

## 2023-03-08 ENCOUNTER — HOSPITAL ENCOUNTER (OUTPATIENT)
Facility: MEDICAL CENTER | Age: 68
End: 2023-03-08
Attending: INTERNAL MEDICINE | Admitting: INTERNAL MEDICINE
Payer: MEDICARE

## 2023-03-08 VITALS
RESPIRATION RATE: 20 BRPM | HEART RATE: 80 BPM | DIASTOLIC BLOOD PRESSURE: 74 MMHG | BODY MASS INDEX: 24.49 KG/M2 | OXYGEN SATURATION: 92 % | TEMPERATURE: 97.2 F | WEIGHT: 180.78 LBS | SYSTOLIC BLOOD PRESSURE: 117 MMHG | HEIGHT: 72 IN

## 2023-03-08 DIAGNOSIS — R06.09 DOE (DYSPNEA ON EXERTION): ICD-10-CM

## 2023-03-08 DIAGNOSIS — I34.0 MODERATE MITRAL REGURGITATION BY PRIOR ECHOCARDIOGRAM: ICD-10-CM

## 2023-03-08 DIAGNOSIS — I50.32 CHRONIC HEART FAILURE WITH PRESERVED EJECTION FRACTION (HCC): ICD-10-CM

## 2023-03-08 DIAGNOSIS — R06.02 SHORTNESS OF BREATH: ICD-10-CM

## 2023-03-08 LAB
EKG IMPRESSION: NORMAL
EKG IMPRESSION: NORMAL

## 2023-03-08 PROCEDURE — 700101 HCHG RX REV CODE 250

## 2023-03-08 PROCEDURE — 160002 HCHG RECOVERY MINUTES (STAT)

## 2023-03-08 PROCEDURE — C1769 GUIDE WIRE: HCPCS

## 2023-03-08 PROCEDURE — 93320 DOPPLER ECHO COMPLETE: CPT | Mod: 26 | Performed by: INTERNAL MEDICINE

## 2023-03-08 PROCEDURE — 93312 ECHO TRANSESOPHAGEAL: CPT | Mod: 26 | Performed by: INTERNAL MEDICINE

## 2023-03-08 PROCEDURE — 160035 HCHG PACU - 1ST 60 MINS PHASE I

## 2023-03-08 PROCEDURE — 93010 ELECTROCARDIOGRAM REPORT: CPT | Performed by: INTERNAL MEDICINE

## 2023-03-08 PROCEDURE — 93325 DOPPLER ECHO COLOR FLOW MAPG: CPT | Mod: 26 | Performed by: INTERNAL MEDICINE

## 2023-03-08 PROCEDURE — 700111 HCHG RX REV CODE 636 W/ 250 OVERRIDE (IP)

## 2023-03-08 PROCEDURE — 93460 R&L HRT ART/VENTRICLE ANGIO: CPT | Mod: 26 | Performed by: INTERNAL MEDICINE

## 2023-03-08 PROCEDURE — 160036 HCHG PACU - EA ADDL 30 MINS PHASE I

## 2023-03-08 PROCEDURE — 99152 MOD SED SAME PHYS/QHP 5/>YRS: CPT | Performed by: INTERNAL MEDICINE

## 2023-03-08 PROCEDURE — 700117 HCHG RX CONTRAST REV CODE 255: Performed by: INTERNAL MEDICINE

## 2023-03-08 PROCEDURE — 93005 ELECTROCARDIOGRAM TRACING: CPT | Mod: XE | Performed by: INTERNAL MEDICINE

## 2023-03-08 PROCEDURE — 93325 DOPPLER ECHO COLOR FLOW MAPG: CPT

## 2023-03-08 PROCEDURE — 160046 HCHG PACU - 1ST 60 MINS PHASE II

## 2023-03-08 RX ORDER — LIDOCAINE HYDROCHLORIDE 20 MG/ML
INJECTION, SOLUTION INFILTRATION; PERINEURAL
Status: COMPLETED
Start: 2023-03-08 | End: 2023-03-08

## 2023-03-08 RX ORDER — PHENYLEPHRINE HCL IN 0.9% NACL 0.5 MG/5ML
SYRINGE (ML) INTRAVENOUS
Status: COMPLETED
Start: 2023-03-08 | End: 2023-03-08

## 2023-03-08 RX ORDER — VERAPAMIL HYDROCHLORIDE 2.5 MG/ML
INJECTION, SOLUTION INTRAVENOUS
Status: COMPLETED
Start: 2023-03-08 | End: 2023-03-08

## 2023-03-08 RX ORDER — METOPROLOL TARTRATE 50 MG/1
50 TABLET, FILM COATED ORAL 2 TIMES DAILY
COMMUNITY
End: 2023-04-26 | Stop reason: SDUPTHER

## 2023-03-08 RX ORDER — HEPARIN SODIUM 200 [USP'U]/100ML
INJECTION, SOLUTION INTRAVENOUS
Status: COMPLETED
Start: 2023-03-08 | End: 2023-03-08

## 2023-03-08 RX ORDER — HEPARIN SODIUM 1000 [USP'U]/ML
INJECTION, SOLUTION INTRAVENOUS; SUBCUTANEOUS
Status: COMPLETED
Start: 2023-03-08 | End: 2023-03-08

## 2023-03-08 RX ORDER — MIDAZOLAM HYDROCHLORIDE 1 MG/ML
INJECTION INTRAMUSCULAR; INTRAVENOUS
Status: COMPLETED
Start: 2023-03-08 | End: 2023-03-08

## 2023-03-08 RX ADMIN — LIDOCAINE HYDROCHLORIDE: 20 INJECTION, SOLUTION INFILTRATION; PERINEURAL at 10:15

## 2023-03-08 RX ADMIN — MIDAZOLAM HYDROCHLORIDE 2 MG: 1 INJECTION, SOLUTION INTRAMUSCULAR; INTRAVENOUS at 10:50

## 2023-03-08 RX ADMIN — NITROGLYCERIN 10 ML: 20 INJECTION INTRAVENOUS at 10:15

## 2023-03-08 RX ADMIN — IOHEXOL 33 ML: 350 INJECTION, SOLUTION INTRAVENOUS at 10:15

## 2023-03-08 RX ADMIN — VERAPAMIL HYDROCHLORIDE 2.5 MG: 2.5 INJECTION, SOLUTION INTRAVENOUS at 10:15

## 2023-03-08 RX ADMIN — FENTANYL CITRATE 100 MCG: 50 INJECTION, SOLUTION INTRAMUSCULAR; INTRAVENOUS at 10:34

## 2023-03-08 RX ADMIN — HEPARIN SODIUM: 1000 INJECTION, SOLUTION INTRAVENOUS; SUBCUTANEOUS at 10:15

## 2023-03-08 RX ADMIN — HEPARIN SODIUM 2000 UNITS: 200 INJECTION, SOLUTION INTRAVENOUS at 10:14

## 2023-03-08 ASSESSMENT — PAIN DESCRIPTION - PAIN TYPE
TYPE: SURGICAL PAIN
TYPE: SURGICAL PAIN

## 2023-03-08 ASSESSMENT — FIBROSIS 4 INDEX: FIB4 SCORE: 0.93

## 2023-03-08 NOTE — DISCHARGE INSTRUCTIONS
HOME CARE INSTRUCTIONS    ACTIVITY: Rest and take it easy for the first 24 hours.  A responsible adult is recommended to remain with you during that time.  It is normal to feel sleepy.  We encourage you to not do anything that requires balance, judgment or coordination.    FOR 24 HOURS DO NOT:  Drive, operate machinery or run household appliances.  Drink beer or alcoholic beverages.  Make important decisions or sign legal documents.      DIET: To avoid nausea, slowly advance diet as tolerated, avoiding spicy or greasy foods for the first day.  Add more substantial food to your diet according to your physician's instructions.  Babies can be fed formula or breast milk as soon as they are hungry.  INCREASE FLUIDS AND FIBER TO AVOID CONSTIPATION.        MEDICATIONS: Resume taking daily medication.  Take prescribed pain medication with food.  If no medication is prescribed, you may take non-aspirin pain medication if needed.  PAIN MEDICATION CAN BE VERY CONSTIPATING.  Take a stool softener or laxative such as senokot, pericolace, or milk of magnesia if needed.      A follow-up appointment should be arranged with your doctor in 1-2 weeks; call to schedule.    You should CALL YOUR PHYSICIAN if you develop:  Fever greater than 101 degrees F.  Pain not relieved by medication, or persistent nausea or vomiting.  Excessive bleeding (blood soaking through dressing) or unexpected drainage from the wound.  Extreme redness or swelling around the incision site, drainage of pus or foul smelling drainage.  Inability to urinate or empty your bladder within 8 hours.  Problems with breathing or chest pain.    You should call 911 if you develop problems with breathing or chest pain.  If you are unable to contact your doctor or surgical center, you should go to the nearest emergency room or urgent care center.  Physician's telephone #: [phone]    MILD FLU-LIKE SYMPTOMS ARE NORMAL.  YOU MAY EXPERIENCE GENERALIZED MUSCLE ACHES, THROAT  IRRITATION, HEADACHE AND/OR SOME NAUSEA.    If any questions arise, call your doctor.  If your doctor is not available, please feel free to call the Surgical Center at [phone].  The Center is open Monday through Friday from 7AM to 7PM.      A registered nurse may call you a few days after your surgery to see how you are doing after your procedure.    You may also receive a survey in the mail within the next two weeks and we ask that you take a few moments to complete the survey and return it to us.  Our goal is to provide you with very good care and we value your comments.     Depression / Suicide Risk    As you are discharged from this [facility] facility, it is important to learn how to keep safe from harming yourself.    Recognize the warning signs:  Abrupt changes in personality, positive or negative- including increase in energy   Giving away possessions  Change in eating patterns- significant weight changes-  positive or negative  Change in sleeping patterns- unable to sleep or sleeping all the time   Unwillingness or inability to communicate  Depression  Unusual sadness, discouragement and loneliness  Talk of wanting to die  Neglect of personal appearance   Rebelliousness- reckless behavior  Withdrawal from people/activities they love  Confusion- inability to concentrate     If you or a loved one observes any of these behaviors or has concerns about self-harm, here's what you can do:  Talk about it- your feelings and reasons for harming yourself  Remove any means that you might use to hurt yourself (examples: pills, rope, extension cords, firearm)  Get professional help from the community (Mental Health, Substance Abuse, psychological counseling)  Do not be alone:Call your Safe Contact- someone whom you trust who will be there for you.  Call your local CRISIS HOTLINE 193-7475 or 626-487-4983  Call your local Children's Mobile Crisis Response Team [place] (012) 876-9912 or  www.whereIstand.com  Call the toll free National Suicide Prevention Hotlines   National Suicide Prevention Lifeline 751-214-NALQ (1732)  [facility] Line Network 800-SUICIDE (253-5228)    I acknowledge receipt and understanding of these Home Care instructions.    Transesophageal Echocardiogram  Transesophageal echocardiogram (TYRA) is a test that uses sound waves to take pictures of your heart. TYRA is done by passing a flexible tube down the esophagus. The esophagus is the tube that carries food from the throat to the stomach. The pictures give detailed images of your heart. This can help your doctor see if there are problems with your heart.  What happens before the procedure?  Staying hydrated  ow instructions from your doctor about hydration, which may include:  Up to 3 hours before the procedure - you may continue to drink clear liquids, such as:  Water.  Clear fruit juice.  Black coffee.  Plain tea.    Eating and drinking  Follow instructions from your doctor about eating and drinking, which may include:  8 hours before the procedure - stop eating heavy meals or foods such as meat, fried foods, or fatty foods.  6 hours before the procedure - stop eating light meals or foods, such as toast or cereal.  6 hours before the procedure - stop drinking milk or drinks that contain milk.  3 hours before the procedure - stop drinking clear liquids.  General instructions  You will need to take out any dentures or retainers.  Plan to have someone take you home from the hospital or clinic.  If you will be going home right after the procedure, plan to have someone with you for 24 hours.  Ask your doctor about:  Changing or stopping your normal medicines. This is important if you take diabetes medicines or blood thinners.  Taking over-the-counter medicines, vitamins, herbs, and supplements.  Taking medicines such as aspirin and ibuprofen. These medicines can thin your blood. Do not take these medicines unless your doctor tells  you to take them.  What happens during the procedure?  To lower your risk of infection, your doctors will wash or clean their hands.  An IV will be put into one of your veins.  You will be given a medicine to help you relax (sedative).  A medicine may be sprayed or gargled. This numbs the back of your throat.  Your blood pressure, heart rate, and breathing will be watched.  You may be asked to lay on your left side.  A bite block will be placed in your mouth. This keeps you from biting the tube.  The tip of the TYRA probe will be placed into the back of your mouth.  You will be asked to swallow.  Your doctor will take pictures of your heart.  The probe and bite block will be taken out.  The procedure may vary among doctors and hospitals.  What happens after the procedure?    Your blood pressure, heart rate, breathing rate, and blood oxygen level will be watched until the medicines you were given have worn off.  When you first wake up, your throat may feel sore and numb. This will get better over time. You will not be allowed to eat or drink until the numbness has gone away.  Do not drive for 24 hours if you were given a medicine to help you relax.  Summary  TYRA is a test that uses sound waves to take pictures of your heart.  You will be given a medicine to help you relax.  Do not drive for 24 hours if you were given a medicine to help you relax.  This information is not intended to replace advice given to you by your health care provider. Make sure you discuss any questions you have with your health care provider.  Document Released: 10/15/2010 Document Revised: 09/06/2019 Document Reviewed: 03/21/2018  Elsevier Patient Education © 2020 [employer]

## 2023-03-08 NOTE — OR NURSING
1118: Pt arrived from cath lab post L heart cath. Pt is awake and alert. R brachial sight is CDI with gauze and tegaderm; TR band to R radial sight. Cardiac rhythm appears to be SR. Pt denies pain or nausea.      1145: Updated pt's spouse. Pt tolerating orals.     1236: Pt reports some chest pain (4/10 in intensity) for a minute. It resolved spontaneously. RN voalte-texted Dr. Hua to notify; order for EKG.     1256: Pt denies chest pain; TR band removed per order; gauze and tegaderm to sight. Brachial sight is CDI.     1330: Pt able to ambulate approximately 100 feet; no chest pain; R radial sight had some bleeeding; MP held for ten minutes and hemostasis achieved; Gauze and tegaderm to sight; CDI and soft. No evidence of hematoma.     1345: Report to SASKIA Wilkinson. Pt to phase II via darrin with RN.     1400: Arm sling provided per pt preference. Dr. Hua was notified of bleeding from R radial sight; He wants the pt to stay for one half hour and then the pt can be discharged if sight is CDI and soft; RN updated Carlos, phase II RN.

## 2023-03-08 NOTE — PROCEDURES
Cardiac Catheterization report    3/8/2023  11:15 AM      Indication for procedure: Severe mitral regurgitation    Procedures:  Coronary arteriograms  Right and left heart catheterization  Left ventriculogram      Final Impression:  Non-obstructive coronary artery disease  3+ mitral regurgitation  Normal sized ascending aorta  Normal filling pressures    Recommendation:  Surgical mitral valve repair, ASD repair, left atrial appendage ligation      Findings:  1.  Left main coronary artery:   Normal.  2.  Left anterior descending artery: 40% disease in midportion after diagonal branch takeoff.  Large diagonal branch is free of significant disease  3.  Left circumflex coronary artery:  Normal. Gives two marginal branches, which have no significant disease   4.  Right coronary artery: Luminal irregularities without significant disease.  This is a right dominant system.  5. LVEDP: 11 mmHg  6. LVgram showed ejection fraction of 65%, 3+ mitral regurgitation, normal sized ascending aorta.    Right heart cath pressures in mmHg:  RA 4  RV 35/1, EDP 6  PA 40 x 20 with a mean of 26  Pulmonary capillary wedge pressure 7        Procedure details:  The risks and benefits of cardiac catheterization and possible intervention were explained to the patient including death, heart attack, stroke, and emergency surgery.  The patient verbalized understanding and wished to proceed.  The patient was brought to the cardiac catheterization laboratory in the fasting state and prepped and draped in the usual sterile fashion.  The right wrist was locally anesthetized with lidocaine and the right radial artery was cannulated with 5/6-Italian equipment and standard radial cocktail was given. Right brachial vein was cannulated with 6 Fr sheath and right heart cath was performed with 6 Fr swan in usual fashion, results mentioned below.  Coronary angiography was performed using JR 4 and JL 3.5  diagnostic catheters in the usual fashion, results  mentioned below.   JR 4 catheter was used to cross aortic valve and performed left ventriculogram and left heart catheterization.    Once all the views were obtained, all wires and catheters were removed from the patient without difficulty.  A Vasc-Band was placed over the right radial artery and the radial artery sheath was removed without difficulty.      Complications:  None.    EBL: <10 CC    Specimens: None      Sedation time:  I supervised moderate sedation over a trained independent nursing staff,  Sedation start time: 10:14 sedation stop time:10:57      ALBIN Truong  [facility]

## 2023-03-08 NOTE — PROGRESS NOTES
REFERRING PHYSICIAN: Kyle Hua MD    CONSULTING PHYSICIAN: Libertad Hebert MD, FACS     CHIEF COMPLAINT: Shortness of breath    HISTORY OF PRESENT ILLNESS: The patient is a 67 y.o. male with history of atrial fibrillation on eliquis, polycythemia, COPD on inhalers, hyperlipidemia, atrial septal defect and severe mitral regurgitation. Today, he states he is having increasing shortness of breath with exertion for the last year. He has occasional chest pressure with especially when has flatulence. He has dizziness with position changes especially since in atrial fibrillation. He denies lower extremity edema, dizziness, syncope, orthopnea, or PND.    PAST MEDICAL HISTORY:   Active Ambulatory Problems     Diagnosis Date Noted    Chest pain 09/27/2012    Pulmonary edema cardiac cause ([facility]) 10/11/2022    Atrial fibrillation with RVR ([facility]) 10/11/2022    Erythrocytosis 10/11/2022    Moderate mitral regurgitation by prior echocardiogram 10/24/2022    H/O cardiac radiofrequency ablation 10/18/22 Dr Glover 11/29/2022    Dyspnea on exertion 02/10/2023    Pulmonary emphysema ([facility]) 02/10/2023     Resolved Ambulatory Problems     Diagnosis Date Noted    Acute respiratory failure with hypoxia ([facility]) 10/11/2022    Hypokalemia 10/12/2022     Past Medical History:   Diagnosis Date    Arrhythmia     Arthritis     Breath shortness     Cataract     Dermatitis     Emphysema of lung ([facility]) 02/10/2023    Heart burn     Heart valve disease 10/2022    High cholesterol     Indigestion 05/2022    Shortness of breath        PAST SURGICAL HISTORY:   Past Surgical History:   Procedure Laterality Date    OTHER CARDIAC SURGERY  10/13/2022    MASTECTOMY BILATERAL SUBQ Bilateral     at age 15 for gynecomastia    OTHER Bilateral     cataract extraction with IOL        ALLERGIES:   Allergies   Allergen Reactions    Bee Swelling     Bee Stings- swelling    Other Environmental      Horses- eyes will swell shut, sneezing  Russian Thistle-  itching/sneezing, watery eyes        CURRENT MEDICATIONS:   Current Outpatient Medications:     metoprolol tartrate (LOPRESSOR) 50 MG Tab, Take 50 mg by mouth 2 times a day., Disp: , Rfl:     traMADol (ULTRAM) 50 MG Tab, Take 50 mg by mouth every four hours as needed., Disp: , Rfl:     ELIQUIS 5 MG Tab, Take 1 Tablet by mouth 2 times a day., Disp: 180 Tablet, Rfl: 3    albuterol 108 (90 Base) MCG/ACT Aero Soln inhalation aerosol, Inhale 2 Puffs every 6 hours as needed for Shortness of Breath., Disp: 18 g, Rfl: 3    budesonide-formoterol (SYMBICORT) 80-4.5 MCG/ACT Aerosol, Inhale 2 Puffs 2 times a day. Use spacer. Rinse mouth after each use., Disp: 41.72 g, Rfl: 3    rosuvastatin (CRESTOR) 5 MG Tab, Take 5 mg by mouth every day., Disp: , Rfl:     famotidine (PEPCID) 20 MG Tab, Take 20 mg by mouth 1 time a day as needed., Disp: , Rfl:     Empagliflozin (JARDIANCE) 10 MG Tab, Take 1 Tablet by mouth every day., Disp: 30 Tablet, Rfl: 6    furosemide (LASIX) 20 MG Tab, Take 1 Tablet by mouth every day., Disp: 90 Tablet, Rfl: 3    FAMILY HISTORY:   Family History   Problem Relation Age of Onset    Cancer Mother     Arterial Aneurysm Father         SOCIAL HISTORY:   Social History     Socioeconomic History    Marital status:      Spouse name: Not on file    Number of children: Not on file    Years of education: Not on file    Highest education level: Not on file   Occupational History    Not on file   Tobacco Use    Smoking status: Former     Packs/day: 1.00     Years: 45.00     Pack years: 45.00     Types: Cigarettes     Quit date: 2022     Years since quittin.9    Smokeless tobacco: Never   Vaping Use    Vaping Use: Never used   Substance and Sexual Activity    Alcohol use: Not Currently    Drug use: Never    Sexual activity: Not on file     Comment:    Other Topics Concern    Not on file   Social History Narrative    Not on file     Social Determinants of Health     Financial Resource Strain: Not  on file   Food Insecurity: Not on file   Transportation Needs: Not on file   Physical Activity: Not on file   Stress: Not on file   Social Connections: Not on file   Intimate Partner Violence: Not on file   Housing Stability: Not on file     REVIEW OF SYSTEMS:  Review of Systems   Constitutional:  Positive for malaise/fatigue.   HENT:  Positive for hearing loss.    Eyes: Negative.    Respiratory:  Positive for shortness of breath.    Cardiovascular:  Positive for chest pain.   Gastrointestinal:  Positive for abdominal pain and heartburn.   Genitourinary:  Positive for frequency.   Musculoskeletal:  Positive for joint pain and myalgias.   Skin: Negative.    Neurological:  Positive for dizziness.   Endo/Heme/Allergies: Negative.    Psychiatric/Behavioral: Negative.       PHYSICAL EXAMINATION:    /70 (BP Location: Left arm, Patient Position: Sitting, BP Cuff Size: Adult)   Pulse 77   Temp 36.7 °C (98 °F) (Temporal)   Ht 1.829 m (6')   Wt 82.3 kg (181 lb 8 oz)   SpO2 90%   BMI 24.62 kg/m²      Physical Exam  Constitutional:       General: He is not in acute distress.  HENT:      Head: Normocephalic.   Eyes:      Pupils: Pupils are equal, round, and reactive to light.   Cardiovascular:      Rate and Rhythm: Normal rate and regular rhythm.      Heart sounds: Murmur heard.   Systolic murmur is present with a grade of 3/6.     No gallop.   Pulmonary:      Effort: Pulmonary effort is normal. No respiratory distress.      Breath sounds: Normal breath sounds. No wheezing or rales.   Abdominal:      General: Bowel sounds are normal. There is no distension.      Palpations: Abdomen is soft.      Tenderness: There is no abdominal tenderness.   Musculoskeletal:         General: Normal range of motion.      Cervical back: Neck supple.   Skin:     General: Skin is warm and dry.   Neurological:      Mental Status: He is alert and oriented to person, place, and time.   Psychiatric:         Mood and Affect: Mood and affect  normal.         Cognition and Memory: Memory normal.         Judgment: Judgment normal.     LABS REVIEWED:  Lab Results   Component Value Date/Time    SODIUM 139 03/07/2023 07:43 AM    POTASSIUM 4.0 03/07/2023 07:43 AM    CHLORIDE 103 03/07/2023 07:43 AM    CO2 23 03/07/2023 07:43 AM    GLUCOSE 128 (H) 03/07/2023 07:43 AM    BUN 14 03/07/2023 07:43 AM    CREATININE 1.00 03/07/2023 07:43 AM      Lab Results   Component Value Date/Time    PROTHROMBTM 13.2 03/07/2023 07:43 AM    INR 1.01 03/07/2023 07:43 AM      Lab Results   Component Value Date/Time    WBC 8.3 03/07/2023 07:43 AM    RBC 6.75 (H) 03/07/2023 07:43 AM    HEMOGLOBIN 19.5 (H) 03/07/2023 07:43 AM    HEMATOCRIT 58.0 (H) 03/07/2023 07:43 AM    MCV 85.9 03/07/2023 07:43 AM    MCH 28.9 03/07/2023 07:43 AM    MCHC 33.6 (L) 03/07/2023 07:43 AM    MPV 11.9 03/07/2023 07:43 AM    NEUTSPOLYS 61.00 01/11/2023 08:36 AM    LYMPHOCYTES 25.50 01/11/2023 08:36 AM    MONOCYTES 9.80 01/11/2023 08:36 AM    EOSINOPHILS 2.50 01/11/2023 08:36 AM    BASOPHILS 1.00 01/11/2023 08:36 AM        IMAGING REVIEWED AND INTERPRETED:    TRANSESOPHAGEAL ECHOCARDIOGRAM [facility] [place]:   Normal left and right ventricular function.  Severe mitral regurgitation by color doppler , central jet due to A2,   P2 segment prolapse.  Small secundum type ASD with left to right shunt.  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  Normal aortic, tricuspid, pulmonary valve function.  Left atrial appendage is clear.    ANGIOGRAM [facility] [place]/8/2023:  1.  Left main coronary artery:   Normal.  2.  Left anterior descending artery: 40% disease in midportion after diagonal branch takeoff.  Large diagonal branch is free of significant disease  3.  Left circumflex coronary artery:  Normal. Gives two marginal branches, which have no significant disease   4.  Right coronary artery: Luminal irregularities without significant disease.  This is a right dominant system.  5. LVEDP: 11 mmHg  6. LVgram showed ejection fraction of 65%,  3+ mitral regurgitation, normal sized ascending aorta.     Right heart cath pressures in mmHg:  RA 4  RV 35/1, EDP 6  PA 40 x 20 with a mean of 26  Pulmonary capillary wedge pressure 7    IMPRESSION:  Severe symptomatic mitral regurgitation (4+, degenerative), atrial fibrillation status post ablation, atrial septal defect, chronic obstructive pulmonary disease, history of polycythemia    PLAN:  I recommend that he undergo mitral valve repair or replacement, left atrial appendage excision/ligation, atrial septal defect repair and intraoperative transesophageal echocardiography.    The procedure, its risks, benefits, potential complications and alternative treatments were discussed with the patient in detail including the risks should he decide not to undergo my recommended treatment. All of his questions were answered to his satisfaction and he is willing to proceed with the operation. The risks include death, stroke, infection: to include a rare bacterial infection related to the use of the heart/lung machine, ruben-operative myocardial infarction, dysrhythmias, diaphragmatic paralysis, chest wall paresthesia, tracheostomy, kidney or other organ failure, possible return to the operating room for bleeding, bleeding requiring transfusion with its attendant risks including AIDS or hepatitis, dehiscence of surgical incisions, respiratory complications including the need for prolonged ventilator support, Protamine or other drug reaction, peripheral neuropathy, loss of limb, and miscount of surgical items. The operative mortality risk is approximately 2-3%. The STS mortality risk score is 1.1% and the morbidity and mortality risk score is 11% for mitral valve repair. The STS mortality risk score is 1.8% and the morbidity and mortality risk score is 14% for mitral valve replacement.The scores were discussed with patient.    The operation is scheduled for Tuesday, March 28, 2023 at 7:30 AM at [facility]  [place].    Findings and recommendations have been discussed with the patient’s cardiologist, Kyle Hua MD.  Thank you for this very challenging consultation and participation in the patient’s care.  I will keep you apprised of all future developments.    Sincerely,    Libertad Hebert MD, FACS

## 2023-03-08 NOTE — OR NURSING
Received pt a x 4  VSS in RA, sats 92%  Denies SOB  Ambulatory and independent w adls   As per communication w/ surgeon, observe rt radial site for another 30 mins in Phase 2   Rt radial site CDI x 1   Dc instructions given  PIV removed  All questions answered  Nil issue as of this writing

## 2023-03-09 LAB — LV EJECT FRACT  99904: 60

## 2023-03-14 ENCOUNTER — NON-PROVIDER VISIT (OUTPATIENT)
Dept: CARDIOLOGY | Facility: MEDICAL CENTER | Age: 68
End: 2023-03-14
Payer: MEDICARE

## 2023-03-14 VITALS
WEIGHT: 185 LBS | DIASTOLIC BLOOD PRESSURE: 71 MMHG | BODY MASS INDEX: 25.09 KG/M2 | HEART RATE: 74 BPM | SYSTOLIC BLOOD PRESSURE: 132 MMHG

## 2023-03-14 PROCEDURE — 99211 OFF/OP EST MAY X REQ PHY/QHP: CPT | Performed by: INTERNAL MEDICINE

## 2023-03-14 ASSESSMENT — FIBROSIS 4 INDEX: FIB4 SCORE: 0.93

## 2023-03-14 NOTE — PROGRESS NOTES
CHF Pharmacotherapy visit:  11/22/22  [unfilled]  [phone]  [phone]    Informed written consent was given on: 11/22/2022    MARIA ESTHER Aponte is here for CHF with preserved ejection fraction  Pertinent Interval History since last visit:   Procedures since last appointment:  Coronary arteriograms  Right and left heart catheterization  Left ventriculogram  Final Impression:  Non-obstructive coronary artery disease  3+ mitral regurgitation  Normal sized ascending aorta  Normal filling pressures    Recommendation:  Surgical mitral valve repair, ASD repair, left atrial appendage ligation    Most recent EF:   CONCLUSIONS 9/30/2022  No prior study is available for comparison.   Normal left ventricular size, thickness, and systolic function.  The left ventricular ejection fraction is visually estimated to be 65%.  Moderate mitral regurgitation with eccentric jet.  Mild tricuspid regurgitation.  Estimated right ventricular systolic pressure is 30-35 mmHg  Diastolic function is difficult to assess with arrhythmia.    Vitals:    03/14/23 0929   BP: 132/71   Pulse: 74       Home BP and HR:  N/a  Change in weight: Stable  Exercise habits: no regular exercise program   Diet: common adult      Current Outpatient Medications:     metoprolol tartrate, 50 mg, Oral, BID    traMADol, 50 mg, Oral, Q4HRS PRN    Eliquis, 5 mg, Oral, BID    albuterol, 2 Puff, Inhalation, Q6HRS PRN    budesonide-formoterol, 2 Puff, Inhalation, BID    rosuvastatin, 5 mg, Oral, DAILY    famotidine, 20 mg, Oral, QDAY PRN    Jardiance, 10 mg, Oral, DAILY    furosemide, 20 mg, Oral, DAILY    Current Adherence to CHF and other therapies:  Partial    DATA REVIEW  INR   Date Value Ref Range Status   03/07/2023 1.01 0.87 - 1.13 Final     Comment:     INR - Non-therapeutic Reference Range: 0.87-1.13  INR - Therapeutic Reference Range: 2.0-4.0       No results found for: POCINR   Lab Results   Component Value Date/Time    HBA1C 5.5 01/11/2023 08:36 AM           Lab Results   Component Value Date/Time    CHOLSTRLTOT 171 2023 08:36 AM     (H) 2023 08:36 AM    HDL 46 2023 08:36 AM    TRIGLYCERIDE 94 2023 08:36 AM       Lab Results   Component Value Date/Time    SODIUM 139 2023 07:43 AM    POTASSIUM 4.0 2023 07:43 AM    CHLORIDE 103 2023 07:43 AM    CO2 23 2023 07:43 AM    GLUCOSE 128 (H) 2023 07:43 AM    BUN 14 2023 07:43 AM    CREATININE 1.00 2023 07:43 AM     Lab Results   Component Value Date/Time    ALKPHOSPHAT 83 2023 07:43 AM    ASTSGOT 19 2023 07:43 AM    ALTSGPT 21 2023 07:43 AM    TBILIRUBIN 0.9 2023 07:43 AM    INR 1.01 2023 07:43 AM    ALBUMIN 4.6 2023 07:43 AM      No results found for: MALBCRT, MICROALBUR  Calculated creatinine clearance: 65 ml/min   Significant changes to laboratory values since last visit that require repeat labs:  N/a  Other Pertinent Blood Work:   N/a    Immunization History   Administered Date(s) Administered    Influenza Vaccine Adult HD 10/16/2021, 10/14/2022    Influenza Vaccine Quad Inj (Preserved) 10/22/2018    Influenza Vaccine Quad Recombinant 2019    Pneumococcal Conjugate Vaccine (PCV20) 10/14/2022    Tdap Vaccine 10/14/2019         SOCIAL HISTORY  Social History     Tobacco Use   Smoking Status Former    Packs/day: 1.00    Years: 45.00    Pack years: 45.00    Types: Cigarettes    Quit date: 2010    Years since quittin.4   Smokeless Tobacco Never        Recent Imaging Studies:    None since last visit    ASSESSMENT AND PLAN  CHF & HTN:  BP >130   SOB with exertion, but new Dx of emphysema and started Symbicort. He also needs a valve replacement and will have this done via open heart surgery.   No Edema   Status post ablation for Afib -still on Eliquis 5mg bid   Declined new BP meds today or a change in Metoprolol tartrate to XL.   Encouraged him to check blood pressure at home and by a blood pressure  cuff      CHF medications:  Entresto or ACE/ARB: None  Beta blocker: Metoprolol tartrate 50 mg twice daily (has 90days and would like to use them up first before going to the )  Diuretic: Furosemide 20mg daily and as needed for weight gain  Aldosterone antagonist: None  SGLT-2 Inhibitor: Start Jardiance 10 mg once daily   To reduce the risk of major adverse cardiovascular events (cardiovascular death, non-fatal MI or non-fatal stroke) and to reduce the risk of cardiovascular death and hospitalization for heart failure in adults with heart failure with NYHA class II-IV as Class 2a recommendation from ACC guidelines for HFpEF     2. DM   Pre-DM       Lifestyle   Lifestyle Recommendations From Today's Visit:   Continue to eat DASH/MED style diet.   Continue to exercise as tolerated.  Salt restriction to <2300mg daily       Blood Work Ordered At Today's visit: none  Studies Ordered at Todays Visit:  Follow-Up: 12 week(s)    Aayush Jacques, PharmD  [facility]  Phone: [phone], Fax: [phone]    This note was created using voice recognition software (Dragon). The accuracy of the dictation is limited by the abilities of the software. I have reviewed the note prior to signing, however some errors in grammar and context are still possible. If you have any questions related to this note please do not hesitate to contact our office.     CC:  FACUNDO Youssef, ANGÉLICA Land*

## 2023-03-16 ENCOUNTER — OFFICE VISIT (OUTPATIENT)
Dept: CARDIOTHORACIC SURGERY | Facility: MEDICAL CENTER | Age: 68
End: 2023-03-16
Payer: MEDICARE

## 2023-03-16 VITALS
TEMPERATURE: 98 F | HEIGHT: 72 IN | BODY MASS INDEX: 24.58 KG/M2 | SYSTOLIC BLOOD PRESSURE: 126 MMHG | DIASTOLIC BLOOD PRESSURE: 70 MMHG | HEART RATE: 77 BPM | OXYGEN SATURATION: 90 % | WEIGHT: 181.5 LBS

## 2023-03-16 DIAGNOSIS — I34.0 SEVERE MITRAL REGURGITATION: ICD-10-CM

## 2023-03-16 DIAGNOSIS — Q21.10 ASD (ATRIAL SEPTAL DEFECT): ICD-10-CM

## 2023-03-16 PROCEDURE — 99205 OFFICE O/P NEW HI 60 MIN: CPT | Performed by: THORACIC SURGERY (CARDIOTHORACIC VASCULAR SURGERY)

## 2023-03-16 ASSESSMENT — ENCOUNTER SYMPTOMS
MYALGIAS: 1
ABDOMINAL PAIN: 1
EYES NEGATIVE: 1
DIZZINESS: 1
SHORTNESS OF BREATH: 1
PSYCHIATRIC NEGATIVE: 1
HEARTBURN: 1

## 2023-03-16 ASSESSMENT — FIBROSIS 4 INDEX: FIB4 SCORE: 0.93

## 2023-03-17 ENCOUNTER — APPOINTMENT (OUTPATIENT)
Dept: RADIOLOGY | Facility: MEDICAL CENTER | Age: 68
End: 2023-03-17
Attending: NURSE PRACTITIONER
Payer: MEDICARE

## 2023-03-17 DIAGNOSIS — I34.0 SEVERE MITRAL REGURGITATION: ICD-10-CM

## 2023-03-17 PROCEDURE — 93880 EXTRACRANIAL BILAT STUDY: CPT

## 2023-03-17 NOTE — NON-PROVIDER
Problem: Prehabilitation    Goal: optimize identified modifiable risk factors prior to cardiac surgery.     Intervention: Screening and interventions for the following  risk factors with educational materials provided if indicated  and patient demonstrates readiness to participate.  Dentition, malnutrition, CAD and dietary cholesterol,  obesity, alcohol, tobacco, and illegal drug use,  home exercise regimen appropriateness, and social support  system for post discharge planning. Also, teaching of and   participation of inspiratory muscle training via  incentive spirometer (provided).    Review of post-surgical physical limitations, upcoming  appointments & testing, ordered diagnostics, and medication review  to identify and educate on anticoagulant and antihypertensives  that need cessation in the days prior to surgery.    The cardiac surgery prehabilitation sheet, surgery instruction sheet,  MAP, education booklet, vitals logbook, normals after heart surgery,  and cardiac rehab flier was provided for patient to read and review.    Carb load drink and surgical CHG wipes were also  provided with instructions on use.    DENTITION:  Routine dental appointment prior to surgery is   indicated for valve procedure. he was not  encouraged to get a dental cleaning as he   does get regular dental cleaning and denies  current dental infection or issues that should be  addressed prior to surgery. He does have a cracked tooth over a bridge that will need extraction but it is not bothering him and he denies infection    INCENTIVE SPIROMETRY:  Discussed importance of incentive spirometry (IS) use,  20 times a day AT MINIMUM but more often if possible to  optimize cardio-pulmonary function prior to surgery.  They were instructed to allow a 5 minute break in  between uses to achieve max IS volume.  Education with return demonstration performed.   Patient is effective, coaching was not needed.    Prehabilitation IS baseline is  4000.    HOME EXERCISE REGIMEN:  We discussed importance of preventing deconditioning and  muscle wasting in the time preceding surgery as this will occur  post surgery.    > 50 % left main disease present? NO  EF-- 65%  Active sub lingual nitro use? NO  he is appropriate for initiation  of a home exercise regimen.    he is minimally physically active; current  exercise tolerance/level is low due to significant  symptoms of SOB with moderate intensity walking. he was educated to start an exercise regimen of walking on   a flat surface 30 minutes a day, adjusting the  length for tolerance level. he was educated  that if chest pain or SOB occurs patient is to stop  immediately and if symptoms do not  resolve after stopping to call 911.    he was also encouraged to practice sit to stand  from a chair with one arm to assist or no arm assistance  12 times a day to strengthen quadriceps.    CAD:  Patient does not have known CAD; education on  cholesterol, diet, and the heart are not indicated  and were not provided.    OBESITY:  Patient's BMI is not over 30.  Education regarding portion sizing  and diet are not indicated and were not provided.    MALNUTRITION:  Malnutrition screening tool (MST) shows he is at risk  with a score of 0. (0-1 not at risk; greater or equal to 2 is at risk).    Given patient response to MST, functional capacity,   and no reported muscle loss, it was not  recommended they increase their protein  intake to 1.2 to 2.5 gram/kg/day.   SMOKING:  Patient denies current smoking history.  Smoking risks  and cessation education not indicated and was not provided. He quit in April of 23' and has a 45 pack year hx.    ALCOHOL ABUSE:  Patient denies alcohol abuse.  Alcohol risks and cessation  education not indicated and was not provided.    ILLEGAL DRUG USE:  Patient denies illicit drug use.  Illicit drug use risks  and cessation education not indicated and was not provided    SOCIAL SUPPORT SYSTEM FOR  DISCHARGE NEEDS:    Patient does have family, his wife Ritesh to stay for  1-week post discharge to assist with ADL's. No barriers  to hospital discharge anticipated.    PSYCHOLOGICAL PREPARATION:  We discussed the basics of physical limitation post op to include:               No driving for 4 weeks               No lifting, pushing or pulling > 10 lbs for 6 weeks  Sternal precautions to include moving within the tube and  safe mobility in and out of bed and the chair.    ANTIBIOTIC STEWARDSHIP:  MRSA swab will be collected by [facility] during pre-admit testing.    MEDICATION AN UPCOMING APPOINTMENTS REVIEW:  Current medications were reviewed that may need  specific stop dates prior to surgery. The patient was instructed   to stop the following medications after the indicated date.    Eliquis to stop 4 full days prior to surgery; last dose 3/23      Vascular scheduling sheet was provided and explained.    Walk test Times: 3 4 4    A phone appointment for pre op education was made  for March 23 rd at 1015 to review all provided education materials.

## 2023-03-20 ENCOUNTER — APPOINTMENT (OUTPATIENT)
Dept: ADMISSIONS | Facility: MEDICAL CENTER | Age: 68
DRG: 220 | End: 2023-03-20
Attending: THORACIC SURGERY (CARDIOTHORACIC VASCULAR SURGERY)
Payer: MEDICARE

## 2023-03-21 ENCOUNTER — PRE-ADMISSION TESTING (OUTPATIENT)
Dept: ADMISSIONS | Facility: MEDICAL CENTER | Age: 68
DRG: 220 | End: 2023-03-21
Attending: THORACIC SURGERY (CARDIOTHORACIC VASCULAR SURGERY)
Payer: MEDICARE

## 2023-03-21 DIAGNOSIS — Z01.810 PRE-OPERATIVE CARDIOVASCULAR EXAMINATION: ICD-10-CM

## 2023-03-21 DIAGNOSIS — Z01.812 PRE-OPERATIVE LABORATORY EXAMINATION: ICD-10-CM

## 2023-03-21 DIAGNOSIS — Z01.811 PRE-OPERATIVE RESPIRATORY EXAMINATION: ICD-10-CM

## 2023-03-21 RX ORDER — CLOBETASOL PROPIONATE 0.5 MG/G
CREAM TOPICAL 2 TIMES DAILY
COMMUNITY

## 2023-03-23 ENCOUNTER — TELEPHONE (OUTPATIENT)
Dept: CARDIOTHORACIC SURGERY | Facility: MEDICAL CENTER | Age: 68
End: 2023-03-23
Payer: MEDICARE

## 2023-03-23 NOTE — TELEPHONE ENCOUNTER
Patient was reminded that MV repair vs replace, ASD repair, GENET surgery with  Dr. Hebert is on 3/28 at 0730 in the morning. he   are aware check in is at 0515 am.    Baseline IS was 4000. he has been compliant   with the IS. Volume has not improved past 4000.    he was prescribed a walking regimen or  told to continue he current regimen and   he has been compliant.   he has been practicing sit to stand.    The CHG wipes instructions were reviewed and understood.    PAT date and time was reviewed with patient and  verified it is within 72 hours of surgery.    Vascular studies and procedures: carotids  were scheduled, completed,  and reviewed by myself for concerning  results did not need escalation to the SERJIO/MD.    he did not need a dental check/work.    he was reminded that Eliquis needs to stop after today's dose 3/23.    he was told that Pepcid and inhalers (if needed) are okay to  take the morning of surgery prior to check in.     he was reminded no food after midnight.    Call time 10 minutes

## 2023-03-27 ENCOUNTER — HOSPITAL ENCOUNTER (OUTPATIENT)
Dept: RADIOLOGY | Facility: MEDICAL CENTER | Age: 68
DRG: 220 | End: 2023-03-27
Attending: THORACIC SURGERY (CARDIOTHORACIC VASCULAR SURGERY) | Admitting: THORACIC SURGERY (CARDIOTHORACIC VASCULAR SURGERY)
Payer: MEDICARE

## 2023-03-27 ENCOUNTER — PRE-ADMISSION TESTING (OUTPATIENT)
Dept: ADMISSIONS | Facility: MEDICAL CENTER | Age: 68
DRG: 220 | End: 2023-03-27
Attending: THORACIC SURGERY (CARDIOTHORACIC VASCULAR SURGERY)
Payer: MEDICARE

## 2023-03-27 DIAGNOSIS — Z01.810 PRE-OPERATIVE CARDIOVASCULAR EXAMINATION: ICD-10-CM

## 2023-03-27 DIAGNOSIS — Z01.812 PRE-OPERATIVE LABORATORY EXAMINATION: ICD-10-CM

## 2023-03-27 DIAGNOSIS — Z01.811 PRE-OPERATIVE RESPIRATORY EXAMINATION: ICD-10-CM

## 2023-03-27 LAB
ABO GROUP BLD: NORMAL
ALBUMIN SERPL BCP-MCNC: 4.7 G/DL (ref 3.2–4.9)
ALBUMIN/GLOB SERPL: 1.5 G/DL
ALP SERPL-CCNC: 87 U/L (ref 30–99)
ALT SERPL-CCNC: 23 U/L (ref 2–50)
AMPHET UR QL SCN: NEGATIVE
ANION GAP SERPL CALC-SCNC: 15 MMOL/L (ref 7–16)
APPEARANCE UR: CLEAR
AST SERPL-CCNC: 11 U/L (ref 12–45)
BARBITURATES UR QL SCN: NEGATIVE
BASOPHILS # BLD AUTO: 1.1 % (ref 0–1.8)
BASOPHILS # BLD: 0.08 K/UL (ref 0–0.12)
BENZODIAZ UR QL SCN: NEGATIVE
BILIRUB SERPL-MCNC: 0.6 MG/DL (ref 0.1–1.5)
BILIRUB UR QL STRIP.AUTO: NEGATIVE
BLD GP AB SCN SERPL QL: NORMAL
BUN SERPL-MCNC: 12 MG/DL (ref 8–22)
BZE UR QL SCN: NEGATIVE
CALCIUM ALBUM COR SERPL-MCNC: 8.9 MG/DL (ref 8.5–10.5)
CALCIUM SERPL-MCNC: 9.5 MG/DL (ref 8.5–10.5)
CANNABINOIDS UR QL SCN: NEGATIVE
CHLORIDE SERPL-SCNC: 103 MMOL/L (ref 96–112)
CO2 SERPL-SCNC: 24 MMOL/L (ref 20–33)
COLOR UR: YELLOW
CREAT SERPL-MCNC: 0.99 MG/DL (ref 0.5–1.4)
EKG IMPRESSION: NORMAL
EOSINOPHIL # BLD AUTO: 0.1 K/UL (ref 0–0.51)
EOSINOPHIL NFR BLD: 1.4 % (ref 0–6.9)
ERYTHROCYTE [DISTWIDTH] IN BLOOD BY AUTOMATED COUNT: 46.3 FL (ref 35.9–50)
EST. AVERAGE GLUCOSE BLD GHB EST-MCNC: 123 MG/DL
GFR SERPLBLD CREATININE-BSD FMLA CKD-EPI: 84 ML/MIN/1.73 M 2
GLOBULIN SER CALC-MCNC: 3.1 G/DL (ref 1.9–3.5)
GLUCOSE SERPL-MCNC: 137 MG/DL (ref 65–99)
GLUCOSE UR STRIP.AUTO-MCNC: >=1000 MG/DL
HBA1C MFR BLD: 5.9 % (ref 4–5.6)
HCT VFR BLD AUTO: 63 % (ref 42–52)
HGB BLD-MCNC: 20.5 G/DL (ref 14–18)
IMM GRANULOCYTES # BLD AUTO: 0.03 K/UL (ref 0–0.11)
IMM GRANULOCYTES NFR BLD AUTO: 0.4 % (ref 0–0.9)
KETONES UR STRIP.AUTO-MCNC: NEGATIVE MG/DL
LEUKOCYTE ESTERASE UR QL STRIP.AUTO: NEGATIVE
LYMPHOCYTES # BLD AUTO: 2.17 K/UL (ref 1–4.8)
LYMPHOCYTES NFR BLD: 29.4 % (ref 22–41)
MCH RBC QN AUTO: 29.2 PG (ref 27–33)
MCHC RBC AUTO-ENTMCNC: 32.5 G/DL (ref 33.7–35.3)
MCV RBC AUTO: 89.9 FL (ref 81.4–97.8)
METHADONE UR QL SCN: NEGATIVE
MICRO URNS: ABNORMAL
MONOCYTES # BLD AUTO: 0.61 K/UL (ref 0–0.85)
MONOCYTES NFR BLD AUTO: 8.3 % (ref 0–13.4)
NEUTROPHILS # BLD AUTO: 4.38 K/UL (ref 1.82–7.42)
NEUTROPHILS NFR BLD: 59.4 % (ref 44–72)
NITRITE UR QL STRIP.AUTO: NEGATIVE
NRBC # BLD AUTO: 0 K/UL
NRBC BLD-RTO: 0 /100 WBC
OPIATES UR QL SCN: NEGATIVE
OXYCODONE UR QL SCN: NEGATIVE
PCP UR QL SCN: NEGATIVE
PH UR STRIP.AUTO: 6 [PH] (ref 5–8)
PLATELET # BLD AUTO: 319 K/UL (ref 164–446)
PMV BLD AUTO: 11.6 FL (ref 9–12.9)
POTASSIUM SERPL-SCNC: 3.7 MMOL/L (ref 3.6–5.5)
PROPOXYPH UR QL SCN: NEGATIVE
PROT SERPL-MCNC: 7.8 G/DL (ref 6–8.2)
PROT UR QL STRIP: NEGATIVE MG/DL
RBC # BLD AUTO: 7.01 M/UL (ref 4.7–6.1)
RBC UR QL AUTO: NEGATIVE
RH BLD: NORMAL
SODIUM SERPL-SCNC: 142 MMOL/L (ref 135–145)
SP GR UR STRIP.AUTO: 1.01
UROBILINOGEN UR STRIP.AUTO-MCNC: 0.2 MG/DL
WBC # BLD AUTO: 7.4 K/UL (ref 4.8–10.8)

## 2023-03-27 PROCEDURE — 93010 ELECTROCARDIOGRAM REPORT: CPT | Performed by: INTERNAL MEDICINE

## 2023-03-27 PROCEDURE — 80053 COMPREHEN METABOLIC PANEL: CPT

## 2023-03-27 PROCEDURE — 83036 HEMOGLOBIN GLYCOSYLATED A1C: CPT | Mod: GA

## 2023-03-27 PROCEDURE — 86900 BLOOD TYPING SEROLOGIC ABO: CPT

## 2023-03-27 PROCEDURE — 86850 RBC ANTIBODY SCREEN: CPT

## 2023-03-27 PROCEDURE — 71046 X-RAY EXAM CHEST 2 VIEWS: CPT

## 2023-03-27 PROCEDURE — 81003 URINALYSIS AUTO W/O SCOPE: CPT

## 2023-03-27 PROCEDURE — 93005 ELECTROCARDIOGRAM TRACING: CPT

## 2023-03-27 PROCEDURE — 80307 DRUG TEST PRSMV CHEM ANLYZR: CPT

## 2023-03-27 PROCEDURE — 86901 BLOOD TYPING SEROLOGIC RH(D): CPT

## 2023-03-27 PROCEDURE — 36415 COLL VENOUS BLD VENIPUNCTURE: CPT

## 2023-03-27 PROCEDURE — 85025 COMPLETE CBC W/AUTO DIFF WBC: CPT

## 2023-03-28 ENCOUNTER — APPOINTMENT (OUTPATIENT)
Dept: RADIOLOGY | Facility: MEDICAL CENTER | Age: 68
DRG: 220 | End: 2023-03-28
Attending: THORACIC SURGERY (CARDIOTHORACIC VASCULAR SURGERY)
Payer: MEDICARE

## 2023-03-28 ENCOUNTER — ANESTHESIA (OUTPATIENT)
Dept: SURGERY | Facility: MEDICAL CENTER | Age: 68
DRG: 220 | End: 2023-03-28
Payer: MEDICARE

## 2023-03-28 ENCOUNTER — APPOINTMENT (OUTPATIENT)
Dept: CARDIOLOGY | Facility: MEDICAL CENTER | Age: 68
DRG: 220 | End: 2023-03-28
Attending: THORACIC SURGERY (CARDIOTHORACIC VASCULAR SURGERY)
Payer: MEDICARE

## 2023-03-28 ENCOUNTER — ANESTHESIA EVENT (OUTPATIENT)
Dept: SURGERY | Facility: MEDICAL CENTER | Age: 68
DRG: 220 | End: 2023-03-28
Payer: MEDICARE

## 2023-03-28 ENCOUNTER — HOSPITAL ENCOUNTER (INPATIENT)
Facility: MEDICAL CENTER | Age: 68
LOS: 7 days | DRG: 220 | End: 2023-04-04
Attending: THORACIC SURGERY (CARDIOTHORACIC VASCULAR SURGERY) | Admitting: THORACIC SURGERY (CARDIOTHORACIC VASCULAR SURGERY)
Payer: MEDICARE

## 2023-03-28 DIAGNOSIS — J96.01 ACUTE RESPIRATORY FAILURE WITH HYPOXIA (HCC): ICD-10-CM

## 2023-03-28 DIAGNOSIS — Z95.2 S/P MVR (MITRAL VALVE REPLACEMENT): ICD-10-CM

## 2023-03-28 PROBLEM — Z99.11 ENCOUNTER FOR WEANING FROM VENTILATOR (HCC): Status: ACTIVE | Noted: 2023-03-28

## 2023-03-28 LAB
ABO + RH BLD: NORMAL
ACT BLD: 125 SEC (ref 74–137)
ACT BLD: 131 SEC (ref 74–137)
ACT BLD: 540 SEC (ref 74–137)
ACT BLD: 588 SEC (ref 74–137)
ACT BLD: 600 SEC (ref 74–137)
ACT BLD: 636 SEC (ref 74–137)
APTT PPP: 40 SEC (ref 24.7–36)
BASE EXCESS BLDA CALC-SCNC: -2 MMOL/L (ref -4–3)
BASE EXCESS BLDA CALC-SCNC: -2 MMOL/L (ref -4–3)
BASE EXCESS BLDA CALC-SCNC: -4 MMOL/L (ref -4–3)
BASE EXCESS BLDA CALC-SCNC: -4 MMOL/L (ref -4–3)
BASE EXCESS BLDA CALC-SCNC: -5 MMOL/L (ref -4–3)
BASE EXCESS BLDA CALC-SCNC: -6 MMOL/L (ref -4–3)
BASE EXCESS BLDA CALC-SCNC: 0 MMOL/L (ref -4–3)
BASE EXCESS BLDA CALC-SCNC: 0 MMOL/L (ref -4–3)
BASE EXCESS BLDV CALC-SCNC: -1 MMOL/L (ref -4–3)
BODY TEMPERATURE: ABNORMAL DEGREES
CA-I BLD ISE-SCNC: 1 MMOL/L (ref 1.1–1.3)
CA-I BLD ISE-SCNC: 1.03 MMOL/L (ref 1.1–1.3)
CA-I BLD ISE-SCNC: 1.03 MMOL/L (ref 1.1–1.3)
CA-I BLD ISE-SCNC: 1.19 MMOL/L (ref 1.1–1.3)
CA-I BLD ISE-SCNC: 1.2 MMOL/L (ref 1.1–1.3)
CA-I BLD ISE-SCNC: 1.23 MMOL/L (ref 1.1–1.3)
CA-I BLD ISE-SCNC: 1.27 MMOL/L (ref 1.1–1.3)
CO2 BLDA-SCNC: 22 MMOL/L (ref 20–33)
CO2 BLDA-SCNC: 22 MMOL/L (ref 20–33)
CO2 BLDA-SCNC: 24 MMOL/L (ref 20–33)
CO2 BLDA-SCNC: 25 MMOL/L (ref 20–33)
CO2 BLDA-SCNC: 25 MMOL/L (ref 20–33)
CO2 BLDA-SCNC: 26 MMOL/L (ref 20–33)
CO2 BLDA-SCNC: 27 MMOL/L (ref 20–33)
CO2 BLDA-SCNC: 28 MMOL/L (ref 20–33)
CO2 BLDV-SCNC: 27 MMOL/L (ref 20–33)
DELSYS IDSYS: ABNORMAL
DELSYS IDSYS: ABNORMAL
EKG IMPRESSION: NORMAL
END TIDAL CARBON DIOXIDE IECO2: 29 MMHG
END TIDAL CARBON DIOXIDE IECO2: 29 MMHG
GLUCOSE BLD STRIP.AUTO-MCNC: 118 MG/DL (ref 65–99)
GLUCOSE BLD STRIP.AUTO-MCNC: 123 MG/DL (ref 65–99)
GLUCOSE BLD STRIP.AUTO-MCNC: 127 MG/DL (ref 65–99)
GLUCOSE BLD STRIP.AUTO-MCNC: 127 MG/DL (ref 65–99)
GLUCOSE BLD STRIP.AUTO-MCNC: 137 MG/DL (ref 65–99)
GLUCOSE BLD STRIP.AUTO-MCNC: 137 MG/DL (ref 65–99)
GLUCOSE BLD STRIP.AUTO-MCNC: 138 MG/DL (ref 65–99)
HCO3 BLDA-SCNC: 20.9 MMOL/L (ref 17–25)
HCO3 BLDA-SCNC: 21.1 MMOL/L (ref 17–25)
HCO3 BLDA-SCNC: 22.5 MMOL/L (ref 17–25)
HCO3 BLDA-SCNC: 22.9 MMOL/L (ref 17–25)
HCO3 BLDA-SCNC: 23.4 MMOL/L (ref 17–25)
HCO3 BLDA-SCNC: 24.9 MMOL/L (ref 17–25)
HCO3 BLDA-SCNC: 25.4 MMOL/L (ref 17–25)
HCO3 BLDA-SCNC: 26.1 MMOL/L (ref 17–25)
HCO3 BLDV-SCNC: 25.3 MMOL/L (ref 24–28)
HCT VFR BLD AUTO: 44.4 % (ref 42–52)
HCT VFR BLD CALC: 34 % (ref 42–52)
HCT VFR BLD CALC: 37 % (ref 42–52)
HCT VFR BLD CALC: 39 % (ref 42–52)
HCT VFR BLD CALC: 40 % (ref 42–52)
HCT VFR BLD CALC: 50 % (ref 42–52)
HCT VFR BLD CALC: 50 % (ref 42–52)
HGB BLD-MCNC: 11.6 G/DL (ref 14–18)
HGB BLD-MCNC: 12.6 G/DL (ref 14–18)
HGB BLD-MCNC: 13.3 G/DL (ref 14–18)
HGB BLD-MCNC: 13.6 G/DL (ref 14–18)
HGB BLD-MCNC: 15.2 G/DL (ref 14–18)
HGB BLD-MCNC: 17 G/DL (ref 14–18)
HGB BLD-MCNC: 17 G/DL (ref 14–18)
HOROWITZ INDEX BLDA+IHG-RTO: 123 MM[HG]
HOROWITZ INDEX BLDA+IHG-RTO: 89 MM[HG]
HOROWITZ INDEX BLDA+IHG-RTO: 97 MM[HG]
INR PPP: 1.44 (ref 0.87–1.13)
MAGNESIUM SERPL-MCNC: 2.8 MG/DL (ref 1.5–2.5)
MODE IMODE: ABNORMAL
MODE IMODE: ABNORMAL
O2/TOTAL GAS SETTING VFR VENT: 100 %
O2/TOTAL GAS SETTING VFR VENT: 100 %
O2/TOTAL GAS SETTING VFR VENT: 95 %
PATHOLOGY CONSULT NOTE: NORMAL
PCO2 BLDA: 36 MMHG (ref 26–37)
PCO2 BLDA: 40.7 MMHG (ref 26–37)
PCO2 BLDA: 42.4 MMHG (ref 26–37)
PCO2 BLDA: 44.7 MMHG (ref 26–37)
PCO2 BLDA: 45.2 MMHG (ref 26–37)
PCO2 BLDA: 47.7 MMHG (ref 26–37)
PCO2 BLDA: 49.5 MMHG (ref 26–37)
PCO2 BLDA: 56.6 MMHG (ref 26–37)
PCO2 BLDV: 46.7 MMHG (ref 41–51)
PCO2 TEMP ADJ BLDA: 34.9 MMHG (ref 26–37)
PCO2 TEMP ADJ BLDA: 39.1 MMHG (ref 26–37)
PCO2 TEMP ADJ BLDA: 40.9 MMHG (ref 26–37)
PCO2 TEMP ADJ BLDA: 42.8 MMHG (ref 26–37)
PCO2 TEMP ADJ BLDA: 42.9 MMHG (ref 26–37)
PCO2 TEMP ADJ BLDA: 43.5 MMHG (ref 26–37)
PCO2 TEMP ADJ BLDA: 49.5 MMHG (ref 26–37)
PCO2 TEMP ADJ BLDA: 54.2 MMHG (ref 26–37)
PCO2 TEMP ADJ BLDV: 42.8 MMHG (ref 41–51)
PEEP END EXPIRATORY PRESSURE IPEEP: 8 CMH20
PEEP END EXPIRATORY PRESSURE IPEEP: 8 CMH20
PERCENT MINUTE VOLUME IPMV: 130
PERCENT MINUTE VOLUME IPMV: 160
PH BLDA: 7.21 [PH] (ref 7.4–7.5)
PH BLDA: 7.31 [PH] (ref 7.4–7.5)
PH BLDA: 7.35 [PH] (ref 7.4–7.5)
PH BLDA: 7.36 [PH] (ref 7.4–7.5)
PH BLDA: 7.37 [PH] (ref 7.4–7.5)
PH BLDA: 7.37 [PH] (ref 7.4–7.5)
PH BLDV: 7.34 [PH] (ref 7.31–7.45)
PH TEMP ADJ BLDA: 7.23 [PH] (ref 7.4–7.5)
PH TEMP ADJ BLDA: 7.31 [PH] (ref 7.4–7.5)
PH TEMP ADJ BLDA: 7.32 [PH] (ref 7.4–7.5)
PH TEMP ADJ BLDA: 7.32 [PH] (ref 7.4–7.5)
PH TEMP ADJ BLDA: 7.37 [PH] (ref 7.4–7.5)
PH TEMP ADJ BLDA: 7.38 [PH] (ref 7.4–7.5)
PH TEMP ADJ BLDV: 7.37 [PH] (ref 7.31–7.45)
PLATELET # BLD AUTO: 165 K/UL (ref 164–446)
PO2 BLDA: 101 MMHG (ref 64–87)
PO2 BLDA: 107 MMHG (ref 64–87)
PO2 BLDA: 123 MMHG (ref 64–87)
PO2 BLDA: 130 MMHG (ref 64–87)
PO2 BLDA: 215 MMHG (ref 64–87)
PO2 BLDA: 330 MMHG (ref 64–87)
PO2 BLDA: 89 MMHG (ref 64–87)
PO2 BLDA: 92 MMHG (ref 64–87)
PO2 BLDV: 48 MMHG (ref 25–40)
PO2 TEMP ADJ BLDA: 101 MMHG (ref 64–87)
PO2 TEMP ADJ BLDA: 120 MMHG (ref 64–87)
PO2 TEMP ADJ BLDA: 130 MMHG (ref 64–87)
PO2 TEMP ADJ BLDA: 209 MMHG (ref 64–87)
PO2 TEMP ADJ BLDA: 318 MMHG (ref 64–87)
PO2 TEMP ADJ BLDA: 84 MMHG (ref 64–87)
PO2 TEMP ADJ BLDA: 88 MMHG (ref 64–87)
PO2 TEMP ADJ BLDA: 97 MMHG (ref 64–87)
PO2 TEMP ADJ BLDV: 42 MMHG (ref 25–40)
POTASSIUM BLD-SCNC: 3.2 MMOL/L (ref 3.6–5.5)
POTASSIUM BLD-SCNC: 3.7 MMOL/L (ref 3.6–5.5)
POTASSIUM BLD-SCNC: 3.8 MMOL/L (ref 3.6–5.5)
POTASSIUM BLD-SCNC: 4.6 MMOL/L (ref 3.6–5.5)
POTASSIUM BLD-SCNC: 4.6 MMOL/L (ref 3.6–5.5)
POTASSIUM BLD-SCNC: 5.2 MMOL/L (ref 3.6–5.5)
POTASSIUM BLD-SCNC: 5.9 MMOL/L (ref 3.6–5.5)
POTASSIUM SERPL-SCNC: 4.2 MMOL/L (ref 3.6–5.5)
POTASSIUM SERPL-SCNC: 4.5 MMOL/L (ref 3.6–5.5)
PROTHROMBIN TIME: 17.3 SEC (ref 12–14.6)
SAO2 % BLDA: 100 % (ref 93–99)
SAO2 % BLDA: 100 % (ref 93–99)
SAO2 % BLDA: 97 % (ref 93–99)
SAO2 % BLDA: 98 % (ref 93–99)
SAO2 % BLDA: 99 % (ref 93–99)
SAO2 % BLDV: 81 %
SCCMEC + MECA PNL NOSE NAA+PROBE: NEGATIVE
SODIUM BLD-SCNC: 137 MMOL/L (ref 135–145)
SODIUM BLD-SCNC: 137 MMOL/L (ref 135–145)
SODIUM BLD-SCNC: 138 MMOL/L (ref 135–145)
SODIUM BLD-SCNC: 139 MMOL/L (ref 135–145)
SODIUM BLD-SCNC: 142 MMOL/L (ref 135–145)
SODIUM BLD-SCNC: 142 MMOL/L (ref 135–145)
SODIUM BLD-SCNC: 144 MMOL/L (ref 135–145)
SPECIMEN DRAWN FROM PATIENT: ABNORMAL

## 2023-03-28 PROCEDURE — C1751 CATH, INF, PER/CENT/MIDLINE: HCPCS | Performed by: THORACIC SURGERY (CARDIOTHORACIC VASCULAR SURGERY)

## 2023-03-28 PROCEDURE — 700111 HCHG RX REV CODE 636 W/ 250 OVERRIDE (IP)

## 2023-03-28 PROCEDURE — 36620 INSERTION CATHETER ARTERY: CPT | Performed by: ANESTHESIOLOGY

## 2023-03-28 PROCEDURE — 770022 HCHG ROOM/CARE - ICU (200)

## 2023-03-28 PROCEDURE — 160009 HCHG ANES TIME/MIN: Performed by: THORACIC SURGERY (CARDIOTHORACIC VASCULAR SURGERY)

## 2023-03-28 PROCEDURE — 160042 HCHG SURGERY MINUTES - EA ADDL 1 MIN LEVEL 5: Performed by: THORACIC SURGERY (CARDIOTHORACIC VASCULAR SURGERY)

## 2023-03-28 PROCEDURE — C1729 CATH, DRAINAGE: HCPCS | Performed by: THORACIC SURGERY (CARDIOTHORACIC VASCULAR SURGERY)

## 2023-03-28 PROCEDURE — 700102 HCHG RX REV CODE 250 W/ 637 OVERRIDE(OP): Performed by: NURSE PRACTITIONER

## 2023-03-28 PROCEDURE — 33641 REPAIR HEART SEPTUM DEFECT: CPT | Mod: 80 | Performed by: THORACIC SURGERY (CARDIOTHORACIC VASCULAR SURGERY)

## 2023-03-28 PROCEDURE — 93005 ELECTROCARDIOGRAM TRACING: CPT | Performed by: NURSE PRACTITIONER

## 2023-03-28 PROCEDURE — 160048 HCHG OR STATISTICAL LEVEL 1-5: Performed by: THORACIC SURGERY (CARDIOTHORACIC VASCULAR SURGERY)

## 2023-03-28 PROCEDURE — 99291 CRITICAL CARE FIRST HOUR: CPT | Performed by: INTERNAL MEDICINE

## 2023-03-28 PROCEDURE — 700105 HCHG RX REV CODE 258: Performed by: NURSE PRACTITIONER

## 2023-03-28 PROCEDURE — 85610 PROTHROMBIN TIME: CPT

## 2023-03-28 PROCEDURE — 93010 ELECTROCARDIOGRAM REPORT: CPT | Performed by: INTERNAL MEDICINE

## 2023-03-28 PROCEDURE — 02B70ZK EXCISION OF LEFT ATRIAL APPENDAGE, OPEN APPROACH: ICD-10-PCS | Performed by: THORACIC SURGERY (CARDIOTHORACIC VASCULAR SURGERY)

## 2023-03-28 PROCEDURE — C1894 INTRO/SHEATH, NON-LASER: HCPCS | Performed by: THORACIC SURGERY (CARDIOTHORACIC VASCULAR SURGERY)

## 2023-03-28 PROCEDURE — 87641 MR-STAPH DNA AMP PROBE: CPT

## 2023-03-28 PROCEDURE — 700111 HCHG RX REV CODE 636 W/ 250 OVERRIDE (IP): Performed by: ANESTHESIOLOGY

## 2023-03-28 PROCEDURE — A9270 NON-COVERED ITEM OR SERVICE: HCPCS | Performed by: THORACIC SURGERY (CARDIOTHORACIC VASCULAR SURGERY)

## 2023-03-28 PROCEDURE — 85347 COAGULATION TIME ACTIVATED: CPT | Mod: 91

## 2023-03-28 PROCEDURE — 700101 HCHG RX REV CODE 250: Performed by: THORACIC SURGERY (CARDIOTHORACIC VASCULAR SURGERY)

## 2023-03-28 PROCEDURE — 700102 HCHG RX REV CODE 250 W/ 637 OVERRIDE(OP): Performed by: THORACIC SURGERY (CARDIOTHORACIC VASCULAR SURGERY)

## 2023-03-28 PROCEDURE — 36556 INSERT NON-TUNNEL CV CATH: CPT | Performed by: ANESTHESIOLOGY

## 2023-03-28 PROCEDURE — 85014 HEMATOCRIT: CPT | Mod: 91

## 2023-03-28 PROCEDURE — 700105 HCHG RX REV CODE 258

## 2023-03-28 PROCEDURE — 503001 HCHG PERFUSION: Performed by: THORACIC SURGERY (CARDIOTHORACIC VASCULAR SURGERY)

## 2023-03-28 PROCEDURE — C1889 IMPLANT/INSERT DEVICE, NOC: HCPCS | Performed by: THORACIC SURGERY (CARDIOTHORACIC VASCULAR SURGERY)

## 2023-03-28 PROCEDURE — 82962 GLUCOSE BLOOD TEST: CPT | Mod: 91

## 2023-03-28 PROCEDURE — 700111 HCHG RX REV CODE 636 W/ 250 OVERRIDE (IP): Performed by: NURSE PRACTITIONER

## 2023-03-28 PROCEDURE — 700101 HCHG RX REV CODE 250: Performed by: NURSE PRACTITIONER

## 2023-03-28 PROCEDURE — 88305 TISSUE EXAM BY PATHOLOGIST: CPT

## 2023-03-28 PROCEDURE — 00562 ANES PX HRT W/PUMP AGE 1YR+: CPT | Performed by: ANESTHESIOLOGY

## 2023-03-28 PROCEDURE — A9270 NON-COVERED ITEM OR SERVICE: HCPCS | Performed by: NURSE PRACTITIONER

## 2023-03-28 PROCEDURE — C1713 ANCHOR/SCREW BN/BN,TIS/BN: HCPCS | Performed by: THORACIC SURGERY (CARDIOTHORACIC VASCULAR SURGERY)

## 2023-03-28 PROCEDURE — 33641 REPAIR HEART SEPTUM DEFECT: CPT | Performed by: THORACIC SURGERY (CARDIOTHORACIC VASCULAR SURGERY)

## 2023-03-28 PROCEDURE — 82330 ASSAY OF CALCIUM: CPT | Mod: 91

## 2023-03-28 PROCEDURE — 93319 3D ECHO IMG CGEN CAR ANOMAL: CPT | Performed by: ANESTHESIOLOGY

## 2023-03-28 PROCEDURE — P9047 ALBUMIN (HUMAN), 25%, 50ML: HCPCS | Mod: JG

## 2023-03-28 PROCEDURE — 700105 HCHG RX REV CODE 258: Performed by: ANESTHESIOLOGY

## 2023-03-28 PROCEDURE — 93321 DOPPLER ECHO F-UP/LMTD STD: CPT | Mod: 26 | Performed by: ANESTHESIOLOGY

## 2023-03-28 PROCEDURE — 88304 TISSUE EXAM BY PATHOLOGIST: CPT

## 2023-03-28 PROCEDURE — 84295 ASSAY OF SERUM SODIUM: CPT | Mod: 91

## 2023-03-28 PROCEDURE — 33430 REPLACEMENT OF MITRAL VALVE: CPT | Performed by: THORACIC SURGERY (CARDIOTHORACIC VASCULAR SURGERY)

## 2023-03-28 PROCEDURE — 83735 ASSAY OF MAGNESIUM: CPT

## 2023-03-28 PROCEDURE — 85049 AUTOMATED PLATELET COUNT: CPT

## 2023-03-28 PROCEDURE — 94003 VENT MGMT INPAT SUBQ DAY: CPT

## 2023-03-28 PROCEDURE — 02Q50ZZ REPAIR ATRIAL SEPTUM, OPEN APPROACH: ICD-10-PCS | Performed by: THORACIC SURGERY (CARDIOTHORACIC VASCULAR SURGERY)

## 2023-03-28 PROCEDURE — 93312 ECHO TRANSESOPHAGEAL: CPT | Mod: 26,59 | Performed by: ANESTHESIOLOGY

## 2023-03-28 PROCEDURE — C1898 LEAD, PMKR, OTHER THAN TRANS: HCPCS | Performed by: THORACIC SURGERY (CARDIOTHORACIC VASCULAR SURGERY)

## 2023-03-28 PROCEDURE — 700101 HCHG RX REV CODE 250: Performed by: ANESTHESIOLOGY

## 2023-03-28 PROCEDURE — 160031 HCHG SURGERY MINUTES - 1ST 30 MINS LEVEL 5: Performed by: THORACIC SURGERY (CARDIOTHORACIC VASCULAR SURGERY)

## 2023-03-28 PROCEDURE — 82803 BLOOD GASES ANY COMBINATION: CPT | Mod: 91

## 2023-03-28 PROCEDURE — 33430 REPLACEMENT OF MITRAL VALVE: CPT | Mod: 80 | Performed by: THORACIC SURGERY (CARDIOTHORACIC VASCULAR SURGERY)

## 2023-03-28 PROCEDURE — 700101 HCHG RX REV CODE 250

## 2023-03-28 PROCEDURE — 84132 ASSAY OF SERUM POTASSIUM: CPT

## 2023-03-28 PROCEDURE — 37799 UNLISTED PX VASCULAR SURGERY: CPT

## 2023-03-28 PROCEDURE — 85018 HEMOGLOBIN: CPT

## 2023-03-28 PROCEDURE — 5A1221Z PERFORMANCE OF CARDIAC OUTPUT, CONTINUOUS: ICD-10-PCS | Performed by: THORACIC SURGERY (CARDIOTHORACIC VASCULAR SURGERY)

## 2023-03-28 PROCEDURE — 36415 COLL VENOUS BLD VENIPUNCTURE: CPT

## 2023-03-28 PROCEDURE — 85730 THROMBOPLASTIN TIME PARTIAL: CPT

## 2023-03-28 PROCEDURE — 71045 X-RAY EXAM CHEST 1 VIEW: CPT

## 2023-03-28 PROCEDURE — 94150 VITAL CAPACITY TEST: CPT

## 2023-03-28 PROCEDURE — 02RG08Z REPLACEMENT OF MITRAL VALVE WITH ZOOPLASTIC TISSUE, OPEN APPROACH: ICD-10-PCS | Performed by: THORACIC SURGERY (CARDIOTHORACIC VASCULAR SURGERY)

## 2023-03-28 DEVICE — IMPLANTABLE DEVICE: Type: IMPLANTABLE DEVICE | Site: HEART | Status: FUNCTIONAL

## 2023-03-28 DEVICE — BONE PUTTY HEMOSTATIC ABSORBABLE (12/BX): Type: IMPLANTABLE DEVICE | Site: HEART | Status: FUNCTIONAL

## 2023-03-28 RX ORDER — OMEPRAZOLE 20 MG/1
20 CAPSULE, DELAYED RELEASE ORAL DAILY
Status: DISCONTINUED | OUTPATIENT
Start: 2023-03-29 | End: 2023-04-04 | Stop reason: HOSPADM

## 2023-03-28 RX ORDER — TRAMADOL HYDROCHLORIDE 50 MG/1
50 TABLET ORAL EVERY 4 HOURS PRN
Status: DISCONTINUED | OUTPATIENT
Start: 2023-03-28 | End: 2023-04-04 | Stop reason: HOSPADM

## 2023-03-28 RX ORDER — AMOXICILLIN 250 MG
2 CAPSULE ORAL 2 TIMES DAILY
Status: DISCONTINUED | OUTPATIENT
Start: 2023-03-28 | End: 2023-04-04 | Stop reason: HOSPADM

## 2023-03-28 RX ORDER — ACETAMINOPHEN 500 MG
1000 TABLET ORAL EVERY 6 HOURS PRN
Status: DISCONTINUED | OUTPATIENT
Start: 2023-04-02 | End: 2023-04-04 | Stop reason: HOSPADM

## 2023-03-28 RX ORDER — INSULIN LISPRO 100 [IU]/ML
0-14 INJECTION, SOLUTION INTRAVENOUS; SUBCUTANEOUS
Status: DISCONTINUED | OUTPATIENT
Start: 2023-03-28 | End: 2023-03-29

## 2023-03-28 RX ORDER — MORPHINE SULFATE 4 MG/ML
4 INJECTION INTRAVENOUS
Status: DISCONTINUED | OUTPATIENT
Start: 2023-03-28 | End: 2023-03-30

## 2023-03-28 RX ORDER — HEPARIN SODIUM,PORCINE 1000/ML
VIAL (ML) INJECTION PRN
Status: DISCONTINUED | OUTPATIENT
Start: 2023-03-28 | End: 2023-03-28

## 2023-03-28 RX ORDER — ACETAMINOPHEN 500 MG
1000 TABLET ORAL ONCE
Status: COMPLETED | OUTPATIENT
Start: 2023-03-28 | End: 2023-03-28

## 2023-03-28 RX ORDER — PROCHLORPERAZINE EDISYLATE 5 MG/ML
10 INJECTION INTRAMUSCULAR; INTRAVENOUS EVERY 6 HOURS PRN
Status: DISCONTINUED | OUTPATIENT
Start: 2023-03-28 | End: 2023-04-04 | Stop reason: HOSPADM

## 2023-03-28 RX ORDER — EPINEPHRINE HCL IN 0.9 % NACL 4MG/250ML
0-.5 PLASTIC BAG, INJECTION (ML) INTRAVENOUS CONTINUOUS
Status: DISCONTINUED | OUTPATIENT
Start: 2023-03-28 | End: 2023-03-28

## 2023-03-28 RX ORDER — DEXMEDETOMIDINE HYDROCHLORIDE 4 UG/ML
INJECTION, SOLUTION INTRAVENOUS
Status: DISCONTINUED | OUTPATIENT
Start: 2023-03-28 | End: 2023-03-28

## 2023-03-28 RX ORDER — ALUMINA, MAGNESIA, AND SIMETHICONE 2400; 2400; 240 MG/30ML; MG/30ML; MG/30ML
30 SUSPENSION ORAL EVERY 4 HOURS PRN
Status: DISCONTINUED | OUTPATIENT
Start: 2023-03-28 | End: 2023-04-04 | Stop reason: HOSPADM

## 2023-03-28 RX ORDER — MAGNESIUM SULFATE 1 G/100ML
1 INJECTION INTRAVENOUS DAILY
Status: COMPLETED | OUTPATIENT
Start: 2023-03-28 | End: 2023-03-30

## 2023-03-28 RX ORDER — POTASSIUM CHLORIDE 14.9 MG/ML
20 INJECTION INTRAVENOUS ONCE
Status: COMPLETED | OUTPATIENT
Start: 2023-03-28 | End: 2023-03-28

## 2023-03-28 RX ORDER — DIPHENHYDRAMINE HCL 25 MG
25 TABLET ORAL
Status: DISCONTINUED | OUTPATIENT
Start: 2023-03-28 | End: 2023-04-04 | Stop reason: HOSPADM

## 2023-03-28 RX ORDER — NOREPINEPHRINE BITARTRATE 0.03 MG/ML
0-1 INJECTION, SOLUTION INTRAVENOUS CONTINUOUS
Status: DISCONTINUED | OUTPATIENT
Start: 2023-03-28 | End: 2023-03-28

## 2023-03-28 RX ORDER — ONDANSETRON 2 MG/ML
8 INJECTION INTRAMUSCULAR; INTRAVENOUS EVERY 6 HOURS PRN
Status: DISCONTINUED | OUTPATIENT
Start: 2023-03-28 | End: 2023-04-04 | Stop reason: HOSPADM

## 2023-03-28 RX ORDER — ACETAMINOPHEN 500 MG
1000 TABLET ORAL EVERY 6 HOURS
Status: DISPENSED | OUTPATIENT
Start: 2023-03-28 | End: 2023-04-02

## 2023-03-28 RX ORDER — POTASSIUM CHLORIDE 7.45 MG/ML
10 INJECTION INTRAVENOUS ONCE
Status: COMPLETED | OUTPATIENT
Start: 2023-03-28 | End: 2023-03-28

## 2023-03-28 RX ORDER — MIDAZOLAM HYDROCHLORIDE 1 MG/ML
2 INJECTION INTRAMUSCULAR; INTRAVENOUS
Status: DISCONTINUED | OUTPATIENT
Start: 2023-03-28 | End: 2023-03-30

## 2023-03-28 RX ORDER — METHADONE HYDROCHLORIDE 10 MG/ML
INJECTION, SOLUTION INTRAMUSCULAR; INTRAVENOUS; SUBCUTANEOUS PRN
Status: DISCONTINUED | OUTPATIENT
Start: 2023-03-28 | End: 2023-03-28

## 2023-03-28 RX ORDER — CEFAZOLIN SODIUM 1 G/3ML
INJECTION, POWDER, FOR SOLUTION INTRAMUSCULAR; INTRAVENOUS PRN
Status: DISCONTINUED | OUTPATIENT
Start: 2023-03-28 | End: 2023-03-28

## 2023-03-28 RX ORDER — SODIUM CHLORIDE, SODIUM GLUCONATE, SODIUM ACETATE, POTASSIUM CHLORIDE AND MAGNESIUM CHLORIDE 526; 502; 368; 37; 30 MG/100ML; MG/100ML; MG/100ML; MG/100ML; MG/100ML
INJECTION, SOLUTION INTRAVENOUS PRN
Status: DISCONTINUED | OUTPATIENT
Start: 2023-03-28 | End: 2023-04-01

## 2023-03-28 RX ORDER — ENOXAPARIN SODIUM 100 MG/ML
40 INJECTION SUBCUTANEOUS DAILY
Status: DISCONTINUED | OUTPATIENT
Start: 2023-03-29 | End: 2023-04-04 | Stop reason: HOSPADM

## 2023-03-28 RX ORDER — PROTAMINE SULFATE 10 MG/ML
INJECTION, SOLUTION INTRAVENOUS PRN
Status: DISCONTINUED | OUTPATIENT
Start: 2023-03-28 | End: 2023-03-28 | Stop reason: SURG

## 2023-03-28 RX ORDER — METHADONE HYDROCHLORIDE 10 MG/ML
20 INJECTION, SOLUTION INTRAMUSCULAR; INTRAVENOUS; SUBCUTANEOUS ONCE
Status: DISCONTINUED | OUTPATIENT
Start: 2023-03-28 | End: 2023-03-28 | Stop reason: HOSPADM

## 2023-03-28 RX ORDER — SODIUM CHLORIDE, SODIUM LACTATE, POTASSIUM CHLORIDE, CALCIUM CHLORIDE 600; 310; 30; 20 MG/100ML; MG/100ML; MG/100ML; MG/100ML
INJECTION, SOLUTION INTRAVENOUS
Status: DISCONTINUED | OUTPATIENT
Start: 2023-03-28 | End: 2023-03-28

## 2023-03-28 RX ORDER — BISACODYL 10 MG
10 SUPPOSITORY, RECTAL RECTAL
Status: DISCONTINUED | OUTPATIENT
Start: 2023-03-28 | End: 2023-04-04 | Stop reason: HOSPADM

## 2023-03-28 RX ORDER — NOREPINEPHRINE BITARTRATE 0.03 MG/ML
0-1 INJECTION, SOLUTION INTRAVENOUS CONTINUOUS
Status: DISCONTINUED | OUTPATIENT
Start: 2023-03-28 | End: 2023-03-30

## 2023-03-28 RX ORDER — OXYCODONE HYDROCHLORIDE 5 MG/1
5 TABLET ORAL
Status: DISCONTINUED | OUTPATIENT
Start: 2023-03-28 | End: 2023-04-04 | Stop reason: HOSPADM

## 2023-03-28 RX ORDER — POLYETHYLENE GLYCOL 3350 17 G/17G
1 POWDER, FOR SOLUTION ORAL DAILY
Status: DISCONTINUED | OUTPATIENT
Start: 2023-03-29 | End: 2023-04-04 | Stop reason: HOSPADM

## 2023-03-28 RX ORDER — DEXMEDETOMIDINE HYDROCHLORIDE 4 UG/ML
0-1.5 INJECTION, SOLUTION INTRAVENOUS CONTINUOUS
Status: DISCONTINUED | OUTPATIENT
Start: 2023-03-28 | End: 2023-03-28

## 2023-03-28 RX ORDER — SODIUM CHLORIDE 9 MG/ML
INJECTION, SOLUTION INTRAVENOUS CONTINUOUS
Status: DISCONTINUED | OUTPATIENT
Start: 2023-03-28 | End: 2023-04-01

## 2023-03-28 RX ORDER — OXYCODONE HYDROCHLORIDE 10 MG/1
10 TABLET ORAL
Status: DISCONTINUED | OUTPATIENT
Start: 2023-03-28 | End: 2023-04-04 | Stop reason: HOSPADM

## 2023-03-28 RX ORDER — NITROGLYCERIN 20 MG/100ML
0-100 INJECTION INTRAVENOUS CONTINUOUS
Status: DISCONTINUED | OUTPATIENT
Start: 2023-03-28 | End: 2023-03-30

## 2023-03-28 RX ORDER — DEXMEDETOMIDINE HYDROCHLORIDE 4 UG/ML
0-1.5 INJECTION, SOLUTION INTRAVENOUS CONTINUOUS
Status: DISCONTINUED | OUTPATIENT
Start: 2023-03-28 | End: 2023-03-30

## 2023-03-28 RX ADMIN — OXYCODONE HYDROCHLORIDE 5 MG: 5 TABLET ORAL at 17:28

## 2023-03-28 RX ADMIN — AMINOCAPROIC ACID 15 G: 250 INJECTION, SOLUTION INTRAVENOUS at 07:36

## 2023-03-28 RX ADMIN — MUPIROCIN 1 APPLICATION: 20 OINTMENT TOPICAL at 17:30

## 2023-03-28 RX ADMIN — LIDOCAINE HYDROCHLORIDE 0.5 ML: 10 INJECTION, SOLUTION EPIDURAL; INFILTRATION; INTRACAUDAL; PERINEURAL at 06:40

## 2023-03-28 RX ADMIN — SODIUM CHLORIDE, POTASSIUM CHLORIDE, SODIUM LACTATE AND CALCIUM CHLORIDE: 600; 310; 30; 20 INJECTION, SOLUTION INTRAVENOUS at 07:33

## 2023-03-28 RX ADMIN — CEFAZOLIN 2 G: 2 INJECTION, POWDER, FOR SOLUTION INTRAMUSCULAR; INTRAVENOUS at 16:22

## 2023-03-28 RX ADMIN — METOPROLOL TARTRATE 12.5 MG: 25 TABLET, FILM COATED ORAL at 06:40

## 2023-03-28 RX ADMIN — MORPHINE SULFATE 4 MG: 4 INJECTION INTRAVENOUS at 12:45

## 2023-03-28 RX ADMIN — DOCUSATE SODIUM 50 MG AND SENNOSIDES 8.6 MG 2 TABLET: 8.6; 5 TABLET, FILM COATED ORAL at 17:29

## 2023-03-28 RX ADMIN — TRAMADOL HYDROCHLORIDE 50 MG: 50 TABLET ORAL at 19:51

## 2023-03-28 RX ADMIN — POTASSIUM CHLORIDE 10 MEQ: 7.46 INJECTION, SOLUTION INTRAVENOUS at 17:36

## 2023-03-28 RX ADMIN — ACETAMINOPHEN 1000 MG: 500 TABLET, FILM COATED ORAL at 17:27

## 2023-03-28 RX ADMIN — DEXMEDETOMIDINE HYDROCHLORIDE 0.5 MCG/KG/HR: 100 INJECTION, SOLUTION INTRAVENOUS at 10:01

## 2023-03-28 RX ADMIN — PROTAMINE SULFATE 400 MG: 10 INJECTION, SOLUTION INTRAVENOUS at 10:31

## 2023-03-28 RX ADMIN — OXYCODONE HYDROCHLORIDE 10 MG: 10 TABLET ORAL at 23:46

## 2023-03-28 RX ADMIN — OXYCODONE HYDROCHLORIDE 10 MG: 10 TABLET ORAL at 20:34

## 2023-03-28 RX ADMIN — ACETAMINOPHEN 1000 MG: 500 TABLET, FILM COATED ORAL at 23:30

## 2023-03-28 RX ADMIN — CEFAZOLIN 2 G: 330 INJECTION, POWDER, FOR SOLUTION INTRAMUSCULAR; INTRAVENOUS at 07:36

## 2023-03-28 RX ADMIN — HEPARIN SODIUM 30000 UNITS: 1000 INJECTION, SOLUTION INTRAVENOUS; SUBCUTANEOUS at 08:39

## 2023-03-28 RX ADMIN — POTASSIUM CHLORIDE 20 MEQ: 14.9 INJECTION, SOLUTION INTRAVENOUS at 12:16

## 2023-03-28 RX ADMIN — METHADONE HYDROCHLORIDE 20 MG: 10 INJECTION, SOLUTION INTRAMUSCULAR; INTRAVENOUS; SUBCUTANEOUS at 08:48

## 2023-03-28 RX ADMIN — ACETAMINOPHEN 1000 MG: 500 TABLET, FILM COATED ORAL at 06:40

## 2023-03-28 RX ADMIN — ROCURONIUM BROMIDE 100 MG: 10 INJECTION, SOLUTION INTRAVENOUS at 07:39

## 2023-03-28 RX ADMIN — PROPOFOL 150 MG: 10 INJECTION, EMULSION INTRAVENOUS at 07:39

## 2023-03-28 RX ADMIN — MIDAZOLAM 2 MG: 1 INJECTION INTRAMUSCULAR; INTRAVENOUS at 07:43

## 2023-03-28 RX ADMIN — ROCURONIUM BROMIDE 50 MG: 10 INJECTION, SOLUTION INTRAVENOUS at 08:56

## 2023-03-28 RX ADMIN — MAGNESIUM SULFATE HEPTAHYDRATE 1 G: 1 INJECTION, SOLUTION INTRAVENOUS at 12:24

## 2023-03-28 RX ADMIN — ONDANSETRON 8 MG: 2 INJECTION INTRAMUSCULAR; INTRAVENOUS at 17:34

## 2023-03-28 ASSESSMENT — LIFESTYLE VARIABLES
ON A TYPICAL DAY WHEN YOU DRINK ALCOHOL HOW MANY DRINKS DO YOU HAVE: 0
TOTAL SCORE: 0
HAVE YOU EVER FELT YOU SHOULD CUT DOWN ON YOUR DRINKING: NO
DOES PATIENT WANT TO STOP DRINKING: CANNOT ASSESS
EVER HAD A DRINK FIRST THING IN THE MORNING TO STEADY YOUR NERVES TO GET RID OF A HANGOVER: NO
ALCOHOL_USE: NO
CONSUMPTION TOTAL: INCOMPLETE
DOES PATIENT WANT TO STOP DRINKING: NO
HOW MANY TIMES IN THE PAST YEAR HAVE YOU HAD 5 OR MORE DRINKS IN A DAY: 0
HAVE YOU EVER FELT YOU SHOULD CUT DOWN ON YOUR DRINKING: NO
HAVE PEOPLE ANNOYED YOU BY CRITICIZING YOUR DRINKING: NO
TOTAL SCORE: 0
EVER FELT BAD OR GUILTY ABOUT YOUR DRINKING: NO
AVERAGE NUMBER OF DAYS PER WEEK YOU HAVE A DRINK CONTAINING ALCOHOL: 0
HOW MANY TIMES IN THE PAST YEAR HAVE YOU HAD 5 OR MORE DRINKS IN A DAY: 0
CONSUMPTION TOTAL: NEGATIVE
HAVE PEOPLE ANNOYED YOU BY CRITICIZING YOUR DRINKING: NO
EVER HAD A DRINK FIRST THING IN THE MORNING TO STEADY YOUR NERVES TO GET RID OF A HANGOVER: NO
AVERAGE NUMBER OF DAYS PER WEEK YOU HAVE A DRINK CONTAINING ALCOHOL: 0
TOTAL SCORE: 0
ALCOHOL_USE: NO
ON A TYPICAL DAY WHEN YOU DRINK ALCOHOL HOW MANY DRINKS DO YOU HAVE: 0

## 2023-03-28 ASSESSMENT — COGNITIVE AND FUNCTIONAL STATUS - GENERAL
DRESSING REGULAR UPPER BODY CLOTHING: A LITTLE
WALKING IN HOSPITAL ROOM: A LITTLE
CLIMB 3 TO 5 STEPS WITH RAILING: A LITTLE
STANDING UP FROM CHAIR USING ARMS: A LITTLE
EATING MEALS: A LITTLE
HELP NEEDED FOR BATHING: A LITTLE
SUGGESTED CMS G CODE MODIFIER DAILY ACTIVITY: CK
MOBILITY SCORE: 18
TURNING FROM BACK TO SIDE WHILE IN FLAT BAD: A LITTLE
MOVING FROM LYING ON BACK TO SITTING ON SIDE OF FLAT BED: A LITTLE
SUGGESTED CMS G CODE MODIFIER MOBILITY: CK
DAILY ACTIVITIY SCORE: 18
TOILETING: A LITTLE
PERSONAL GROOMING: A LITTLE
DRESSING REGULAR LOWER BODY CLOTHING: A LITTLE
MOVING TO AND FROM BED TO CHAIR: A LITTLE

## 2023-03-28 ASSESSMENT — PATIENT HEALTH QUESTIONNAIRE - PHQ9
2. FEELING DOWN, DEPRESSED, IRRITABLE, OR HOPELESS: NOT AT ALL
SUM OF ALL RESPONSES TO PHQ9 QUESTIONS 1 AND 2: 0
SUM OF ALL RESPONSES TO PHQ9 QUESTIONS 1 AND 2: 0
1. LITTLE INTEREST OR PLEASURE IN DOING THINGS: NOT AT ALL
1. LITTLE INTEREST OR PLEASURE IN DOING THINGS: NOT AT ALL
2. FEELING DOWN, DEPRESSED, IRRITABLE, OR HOPELESS: NOT AT ALL

## 2023-03-28 ASSESSMENT — PAIN DESCRIPTION - PAIN TYPE
TYPE: SURGICAL PAIN

## 2023-03-28 ASSESSMENT — PULMONARY FUNCTION TESTS: FVC: 2.1

## 2023-03-28 ASSESSMENT — FIBROSIS 4 INDEX: FIB4 SCORE: 0.47

## 2023-03-28 NOTE — ANESTHESIA PROCEDURE NOTES
Airway    Date/Time: 3/28/2023 7:39 AM  Performed by: Cassius Norton M.D.  Authorized by: Cassius Norton M.D.     Location:  OR  Urgency:  Elective  Indications for Airway Management:  Anesthesia      Spontaneous Ventilation: absent    Sedation Level:  Deep  Preoxygenated: Yes    Patient Position:  Sniffing  Final Airway Type:  Endotracheal airway  Final Endotracheal Airway:  ETT  Cuffed: Yes    Technique Used for Successful ETT Placement:  Direct laryngoscopy    Insertion Site:  Oral  Blade Type:  Carmela  Laryngoscope Blade/Videolaryngoscope Blade Size:  3  ETT Size (mm):  7.5  Measured from:  Teeth  ETT to Teeth (cm):  21  Placement Verified by: auscultation and capnometry    Cormack-Lehane Classification:  Grade IIb - view of arytenoids or posterior of glottis only  Number of Attempts at Approach:  1

## 2023-03-28 NOTE — ASSESSMENT & PLAN NOTE
Postoperative ventilator weaned  Continued hypoxia, weaned to NC  Scheduled nebs  Pulmonary hygiene and ambulation encouraged

## 2023-03-28 NOTE — PROGRESS NOTES
Pre op labs and imaging reviewed. No interval changes to H&P. Proceed with mitral valve repair vs replacement, left atrial appendage excision/ligation, atrial septal defect repair, and TYRA.

## 2023-03-28 NOTE — ASSESSMENT & PLAN NOTE
Mitral valve replacement on 3/28/2023 by Dr. Sheppard  POD #4  Weaned from ventilator on POD #0  Weaned from vasopressor support  Post-operative pain control per [facility]  Chest tube and pacemaker removed  SSI  Optimize K and Mg  PT/OT consult  Continue aggressive pulmonary hygeine

## 2023-03-28 NOTE — PROGRESS NOTES
Pt awake post OHS. Follows commands, WALLS equally. Slight nystagmus noted on awakening, resolved by 1300. Pt restless, attempting to pull lines, grimacing, coughing and fighting vent. 4mg Morphine given.

## 2023-03-28 NOTE — ANESTHESIA PREPROCEDURE EVALUATION
Case: [id number] Date/Time: 03/28/23 0715    Procedures:       MITRAL VALVE REPAIR VS REPLACEMENT (Chest)      VALVULOPLASTY, MITRAL VALVE (Chest)      ECHOCARDIOGRAM, TRANSESOPHAGEAL, INTRAOPERATIVE (Throat)      LIGATION, LEFT ATRIAL APPENDAGE (Chest)      REPAIR, ATRIAL SEPTAL DEFECT (Chest)    Anesthesia type: General    Pre-op diagnosis: SEVERE MITRAL REGURGITATION, ATRIAL SEPTAL DEFECT    Location: [facility] OR 02 / SURGERY [facility]    Surgeons: Libertad Hebert M.D.          Relevant Problems   PULMONARY   (positive) Dyspnea on exertion   (positive) Pulmonary emphysema (HCC)      CARDIAC   (positive) Atrial fibrillation with RVR (HCC)   (positive) Dyspnea on exertion   (positive) Moderate mitral regurgitation by prior echocardiogram       Physical Exam    Airway   Mallampati: II  TM distance: >3 FB  Neck ROM: full       Cardiovascular - normal exam  Rhythm: regular  Rate: normal  (-) murmur     Dental - normal exam           Pulmonary - normal exam  Breath sounds clear to auscultation     Abdominal    Neurological - normal exam                 Anesthesia Plan    ASA 3       Plan - general       Airway plan will be ETT          Induction: intravenous    Postoperative Plan: Postoperative administration of opioids is intended.    Pertinent diagnostic labs and testing reviewed    Informed Consent:    Anesthetic plan and risks discussed with patient.    Use of blood products discussed with: patient whom consented to blood products.

## 2023-03-28 NOTE — ANESTHESIA PROCEDURE NOTES
Arterial Line  Performed by: Cassius Norton M.D.  Authorized by: Cassius Norton M.D.     Localization: surface landmarks    Patient Location:  OR  Indication: continuous blood pressure monitoring        Catheter Size:  20 G  Seldinger Technique?: Yes    Laterality:  Left  Site:  Radial artery  Line Secured:  Antimicrobial disc, tape and transparent dressing  Events: patient tolerated procedure well with no complications

## 2023-03-28 NOTE — ANESTHESIA POSTPROCEDURE EVALUATION
Patient: Eduardo Aponte    Procedure Summary     Date: 03/28/23 Room / Location: [facility] 02 / SURGERY [facility]    Anesthesia Start: 0731 Anesthesia Stop: 1158    Procedures:       MITRAL VALVE REPLACEMENT (Chest)      ECHOCARDIOGRAM, TRANSESOPHAGEAL, INTRAOPERATIVE (Throat)      EXCISION, LEFT ATRIAL APPENDAGE (Chest)      REPAIR, ATRIAL SEPTAL DEFECT (Chest) Diagnosis: (SEVERE MITRAL REGURGITATION, ATRIAL SEPTAL DEFECT)    Surgeons: Libertad Hebert M.D. Responsible Provider: Cassius Norton M.D.    Anesthesia Type: general ASA Status: 3          Final Anesthesia Type: general  Last vitals  BP   Blood Pressure : 117/58    Temp   36.2 °C (97.1 °F)    Pulse   93   Resp   16    SpO2   95 %      Anesthesia Post Evaluation    Patient location during evaluation: ICU  Patient participation: waiting for patient participation  Level of consciousness: obtunded/minimal responses    Anesthetic complications: no  Cardiovascular status: hemodynamically stable  Respiratory status: ETT              There were no known notable events for this encounter.     Nurse Pain Score: 0 (NPRS)

## 2023-03-28 NOTE — RESPIRATORY CARE
Extubation    Cuff leak noted Yes  Stridor present No     Pt extubated per cardiac surgery protocol, tolerating ventilator liberation well at this time. No s/s of respiratory distress noted, resting comfortably on 4L NC

## 2023-03-28 NOTE — ANESTHESIA PROCEDURE NOTES
TYRA  Performed by: Cassius Norton M.D.  Authorized by: Cassius Norton M.D.     Preanesthetic Checklist: patient identified, IV checked, site marked, risks and benefits discussed, surgical consent, monitors and equipment checked, pre-op evaluation and timeout performed    Indication for TYRA: diagnostic     Intubated: Yes  Bite Block: Yes  Heart Visualized: Yes  Insertion: atraumatic    **See FULL TYRA report in patient's chart via [facility]**

## 2023-03-28 NOTE — ANESTHESIA TIME REPORT
Anesthesia Start and Stop Event Times     Date Time Event    3/28/2023 0731 Anesthesia Start     1158 Anesthesia Stop        Responsible Staff  03/28/23    Name Role Begin End    Cassius Norton M.D. Anesth 0731 1158        Overtime Reason:  no overtime (within assigned shift)    Comments:

## 2023-03-28 NOTE — CARE PLAN
The patient is Watcher - Medium risk of patient condition declining or worsening    Shift Goals  Clinical Goals: Extubate, HDS, Monitor I/O  Patient Goals: ERNESTINA  Family Goals: Extubate    Progress made toward(s) clinical / shift goals:      Problem: Pain - Standard  Goal: Alleviation of pain or a reduction in pain to the patient’s comfort goal  Outcome: Progressing     Problem: Day of surgery post CABG/Heart valve replacement  Goal: Stabilization in immediate post op period  Outcome: Progressing  Intervention: VS q 15 min x 4 hours, then q 1 hour. Include temperature immediately upon arrival. Check CO/CI q 2-4 hours and PRN  Note: VS completed per protocol. Pt given 1/2L plasmalyte and on norepi gtt to maintain target hemodynamics. CVP ranged 3-8.   Intervention: First post op hour labs and EKG per order  Note: Completed. No abnormal EKG findings.    Intervention: Serum K q 6 hours x 24 hours.  ABG and CBC prn.  Note: Completed per protocol. potassium replacement given per K scale.   Intervention: Initiate post cardiac insulin infusion protocol orders for FSBS greater than 140 and check frequency per protocol  Note: Pt arrive to unit on insulin gtt at 2u/hr. FSBS checked q1 hr per protocol   Intervention: FSBS frequency as per Cardiac Surgery Insulin Drip Protocol  Note: FSBS monitored q1 per protocol.   Intervention: Chest tube to 20 cm suction, record CT drainage with VS, and check for air leak  Note: CT connected to suction on arrival to unit. CT drainaged recorded q15.   Intervention: For CT drainage >300 mL in first hour post op and/or 150 mL in subsequent hours: Stat platelets, PT, INR, TEG, iSTAT, and H&H per order  Note: CT drainage within parameters. Total chest tube output for shift  .  Intervention: Titrate and wean off vasoactive drips per patient's condition and per MD order while maintaining SBP  mmHg per MD order  Note: Target hemodynamics achieved with 1 of 2 L plasmalyte and norepi  gtt.  Intervention: VAP protocol in place  Note: Oral care completed q2   Intervention: Wean from Vent per protocol (see protocol), extubation goal within 6 hours post op  Note: Pt placed on spont at 1517, several episodes of apnea noted by 1545 despite stimulation from family and RN. Pt extubated at 1616 , to 4L NC. Total intubation time 4hr 40 mins   Intervention: IS q 1 hour while awake post extubation  Note: Best IS: 4000 Baseline   Intervention: Bedrest until extubated and groin lines out  Note: No groin lines. EOB once extubated.   Intervention: Dangle within 4 hours post extubation  Note: Plan for mobilization to EOB.   Intervention: Up in chair 4 hours, day of extubation  Note: Plan for up to chair in AM.   Intervention: Maintain all original surgical dressings per provider orders and specifications  Note: Island dressing in CDI.   Intervention: Clear liquids post extubation, order carbohydrate free (post cardiac surgery) diet, advance as tolerated  Note: Completed per protocol. Clear CHO consistent diet ordered.    Intervention: Discontinue [place] pj and arterial line 12-18 hours post op if hemodynamically stable and off vasoactive drips  Note: No [place] Pj in place.   Intervention: A-Fib and DVT prophylaxis per MD order or contraindications documented (refer to DVT/VTE problem on Care Plan)  Note: No AFib noted, SCDs on patient.   Intervention: Amiodarone protocol per MD order  Note: No Afib noted.      Problem: Hemodynamics  Goal: Patient's hemodynamics, fluid balance and neurologic status will be stable or improve  Outcome: Progressing     Problem: Respiratory  Goal: Patient will achieve/maintain optimum respiratory ventilation and gas exchange  Outcome: Progressing     Problem: Risk for Aspiration  Goal: Patient's risk for aspiration will be absent or decrease  Outcome: Progressing     Problem: Nutrition - Advanced  Goal: Patient will display progressive weight gain toward goal have adequate food and  fluid intake  Outcome: Progressing     Problem: Self Care  Goal: Patient will have the ability to perform ADLs independently or with assistance (bathe, groom, dress, toilet and feed)  Outcome: Progressing     Problem: Bowel Elimination - Post Surgical  Goal: Patient will resume regular bowel sounds and function with no discomfort or distention  Outcome: Progressing   Problem: Knowledge Deficit - Standard  Goal: Patient and family/care givers will demonstrate understanding of plan of care, disease process/condition, diagnostic tests and medications  Outcome: Progressing     Problem: Skin Integrity  Goal: Skin integrity is maintained or improved  Outcome: Progressing     Problem: Fall Risk  Goal: Patient will remain free from falls  Outcome: Progressing

## 2023-03-28 NOTE — PROGRESS NOTES
Pt arrived to room with OR team s/p MV replacement, ASD repair, GENET, TYRA. Pt placed on ICU monitor. Recieved report from anesthesiologist Dr. Norton. Chest tubes connected to suction, No air leak noted. Pacer connected and tested, Settings rate 60, Ma 10, sensitivity 0.8. Labs obtained and sent. Ne kaminski applied for temp 36.1.

## 2023-03-28 NOTE — ASSESSMENT & PLAN NOTE
Continue Symbicort and albuterol  Continue scheduled nebs  PFT 2/28/23 show mild obstruction and no e/o restriction. Reduced DLCO and DL/VA are c/w his emphysema.  Long history of tobacco abuse and mining exposure

## 2023-03-28 NOTE — OR SURGEON
OPERATIVE REPORT    Operation date: 3/28/2023    Referring physician: Kyle Hua MD    PreOp Diagnosis: Severe symptomatic mitral regurgitation (4+, degenerative), mitral valve prolapse, atrial fibrillation status post ablation, atrial septal defect, chronic obstructive pulmonary disease, polycythemia    PostOp Diagnosis: Severe symptomatic mitral regurgitation (4+, degenerative), mitral valve prolapse, atrial fibrillation status post ablation, atrial septal defect, chronic obstructive pulmonary disease, polycythemia    Procedure(s):  MITRAL VALVE REPLACEMENT (29 mm Mitris Rivas bovine pericardial valve), LEFT ATRIAL APPENDAGE EXCISION/LIGATION, ATRIAL SEPTAL DEFECT REPAIR and intraoperative transesophageal echocardiography    Surgeon(s):  Libertad Hebert M.D.    Assistants:  CAROL Hahn APN    Anesthesiologist/Type of Anesthesia:  Anesthesiologist: Cassius Norton M.D./General    Surgical Staff:  Assistant: CARMEN Huang  Circulator: Richard Cardozo R.N.  Perfusionist: Toya Hawk  Scrub Person: Ivette Urrutia; Ivette Hagen R.N.    Specimens removed if any:  ID Type Source Tests Collected by Time Destination   A : Left atrial appendage Tissue Heart PATHOLOGY SPECIMEN Libertad Hebert M.D. 3/28/2023  9:01 AM    B : Mitral valve Tissue Heart Valve PATHOLOGY SPECIMEN Libertad Hebert M.D. 3/28/2023  9:41 AM        Estimated Blood Loss: Minimal    Findings: Severe mitral regurgitation, mitral valve prolapse, small atrial septal defect with left-to-right shunt, LVEF approximately 60%    Complications: None    Indications:  Mr. Aponte is a 67-year-old male with severe symptomatic mitral regurgitation, mitral valve prolapse, atrial septal defect and atrial fibrillation status post ablation.    Procedure:  The patient was brought to the operating room and placed on the operating room table in the supine position.  After successful induction of  general anesthesia endotracheal intubation, the patient was prepped and draped in the usual sterile fashion.  Cesilia Crawley MD was the first assistant during the operation and provided retraction and exposure.  VIPUL Pham was a second assistant and she closed the sternal wound.  Intraoperative transesophageal echocardiography showed moderate to severe mitral regurgitation, a small atrial septal defect and good left ventricular ejection fraction of approximately 60%.    An incision was made from the sternal notch to the xiphoid.  The sternum was opened longitudinally with a sternal saw.  Hemostasis was obtained with electrocautery at the sternal edges.  The patient was systemically heparinized.  The pericardium was opened longitudinally and tented anteriorly with Ethibond stay stitches.  The aortic cannula was inserted first followed by a dual stage venous cannula.  An antegrade cardioplegia cannula was placed in the ascending aorta.  Cardiopulmonary bypass was instituted.  The aorta was crossclamped and the patient was given 1 L of cold [place] cardioplegia in an antegrade fashion.  There was prompt cardiac arrest.  Ice slush was placed on the heart for further myocardial protection.  A phrenic nerve protector pad was used.  The left atrial appendage was ligated at its base and excised.  The stump was oversewn in 2 layers using #4-0 Prolene sutures.  A left atriotomy was performed just below the interatrial groove.  A small atrial septal defect was easily identified.  It was primarily closed with a single #5-0 Prolene suture in a figure-of-eight fashion.  The anterior mitral valve leaflet was sclerotic and there was A2 prolapse.  The posterior mitral leaflet had more normal appearance and there was severe P2 prolapse.  Due to the appearance of the anterior mitral valve leaflet, the decision was made to replace the mitral valve.  The anterior mitral valve leaflet was excised and the posterior one was  preserved and plicated with the periannular sutures.  Pledgeted #2-0 Ethibond stitches were then placed around the mitral valve annulus in a horizontal mattress fashion with the pledgets in the subannular position.  A 29 mm Mitris Rivas bovine pericardial valve was then placed in the mitral valve annulus and secured in place with the Ethibond stitches utilizing the CorKnot device.  The valve was properly positioned and there was free movement and coaptation of all 3 bioprosthetic valve leaflets.  Rewarming of the patient was initiated.  The left atriotomy was closed in 2 layers using #4-0 Prolene sutures.  The aortic cross-clamp was removed.  Aortic cross-clamp time was 95 minutes and total cardiopulmonary bypass time was 110 minutes.  The left ventricle was de-aired in the usual fashion.  The carbon dioxide which had been released over the operative field during the operation was discontinued.  A straight and an angled 32 [place] chest tubes were placed in the mediastinum.  Temporary epicardial ventricular pacemaker wires were inserted.  There was spontaneous conversion into sinus rhythm.  The antegrade cardioplegia cannula was removed.  When he was adequately warmed, he was slowly taken off cardiopulmonary bypass which he tolerated well.  The dual stage venous cannula was removed.  Protamine was given to reverse the effects of the heparin.  The aortic cannula was removed.  When adequate hemostasis had been obtained, the sternum was reapproximated using size 5 sternal wires and the remainder of the incision was closed in several layers using Vicryl sutures.    Intraoperative transesophageal echocardiography showed a properly positioned and functioning mitral valve bioprosthesis without any paravalvular or central leaks.  The mean mitral transvalvular gradient was 3 mmHg.  His left ventricular ejection fraction remained normal at approximately 60%.  There were no apparent complications.  The patient tolerated the  procedure well and left the operating room in guarded condition.      3/28/2023 11:18 AM Libertad Hebert MD, FACS

## 2023-03-28 NOTE — ANESTHESIA PROCEDURE NOTES
Central Venous Line  Performed by: Cassius Norton M.D.  Authorized by: Cassius Norton M.D.         provider hand hygiene performed prior to central venous catheter insertion, all 5 sterile barriers used (gloves, gown, cap, mask, large sterile drape) during central venous catheter insertion and skin prep agent completely dried prior to procedure    Patient Position:  Trendelenburg  Site:  Internal jugular  Prep:  Chlorhexidine  Catheter Size:  7.5 Fr  Number of Lumens:  Double lumen  target vein identified, needle advanced into vein and blood aspirated and guidewire advanced into vein    Seldinger Technique?: Yes    Ultrasound-Guided: ultrasound-guided  Image captured, interpreted and electronically stored.  Sterile Gel and Probe Cover Used for Ultrasound?: Yes    Intravenous Verification: verified by ultrasound, venous blood return and chest x-ray pending    all ports aspirated, all ports flushed easily, guidewire was removed intact, biopatch was applied, line was sutured in place and dressing was applied    Events: patient tolerated procedure well with no complications

## 2023-03-28 NOTE — PROGRESS NOTES
Monitor Summary    Rhythm: SR   Heart Rate: 70-90s  Ectopy: none   Measurements:  .18/0.08/.43                12 hr chart check

## 2023-03-28 NOTE — CONSULTS
Critical Care Consultation    Date of consult: 3/28/2023    Referring Physician  Libertad Hebert M.D.    Reason for Consultation  Postoperative critical care assistance    History of Presenting Illness  67 y.o. male with a past medical history of asthma, hypertension, hyperlipidemia, severe MR and ASD who presented 3/28/2023 for an elective mitral valve replacement, left atrial appendage ligation and ASD repair with Dr. Sheppard.  His intraoperative course was uneventful, his total aortic cross-clamp time was 95 minutes and total cardiopulmonary bypass time was 110 minutes.  Post procedurally his LVEF was 60% with good transvalvular gradients.  He arrives in the ICU on sedation, vasopressors and full mechanical ventilatory support.    Code Status  Full Code    Review of Systems  Review of Systems   Unable to perform ROS: Intubated     Past Medical History   has a past medical history of Arrhythmia, Arthritis, Breath shortness, Cataract, Dermatitis, Emphysema of lung (HCC) (02/10/2023), Heart burn, Heart valve disease (10/2022), High cholesterol, Indigestion (05/2022), and Shortness of breath.    He has no past medical history of Cough, GERD (gastroesophageal reflux disease), Painful breathing, Sputum production, Ulcer, or Wheezing.    Surgical History   has a past surgical history that includes other cardiac surgery (10/13/2022); other (Bilateral); mastectomy bilateral subq (Bilateral); and angiogram (03/2023).    Family History  family history includes Arterial Aneurysm in his father; Cancer in his mother.    Social History   reports that he quit smoking about a year ago. His smoking use included cigarettes. He has a 45.00 pack-year smoking history. He has never used smokeless tobacco. He reports that he does not currently use alcohol. He reports that he does not use drugs.    Medications  Home Medications       Reviewed by Patti Zaldivar R.N. (Registered Nurse) on 03/28/23 at 0633  Med List Status: Complete      Medication Last Dose Status   albuterol 108 (90 Base) MCG/ACT Aero Soln inhalation aerosol 3/27/2023 Active   budesonide-formoterol (SYMBICORT) 80-4.5 MCG/ACT Aerosol 3/28/2023 Active   clobetasol (TEMOVATE) 0.05 % Cream 3/26/2023 Active   ELIQUIS 5 MG Tab 3/23/2023 Active   Empagliflozin (JARDIANCE) 10 MG Tab 3/27/2023 Active   famotidine (PEPCID) 20 MG Tab 3/27/2023 Active   furosemide (LASIX) 20 MG Tab 3/27/2023 Active   metoprolol tartrate (LOPRESSOR) 50 MG Tab 3/27/2023 Active   rosuvastatin (CRESTOR) 5 MG Tab 3/27/2023 Active   traMADol (ULTRAM) 50 MG Tab 3/26/2023 Active                  Current Facility-Administered Medications   Medication Dose Route Frequency Provider Last Rate Last Admin    Respiratory Therapy Consult   Nebulization Continuous RT ANGÉLICA HuangPMarielRLEONARDO.        NS infusion   Intravenous Continuous ANGÉLICA HuangP.R.N. 10 mL/hr at 03/28/23 1200 Rate Verify at 03/28/23 1200    electrolyte-A (PLASMALYTE-A) infusion   Intravenous PRN ANGÉLICA HuangP.RLEONARDO.        [START ON 3/29/2023] enoxaparin (Lovenox) inj 40 mg  40 mg Subcutaneous DAILY AT 1800 ANGÉLICA HuangPMarielRLEONARDO.        mupirocin (BACTROBAN) 2 % ointment 1 Application.  1 Application. Topical BID ANGÉLICA HuangPMarielRSINGH        calcium CHLORIDE 1,000 mg in dextrose 5% 100 mL IVPB  1,000 mg Intravenous Once PRN ANGÉLICA HuangP.R.SHAYAN.        magnesium sulfate in D5W IVPB premix 1 g  1 g Intravenous DAILY ANGÉLICA HuangP.R.SHAYAN.   Stopped at 03/28/23 1324    K+ Scale: Goal of 4.5  1 Each Intravenous Q6HRS ANGÉLICA HuangP.R.N.   1 Each at 03/28/23 1201    [START ON 3/29/2023] aspirin EC (ECOTRIN) tablet 81 mg  81 mg Oral DAILY MING Huang.P.R.N.        clevidipine (Cleviprex) IV emulsion  0-21 mg/hr Intravenous Continuous ANGÉLICA HuangP.R.N.   Dose not Required at 03/28/23 1200    nitroglycerin 50 mg in D5W 250 ml infusion  0-100 mcg/min Intravenous Continuous ANGÉLICA HuangP.R.N.    Dose not Required at 03/28/23 1200    Pharmacy Consult Request ...Pain Management Review 1 Each  1 Each Other PHARMACY TO DOSE SARAH HuangRLEONARDO.        acetaminophen (TYLENOL) tablet 1,000 mg  1,000 mg Oral Q6HRS MING Huang.P.R.SHAYAN.        Followed by    [START ON 4/2/2023] acetaminophen (TYLENOL) tablet 1,000 mg  1,000 mg Oral Q6HRS PRN MING Huang.P.R.N.        oxyCODONE immediate-release (ROXICODONE) tablet 5 mg  5 mg Oral Q3HRS PRN MING Huang.P.R.N.        Or    oxyCODONE immediate release (ROXICODONE) tablet 10 mg  10 mg Oral Q3HRS PRN MING Huang.P.R.N.        Or    fentaNYL (SUBLIMAZE) injection 50 mcg  50 mcg Intravenous Q3HRS PRN MING Huang.P.R.N.        traMADol (Ultram) 50 MG tablet 50 mg  50 mg Oral Q4HRS PRN MING Huang.P.R.N.        midazolam (Versed) injection 2 mg  2 mg Intravenous Q HOUR PRN ANGÉLICA HuangP.R.SHAYAN.        dexmedetomidine (PRECEDEX) 400 mcg/100mL NS premix infusion  0-1.5 mcg/kg/hr (Ideal) Intravenous Continuous MING Huang.P.R.N.   Stopped at 03/28/23 1358    sodium bicarbonate 8.4 % injection 50 mEq  50 mEq Intravenous Q HOUR PRN MING Huang.P.R.N.        morphine 4 MG/ML injection 4 mg  4 mg Intravenous Q HOUR PRN MING Huang.P.R.N.   4 mg at 03/28/23 1245    ondansetron (ZOFRAN) syringe/vial injection 8 mg  8 mg Intravenous Q6HRS PRN MING Huang.P.R.N.        Or    prochlorperazine (COMPAZINE) injection 10 mg  10 mg Intravenous Q6HRS PRN MING Huang.P.R.N.        senna-docusate (PERICOLACE or SENOKOT S) 8.6-50 MG per tablet 2 Tablet  2 Tablet Oral BID ANGÉLICA HuangP.R.N.        And    [START ON 3/29/2023] polyethylene glycol/lytes (MIRALAX) PACKET 1 Packet  1 Packet Oral DAILY ANGÉLICA HuangP.R.SHAYAN.        And    [START ON 3/30/2023] magnesium hydroxide (MILK OF MAGNESIA) suspension 30 mL  30 mL Oral DAILY MING Huang.P.RLEONARDO.        And    bisacodyl (DULCOLAX)  suppository 10 mg  10 mg Rectal QDAY PRN SARAH HuangRSINGH        [START ON 3/29/2023] omeprazole (PRILOSEC) capsule 20 mg  20 mg Oral DAILY SARAH HuangRLEONARDO.        mag hydrox-al hydrox-simeth (MAALOX PLUS ES or MYLANTA DS) suspension 30 mL  30 mL Oral Q4HRS PRN SARAH HuangRSINGH        diphenhydrAMINE (BENADRYL) tablet/capsule 25 mg  25 mg Oral HS PRN - MR X 1 SARAH HuangRSINGH        ceFAZolin (Ancef) 2 g in  mL IVPB  2 g Intravenous Once SARAH HuangRSINGH        [START ON 3/29/2023] metoprolol tartrate (LOPRESSOR) tablet 12.5 mg  12.5 mg Oral BID CARMEN Huang        Followed by    [START ON 3/30/2023] metoprolol tartrate (LOPRESSOR) tablet 25 mg  25 mg Oral BID ANGÉLICA HuangPMarielRSINGH        [START ON 3/29/2023] MD Alert...Warfarin per Pharmacy   Other PHARMACY TO DOSE CARMEN Huang        norepinephrine (Levophed) 8 mg in 250 mL NS infusion (premix)  0-1 mcg/kg/min (Ideal) Intravenous Continuous CARMEN Huang   Stopped at 03/28/23 1306    insulin regular (HumuLIN R,NovoLIN R) injection  0-14 Units Intravenous Once SARAH HuangRSINGH        insulin lispro (AdmeLOG,HumaLOG) injection  0-14 Units Subcutaneous TID AC CARMEN Huang        insulin regular (HumuLIN R) 100 Units in  mL Infusion  0-29 Units/hr Intravenous Continuous ANGÉLICA HuangPMEHNAZ 1.5 mL/hr at 03/28/23 1410 1.5 Units/hr at 03/28/23 1410    dextrose 10 % BOLUS 12.5-25 g  12.5-25 g Intravenous PRN CARMEN Huang MD Alert...Pharmacy to initiate transition from insulin infusion to subcutaneous insulin for cardiothoracic surgery 1 Each  1 Each Other Continuous CARMEN Huang           Allergies  Allergies   Allergen Reactions    Bee Swelling     Bee Stings- swelling    Other Environmental      Horses- eyes will swell shut, sneezing  Russian Thistle- itching/sneezing, watery eyes       Vital Signs  last 24 hours  Temp:  [36.1 °C (97 °F)-36.3 °C (97.3 °F)] 36.3 °C (97.3 °F)  Pulse:  [72-93] 72  Resp:  [10-23] 10  BP: ()/(58-79) 95/59  SpO2:  [95 %-99 %] 96 %    Physical Exam  Physical Exam  Vitals and nursing note reviewed.   Constitutional:       General: He is in acute distress.      Appearance: He is well-developed. He is ill-appearing.      Interventions: He is intubated.   HENT:      Head: Normocephalic and atraumatic.      Right Ear: External ear normal.      Left Ear: External ear normal.      Mouth/Throat:      Comments: 7.5 ET tube in place  Eyes:      Conjunctiva/sclera: Conjunctivae normal.      Pupils: Pupils are equal, round, and reactive to light.   Neck:      Vascular: No JVD.      Trachea: No tracheal deviation.      Comments: Right IJ dual-lumen catheter  Cardiovascular:      Rate and Rhythm: Normal rate and regular rhythm.      Pulses: Normal pulses.   Pulmonary:      Effort: He is intubated.      Breath sounds: Rales present. No wheezing.   Chest:       Abdominal:      General: Bowel sounds are normal. There is no distension.      Palpations: Abdomen is soft.   Genitourinary:     Comments: Bloody urinary output from [place]  Musculoskeletal:         General: No tenderness.      Cervical back: Neck supple.   Skin:     General: Skin is warm and dry.      Capillary Refill: Capillary refill takes less than 2 seconds.      Findings: No rash.   Neurological:      Comments: Sedated   Psychiatric:      Comments: Unable to assess       Fluids    Intake/Output Summary (Last 24 hours) at 3/28/2023 1446  Last data filed at 3/28/2023 1411  Gross per 24 hour   Intake 2067.88 ml   Output 253 ml   Net 1814.88 ml       Laboratory  Recent Results (from the past 48 hour(s))   URINALYSIS,CULTURE IF INDICATED    Collection Time: 03/27/23 10:30 AM    Specimen: Urine   Result Value Ref Range    Color Yellow     Character Clear     Specific Gravity 1.014 <1.035    Ph 6.0 5.0 - 8.0    Glucose >=1000 (A) Negative  mg/dL    Ketones Negative Negative mg/dL    Protein Negative Negative mg/dL    Bilirubin Negative Negative    Urobilinogen, Urine 0.2 Negative    Nitrite Negative Negative    Leukocyte Esterase Negative Negative    Occult Blood Negative Negative    Micro Urine Req see below    Urine Drug Screen    Collection Time: 03/27/23 10:30 AM   Result Value Ref Range    Amphetamines Urine Negative Negative    Barbiturates Negative Negative    Benzodiazepines Negative Negative    Cocaine Metabolite Negative Negative    Methadone Negative Negative    Opiates Negative Negative    Oxycodone Negative Negative    Phencyclidine -Pcp Negative Negative    Propoxyphene Negative Negative    Cannabinoid Metab Negative Negative   CBC WITH DIFFERENTIAL    Collection Time: 03/27/23 10:31 AM   Result Value Ref Range    WBC 7.4 4.8 - 10.8 K/uL    RBC 7.01 (H) 4.70 - 6.10 M/uL    Hemoglobin 20.5 (H) 14.0 - 18.0 g/dL    Hematocrit 63.0 (H) 42.0 - 52.0 %    MCV 89.9 81.4 - 97.8 fL    MCH 29.2 27.0 - 33.0 pg    MCHC 32.5 (L) 33.7 - 35.3 g/dL    RDW 46.3 35.9 - 50.0 fL    Platelet Count 319 164 - 446 K/uL    MPV 11.6 9.0 - 12.9 fL    Neutrophils-Polys 59.40 44.00 - 72.00 %    Lymphocytes 29.40 22.00 - 41.00 %    Monocytes 8.30 0.00 - 13.40 %    Eosinophils 1.40 0.00 - 6.90 %    Basophils 1.10 0.00 - 1.80 %    Immature Granulocytes 0.40 0.00 - 0.90 %    Nucleated RBC 0.00 /100 WBC    Neutrophils (Absolute) 4.38 1.82 - 7.42 K/uL    Lymphs (Absolute) 2.17 1.00 - 4.80 K/uL    Monos (Absolute) 0.61 0.00 - 0.85 K/uL    Eos (Absolute) 0.10 0.00 - 0.51 K/uL    Baso (Absolute) 0.08 0.00 - 0.12 K/uL    Immature Granulocytes (abs) 0.03 0.00 - 0.11 K/uL    NRBC (Absolute) 0.00 K/uL   Comp Metabolic Panel    Collection Time: 03/27/23 10:31 AM   Result Value Ref Range    Sodium 142 135 - 145 mmol/L    Potassium 3.7 3.6 - 5.5 mmol/L    Chloride 103 96 - 112 mmol/L    Co2 24 20 - 33 mmol/L    Anion Gap 15.0 7.0 - 16.0    Glucose 137 (H) 65 - 99 mg/dL    Bun 12 8 -  22 mg/dL    Creatinine 0.99 0.50 - 1.40 mg/dL    Calcium 9.5 8.5 - 10.5 mg/dL    AST(SGOT) 11 (L) 12 - 45 U/L    ALT(SGPT) 23 2 - 50 U/L    Alkaline Phosphatase 87 30 - 99 U/L    Total Bilirubin 0.6 0.1 - 1.5 mg/dL    Albumin 4.7 3.2 - 4.9 g/dL    Total Protein 7.8 6.0 - 8.2 g/dL    Globulin 3.1 1.9 - 3.5 g/dL    A-G Ratio 1.5 g/dL   HEMOGLOBIN A1C    Collection Time: 23 10:31 AM   Result Value Ref Range    Glycohemoglobin 5.9 (H) 4.0 - 5.6 %    Est Avg Glucose 123 mg/dL   CORRECTED CALCIUM    Collection Time: 23 10:31 AM   Result Value Ref Range    Correct Calcium 8.9 8.5 - 10.5 mg/dL   ESTIMATED GFR    Collection Time: 23 10:31 AM   Result Value Ref Range    GFR (CKD-EPI) 84 >60 mL/min/1.73 m 2   PreAdmit COD    Collection Time: 23 10:39 AM   Result Value Ref Range    ABO Grouping Only A     Rh Grouping Only POS     Antibody Screen Scrn NEG    EKG    Collection Time: 23 10:51 AM   Result Value Ref Range    Report       [facility]    Test Date:  2023  Pt Name:    FABINAO TRONCOSO              Department: [facility]  MRN:        [id number]                      Room:  Gender:     Male                         Technician: BRITT  :        1955                   Requested By:EMIGDIO DANGELO  Order #:    [id number]                    Reading MD: Jalil Cazares MD    Measurements  Intervals                                Axis  Rate:       90                           P:          63  [place]:         160                          QRS:        45  QRSD:       85                           T:          43  QT:         383  QTc:        469    Interpretive Statements  Sinus rhythm  Compared to ECG 2023 12:45:52  No significant changes  Electronically Signed On 3- 16:52:27 PDT by Jalil Cazares MD     MRSA By PCR (Amp)    Collection Time: 23  6:30 AM    Specimen: Nares; Respirate   Result Value Ref Range    MRSA by PCR Negative Negative   ABO Rh Confirm    Collection Time: 23   6:39 AM   Result Value Ref Range    ABO Rh Confirm A POS    POCT activated clotting time device results    Collection Time: 03/28/23  8:07 AM   Result Value Ref Range    Istat Activated Clotting Time 131 74 - 137 sec   POCT arterial blood gas device results    Collection Time: 03/28/23  8:07 AM   Result Value Ref Range    Ph 7.306 (L) 7.400 - 7.500    Pco2 42.4 (H) 26.0 - 37.0 mmHg    Po2 101 (H) 64 - 87 mmHg    Tco2 22 20 - 33 mmol/L    S02 97 93 - 99 %    Hco3 21.1 17.0 - 25.0 mmol/L    BE -5 (L) -4 - 3 mmol/L    Body Temp 36.2 C degrees    Ph Temp Checo 7.317 (L) 7.400 - 7.500    Pco2 Temp Co 40.9 (H) 26.0 - 37.0 mmHg    Po2 Temp Cor 97 (H) 64 - 87 mmHg    Specimen Arterial    POCT sodium device results    Collection Time: 03/28/23  8:07 AM   Result Value Ref Range    Istat Sodium 142 135 - 145 mmol/L   POCT potassium device results    Collection Time: 03/28/23  8:07 AM   Result Value Ref Range    Istat Potassium 3.2 (L) 3.6 - 5.5 mmol/L   POCT ionized CA device results    Collection Time: 03/28/23  8:07 AM   Result Value Ref Range    Istat Ionized Calcium 1.20 1.10 - 1.30 mmol/L   POCT hematocrit and hemoglobin device results    Collection Time: 03/28/23  8:07 AM   Result Value Ref Range    Istat Hematocrit 50 42 - 52 %    Istat Hemoglobin 17.0 14.0 - 18.0 g/dL   POCT activated clotting time device results    Collection Time: 03/28/23  8:34 AM   Result Value Ref Range    Istat Activated Clotting Time 540 (H) 74 - 137 sec   POCT arterial blood gas device results    Collection Time: 03/28/23  8:34 AM   Result Value Ref Range    Ph 7.214 (LL) 7.400 - 7.500    Pco2 56.6 (HH) 26.0 - 37.0 mmHg    Po2 107 (H) 64 - 87 mmHg    Tco2 25 20 - 33 mmol/L    S02 97 93 - 99 %    Hco3 22.9 17.0 - 25.0 mmol/L    BE -6 (L) -4 - 3 mmol/L    Body Temp 36.0 C degrees    Ph Temp Checo 7.228 (LL) 7.400 - 7.500    Pco2 Temp Co 54.2 (HH) 26.0 - 37.0 mmHg    Po2 Temp Cor 101 (H) 64 - 87 mmHg    Specimen Arterial    POCT sodium device  results    Collection Time: 03/28/23  8:34 AM   Result Value Ref Range    Istat Sodium 142 135 - 145 mmol/L   POCT potassium device results    Collection Time: 03/28/23  8:34 AM   Result Value Ref Range    Istat Potassium 3.8 3.6 - 5.5 mmol/L   POCT ionized CA device results    Collection Time: 03/28/23  8:34 AM   Result Value Ref Range    Istat Ionized Calcium 1.23 1.10 - 1.30 mmol/L   POCT hematocrit and hemoglobin device results    Collection Time: 03/28/23  8:34 AM   Result Value Ref Range    Istat Hematocrit 50 42 - 52 %    Istat Hemoglobin 17.0 14.0 - 18.0 g/dL   Histology Request    Collection Time: 03/28/23  9:01 AM   Result Value Ref Range    Pathology Request Sent to Histo    POCT activated clotting time device results    Collection Time: 03/28/23  9:09 AM   Result Value Ref Range    Istat Activated Clotting Time 600 (H) 74 - 137 sec   POCT venous blood gas device results    Collection Time: 03/28/23  9:09 AM   Result Value Ref Range    Ph 7.341 7.310 - 7.450    Pco2 46.7 41.0 - 51.0 mmHg    Po2 48 (H) 25 - 40 mmHg    Tco2 27 20 - 33 mmol/L    SO2 81 %    Hco3 25.3 24.0 - 28.0 mmol/L    BE -1 -4 - 3 mmol/L    Body Temp 35.0 C degrees    Ph Temp Correc 7.370 7.310 - 7.450    Pco2 Temp Checo 42.8 41.0 - 51.0 mmHg    Po2 Temp Corre 42 (H) 25 - 40 mmHg    Specimen Venous    POCT sodium device results    Collection Time: 03/28/23  9:09 AM   Result Value Ref Range    Istat Sodium 137 135 - 145 mmol/L   POCT potassium device results    Collection Time: 03/28/23  9:09 AM   Result Value Ref Range    Istat Potassium 4.6 3.6 - 5.5 mmol/L   POCT ionized CA device results    Collection Time: 03/28/23  9:09 AM   Result Value Ref Range    Istat Ionized Calcium 1.00 (L) 1.10 - 1.30 mmol/L   POCT hematocrit and hemoglobin device results    Collection Time: 03/28/23  9:09 AM   Result Value Ref Range    Istat Hematocrit 34 (L) 42 - 52 %    Istat Hemoglobin 11.6 (L) 14.0 - 18.0 g/dL   POCT activated clotting time device  results    Collection Time: 03/28/23  9:40 AM   Result Value Ref Range    Istat Activated Clotting Time 636 (H) 74 - 137 sec   POCT arterial blood gas device results    Collection Time: 03/28/23  9:41 AM   Result Value Ref Range    Ph 7.346 (L) 7.400 - 7.500    Pco2 47.7 (H) 26.0 - 37.0 mmHg    Po2 330 (H) 64 - 87 mmHg    Tco2 28 20 - 33 mmol/L    S02 100 (H) 93 - 99 %    Hco3 26.1 (H) 17.0 - 25.0 mmol/L    BE 0 -4 - 3 mmol/L    Body Temp 34.5 C degrees    Ph Temp Checo 7.382 (L) 7.400 - 7.500    Pco2 Temp Co 42.8 (H) 26.0 - 37.0 mmHg    Po2 Temp Cor 318 (H) 64 - 87 mmHg    Specimen Arterial    POCT sodium device results    Collection Time: 03/28/23  9:41 AM   Result Value Ref Range    Istat Sodium 138 135 - 145 mmol/L   POCT potassium device results    Collection Time: 03/28/23  9:41 AM   Result Value Ref Range    Istat Potassium 5.2 3.6 - 5.5 mmol/L   POCT ionized CA device results    Collection Time: 03/28/23  9:41 AM   Result Value Ref Range    Istat Ionized Calcium 1.03 (L) 1.10 - 1.30 mmol/L   POCT hematocrit and hemoglobin device results    Collection Time: 03/28/23  9:41 AM   Result Value Ref Range    Istat Hematocrit 37 (L) 42 - 52 %    Istat Hemoglobin 12.6 (L) 14.0 - 18.0 g/dL   POCT activated clotting time device results    Collection Time: 03/28/23 10:18 AM   Result Value Ref Range    Istat Activated Clotting Time 588 (H) 74 - 137 sec   POCT arterial blood gas device results    Collection Time: 03/28/23 10:18 AM   Result Value Ref Range    Ph 7.357 (L) 7.400 - 7.500    Pco2 45.2 (H) 26.0 - 37.0 mmHg    Po2 215 (H) 64 - 87 mmHg    Tco2 27 20 - 33 mmol/L    S02 100 (H) 93 - 99 %    Hco3 25.4 (H) 17.0 - 25.0 mmol/L    BE 0 -4 - 3 mmol/L    Body Temp 35.8 C degrees    Ph Temp Checo 7.374 (L) 7.400 - 7.500    Pco2 Temp Co 42.9 (H) 26.0 - 37.0 mmHg    Po2 Temp Cor 209 (H) 64 - 87 mmHg    Specimen Arterial    POCT sodium device results    Collection Time: 03/28/23 10:18 AM   Result Value Ref Range    Istat  Sodium 137 135 - 145 mmol/L   POCT potassium device results    Collection Time: 03/28/23 10:18 AM   Result Value Ref Range    Istat Potassium 5.9 (H) 3.6 - 5.5 mmol/L   POCT ionized CA device results    Collection Time: 03/28/23 10:18 AM   Result Value Ref Range    Istat Ionized Calcium 1.03 (L) 1.10 - 1.30 mmol/L   POCT hematocrit and hemoglobin device results    Collection Time: 03/28/23 10:18 AM   Result Value Ref Range    Istat Hematocrit 40 (L) 42 - 52 %    Istat Hemoglobin 13.6 (L) 14.0 - 18.0 g/dL   POCT activated clotting time device results    Collection Time: 03/28/23 10:55 AM   Result Value Ref Range    Istat Activated Clotting Time 125 74 - 137 sec   POCT arterial blood gas device results    Collection Time: 03/28/23 10:56 AM   Result Value Ref Range    Ph 7.310 (L) 7.400 - 7.500    Pco2 49.5 (H) 26.0 - 37.0 mmHg    Po2 130 (H) 64 - 87 mmHg    Tco2 26 20 - 33 mmol/L    S02 99 93 - 99 %    Hco3 24.9 17.0 - 25.0 mmol/L    BE -2 -4 - 3 mmol/L    Body Temp 37.0 C degrees    Ph Temp Checo 7.310 (L) 7.400 - 7.500    Pco2 Temp Co 49.5 (H) 26.0 - 37.0 mmHg    Po2 Temp Cor 130 (H) 64 - 87 mmHg    Specimen Arterial    POCT sodium device results    Collection Time: 03/28/23 10:56 AM   Result Value Ref Range    Istat Sodium 139 135 - 145 mmol/L   POCT potassium device results    Collection Time: 03/28/23 10:56 AM   Result Value Ref Range    Istat Potassium 4.6 3.6 - 5.5 mmol/L   POCT ionized CA device results    Collection Time: 03/28/23 10:56 AM   Result Value Ref Range    Istat Ionized Calcium 1.27 1.10 - 1.30 mmol/L   POCT hematocrit and hemoglobin device results    Collection Time: 03/28/23 10:56 AM   Result Value Ref Range    Istat Hematocrit 39 (L) 42 - 52 %    Istat Hemoglobin 13.3 (L) 14.0 - 18.0 g/dL   Platelet count    Collection Time: 03/28/23 11:45 AM   Result Value Ref Range    Platelet Count 165 164 - 446 K/uL   Magnesium    Collection Time: 03/28/23 11:45 AM   Result Value Ref Range    Magnesium 2.8  (H) 1.5 - 2.5 mg/dL   Prothrombin time (INR)    Collection Time: 23 11:45 AM   Result Value Ref Range    PT 17.3 (H) 12.0 - 14.6 sec    INR 1.44 (H) 0.87 - 1.13   APTT (PTT)    Collection Time: 23 11:45 AM   Result Value Ref Range    APTT 40.0 (H) 24.7 - 36.0 sec   Hemoglobin and Hematocrit upon arrival to unit    Collection Time: 23 11:45 AM   Result Value Ref Range    Hemoglobin 15.2 14.0 - 18.0 g/dL    Hematocrit 44.4 42.0 - 52.0 %   POCT arterial blood gas device results    Collection Time: 23 11:50 AM   Result Value Ref Range    Ph 7.309 (L) 7.400 - 7.500    Pco2 44.7 (H) 26.0 - 37.0 mmHg    Po2 123 (H) 64 - 87 mmHg    Tco2 24 20 - 33 mmol/L    S02 98 93 - 99 %    Hco3 22.5 17.0 - 25.0 mmol/L    BE -4 -4 - 3 mmol/L    Body Temp 36.4 C degrees    O2 Therapy 100 %    iPF Ratio 123     Ph Temp Checo 7.318 (L) 7.400 - 7.500    Pco2 Temp Co 43.5 (H) 26.0 - 37.0 mmHg    Po2 Temp Cor 120 (H) 64 - 87 mmHg    Specimen Arterial    POCT sodium device results    Collection Time: 23 11:50 AM   Result Value Ref Range    Istat Sodium 144 135 - 145 mmol/L   POCT potassium device results    Collection Time: 23 11:50 AM   Result Value Ref Range    Istat Potassium 3.7 3.6 - 5.5 mmol/L   POCT ionized CA device results    Collection Time: 23 11:50 AM   Result Value Ref Range    Istat Ionized Calcium 1.19 1.10 - 1.30 mmol/L   EKG on arrival to CSU    Collection Time: 23 12:09 PM   Result Value Ref Range    Report       [facility]    Test Date:  2023  Pt Name:    FABIANO TRONCOSO              Department: 161  MRN:        [id number]                      Room:       T617  Gender:     Male                         Technician: [facility]  :        1955                   Requested By:CECE RUIZ  Order #:    [id number]                    Reading MD: Jorge Alberto Cruz MD    Measurements  Intervals                                Axis  Rate:       77                           P:           78  RI:         156                          QRS:        47  QRSD:       94                           T:          39  QT:         428  QTc:        485    Interpretive Statements  Sinus rhythm  Low voltage, extremity leads  Borderline prolonged QT interval  Compared to ECG 03/27/2023 10:51:42  No significant changes  Electronically Signed On 3- 12:54:58 PDT by Jorge Alberto Cruz MD     POCT arterial blood gas device results    Collection Time: 03/28/23  1:08 PM   Result Value Ref Range    Ph 7.367 (L) 7.400 - 7.500    Pco2 40.7 (H) 26.0 - 37.0 mmHg    Po2 89 (H) 64 - 87 mmHg    Tco2 25 20 - 33 mmol/L    S02 97 93 - 99 %    Hco3 23.4 17.0 - 25.0 mmol/L    BE -2 -4 - 3 mmol/L    Body Temp 36.1 C degrees    O2 Therapy 100 %    iPF Ratio 89     Ph Temp Checo 7.380 (L) 7.400 - 7.500    Pco2 Temp Co 39.1 (H) 26.0 - 37.0 mmHg    Po2 Temp Cor 84 64 - 87 mmHg    Specimen Arterial     DelSys Vent     End Tidal Carbon Dioxide 29 mmhg    Peep End Expiratory Pressure 8 cmh20    Percent Minute Volume 160     Mode ASV    POCT arterial blood gas device results    Collection Time: 03/28/23  2:11 PM   Result Value Ref Range    Ph 7.371 (L) 7.400 - 7.500    Pco2 36.0 26.0 - 37.0 mmHg    Po2 92 (H) 64 - 87 mmHg    Tco2 22 20 - 33 mmol/L    S02 97 93 - 99 %    Hco3 20.9 17.0 - 25.0 mmol/L    BE -4 -4 - 3 mmol/L    Body Temp 36.3 C degrees    O2 Therapy 95 %    iPF Ratio 97     Ph Temp [place] 7.381 (L) 7.400 - 7.500    Pco2 Temp Co 34.9 26.0 - 37.0 mmHg    Po2 Temp Cor 88 (H) 64 - 87 mmHg    Specimen Arterial     DelSys Vent     End Tidal Carbon Dioxide 29 mmhg    Peep End Expiratory Pressure 8 cmh20    Percent Minute Volume 130     Mode ASV        Imaging  DX-CHEST-PORTABLE (1 VIEW)   Final Result      1.  Interval open heart surgery.   2.  Hypoinflation and pulmonary edema as well as bibasilar atelectasis.   3.  Supportive tubing as described above.   4.  No pneumothorax.      EC-TYRA W/O CONT    (Results Pending)        Assessment/Plan  Encounter for weaning from ventilator ([facility])- (present on admission)  Assessment & Plan  Postoperative ventilator weaning per protocol    Pulmonary emphysema ([facility])- (present on admission)  Assessment & Plan  Restart Symbicort and albuterol postextubation  PFT 2/28/23 show mild obstruction and no e/o restriction. Reduced DLCO and DL/VA are c/w his emphysema.  Long history of tobacco abuse and mining exposure    Moderate mitral regurgitation by prior echocardiogram- (present on admission)  Assessment & Plan  Mitral valve replacement on 3/28/2023 by Dr. Sheppard  POD #0  Wean ventilator per protocol based on ABG, goal to wean from ventilator in <4-6 hours  Wean vasopressor support as guided by hemodynamics  Post-operative pain control per [facility]  Chest tube and pacemaker management per [facility]  Optimize acid-base balance with vent and bicarbonate PRN  Insulin gtt  Optimize K and Mg  PT/OT consult    Atrial fibrillation with RVR ([facility])- (present on admission)  Assessment & Plan  Status post ablation  Left atrial appendage ligation on 3/28/2023        Discussed patient condition and risk of morbidity and/or mortality with RN, RT, Pharmacy, Code status disscussed, Charge nurse / hot rounds, CVS, and anesthesiology .      The patient remains critically ill.  Critical care time = 55 minutes in directly providing and coordinating critical care and extensive data review.  No time overlap and excludes procedures.

## 2023-03-29 ENCOUNTER — APPOINTMENT (OUTPATIENT)
Dept: RADIOLOGY | Facility: MEDICAL CENTER | Age: 68
DRG: 220 | End: 2023-03-29
Attending: NURSE PRACTITIONER
Payer: MEDICARE

## 2023-03-29 LAB
ANION GAP SERPL CALC-SCNC: 14 MMOL/L (ref 7–16)
BASE EXCESS BLDA CALC-SCNC: -3 MMOL/L (ref -4–3)
BODY TEMPERATURE: ABNORMAL DEGREES
BUN SERPL-MCNC: 16 MG/DL (ref 8–22)
CALCIUM SERPL-MCNC: 7.8 MG/DL (ref 8.5–10.5)
CHLORIDE SERPL-SCNC: 105 MMOL/L (ref 96–112)
CO2 BLDA-SCNC: 21 MMOL/L (ref 20–33)
CO2 SERPL-SCNC: 19 MMOL/L (ref 20–33)
CREAT SERPL-MCNC: 0.76 MG/DL (ref 0.5–1.4)
DELSYS IDSYS: ABNORMAL
EKG IMPRESSION: NORMAL
ERYTHROCYTE [DISTWIDTH] IN BLOOD BY AUTOMATED COUNT: 46.1 FL (ref 35.9–50)
GFR SERPLBLD CREATININE-BSD FMLA CKD-EPI: 99 ML/MIN/1.73 M 2
GLUCOSE BLD STRIP.AUTO-MCNC: 131 MG/DL (ref 65–99)
GLUCOSE BLD STRIP.AUTO-MCNC: 134 MG/DL (ref 65–99)
GLUCOSE BLD STRIP.AUTO-MCNC: 135 MG/DL (ref 65–99)
GLUCOSE BLD STRIP.AUTO-MCNC: 140 MG/DL (ref 65–99)
GLUCOSE BLD STRIP.AUTO-MCNC: 146 MG/DL (ref 65–99)
GLUCOSE BLD STRIP.AUTO-MCNC: 152 MG/DL (ref 65–99)
GLUCOSE BLD STRIP.AUTO-MCNC: 156 MG/DL (ref 65–99)
GLUCOSE BLD STRIP.AUTO-MCNC: 158 MG/DL (ref 65–99)
GLUCOSE BLD STRIP.AUTO-MCNC: 161 MG/DL (ref 65–99)
GLUCOSE BLD STRIP.AUTO-MCNC: 169 MG/DL (ref 65–99)
GLUCOSE BLD STRIP.AUTO-MCNC: 173 MG/DL (ref 65–99)
GLUCOSE BLD STRIP.AUTO-MCNC: 174 MG/DL (ref 65–99)
GLUCOSE BLD STRIP.AUTO-MCNC: 174 MG/DL (ref 65–99)
GLUCOSE BLD STRIP.AUTO-MCNC: 178 MG/DL (ref 65–99)
GLUCOSE BLD STRIP.AUTO-MCNC: 179 MG/DL (ref 65–99)
GLUCOSE SERPL-MCNC: 150 MG/DL (ref 65–99)
HCO3 BLDA-SCNC: 20.4 MMOL/L (ref 17–25)
HCT VFR BLD AUTO: 44.2 % (ref 42–52)
HGB BLD-MCNC: 14.8 G/DL (ref 14–18)
INR PPP: 1.18 (ref 0.87–1.13)
LPM ILPM: 12 LPM
LV EJECT FRACT  99904: 60
MCH RBC QN AUTO: 29.3 PG (ref 27–33)
MCHC RBC AUTO-ENTMCNC: 33.5 G/DL (ref 33.7–35.3)
MCV RBC AUTO: 87.5 FL (ref 81.4–97.8)
PCO2 BLDA: 32.1 MMHG (ref 26–37)
PCO2 TEMP ADJ BLDA: 31.9 MMHG (ref 26–37)
PH BLDA: 7.41 [PH] (ref 7.4–7.5)
PH TEMP ADJ BLDA: 7.41 [PH] (ref 7.4–7.5)
PLATELET # BLD AUTO: 139 K/UL (ref 164–446)
PMV BLD AUTO: 11.5 FL (ref 9–12.9)
PO2 BLDA: 54 MMHG (ref 64–87)
PO2 TEMP ADJ BLDA: 54 MMHG (ref 64–87)
POTASSIUM SERPL-SCNC: 4.6 MMOL/L (ref 3.6–5.5)
PROTHROMBIN TIME: 14.9 SEC (ref 12–14.6)
RBC # BLD AUTO: 5.05 M/UL (ref 4.7–6.1)
SAO2 % BLDA: 89 % (ref 93–99)
SODIUM SERPL-SCNC: 138 MMOL/L (ref 135–145)
SPECIMEN DRAWN FROM PATIENT: ABNORMAL
WBC # BLD AUTO: 22.1 K/UL (ref 4.8–10.8)

## 2023-03-29 PROCEDURE — 82962 GLUCOSE BLOOD TEST: CPT | Mod: 91

## 2023-03-29 PROCEDURE — 770022 HCHG ROOM/CARE - ICU (200)

## 2023-03-29 PROCEDURE — 94640 AIRWAY INHALATION TREATMENT: CPT

## 2023-03-29 PROCEDURE — 94664 DEMO&/EVAL PT USE INHALER: CPT

## 2023-03-29 PROCEDURE — 700111 HCHG RX REV CODE 636 W/ 250 OVERRIDE (IP): Performed by: NURSE PRACTITIONER

## 2023-03-29 PROCEDURE — 99024 POSTOP FOLLOW-UP VISIT: CPT | Performed by: THORACIC SURGERY (CARDIOTHORACIC VASCULAR SURGERY)

## 2023-03-29 PROCEDURE — 93005 ELECTROCARDIOGRAM TRACING: CPT | Performed by: NURSE PRACTITIONER

## 2023-03-29 PROCEDURE — 85027 COMPLETE CBC AUTOMATED: CPT

## 2023-03-29 PROCEDURE — A9270 NON-COVERED ITEM OR SERVICE: HCPCS | Performed by: THORACIC SURGERY (CARDIOTHORACIC VASCULAR SURGERY)

## 2023-03-29 PROCEDURE — 80048 BASIC METABOLIC PNL TOTAL CA: CPT

## 2023-03-29 PROCEDURE — 700101 HCHG RX REV CODE 250: Performed by: NURSE PRACTITIONER

## 2023-03-29 PROCEDURE — 82803 BLOOD GASES ANY COMBINATION: CPT

## 2023-03-29 PROCEDURE — 700102 HCHG RX REV CODE 250 W/ 637 OVERRIDE(OP): Performed by: NURSE PRACTITIONER

## 2023-03-29 PROCEDURE — 93010 ELECTROCARDIOGRAM REPORT: CPT | Performed by: STUDENT IN AN ORGANIZED HEALTH CARE EDUCATION/TRAINING PROGRAM

## 2023-03-29 PROCEDURE — 93312 ECHO TRANSESOPHAGEAL: CPT

## 2023-03-29 PROCEDURE — 99291 CRITICAL CARE FIRST HOUR: CPT | Performed by: INTERNAL MEDICINE

## 2023-03-29 PROCEDURE — A9270 NON-COVERED ITEM OR SERVICE: HCPCS | Performed by: NURSE PRACTITIONER

## 2023-03-29 PROCEDURE — 700101 HCHG RX REV CODE 250: Performed by: INTERNAL MEDICINE

## 2023-03-29 PROCEDURE — 71045 X-RAY EXAM CHEST 1 VIEW: CPT

## 2023-03-29 PROCEDURE — 700102 HCHG RX REV CODE 250 W/ 637 OVERRIDE(OP): Performed by: THORACIC SURGERY (CARDIOTHORACIC VASCULAR SURGERY)

## 2023-03-29 PROCEDURE — 94669 MECHANICAL CHEST WALL OSCILL: CPT

## 2023-03-29 PROCEDURE — 85610 PROTHROMBIN TIME: CPT

## 2023-03-29 RX ORDER — POTASSIUM CHLORIDE 20 MEQ/1
20 TABLET, EXTENDED RELEASE ORAL DAILY
Status: DISCONTINUED | OUTPATIENT
Start: 2023-03-29 | End: 2023-04-04 | Stop reason: HOSPADM

## 2023-03-29 RX ORDER — ROSUVASTATIN CALCIUM 10 MG/1
5 TABLET, COATED ORAL DAILY
Status: DISCONTINUED | OUTPATIENT
Start: 2023-03-29 | End: 2023-04-04 | Stop reason: HOSPADM

## 2023-03-29 RX ORDER — FUROSEMIDE 10 MG/ML
20 INJECTION INTRAMUSCULAR; INTRAVENOUS ONCE
Status: COMPLETED | OUTPATIENT
Start: 2023-03-29 | End: 2023-03-29

## 2023-03-29 RX ORDER — IPRATROPIUM BROMIDE AND ALBUTEROL SULFATE 2.5; .5 MG/3ML; MG/3ML
3 SOLUTION RESPIRATORY (INHALATION)
Status: DISCONTINUED | OUTPATIENT
Start: 2023-03-29 | End: 2023-04-01

## 2023-03-29 RX ORDER — FUROSEMIDE 10 MG/ML
20 INJECTION INTRAMUSCULAR; INTRAVENOUS
Status: DISCONTINUED | OUTPATIENT
Start: 2023-03-29 | End: 2023-04-04 | Stop reason: HOSPADM

## 2023-03-29 RX ORDER — WARFARIN SODIUM 5 MG/1
5 TABLET ORAL
Status: COMPLETED | OUTPATIENT
Start: 2023-03-29 | End: 2023-03-29

## 2023-03-29 RX ADMIN — MAGNESIUM SULFATE HEPTAHYDRATE 1 G: 1 INJECTION, SOLUTION INTRAVENOUS at 05:46

## 2023-03-29 RX ADMIN — POTASSIUM CHLORIDE 20 MEQ: 1500 TABLET, EXTENDED RELEASE ORAL at 09:08

## 2023-03-29 RX ADMIN — IPRATROPIUM BROMIDE AND ALBUTEROL SULFATE 3 ML: .5; 2.5 SOLUTION RESPIRATORY (INHALATION) at 16:33

## 2023-03-29 RX ADMIN — FUROSEMIDE 20 MG: 10 INJECTION, SOLUTION INTRAMUSCULAR; INTRAVENOUS at 02:37

## 2023-03-29 RX ADMIN — ASPIRIN 81 MG: 81 TABLET, COATED ORAL at 05:43

## 2023-03-29 RX ADMIN — OXYCODONE HYDROCHLORIDE 5 MG: 5 TABLET ORAL at 03:30

## 2023-03-29 RX ADMIN — POLYETHYLENE GLYCOL 3350 1 PACKET: 17 POWDER, FOR SOLUTION ORAL at 05:42

## 2023-03-29 RX ADMIN — IPRATROPIUM BROMIDE AND ALBUTEROL SULFATE 3 ML: .5; 2.5 SOLUTION RESPIRATORY (INHALATION) at 08:38

## 2023-03-29 RX ADMIN — ACETAMINOPHEN 1000 MG: 500 TABLET, FILM COATED ORAL at 13:01

## 2023-03-29 RX ADMIN — OXYCODONE HYDROCHLORIDE 5 MG: 5 TABLET ORAL at 16:32

## 2023-03-29 RX ADMIN — METOPROLOL TARTRATE 12.5 MG: 25 TABLET, FILM COATED ORAL at 17:49

## 2023-03-29 RX ADMIN — ACETAMINOPHEN 1000 MG: 500 TABLET, FILM COATED ORAL at 17:49

## 2023-03-29 RX ADMIN — WARFARIN SODIUM 5 MG: 5 TABLET ORAL at 17:51

## 2023-03-29 RX ADMIN — DOCUSATE SODIUM 50 MG AND SENNOSIDES 8.6 MG 2 TABLET: 8.6; 5 TABLET, FILM COATED ORAL at 05:42

## 2023-03-29 RX ADMIN — OMEPRAZOLE 20 MG: 20 CAPSULE, DELAYED RELEASE ORAL at 05:43

## 2023-03-29 RX ADMIN — OXYCODONE HYDROCHLORIDE 5 MG: 5 TABLET ORAL at 13:00

## 2023-03-29 RX ADMIN — ROSUVASTATIN CALCIUM 5 MG: 10 TABLET, FILM COATED ORAL at 09:07

## 2023-03-29 RX ADMIN — FUROSEMIDE 20 MG: 10 INJECTION, SOLUTION INTRAMUSCULAR; INTRAVENOUS at 09:09

## 2023-03-29 RX ADMIN — ACETAMINOPHEN 1000 MG: 500 TABLET, FILM COATED ORAL at 23:25

## 2023-03-29 RX ADMIN — ACETAMINOPHEN 1000 MG: 500 TABLET, FILM COATED ORAL at 05:43

## 2023-03-29 RX ADMIN — IPRATROPIUM BROMIDE AND ALBUTEROL SULFATE 3 ML: .5; 2.5 SOLUTION RESPIRATORY (INHALATION) at 19:50

## 2023-03-29 RX ADMIN — INSULIN HUMAN 2 UNITS: 100 INJECTION, SOLUTION PARENTERAL at 20:48

## 2023-03-29 RX ADMIN — MUPIROCIN 1 APPLICATION: 20 OINTMENT TOPICAL at 05:44

## 2023-03-29 RX ADMIN — IPRATROPIUM BROMIDE AND ALBUTEROL SULFATE 3 ML: .5; 2.5 SOLUTION RESPIRATORY (INHALATION) at 22:38

## 2023-03-29 RX ADMIN — FUROSEMIDE 20 MG: 10 INJECTION, SOLUTION INTRAMUSCULAR; INTRAVENOUS at 16:33

## 2023-03-29 RX ADMIN — IPRATROPIUM BROMIDE AND ALBUTEROL SULFATE 3 ML: .5; 2.5 SOLUTION RESPIRATORY (INHALATION) at 11:13

## 2023-03-29 RX ADMIN — MUPIROCIN 1 APPLICATION: 20 OINTMENT TOPICAL at 17:52

## 2023-03-29 RX ADMIN — ENOXAPARIN SODIUM 40 MG: 40 INJECTION SUBCUTANEOUS at 17:52

## 2023-03-29 RX ADMIN — DOCUSATE SODIUM 50 MG AND SENNOSIDES 8.6 MG 2 TABLET: 8.6; 5 TABLET, FILM COATED ORAL at 17:50

## 2023-03-29 RX ADMIN — OXYCODONE HYDROCHLORIDE 5 MG: 5 TABLET ORAL at 23:25

## 2023-03-29 ASSESSMENT — PAIN DESCRIPTION - PAIN TYPE
TYPE: SURGICAL PAIN
TYPE: ACUTE PAIN
TYPE: SURGICAL PAIN
TYPE: ACUTE PAIN;SURGICAL PAIN
TYPE: SURGICAL PAIN
TYPE: ACUTE PAIN;SURGICAL PAIN
TYPE: SURGICAL PAIN

## 2023-03-29 ASSESSMENT — CHA2DS2 SCORE
DIABETES: NO
CHA2DS2 VASC SCORE: 2
AGE 75 OR GREATER: NO
AGE 65 TO 74: YES
VASCULAR DISEASE: NO
PRIOR STROKE OR TIA OR THROMBOEMBOLISM: NO
SEX: MALE
HYPERTENSION: YES
CHF OR LEFT VENTRICULAR DYSFUNCTION: NO

## 2023-03-29 ASSESSMENT — ENCOUNTER SYMPTOMS
STRIDOR: 0
FOCAL WEAKNESS: 0
COUGH: 0
VOMITING: 0
DIZZINESS: 0
CHILLS: 0
SHORTNESS OF BREATH: 1
ABDOMINAL PAIN: 0
SPUTUM PRODUCTION: 0
MYALGIAS: 0
FEVER: 0
SENSORY CHANGE: 0
BLURRED VISION: 0
NAUSEA: 0

## 2023-03-29 ASSESSMENT — FIBROSIS 4 INDEX: FIB4 SCORE: 1.09

## 2023-03-29 NOTE — PROGRESS NOTES
Cardiovascular Surgery Progress Note    Name: Eduardo Aponte  MRN: [id number]  : 1955  Admit Date: 3/28/2023  5:17 AM  1 Day Post-Op     Procedure:  Procedure(s) and Anesthesia Type:     * MITRAL VALVE REPLACEMENT - General     * ECHOCARDIOGRAM, TRANSESOPHAGEAL, INTRAOPERATIVE - General     * EXCISION, LEFT ATRIAL APPENDAGE - General     * REPAIR, ATRIAL SEPTAL DEFECT - General    Vitals:  Vitals:    23 0415 23 0430 23 0500 23 0600   BP:  95/64 106/61 90/57   Pulse: 92 92 97 89   Resp: 20      Temp:       TempSrc:       SpO2: 89% 91% 90% 88%   Weight:       Height:          Temp (24hrs), Av.5 °C (97.7 °F), Min:36.1 °C (97 °F), Max:37 °C (98.6 °F)      Respiratory:  Respiration: 20, Pulse Oximetry: 88 %     Fluids:    Intake/Output Summary (Last 24 hours) at 3/29/2023 0734  Last data filed at 3/29/2023 0600  Gross per 24 hour   Intake 3901.44 ml   Output 2220 ml   Net 1681.44 ml     Admit weight: Weight: 82.6 kg (182 lb 1.6 oz)  Current weight: Weight: 82.6 kg (182 lb 1.6 oz) (23 0532)    Labs:  Recent Labs     23  1031 23  1145 23  0455   WBC 7.4  --  22.1*   RBC 7.01*  --  5.05   HEMOGLOBIN 20.5* 15.2 14.8   HEMATOCRIT 63.0* 44.4 44.2   MCV 89.9  --  87.5   MCH 29.2  --  29.3   MCHC 32.5*  --  33.5*   RDW 46.3  --  46.1   PLATELETCT 319 165 139*   MPV 11.6  --  11.5     Recent Labs     23  1031 23  1610 23  2304 23  0455   SODIUM 142  --   --  138   POTASSIUM 3.7 4.2 4.5 4.6   CHLORIDE 103  --   --  105   CO2 24  --   --  19*   GLUCOSE 137*  --   --  150*   BUN 12  --   --  16   CREATININE 0.99  --   --  0.76   CALCIUM 9.5  --   --  7.8*     Recent Labs     23  1145 23  0445   APTT 40.0*  --    INR 1.44* 1.18*           Medications:  Scheduled Medications   Medication Dose Frequency    enoxaparin (LOVENOX) injection  40 mg DAILY AT 1800    mupirocin  1 Application. BID    magnesium sulfate  1 g DAILY    K+ Scale: Goal of  4.5  1 Each Q6HRS    aspirin EC  81 mg DAILY    Pharmacy Consult Request  1 Each PHARMACY TO DOSE    acetaminophen  1,000 mg Q6HRS    senna-docusate  2 Tablet BID    And    polyethylene glycol/lytes  1 Packet DAILY    And    [START ON 3/30/2023] magnesium hydroxide  30 mL DAILY    omeprazole  20 mg DAILY    metoprolol tartrate  12.5 mg BID    Followed by    [START ON 3/30/2023] metoprolol tartrate  25 mg BID    MD Alert...Warfarin per Pharmacy   PHARMACY TO DOSE    insulin regular  0-14 Units Once    insulin lispro  0-14 Units TID AC        Exam:   Physical Exam  Vitals and nursing note reviewed.   Constitutional:       General: He is not in acute distress.     Appearance: Normal appearance. He is obese.   HENT:      Head: Normocephalic and atraumatic.      Right Ear: External ear normal.      Left Ear: External ear normal.      Mouth/Throat:      Mouth: Mucous membranes are moist.      Pharynx: Oropharynx is clear.   Eyes:      Pupils: Pupils are equal, round, and reactive to light.   Cardiovascular:      Rate and Rhythm: Normal rate and regular rhythm.      Pulses: Normal pulses.      Heart sounds: Normal heart sounds.   Pulmonary:      Effort: Pulmonary effort is normal.      Breath sounds: Decreased breath sounds present.   Abdominal:      General: Bowel sounds are decreased. There is no distension.      Palpations: Abdomen is soft.      Tenderness: There is no abdominal tenderness.   Musculoskeletal:      Cervical back: Normal range of motion and neck supple. No tenderness.      Right lower leg: No edema.      Left lower leg: No edema.      Comments: Bilateral upper extremity edema     Skin:     General: Skin is warm and dry.      Capillary Refill: Capillary refill takes less than 2 seconds.   Neurological:      General: No focal deficit present.      Mental Status: He is oriented to person, place, and time. Mental status is at baseline.   Psychiatric:         Mood and Affect: Mood normal.         Behavior:  Behavior normal.         Thought Content: Thought content normal.       Cardiac Medications:    ASA - Yes    Plavix - No; contraindicated because of On Coumadin / NOAC    Post-operative Beta Blockers - Yes    Ace/ARB- No; contraindicated because of Normal EF    Statin - Yes    Aldactone- No; contraindicated because of Normal EF    Ejection Fraction:  60%    Telemetry:   3/29 SR    Assessment/Plan:  POD 1  HDS- off pressors, SR, neuro intact, wounds intact, abdomen soft, fluid balance positive, wt up,  on 12 L oxymask. Received one dose of lasix overnight. 208 ml out of chest tubes overnight. Plan:  Diurese, encourage IS and mobility, HFNC. Keep chest tubes, pacing wires, and corona.     Disposition:  TBD

## 2023-03-29 NOTE — PROGRESS NOTES
4 Eyes Skin Assessment Completed by  Eduardo RN and Gonzales RN.    Head WDL  Ears WDL  Nose WDL  Mouth WDL  Neck WDL  Breast/Chest Incision  Shoulder Blades WDL  Spine WDL  (R) Arm/Elbow/Hand Bruising  (L) Arm/Elbow/Hand Bruising  Abdomen Incision  Groin WDL  Scrotum/Coccyx/Buttocks Blanching  (R) Leg WDL  (L) Leg WDL  (R) Heel/Foot/Toe WDL  (L) Heel/Foot/Toe WDL          Devices In Places ECG, Blood Pressure Cuff, Pulse Ox, Roberts, Arterial Line, SCD's, ET Tube, Central Line, Pacer, and Nasal Cannula      Interventions In Place Gray Ear Foams    Possible Skin Injury Yes    Pictures Uploaded Into Epic N/A  Wound Consult Placed N/A  RN Wound Prevention Protocol Ordered No

## 2023-03-29 NOTE — PROGRESS NOTES
"Critical Care Progress Note    Date of admission  3/28/2023    Chief Complaint  67 y.o. male admitted 3/28/2023 with mitral regurgitation     Hospital Course  \"67 y.o. male with a past medical history of asthma, hypertension, hyperlipidemia, severe MR and ASD who presented 3/28/2023 for an elective mitral valve replacement, left atrial appendage ligation and ASD repair with Dr. Sheppard.  His intraoperative course was uneventful, his total aortic cross-clamp time was 95 minutes and total cardiopulmonary bypass time was 110 minutes.  Post procedurally his LVEF was 60% with good transvalvular gradients.  He arrives in the ICU on sedation, vasopressors and full mechanical ventilatory support.\"    Interval Problem Update  Reviewed last 24 hour events:   - Increasing hypoxia overnight, splinting respirations due to nebs   - Neuro: AOx4   - HR: 80s-90s   - SBP: 90s-110s, off levo   - GI: tolerating diet, last BM PTA   - UOP: 1.9 L since admit   - Roberts: yes   - Tm: 37.2   - Lines: CVC, chest tubes, pacer   - PPx: GI PPI, DVT held   - 12L oxymask, IS 1.7L, PEP   - ABG pH: 7.41, pCO2: 32.1, pO2: 54, HCO3: 20.4, BE: -3   - CXR (personally reviewed and compared to prior): Hypoinflation and bibasilar atelectasis   - Oxymask -> HFNC, scheduled nebs    Review of Systems  Review of Systems   Constitutional:  Positive for malaise/fatigue. Negative for chills and fever.   Eyes:  Negative for blurred vision.   Respiratory:  Positive for shortness of breath. Negative for cough, sputum production and stridor.    Cardiovascular:  Positive for chest pain.   Gastrointestinal:  Negative for abdominal pain, nausea and vomiting.   Genitourinary:  Negative for dysuria.   Musculoskeletal:  Negative for myalgias.   Skin:  Negative for rash.   Neurological:  Negative for dizziness, sensory change and focal weakness.      Vital Signs for last 24 hours   Temp:  [36.1 °C (97 °F)-37 °C (98.6 °F)] 37 °C (98.6 °F)  Pulse:  [68-97] 89  Resp:  [10-25] " 20  BP: ()/(57-79) 90/57  SpO2:  [87 %-99 %] 88 %    Hemodynamic parameters for last 24 hours  CVP:  [0 MM HG-305 MM HG] 17 MM HG    Respiratory Information for the last 24 hours  Vent Mode: ASV  PEEP/CPAP: 8  P Support: 50  MAP: 10    Physical Exam   Physical Exam  Vitals and nursing note reviewed.   Constitutional:       General: He is in acute distress.      Appearance: He is well-developed. He is ill-appearing.   HENT:      Head: Normocephalic and atraumatic.      Right Ear: External ear normal.      Left Ear: External ear normal.   Eyes:      Conjunctiva/sclera: Conjunctivae normal.      Pupils: Pupils are equal, round, and reactive to light.   Neck:      Vascular: No JVD.      Trachea: No tracheal deviation.      Comments: Right IJ dual-lumen catheter  Cardiovascular:      Rate and Rhythm: Normal rate and regular rhythm.      Pulses: Normal pulses.   Pulmonary:      Breath sounds: Rales present. No wheezing.   Chest:       Abdominal:      General: Bowel sounds are normal. There is no distension.      Palpations: Abdomen is soft.   Genitourinary:     Comments: Bloody urinary output from Roberts, clearing  Musculoskeletal:         General: No tenderness.      Cervical back: Neck supple.   Skin:     General: Skin is warm and dry.      Capillary Refill: Capillary refill takes less than 2 seconds.      Findings: No rash.   Neurological:      General: No focal deficit present.      Mental Status: He is alert and oriented to person, place, and time.      GCS: GCS eye subscore is 4. GCS verbal subscore is 5. GCS motor subscore is 6.      Cranial Nerves: Cranial nerves 2-12 are intact.      Sensory: Sensation is intact.      Motor: Motor function is intact.   Psychiatric:         Mood and Affect: Mood and affect normal.       Medications  Current Facility-Administered Medications   Medication Dose Route Frequency Provider Last Rate Last Admin    Respiratory Therapy Consult   Nebulization Continuous RT Corinne TIDWELL  ANGÉLICA MishraPMarielRMarielN.        NS infusion   Intravenous Continuous ANGÉLICA HuangPMarielR.N. 10 mL/hr at 03/28/23 1200 Rate Verify at 03/28/23 1200    electrolyte-A (PLASMALYTE-A) infusion   Intravenous PRN AGNÉLICA HuangPMarielR.N.        enoxaparin (Lovenox) inj 40 mg  40 mg Subcutaneous DAILY AT 1800 ANGÉLICA HuangPMarielRLEONARDO.        mupirocin (BACTROBAN) 2 % ointment 1 Application.  1 Application. Topical BID SARAH HuangR.N.   1 Application. at 03/29/23 0544    calcium CHLORIDE 1,000 mg in dextrose 5% 100 mL IVPB  1,000 mg Intravenous Once PRN ANGÉLICA HuangPMarielRSINGH        magnesium sulfate in D5W IVPB premix 1 g  1 g Intravenous DAILY ANGÉLICA HuangP.R.N. 100 mL/hr at 03/29/23 0546 1 g at 03/29/23 0546    K+ Scale: Goal of 4.5  1 Each Intravenous Q6HRS ANGÉLICA HuangPMarielR.N.   1 Each at 03/28/23 1800    aspirin EC (ECOTRIN) tablet 81 mg  81 mg Oral DAILY MING Huang.P.R.N.   81 mg at 03/29/23 0543    clevidipine (Cleviprex) IV emulsion  0-21 mg/hr Intravenous Continuous ANGÉLICA HuangPMarielRMarielNMariel   Dose not Required at 03/28/23 1200    nitroglycerin 50 mg in D5W 250 ml infusion  0-100 mcg/min Intravenous Continuous ANGÉLICA HuangP.R.N.   Dose not Required at 03/28/23 1200    Pharmacy Consult Request ...Pain Management Review 1 Each  1 Each Other PHARMACY TO DOSE ANGÉLICA HuangP.R.SHAYAN.        acetaminophen (TYLENOL) tablet 1,000 mg  1,000 mg Oral Q6HRS ANGÉLICA HuangP.R.N.   1,000 mg at 03/29/23 0543    Followed by    [START ON 4/2/2023] acetaminophen (TYLENOL) tablet 1,000 mg  1,000 mg Oral Q6HRS PRN Corinne Mishra, A.P.R.N.        oxyCODONE immediate-release (ROXICODONE) tablet 5 mg  5 mg Oral Q3HRS PRN MING Huang.P.R.N.   5 mg at 03/29/23 0330    Or    oxyCODONE immediate release (ROXICODONE) tablet 10 mg  10 mg Oral Q3HRS PRN Corinne Mishra A.P.R.N.   10 mg at 03/28/23 4126    Or    fentaNYL (SUBLIMAZE) injection 50 mcg  50 mcg Intravenous Q3HRS PRN  ANGÉLICA HuangPMarielR.N.        traMADol (Ultram) 50 MG tablet 50 mg  50 mg Oral Q4HRS PRN MING Huang.P.R.N.   50 mg at 03/28/23 1951    midazolam (Versed) injection 2 mg  2 mg Intravenous Q HOUR PRN ANGÉLICA HuangP.R.N.        dexmedetomidine (PRECEDEX) 400 mcg/100mL NS premix infusion  0-1.5 mcg/kg/hr (Ideal) Intravenous Continuous MING Huang.P.R.N.   Stopped at 03/28/23 1358    sodium bicarbonate 8.4 % injection 50 mEq  50 mEq Intravenous Q HOUR PRN MING Huang.P.R.N.        morphine 4 MG/ML injection 4 mg  4 mg Intravenous Q HOUR PRN MING Huang.P.R.N.   4 mg at 03/28/23 1245    ondansetron (ZOFRAN) syringe/vial injection 8 mg  8 mg Intravenous Q6HRS PRN MING uHang.P.R.N.   8 mg at 03/28/23 1734    Or    prochlorperazine (COMPAZINE) injection 10 mg  10 mg Intravenous Q6HRS PRN MING Huang.P.R.N.        senna-docusate (PERICOLACE or SENOKOT S) 8.6-50 MG per tablet 2 Tablet  2 Tablet Oral BID MING Huang.P.R.N.   2 Tablet at 03/29/23 0542    And    polyethylene glycol/lytes (MIRALAX) PACKET 1 Packet  1 Packet Oral DAILY MING Huang.P.R.N.   1 Packet at 03/29/23 0542    And    [START ON 3/30/2023] magnesium hydroxide (MILK OF MAGNESIA) suspension 30 mL  30 mL Oral DAILY MING Huang.P.R.N.        And    bisacodyl (DULCOLAX) suppository 10 mg  10 mg Rectal QDAY PRN MING Huang.P.R.N.        omeprazole (PRILOSEC) capsule 20 mg  20 mg Oral DAILY MING Huang.P.R.N.   20 mg at 03/29/23 0543    mag hydrox-al hydrox-simeth (MAALOX PLUS ES or MYLANTA DS) suspension 30 mL  30 mL Oral Q4HRS PRN MING Huang.P.R.N.        diphenhydrAMINE (BENADRYL) tablet/capsule 25 mg  25 mg Oral HS PRN - MR X 1 ANGÉLICA HuangP.R.N.        metoprolol tartrate (LOPRESSOR) tablet 12.5 mg  12.5 mg Oral BID MING Huang.P.R.N.        Followed by    [START ON 3/30/2023] metoprolol tartrate (LOPRESSOR) tablet 25 mg  25 mg Oral BID  CARMEN Huang MD Alert...Warfarin per Pharmacy   Other PHARMACY TO DOSE CARMEN Huang        norepinephrine (Levophed) 8 mg in 250 mL NS infusion (premix)  0-1 mcg/kg/min (Ideal) Intravenous Continuous CARMEN Huang   Stopped at 03/29/23 0228    insulin regular (HumuLIN R,NovoLIN R) injection  0-14 Units Intravenous Once CARMEN Huang        insulin lispro (AdmeLOG,HumaLOG) injection  0-14 Units Subcutaneous TID AC CARMEN Huang        insulin regular (HumuLIN R) 100 Units in  mL Infusion  0-29 Units/hr Intravenous Continuous CARMEN Huang 0.5 mL/hr at 03/29/23 0602 0.5 Units/hr at 03/29/23 0602    dextrose 10 % BOLUS 12.5-25 g  12.5-25 g Intravenous PRN CARMEN Huang MD Alert...Pharmacy to initiate transition from insulin infusion to subcutaneous insulin for cardiothoracic surgery 1 Each  1 Each Other Continuous CARMEN Huang           Fluids    Intake/Output Summary (Last 24 hours) at 3/29/2023 0658  Last data filed at 3/29/2023 0600  Gross per 24 hour   Intake 3901.44 ml   Output 2220 ml   Net 1681.44 ml       Laboratory  Recent Labs     03/28/23  1150 03/28/23  1308 03/28/23  1411 03/29/23  0319   ISTATAPH 7.309* 7.367* 7.371* 7.411   ISTATAPCO2 44.7* 40.7* 36.0 32.1   ISTATAPO2 123* 89* 92* 54*   ISTATATCO2 24 25 22 21   [id number] 98 97 97 89*   ISTATARTHCO3 22.5 23.4 20.9 20.4   ISTATARTBE -4 -2 -4 -3   ISTATTEMP 36.4 C 36.1 C 36.3 C 36.9 C   ISTATFIO2 100 100 95  --    ISTATSPEC Arterial Arterial Arterial Arterial   ISTATAPHTC 7.318* 7.380* 7.381* 7.413   [id number] 120* 84 88* 54*         Recent Labs     03/27/23  1031 03/28/23  1145 03/28/23  1610 03/28/23  2304 03/29/23  0455   SODIUM 142  --   --   --  138   POTASSIUM 3.7  --  4.2 4.5 4.6   CHLORIDE 103  --   --   --  105   CO2 24  --   --   --  19*   BUN 12  --   --   --  16   CREATININE 0.99  --   --   --  0.76   MAGNESIUM  --   2.8*  --   --   --    CALCIUM 9.5  --   --   --  7.8*     Recent Labs     03/27/23  1031 03/29/23  0455   ALTSGPT 23  --    ASTSGOT 11*  --    ALKPHOSPHAT 87  --    TBILIRUBIN 0.6  --    GLUCOSE 137* 150*     Recent Labs     03/27/23  1031 03/29/23  0455   WBC 7.4 22.1*   NEUTSPOLYS 59.40  --    LYMPHOCYTES 29.40  --    MONOCYTES 8.30  --    EOSINOPHILS 1.40  --    BASOPHILS 1.10  --    ASTSGOT 11*  --    ALTSGPT 23  --    ALKPHOSPHAT 87  --    TBILIRUBIN 0.6  --      Recent Labs     03/27/23  1031 03/28/23  1145 03/29/23  0445 03/29/23  0455   RBC 7.01*  --   --  5.05   HEMOGLOBIN 20.5* 15.2  --  14.8   HEMATOCRIT 63.0* 44.4  --  44.2   PLATELETCT 319 165  --  139*   PROTHROMBTM  --  17.3* 14.9*  --    APTT  --  40.0*  --   --    INR  --  1.44* 1.18*  --        Imaging  X-Ray:  I have personally reviewed the images and compared with prior images.    Assessment/Plan  Encounter for weaning from ventilator ([facility])- (present on admission)  Assessment & Plan  Postoperative ventilator weaned  Continued hypoxia, place on HFNC  Scheduled nebs  Pulmonary hygiene encouraged    Pulmonary emphysema ([facility])- (present on admission)  Assessment & Plan  Restart Symbicort and albuterol  Start scheduled nebs  PFT 2/28/23 show mild obstruction and no e/o restriction. Reduced DLCO and DL/VA are c/w his emphysema.  Long history of tobacco abuse and mining exposure    Moderate mitral regurgitation by prior echocardiogram- (present on admission)  Assessment & Plan  Mitral valve replacement on 3/28/2023 by Dr. Sheppard  POD #1  Weaned from ventilator  Weaned from vasopressor support  Post-operative pain control per [facility]  Chest tube and pacemaker management per [facility]  Insulin gtt  Optimize K and Mg  PT/OT consult    Atrial fibrillation with RVR ([facility])- (present on admission)  Assessment & Plan  Status post ablation  Left atrial appendage ligation on 3/28/2023         VTE:  Contraindicated  Ulcer: PPI  Lines: Central Line  Ongoing indication  addressed, Arterial Line  Ongoing indication addressed, and Roberts Catheter  Ongoing indication addressed    I have performed a physical exam and reviewed and updated ROS and Plan today (3/29/2023). In review of yesterday's note (3/28/2023), there are no changes except as documented above.     Discussed patient condition and risk of morbidity and/or mortality with RN, RT, Pharmacy, Code status disscussed, Charge nurse / hot rounds, Patient, and CVS    The patient remains critically ill.  Critical care time = 55 minutes in directly providing and coordinating critical care and extensive data review.  No time overlap and excludes procedures.

## 2023-03-29 NOTE — PROGRESS NOTES
Inpatient Anticoagulation Service Note for 3/29/2023    Reason for Anticoagulation: Bioprosthetic Valve Replacement, Atrial Fibrillation   [id number] VASc Score: 2  HAS-BLED Score: 2    Hemoglobin Value: 14.8  Hematocrit Value: 44.2  Lab Platelet Value: (Abnormal) 139  Target INR: 2.0 to 3.0    INR from last 7 days       Date/Time INR Value    03/29/23 0445 (Abnormal) 1.18    03/28/23 1145 (Abnormal) 1.44          Dose from last 7 days       Date/Time Dose (mg)    03/29/23 1302 5          Average Dose (mg):  (new start)  Significant Interactions: Antiplatelet Medications  Bridge Therapy: No   Reversal Agent Administered: Not Applicable    A/P  New start warfarin following MV replacement.  Prior to surgery patient was on apixaban for AF.  Give warfarin 5 mg tonight.  INR tomorrow to guide subsequent dosing.    Education Material Provided?: No    Pharmacist suggested discharge dosing: TBD pending INR trends, perhaps warfarin 2.5 mg PO daily.  Recommend a follow-up PT/INR within 48-72 hours of discharge.    Lorraine Suarez, PharmD, BCPS, BCCCP

## 2023-03-29 NOTE — DISCHARGE INSTRUCTIONS
Discharge Instructions    Discharged to home by car with relative. Discharged via wheelchair, hospital escort: Yes.  Special equipment needed: Oxygen and Walker    Be sure to schedule a follow-up appointment with your primary care doctor or any specialists as instructed.     Discharge Plan:   Diet Plan: Discussed  Activity Level: Discussed  Confirmed Follow up Appointment: Appointment Scheduled  Confirmed Symptoms Management: Discussed  Medication Reconciliation Updated: Yes  Influenza Vaccine Indication: Not indicated: Previously immunized this influenza season and > 8 years of age    I understand that a diet low in cholesterol, fat, and sodium is recommended for good health. Unless I have been given specific instructions below for another diet, I accept this instruction as my diet prescription.   Other diet: Cardiac     Special Instructions: DIVISION OF CARDIAC SURGERY   DISCHARGE INSTRUCTIONS    Activity:    NO driving for 4 weeks after surgery. You may ride as a passenger.  NO lifting, pushing, or pulling more than 10 pounds for 6 weeks.  For the next 6 weeks, keep your elbows close to your body and move within a pain-free motion when lifting, pushing or pulling.  Do not stretch both arms backwards at the same time.    Walk at least 4 times per day, there is no maximum. The goal is to increase your distance over time.  Continue using incentive spirometer for 2 weeks or until your baseline volume is reached.  If you are going home on oxygen and you were not on oxygen prior to surgery, keep using until you are oxygen free.  Weigh yourself daily.  Call your Cardiologist for a weight gain of 3 or more pounds in 1 day or more than 5 pounds in 7 days.  Take all of your medications as prescribed. Do not use a pill box for the first month at home. If you have questions, please call your nurse navigator at [phone].  Continue to wear the ALY (compression) stockings for 2-4 weeks or until all swelling is gone. You may  take them off when you are in bed or when your legs are elevated.    Incision Care:    Make sure to clean your incision(s) TWICE DAILY.  Once by showering AND once using the no rinse Foam cleanser provided in the hospital.  During the shower, cleanse the incision(s) with a perfume and dye free soap (Dial, Dove, Shaila Spring)  Use gentle pressure and rub up and down over incision with your hands or a washcloth. Rinse off and pat incision(s) dry with clean towel.  Keep the incision open to air. No creams or lotions on your incision(s). No baths.  If there is any increased redness or swelling, separation of the incision line, or thick drainage from any of your incisions, call the Cardiac Surgeons ([phone]).      General Instructions:    You have been referred to Cardiac Rehab.  You can start Cardiac Rehab 30 days after surgery.  If you do not have an appointment at the time of discharge call [phone] to schedule an appointment.  Your Primary Care Doctor typically handles home oxygen. Oxygen may be stopped when your oxygen level is consistently greater than 90.  Check with your Primary Care Doctor if you are unsure.  Take all of your medications (including pain medications) as prescribed.  Taking medications other than prescribed can result in serious injury.    For Patients Discharged with Narcotic Pain Medication:     If a refill is needed, understand that only 1 refill will be provided and you must come to the Cardiac Surgeons’ office for an appointment (72 hours’ notice is required to schedule and there are no weekend appointments).  If the pain medications you are discharged on are not working, you will need to bring your remaining prescription into the office in order to receive a new prescription.  If you were taking narcotics prior to your heart surgery, the Cardiac Surgeons will provide you with one prescription and additional medications will need to be provided by your pain management doctor.  Do not  drink alcohol while taking narcotics.  Lost or stolen medications will not be refilled.  If medications are stolen, report to law enforcement.    Contact Cardiac Surgery at [phone] if you have any questions.    -Is this patient being discharged with medication to prevent blood clots?  Yes, Aspirin   Aspirin, ASA oral tablets  What is this medicine?  ASPIRIN (AS pir in) is a pain reliever. It is used to treat mild pain and fever. This medicine is also used as directed by a doctor to prevent and to treat heart attacks, to prevent strokes and blood clots, and to treat arthritis or inflammation.  This medicine may be used for other purposes; ask your health care provider or pharmacist if you have questions.  COMMON BRAND NAME(S): Aspir-Low, Aspir-Giovanna, Aspirtab, Zonia Advanced Aspirin, Zonia Aspirin, Zonia Aspirin Extra Strength, Zonia Aspirin Plus, Zonia Extra Strength, Zonia Extra Strength Plus, Zonia Genuine Aspirin, Zonia Womens Aspirin, Bufferin, Bufferin Extra Strength, Bufferin Low Dose  What should I tell my health care provider before I take this medicine?  They need to know if you have any of these conditions:  anemia  asthma  bleeding problems  child with chickenpox, the flu, or other viral infection  diabetes  gout  if you frequently drink alcohol containing drinks  kidney disease  liver disease  low level of vitamin K  lupus  smoke tobacco  stomach ulcers or other problems  an unusual or allergic reaction to aspirin, tartrazine dye, other medicines, dyes, or preservatives  pregnant or trying to get pregnant  breast-feeding  How should I use this medicine?  Take this medicine by mouth with a glass of water. Follow the directions on the package or prescription label. You can take this medicine with or without food. If it upsets your stomach, take it with food. Do not take your medicine more often than directed.  Talk to your pediatrician regarding the use of this medicine in children. While this drug may  be prescribed for children as young as 12 years of age for selected conditions, precautions do apply. Children and teenagers should not use this medicine to treat chicken pox or flu symptoms unless directed by a doctor.  Patients over 65 years old may have a stronger reaction and need a smaller dose.  Overdosage: If you think you have taken too much of this medicine contact a poison control center or emergency room at once.  NOTE: This medicine is only for you. Do not share this medicine with others.  What if I miss a dose?  If you are taking this medicine on a regular schedule and miss a dose, take it as soon as you can. If it is almost time for your next dose, take only that dose. Do not take double or extra doses.  What may interact with this medicine?  Do not take this medicine with any of the following medications:  cidofovir  ketorolac  probenecid  This medicine may also interact with the following medications:  alcohol  alendronate  bismuth subsalicylate  flavocoxid  herbal supplements like feverfew, garlic, shailesh, ginkgo biloba, horse chestnut  medicines for diabetes or glaucoma like acetazolamide, methazolamide  medicines for gout  medicines that treat or prevent blood clots like enoxaparin, heparin, ticlopidine, warfarin  other aspirin and aspirin-like medicines  NSAIDs, medicines for pain and inflammation, like ibuprofen or naproxen  pemetrexed  sulfinpyrazone  varicella live vaccine  This list may not describe all possible interactions. Give your health care provider a list of all the medicines, herbs, non-prescription drugs, or dietary supplements you use. Also tell them if you smoke, drink alcohol, or use illegal drugs. Some items may interact with your medicine.  What should I watch for while using this medicine?  If you are treating yourself for pain, tell your doctor or health care professional if the pain lasts more than 10 days, if it gets worse, or if there is a new or different kind of pain.  Tell your doctor if you see redness or swelling. Also, check with your doctor if you have a fever that lasts for more than 3 days. Only take this medicine to prevent heart attacks or blood clotting if prescribed by your doctor or health care professional.  Do not take aspirin or aspirin-like medicines with this medicine. Too much aspirin can be dangerous. Always read the labels carefully.  This medicine can irritate your stomach or cause bleeding problems. Do not smoke cigarettes or drink alcohol while taking this medicine. Do not lie down for 30 minutes after taking this medicine to prevent irritation to your throat.  If you are scheduled for any medical or dental procedure, tell your healthcare provider that you are taking this medicine. You may need to stop taking this medicine before the procedure.  This medicine may be used to treat migraines. If you take migraine medicines for 10 or more days a month, your migraines may get worse. Keep a diary of headache days and medicine use. Contact your healthcare professional if your migraine attacks occur more frequently.  What side effects may I notice from receiving this medicine?  Side effects that you should report to your doctor or health care professional as soon as possible:  allergic reactions like skin rash, itching or hives, swelling of the face, lips, or tongue  breathing problems  changes in hearing, ringing in the ears  confusion  general ill feeling or flu-like symptoms  pain on swallowing  redness, blistering, peeling or loosening of the skin, including inside the mouth or nose  signs and symptoms of bleeding such as bloody or black, tarry stools; red or dark-brown urine; spitting up blood or brown material that looks like coffee grounds; red spots on the skin; unusual bruising or bleeding from the eye, gums, or nose  trouble passing urine or change in the amount of urine  unusually weak or tired  yellowing of the eyes or skin  Side effects that usually do  not require medical attention (report to your doctor or health care professional if they continue or are bothersome):  diarrhea or constipation  headache  nausea, vomiting  stomach gas, heartburn  This list may not describe all possible side effects. Call your doctor for medical advice about side effects. You may report side effects to FDA at 6-851-ORY-8909.  Where should I keep my medicine?  Keep out of the reach of children.  Store at room temperature between 15 and 30 degrees C (59 and 86 degrees F). Protect from heat and moisture. Do not use this medicine if it has a strong vinegar smell. Throw away any unused medicine after the expiration date.  NOTE: This sheet is a summary. It may not cover all possible information. If you have questions about this medicine, talk to your doctor, pharmacist, or health care provider.  © 2020 [employer] (2018-01-30 10:42:13)    Is patient discharged on Warfarin / Coumadin?   Yes    You are receiving the drug warfarin. Please understand the importance of monitoring warfarin with scheduled PT/INR blood draws.  Follow-up with the [facility] in one week for INR lab.    IMPORTANT: HOW TO USE THIS INFORMATION:  This is a summary and does NOT have all possible information about this product. This information does not assure that this product is safe, effective, or appropriate for you. This information is not individual medical advice and does not substitute for the advice of your health care professional. Always ask your health care professional for complete information about this product and your specific health needs.      WARFARIN - ORAL (WARF-uh-rin)      COMMON BRAND NAME(S): Coumadin      WARNING:  Warfarin can cause very serious (possibly fatal) bleeding. This is more likely to occur when you first start taking this medication or if you take too much warfarin. To decrease your risk for bleeding, your doctor or other health care provider will monitor you closely  "and check your lab results (INR test) to make sure you are not taking too much warfarin. Keep all medical and laboratory appointments. Tell your doctor right away if you notice any signs of serious bleeding. See also Side Effects section.      USES:  This medication is used to treat blood clots (such as in deep vein thrombosis-DVT or pulmonary embolus-PE) and/or to prevent new clots from forming in your body. Preventing harmful blood clots helps to reduce the risk of a stroke or heart attack. Conditions that increase your risk of developing blood clots include a certain type of irregular heart rhythm (atrial fibrillation), heart valve replacement, recent heart attack, and certain surgeries (such as hip/knee replacement). Warfarin is commonly called a \"blood thinner,\" but the more correct term is \"anticoagulant.\" It helps to keep blood flowing smoothly in your body by decreasing the amount of certain substances (clotting proteins) in your blood.      HOW TO USE:  Read the Medication Guide provided by your pharmacist before you start taking warfarin and each time you get a refill. If you have any questions, ask your doctor or pharmacist. Take this medication by mouth with or without food as directed by your doctor or other health care professional, usually once a day. It is very important to take it exactly as directed. Do not increase the dose, take it more frequently, or stop using it unless directed by your doctor. Dosage is based on your medical condition, laboratory tests (such as INR), and response to treatment. Your doctor or other health care provider will monitor you closely while you are taking this medication to determine the right dose for you. Use this medication regularly to get the most benefit from it. To help you remember, take it at the same time each day. It is important to eat a balanced, consistent diet while taking warfarin. Some foods can affect how warfarin works in your body and may affect " your treatment and dose. Avoid sudden large increases or decreases in your intake of foods high in vitamin K (such as broccoli, cauliflower, cabbage, brussels sprouts, kale, spinach, and other green leafy vegetables, liver, green tea, certain vitamin supplements). If you are trying to lose weight, check with your doctor before you try to go on a diet. Cranberry products may also affect how your warfarin works. Limit the amount of cranberry juice (16 ounces/480 milliliters a day) or other cranberry products you may drink or eat.      SIDE EFFECTS:  Nausea, loss of appetite, or stomach/abdominal pain may occur. If any of these effects persist or worsen, tell your doctor or pharmacist promptly. Remember that your doctor has prescribed this medication because he or she has judged that the benefit to you is greater than the risk of side effects. Many people using this medication do not have serious side effects. This medication can cause serious bleeding if it affects your blood clotting proteins too much (shown by unusually high INR lab results). Even if your doctor stops your medication, this risk of bleeding can continue for up to a week. Tell your doctor right away if you have any signs of serious bleeding, including: unusual pain/swelling/discomfort, unusual/easy bruising, prolonged bleeding from cuts or gums, persistent/frequent nosebleeds, unusually heavy/prolonged menstrual flow, pink/dark urine, coughing up blood, vomit that is bloody or looks like coffee grounds, severe headache, dizziness/fainting, unusual or persistent tiredness/weakness, bloody/black/tarry stools, chest pain, shortness of breath, difficulty swallowing. Tell your doctor right away if any of these unlikely but serious side effects occur: persistent nausea/vomiting, severe stomach/abdominal pain, yellowing eyes/skin. This drug rarely has caused very serious (possibly fatal) problems if its effects lead to small blood clots (usually at the  beginning of treatment). This can lead to severe skin/tissue damage that may require surgery or amputation if left untreated. Patients with certain blood conditions (protein C or S deficiency) may be at greater risk. Get medical help right away if any of these rare but serious side effects occur: painful/red/purplish patches on the skin (such as on the toe, breast, abdomen), change in the amount of urine, vision changes, confusion, slurred speech, weakness on one side of the body. A very serious allergic reaction to this drug is rare. However, get medical help right away if you notice any symptoms of a serious allergic reaction, including: rash, itching/swelling (especially of the face/tongue/throat), severe dizziness, trouble breathing. This is not a complete list of possible side effects. If you notice other effects not listed above, contact your doctor or pharmacist. In the [place] - Call your doctor for medical advice about side effects. You may report side effects to FDA at 7-992-WZT-8519. In [place] - Call your doctor for medical advice about side effects. You may report side effects to [facility] [place] at 1-986.358.4705.      PRECAUTIONS:  Before taking warfarin, tell your doctor or pharmacist if you are allergic to it; or if you have any other allergies. This product may contain inactive ingredients, which can cause allergic reactions or other problems. Talk to your pharmacist for more details. Before using this medication, tell your doctor or pharmacist your medical history, especially of: blood disorders (such as anemia, hemophilia), bleeding problems (such as bleeding of the stomach/intestines, bleeding in the brain), blood vessel disorders (such as aneurysms), recent major injury/surgery, liver disease, alcohol use, mental/mood disorders (including memory problems), frequent falls/injuries. It is important that all your doctors and dentists know that you take warfarin. Before having surgery or any medical/dental  procedures, tell your doctor or dentist that you are taking this medication and about all the products you use (including prescription drugs, nonprescription drugs, and herbal products). Avoid getting injections into the muscles. If you must have an injection into a muscle (for example, a flu shot), it should be given in the arm. This way, it will be easier to check for bleeding and/or apply pressure bandages. This medication may cause stomach bleeding. Daily use of alcohol while using this medicine will increase your risk for stomach bleeding and may also affect how this medication works. Limit or avoid alcoholic beverages. If you have not been eating well, if you have an illness or infection that causes fever, vomiting, or diarrhea for more than 2 days, or if you start using any antibiotic medications, contact your doctor or pharmacist immediately because these conditions can affect how warfarin works. This medication can cause heavy bleeding. To lower the chance of getting cut, bruised, or injured, use great caution with sharp objects like safety razors and nail cutters. Use an electric razor when shaving and a soft toothbrush when brushing your teeth. Avoid activities such as contact sports. If you fall or injure yourself, especially if you hit your head, call your doctor immediately. Your doctor may need to check you. The Food & Drug Administration has stated that generic warfarin products are interchangeable. However, consult your doctor or pharmacist before switching warfarin products. Be careful not to take more than one medication that contains warfarin unless specifically directed by the doctor or health care provider who is monitoring your warfarin treatment. Older adults may be at greater risk for bleeding while using this drug. This medication is not recommended for use during pregnancy because of serious (possibly fatal) harm to an unborn baby. Discuss the use of reliable forms of birth control with  "your doctor. If you become pregnant or think you may be pregnant, tell your doctor immediately. If you are planning pregnancy, discuss a plan for managing your condition with your doctor before you become pregnant. Your doctor may switch the type of medication you use during pregnancy. Very small amounts of this medication may pass into breast milk but is unlikely to harm a nursing infant. Consult your doctor before breast-feeding.      DRUG INTERACTIONS:  Drug interactions may change how your medications work or increase your risk for serious side effects. This document does not contain all possible drug interactions. Keep a list of all the products you use (including prescription/nonprescription drugs and herbal products) and share it with your doctor and pharmacist. Do not start, stop, or change the dosage of any medicines without your doctor's approval. Warfarin interacts with many prescription, nonprescription, vitamin, and herbal products. This includes medications that are applied to the skin or inside the vagina or rectum. The interactions with warfarin usually result in an increase or decrease in the \"blood-thinning\" (anticoagulant) effect. Your doctor or other health care professional should closely monitor you to prevent serious bleeding or clotting problems. While taking warfarin, it is very important to tell your doctor or pharmacist of any changes in medications, vitamins, or herbal products that you are taking. Some products that may interact with this drug include: capecitabine, imatinib, mifepristone. Aspirin, aspirin-like drugs (salicylates), and nonsteroidal anti-inflammatory drugs (NSAIDs such as ibuprofen, naproxen, celecoxib) may have effects similar to warfarin. These drugs may increase the risk of bleeding problems if taken during treatment with warfarin. Carefully check all prescription/nonprescription product labels (including drugs applied to the skin such as pain-relieving creams) since " the products may contain NSAIDs or salicylates. Talk to your doctor about using a different medication (such as acetaminophen) to treat pain/fever. Low-dose aspirin and related drugs (such as clopidogrel, ticlopidine) should be continued if prescribed by your doctor for specific medical reasons such as heart attack or stroke prevention. Consult your doctor or pharmacist for more details. Many herbal products interact with warfarin. Tell your doctor before taking any herbal products, especially bromelains, coenzyme Q10, cranberry, danshen, dong quai, fenugreek, garlic, ginkgo biloba, ginseng, and Teresa's wort, among others. This medication may interfere with a certain laboratory test to measure theophylline levels, possibly causing false test results. Make sure laboratory personnel and all your doctors know you use this drug.      OVERDOSE:  If overdose is suspected, contact a poison control center or emergency room immediately. [place] residents can call the [employer] Poison Hotline at 1-856.654.3415. [place] residents can call a provincial poison control center. Symptoms of overdose may include: bloody/black/tarry stools, pink/dark urine, unusual/prolonged bleeding.      NOTES:  Do not share this medication with others. Laboratory and/or medical tests (such as INR, complete blood count) must be performed periodically to monitor your progress or check for side effects. Consult your doctor for more details.      MISSED DOSE:  For the best possible benefit, do not miss any doses. If you do miss a dose and remember on the same day, take it as soon as you remember. If you remember on the next day, skip the missed dose and resume your usual dosing schedule. Do not double the dose to catch up because this could increase your risk for bleeding. Keep a record of missed doses to give to your doctor or pharmacist. Contact your doctor or pharmacist if you miss 2 or more doses in a row.      STORAGE:  Store at room temperature  away from light and moisture. Do not store in the bathroom. Keep all medications away from children and pets. Do not flush medications down the toilet or pour them into a drain unless instructed to do so. Properly discard this product when it is  or no longer needed. Consult your pharmacist or local waste disposal company for more details about how to safely discard your product.      MEDICAL ALERT:  Your condition and medication can cause complications in a medical emergency. For information about enrolling in [facility], call [phone] ([place]) or [phone] (Albert).      Information last revised 2010 Copyright(c) 2010 [employer]     Oxycodone tablets or capsules  What is this medicine?  OXYCODONE (ox i KOE done) is a pain reliever. It is used to treat moderate to severe pain.  This medicine may be used for other purposes; ask your health care provider or pharmacist if you have questions.  COMMON BRAND NAME(S): Dazidox, Endocodone, Oxaydo, OXECTA, OxyIR, Percolone, Roxicodone, Roxybond  What should I tell my health care provider before I take this medicine?  They need to know if you have any of these conditions:  Jeremi's disease  brain tumor  head injury  heart disease  history of drug or alcohol abuse problem  if you often drink alcohol  kidney disease  liver disease  lung or breathing disease, like asthma  mental illness  pancreatic disease  seizures  thyroid disease  an unusual or allergic reaction to oxycodone, codeine, hydrocodone, morphine, other medicines, foods, dyes, or preservatives  pregnant or trying to get pregnant  breast-feeding  How should I use this medicine?  Take this medicine by mouth with a glass of water. Follow the directions on the prescription label. You can take it with or without food. If it upsets your stomach, take it with food. Take your medicine at regular intervals. Do not take it more often than directed. Do not stop taking except on your doctor's  advice.  Some brands of this medicine, like Oxecta, have special instructions. Ask your doctor or pharmacist if these directions are for you: Do not cut, crush or chew this medicine. Swallow only one tablet at a time. Do not wet, soak, or lick the tablet before you take it.  A special MedGuide will be given to you by the pharmacist with each prescription and refill. Be sure to read this information carefully each time.  Talk to your pediatrician regarding the use of this medicine in children. Special care may be needed.  Overdosage: If you think you have taken too much of this medicine contact a poison control center or emergency room at once.  NOTE: This medicine is only for you. Do not share this medicine with others.  What if I miss a dose?  If you miss a dose, take it as soon as you can. If it is almost time for your next dose, take only that dose. Do not take double or extra doses.  What may interact with this medicine?  This medicine may interact with the following medications:  alcohol  antihistamines for allergy, cough and cold  antiviral medicines for HIV or AIDS  atropine  certain antibiotics like clarithromycin, erythromycin, linezolid, rifampin  certain medicines for anxiety or sleep  certain medicines for bladder problems like oxybutynin, tolterodine  certain medicines for depression like amitriptyline, fluoxetine, sertraline  certain medicines for fungal infections like ketoconazole, itraconazole, voriconazole  certain medicines for migraine headache like almotriptan, eletriptan, frovatriptan, naratriptan, rizatriptan, sumatriptan, zolmitriptan  certain medicines for nausea or vomiting like dolasetron, ondansetron, palonosetron  certain medicines for Parkinson's disease like benztropine, trihexyphenidyl  certain medicines for seizures like phenobarbital, phenytoin, primidone  certain medicines for stomach problems like dicyclomine, hyoscyamine  certain medicines for travel sickness like  scopolamine  diuretics  general anesthetics like halothane, isoflurane, methoxyflurane, propofol  ipratropium  local anesthetics like lidocaine, pramoxine, tetracaine  MAOIs like Carbex, Eldepryl, Marplan, Nardil, and Parnate  medicines that relax muscles for surgery  methylene blue  nilotinib  other narcotic medicines for pain or cough  phenothiazines like chlorpromazine, mesoridazine, prochlorperazine, thioridazine  This list may not describe all possible interactions. Give your health care provider a list of all the medicines, herbs, non-prescription drugs, or dietary supplements you use. Also tell them if you smoke, drink alcohol, or use illegal drugs. Some items may interact with your medicine.  What should I watch for while using this medicine?  Tell your doctor or health care professional if your pain does not go away, if it gets worse, or if you have new or a different type of pain. You may develop tolerance to the medicine. Tolerance means that you will need a higher dose of the medicine for pain relief. Tolerance is normal and is expected if you take this medicine for a long time.  Do not suddenly stop taking your medicine because you may develop a severe reaction. Your body becomes used to the medicine. This does NOT mean you are addicted. Addiction is a behavior related to getting and using a drug for a non-medical reason. If you have pain, you have a medical reason to take pain medicine. Your doctor will tell you how much medicine to take. If your doctor wants you to stop the medicine, the dose will be slowly lowered over time to avoid any side effects.  There are different types of narcotic medicines (opiates). If you take more than one type at the same time or if you are taking another medicine that also causes drowsiness, you may have more side effects. Give your health care provider a list of all medicines you use. Your doctor will tell you how much medicine to take. Do not take more medicine than  directed. Call emergency for help if you have problems breathing or unusual sleepiness.  You may get drowsy or dizzy. Do not drive, use machinery, or do anything that needs mental alertness until you know how the medicine affects you. Do not stand or sit up quickly, especially if you are an older patient. This reduces the risk of dizzy or fainting spells. Alcohol may interfere with the effect of this medicine. Avoid alcoholic drinks.  This medicine will cause constipation. Try to have a bowel movement at least every 2 to 3 days. If you do not have a bowel movement for 3 days, call your doctor or health care professional.  Your mouth may get dry. Chewing sugarless gum or sucking hard candy, and drinking plenty of water may help. Contact your doctor if the problem does not go away or is severe.  What side effects may I notice from receiving this medicine?  Side effects that you should report to your doctor or health care professional as soon as possible:  allergic reactions like skin rash, itching or hives, swelling of the face, lips, or tongue  breathing problems  confusion  signs and symptoms of low blood pressure like dizziness; feeling faint or lightheaded, falls; unusually weak or tired  trouble passing urine or change in the amount of urine  trouble swallowing  Side effects that usually do not require medical attention (report to your doctor or health care professional if they continue or are bothersome):  constipation  dry mouth  nausea, vomiting  tiredness  This list may not describe all possible side effects. Call your doctor for medical advice about side effects. You may report side effects to FDA at 4-920-FDA-1047.  Where should I keep my medicine?  Keep out of the reach of children. This medicine can be abused. Keep your medicine in a safe place to protect it from theft. Do not share this medicine with anyone. Selling or giving away this medicine is dangerous and against the law.  Store at room temperature  between 15 and 30 degrees C (59 and 86 degrees F). Protect from light. Keep container tightly closed.  This medicine may cause harm and death if it is taken by other adults, children, or pets. Return medicine that has not been used to an official disposal site. Contact the [place] at [phone] or your [facility] government to find a site. If you cannot return the medicine, flush it down the toilet. Do not use the medicine after the expiration date.  NOTE: This sheet is a summary. It may not cover all possible information. If you have questions about this medicine, talk to your doctor, pharmacist, or health care provider.  © 2020 [employer] (2018-04-24 16:13:10)    Potassium chloride tablets, extended-release tablets or capsules  What is this medicine?  POTASSIUM CHLORIDE (bhaskar TASS i um KLOOR mau) is a potassium supplement used to prevent and to treat low potassium. Potassium is important for the heart, muscles, and nerves. Too much or too little potassium in the body can cause serious problems.  This medicine may be used for other purposes; ask your health care provider or pharmacist if you have questions.  COMMON BRAND NAME(S): ED-K+10, K-10, K-8, K-Dur, K-Tab, Kaon-CL, Klor-Con, Klor-Con M10, Klor-Con M15, Klor-Con M20, Klotrix, Micro-K, Micro-K Extencaps, Slow-K  What should I tell my health care provider before I take this medicine?  They need to know if you have any of these conditions:  T[place]carawas's disease  dehydration  diabetes  difficulty swallowing  heart disease  high levels of potassium in the blood  irregular heartbeat  kidney disease  recent severe burn  stomach ulcers or other stomach problems  an unusual or allergic reaction to potassium, tartrazine, other medicines, foods, dyes, or preservatives  pregnant or trying to get pregnant  breast-feeding  How should I use this medicine?  Take this medicine by mouth with a full glass of water. Take with food. Follow the directions on the  prescription label. Do not suck on, crush, or chew this medicine. If you have difficulty swallowing, ask the pharmacist how to take. Take your medicine at regular intervals. Do not take it more often than directed. Do not stop taking except on your doctor's advice.  Talk to your pediatrician regarding the use of this medicine in children. Special care may be needed.  Overdosage: If you think you have taken too much of this medicine contact a poison control center or emergency room at once.  NOTE: This medicine is only for you. Do not share this medicine with others.  What if I miss a dose?  If you miss a dose, take it as soon as you can. If it is almost time for your next dose, take only that dose. Do not take double or extra doses.  What may interact with this medicine?  Do not take this medicine with any of the following medications:  certain diuretics such as spironolactone, triamterene  certain medicines for stomach problems like atropine; difenoxin and glycopyrrolate  eplerenone  sodium polystyrene sulfonate  This medicine may also interact with the following medications:  certain medicines for blood pressure or heart disease like lisinopril, losartan, quinapril, valsartan  medicines that lower your chance of fighting infection such as cyclosporine, tacrolimus  NSAIDs, medicines for pain and inflammation, like ibuprofen or naproxen  other potassium supplements  salt substitutes  This list may not describe all possible interactions. Give your health care provider a list of all the medicines, herbs, non-prescription drugs, or dietary supplements you use. Also tell them if you smoke, drink alcohol, or use illegal drugs. Some items may interact with your medicine.  What should I watch for while using this medicine?  Visit your doctor or health care professional for regular check ups. You will need lab work done regularly.  You may need to be on a special diet while taking this medicine. Ask your doctor.  What side  effects may I notice from receiving this medicine?  Side effects that you should report to your doctor or health care professional as soon as possible:  allergic reactions like skin rash, itching or hives, swelling of the face, lips, or tongue  black, tarry stools  breathing problems  confusion  heartburn  fast, irregular heartbeat  feeling faint or lightheaded, falls  low blood pressure  numbness or tingling in hands or feet  pain when swallowing  unusually weak or tired  weakness, heaviness of legs  Side effects that usually do not require medical attention (report to your doctor or health care professional if they continue or are bothersome):  diarrhea  nausea, vomiting  stomach pain  This list may not describe all possible side effects. Call your doctor for medical advice about side effects. You may report side effects to FDA at 0-898-MAU-0603.  Where should I keep my medicine?  Keep out of the reach of children.  Store at room temperature between 15 and 30 degrees C (59 and 86 degrees F ). Keep bottle closed tightly to protect this medicine from light and moisture. Throw away any unused medicine after the expiration date.  NOTE: This sheet is a summary. It may not cover all possible information. If you have questions about this medicine, talk to your doctor, pharmacist, or health care provider.  © 2020 [employer] (2017-09-20 11:43:27)    Benzonatate capsules  What is this medicine?  BENZONATATE (nik NEO na wilks) is used to treat cough.  This medicine may be used for other purposes; ask your health care provider or pharmacist if you have questions.  COMMON BRAND NAME(S): Agnes Jade  What should I tell my health care provider before I take this medicine?  They need to know if you have any of these conditions:  kidney or liver disease  an unusual or allergic reaction to benzonatate, anesthetics, other medicines, foods, dyes, or preservatives  pregnant or trying to get  pregnant  breast-feeding  How should I use this medicine?  Take this medicine by mouth with a glass of water. Follow the directions on the prescription label. Avoid breaking, chewing, or sucking the capsule, as this can cause serious side effects. Take your medicine at regular intervals. Do not take your medicine more often than directed.  Talk to your pediatrician regarding the use of this medicine in children. While this drug may be prescribed for children as young as 10 years old for selected conditions, precautions do apply.  Overdosage: If you think you have taken too much of this medicine contact a poison control center or emergency room at once.  NOTE: This medicine is only for you. Do not share this medicine with others.  What if I miss a dose?  If you miss a dose, take it as soon as you can. If it is almost time for your next dose, take only that dose. Do not take double or extra doses.  What may interact with this medicine?  Do not take this medicine with any of the following medications:  MAOIs like Carbex, Eldepryl, Marplan, Nardil, and Parnate  This list may not describe all possible interactions. Give your health care provider a list of all the medicines, herbs, non-prescription drugs, or dietary supplements you use. Also tell them if you smoke, drink alcohol, or use illegal drugs. Some items may interact with your medicine.  What should I watch for while using this medicine?  Tell your doctor if your symptoms do not improve or if they get worse. If you have a high fever, skin rash, or headache, see your health care professional.  You may get drowsy or dizzy. Do not drive, use machinery, or do anything that needs mental alertness until you know how this medicine affects you. Do not sit or stand up quickly, especially if you are an older patient. This reduces the risk of dizzy or fainting spells.  What side effects may I notice from receiving this medicine?  Side effects that you should report to your  doctor or health care professional as soon as possible:  allergic reactions like skin rash, itching or hives, swelling of the face, lips, or tongue  breathing problems  chest pain  confusion or hallucinations  irregular heartbeat  numbness of mouth or throat  seizures  Side effects that usually do not require medical attention (report to your doctor or health care professional if they continue or are bothersome):  burning feeling in the eyes  constipation  headache  nasal congestion  stomach upset  This list may not describe all possible side effects. Call your doctor for medical advice about side effects. You may report side effects to FDA at 2-554-DBY-5384.  Where should I keep my medicine?  Keep out of the reach of children.  Store at room temperature between 15 and 30 degrees C (59 and 86 degrees F). Keep tightly closed. Protect from light and moisture. Throw away any unused medicine after the expiration date.  NOTE: This sheet is a summary. It may not cover all possible information. If you have questions about this medicine, talk to your doctor, pharmacist, or health care provider.  © 2020 [employer] (2009-03-18 14:52:56)

## 2023-03-29 NOTE — PROGRESS NOTES
Contacted MADDY Mishra D/t pt increasing O2 requirements. 5LNC to 12 oxy mask. Sat 89%. Pt is A0x4 and no complaints of SOB. CXR and breathing therapy completed. Orders for 20mg lasix, okay to titrate O2 >88%.

## 2023-03-29 NOTE — DISCHARGE PLANNING
"Care Transition Team Assessment    Information Source  Orientation Level: Oriented X4  Information Given By: Patient, Spouse  Informant's Name: Ray Aponte  Who is responsible for making decisions for patient? : Patient    Readmission Evaluation  Is this a readmission?: No    Elopement Risk  Legal Hold: No  Ambulatory or Self Mobile in Wheelchair: No-Not an Elopement Risk  Elopement Risk: Not at Risk for Elopement    Interdisciplinary Discharge Planning  Does Admitting Nurse Feel This Could be a Complex Discharge?: No  Primary Care Physician: Joel [facility] [phone])  Lives with - Patient's Self Care Capacity: Spouse  Patient or legal guardian wants to designate a caregiver: No  Support Systems: Family Member(s)  Housing / Facility: [address]  Do You Take your Prescribed Medications Regularly: Yes  Able to Return to Previous ADL's: Yes  Mobility Issues: No  Prior Services: None  Patient Prefers to be Discharged to:: Home  Assistance Needed: No  Durable Medical Equipment: Home Oxygen (bought an off brand \"Inogen\" portable oxygen machine)    Discharge Preparedness  What is your plan after discharge?: Home with help  What are your discharge supports?: Spouse  Prior Functional Level: Independent with Activities of Daily Living, Independent with Medication Management, Ambulatory  Difficulty with ADLs: None  Difficulty with IADLs: None    Functional Assessment  Prior Functional Level: Independent with Activities of Daily Living, Independent with Medication Management, Ambulatory    Finances  Financial Barriers to Discharge: No  Prescription Coverage: Yes    Vision / Hearing Impairment  Vision Impairment : Yes  Right Eye Vision: Wears Glasses  Left Eye Vision: Wears Glasses  Hearing Impairment : No  Hearing Impairment: Both Ears    Values / Beliefs / Concerns  Values / Beliefs Concerns : No (non-Cheondoism)    Advance Directive  Advance Directive?: None  Advance " "Directive offered?: AD Booklet refused (Patient states he has paperwork at home.)    Domestic Abuse  Have you ever been the victim of abuse or violence?: No  Physical Abuse or Sexual Abuse: No  Verbal Abuse or Emotional Abuse: No  Possible Abuse/Neglect Reported to:: Not Applicable    Psychological Assessment  History of Substance Abuse: None  History of Psychiatric Problems: No  Non-compliant with Treatment: No  Newly Diagnosed Illness: No    Discharge Risks or Barriers  Discharge risks or barriers?: Complex medical needs  Patient risk factors: Complex medical needs    Anticipated Discharge Information  Discharge Disposition: Discharged to home/self care (01)  Discharge Address: [facility] [address] [facility] [address]  Discharge Contact Phone Number: [phone]      Patient is POD# s/p MV replacement, ASD repair, GENET, TYRA.  RN CM met with patient, up in chair, and spouse. Patient independent at home, lives with spouse.    Patient has no DME at home. He bought an off brand \"inogen\" type oxygen machine for home years ago. He does not use this and states it does not work well.    Patient's spouse will provide dc transport.    Heike RUIZ RN Case Manager  [phone]      "

## 2023-03-29 NOTE — CARE PLAN
Problem: Knowledge Deficit - Standard  Goal: Patient and family/care givers will demonstrate understanding of plan of care, disease process/condition, diagnostic tests and medications  Outcome: Progressing     Problem: Skin Integrity  Goal: Skin integrity is maintained or improved  Outcome: Progressing     Problem: Fall Risk  Goal: Patient will remain free from falls  Outcome: Progressing     Problem: Pain - Standard  Goal: Alleviation of pain or a reduction in pain to the patient’s comfort goal  Outcome: Progressing     Problem: Day of surgery post CABG/Heart valve replacement  Goal: Stabilization in immediate post op period  Outcome: Progressing  Intervention: IS q 1 hour while awake post extubation  Note: Encouraging pt to use IS while awake provided education.     Intervention: Bedrest until extubated and groin lines out  Note: Pt is up to chair   Intervention: Dangle within 4 hours post extubation  Note: Completed per protocol, tolerated well with mild dizziness     Intervention: Up in chair 4 hours, day of extubation  Note: Up to chair this am   Intervention: Discontinue [place] toya and arterial line 12-18 hours post op if hemodynamically stable and off vasoactive drips  Note: [place] removed per protocol   Intervention: Amiodarone protocol per MD order  Note: N/A      Problem: Self Care  Goal: Patient will have the ability to perform ADLs independently or with assistance (bathe, groom, dress, toilet and feed)  Outcome: Progressing     Problem: Risk for Aspiration  Goal: Patient's risk for aspiration will be absent or decrease  Outcome: Progressing   The patient is Watcher - Medium risk of patient condition declining or worsening    Shift Goals  Clinical Goals: up to chair  Patient Goals: rest overngiht  Family Goals: Extubate    Progress made toward(s) clinical / shift goals:  pt up to chair off pressors     Patient is not progressing towards the following goals:

## 2023-03-29 NOTE — HOSPITAL COURSE
"\"67 y.o. male with a past medical history of asthma, hypertension, hyperlipidemia, severe MR and ASD who presented 3/28/2023 for an elective mitral valve replacement, left atrial appendage ligation and ASD repair with Dr. Sheppard.  His intraoperative course was uneventful, his total aortic cross-clamp time was 95 minutes and total cardiopulmonary bypass time was 110 minutes.  Post procedurally his LVEF was 60% with good transvalvular gradients.  He arrives in the ICU on sedation, vasopressors and full mechanical ventilatory support.\"  "

## 2023-03-29 NOTE — DIETARY
"Nutrition services: Day 1 of admit.  Eduardo Aponte is a 67 y.o. male with admitting DX of Severe mitral valve regurgitation  Consult received for cardiac diet education and MST of 2      Assessment:  Height: 182.9 cm (6' 0.01\")  Weight: 82.6 kg (182 lb 1.6 oz)  Body mass index is 24.69 kg/m²., BMI classification: Normal weight  Diet/Intake: Clear liquid    RD able to visit pt at bedside to provide cardiac diet education. RD discussed a heart healthy diet, provided handout reinforcing topics discussed. Pt demonstrated readiness and evidence of learning. RD able to answer all questions to patient's satisfaction.     No other education needs identified at this time. Consider referral to outpatient nutrition services for continuation of education as indicated or per pt preferences.     Evaluation:   MST of 2 reports unsure of weight loss with no decrease in appetite.  Spoke with Pt at bedside and Pt reports no changes in weight. Pt weights in the morning and afternoon. Pt states UBW is ~180 lbs.  Pt reports that he has had no changes in appetite.   Pt appeared adequately healthy for age.  Labs: Glucose 150.  Meds: Lasix. SSI. BM protocol.  Last BM: PTA.     Malnutrition Risk: Does not meet ASPEN criteria.    Recommendations/Plan:  Advance diet when medically feasible  Encourage intake of all meals  Document intake of all meals as % taken in ADL's to provide interdisciplinary communication across all shifts.   Monitor weight.  Nutrition rep will continue to see patient for ongoing meal and snack preferences.     RD following.      "

## 2023-03-29 NOTE — CARE PLAN
Problem: Humidified High Flow Nasal Cannula  Goal: Maintain adequate oxygenation dependent on patient condition  Description: Target End Date:  resolve prior to discharge or when underlying condition is resolved/stabilized    1.  Implement humidified high flow oxygen therapy  2.  Titrate high flow oxygen to maintain appropriate SpO2  Outcome: Progressing   40L/70%  PEP QID  IS: [id number]

## 2023-03-29 NOTE — PROGRESS NOTES
NSR 80-90    R- PVCs    .17/.074/.498      Pacer wire disconnected. No pacing required   VVI- 50/10/0.8

## 2023-03-29 NOTE — RESPIRATORY CARE
"COPD EDUCATION by COPD CLINICAL EDUCATOR  3/29/2023  at  11:33 AM by Crystal Sabillon, RRT     Patient interviewed by COPD education team.  Patient unable to participate in full program.  A short intervention has been conducted.  A comprehensive packet including information about COPD, types of treatments to manage their disease.    COPD Screen  COPD Risk Screening  Do you have a history of COPD?: Yes  Do you have a Pulmonologist?: Yes    COPD Assessment  COPD Clinical Specialists ONLY  COPD Education Initiated: Yes--Short Intervention (  DME Company: none  DME Equipment Type: none  Physician Name: NAGA ZAPIEN  Pulmonologist Name: established with pulmonary, Dr. Castellanos  Referrals Initiated: Yes  Pulmonary Rehab: N/A  Smoking Cessation: N/A  Hospice: N/A  Home Health Care: N/A  [facility] Outreach: N/A  [facility]: N/A  [facility]  Private In-Home Care Agency: N/A  Is this a COPD exacerbation patient?: No  $ Demo/Eval of SVN's, MDI's and Aerosols: Yes  (OP) Pulmonary Function Testing: Yes (2/8/2023 65% ratio)      Meds to Beds  Would the patient like to opt in for Bedside Medication Delivery at Discharge?: Yes, interested     MY COPD ACTION PLAN     It is recommended that patients and physicians /healthcare providers complete this action plan together. This plan should be discussed at each physician visit and updated as needed.    The green, yellow and red zones show groups of symptoms of COPD. This list of symptoms is not comprehensive, and you may experience other symptoms. In the \"Actions\" column, your healthcare provider has recommended actions for you to take based on your symptoms.    Patient Name: Eduardo Aponte   YOB: 1955   Last Updated on: 3/29/2023 11:32 AM   Green Zone:  I am doing well today Actions     Usual activitiy and exercise level   Take daily medications     Usual amounts of cough and phlegm/mucus   Use oxygen as prescribed     " "Sleep well at night   Continue regular exercise/diet plan     Appetite is good   At all times avoid cigarette smoke, inhaled irritants     Daily Medications (these medications are taken every day):   Budesonide-Formoterol Fumarate (Symbicort) 2 Puffs Twice daily     Additional Information:  Use with spacer and rinse mouth after use    Yellow Zone:  I am having a bad day or a COPD flare Actions     More breathless than usual   Continue daily medications     I have less energy for my daily activities   Use quick relief inhaler as ordered     Increased or thicker phlegm/mucus   Use oxygen as prescribed     Using quick relief inhaler/nebulizer more often   Get plenty of rest     Swelling of ankles more than usual   Use pursed lip breathing     More coughing than usual   At all times avoid cigarette smoke, inhaled irritants     I feel like I have a \"chest cold\"     Poor sleep and my symptoms woke me up     My appetite is not good     My medicine is not helping      Call provider immediately if symptoms don’t improve     Continue daily medications, add rescue medications:   Albuterol 2 Puffs Every 4 hours PRN       Medications to be used during a flare up, (as Discussed with Provider):           Additional Information:  Use with spacer    Red Zone:  I need urgent medical care Actions     Severe shortness of breath even at rest   Call 911 or seek medical care immediately     Not able to do any activity because of breathing      Fever or shaking chills      Feeling confused or very drowsy       Chest pains      Coughing up blood                  "

## 2023-03-30 LAB
ANION GAP SERPL CALC-SCNC: 10 MMOL/L (ref 7–16)
BUN SERPL-MCNC: 17 MG/DL (ref 8–22)
CALCIUM SERPL-MCNC: 8.1 MG/DL (ref 8.5–10.5)
CHLORIDE SERPL-SCNC: 101 MMOL/L (ref 96–112)
CO2 SERPL-SCNC: 25 MMOL/L (ref 20–33)
CREAT SERPL-MCNC: 0.81 MG/DL (ref 0.5–1.4)
ERYTHROCYTE [DISTWIDTH] IN BLOOD BY AUTOMATED COUNT: 46.5 FL (ref 35.9–50)
GFR SERPLBLD CREATININE-BSD FMLA CKD-EPI: 97 ML/MIN/1.73 M 2
GLUCOSE BLD STRIP.AUTO-MCNC: 136 MG/DL (ref 65–99)
GLUCOSE SERPL-MCNC: 142 MG/DL (ref 65–99)
HCT VFR BLD AUTO: 42.1 % (ref 42–52)
HGB BLD-MCNC: 14.2 G/DL (ref 14–18)
INR PPP: 1.23 (ref 0.87–1.13)
MCH RBC QN AUTO: 29.5 PG (ref 27–33)
MCHC RBC AUTO-ENTMCNC: 33.7 G/DL (ref 33.7–35.3)
MCV RBC AUTO: 87.3 FL (ref 81.4–97.8)
PLATELET # BLD AUTO: 134 K/UL (ref 164–446)
PMV BLD AUTO: 11.8 FL (ref 9–12.9)
POTASSIUM SERPL-SCNC: 4.2 MMOL/L (ref 3.6–5.5)
PROTHROMBIN TIME: 15.3 SEC (ref 12–14.6)
RBC # BLD AUTO: 4.82 M/UL (ref 4.7–6.1)
SODIUM SERPL-SCNC: 136 MMOL/L (ref 135–145)
WBC # BLD AUTO: 23 K/UL (ref 4.8–10.8)

## 2023-03-30 PROCEDURE — 82962 GLUCOSE BLOOD TEST: CPT

## 2023-03-30 PROCEDURE — 94640 AIRWAY INHALATION TREATMENT: CPT

## 2023-03-30 PROCEDURE — A9270 NON-COVERED ITEM OR SERVICE: HCPCS | Performed by: NURSE PRACTITIONER

## 2023-03-30 PROCEDURE — 97535 SELF CARE MNGMENT TRAINING: CPT

## 2023-03-30 PROCEDURE — 99291 CRITICAL CARE FIRST HOUR: CPT | Performed by: INTERNAL MEDICINE

## 2023-03-30 PROCEDURE — 700111 HCHG RX REV CODE 636 W/ 250 OVERRIDE (IP): Performed by: NURSE PRACTITIONER

## 2023-03-30 PROCEDURE — 99024 POSTOP FOLLOW-UP VISIT: CPT | Performed by: NURSE PRACTITIONER

## 2023-03-30 PROCEDURE — 85027 COMPLETE CBC AUTOMATED: CPT

## 2023-03-30 PROCEDURE — 85610 PROTHROMBIN TIME: CPT

## 2023-03-30 PROCEDURE — 770022 HCHG ROOM/CARE - ICU (200)

## 2023-03-30 PROCEDURE — 94669 MECHANICAL CHEST WALL OSCILL: CPT

## 2023-03-30 PROCEDURE — A9270 NON-COVERED ITEM OR SERVICE: HCPCS | Performed by: INTERNAL MEDICINE

## 2023-03-30 PROCEDURE — 700102 HCHG RX REV CODE 250 W/ 637 OVERRIDE(OP): Performed by: INTERNAL MEDICINE

## 2023-03-30 PROCEDURE — 80048 BASIC METABOLIC PNL TOTAL CA: CPT

## 2023-03-30 PROCEDURE — 97163 PT EVAL HIGH COMPLEX 45 MIN: CPT

## 2023-03-30 PROCEDURE — 700101 HCHG RX REV CODE 250: Performed by: INTERNAL MEDICINE

## 2023-03-30 PROCEDURE — 700102 HCHG RX REV CODE 250 W/ 637 OVERRIDE(OP): Performed by: NURSE PRACTITIONER

## 2023-03-30 RX ORDER — BUDESONIDE AND FORMOTEROL FUMARATE DIHYDRATE 80; 4.5 UG/1; UG/1
2 AEROSOL RESPIRATORY (INHALATION) 2 TIMES DAILY
Status: DISCONTINUED | OUTPATIENT
Start: 2023-03-30 | End: 2023-04-04 | Stop reason: HOSPADM

## 2023-03-30 RX ORDER — ALBUTEROL SULFATE 90 UG/1
2 AEROSOL, METERED RESPIRATORY (INHALATION) EVERY 6 HOURS PRN
Status: DISCONTINUED | OUTPATIENT
Start: 2023-03-30 | End: 2023-04-04 | Stop reason: HOSPADM

## 2023-03-30 RX ORDER — WARFARIN SODIUM 2.5 MG/1
2.5 TABLET ORAL DAILY
Status: DISCONTINUED | OUTPATIENT
Start: 2023-03-30 | End: 2023-04-04

## 2023-03-30 RX ADMIN — WARFARIN SODIUM 2.5 MG: 2.5 TABLET ORAL at 18:07

## 2023-03-30 RX ADMIN — ACETAMINOPHEN 1000 MG: 500 TABLET, FILM COATED ORAL at 05:24

## 2023-03-30 RX ADMIN — IPRATROPIUM BROMIDE AND ALBUTEROL SULFATE 3 ML: .5; 2.5 SOLUTION RESPIRATORY (INHALATION) at 22:49

## 2023-03-30 RX ADMIN — ENOXAPARIN SODIUM 40 MG: 40 INJECTION SUBCUTANEOUS at 18:05

## 2023-03-30 RX ADMIN — DOCUSATE SODIUM 50 MG AND SENNOSIDES 8.6 MG 2 TABLET: 8.6; 5 TABLET, FILM COATED ORAL at 18:05

## 2023-03-30 RX ADMIN — POLYETHYLENE GLYCOL 3350 1 PACKET: 17 POWDER, FOR SOLUTION ORAL at 05:24

## 2023-03-30 RX ADMIN — OXYCODONE HYDROCHLORIDE 5 MG: 5 TABLET ORAL at 02:19

## 2023-03-30 RX ADMIN — FUROSEMIDE 20 MG: 10 INJECTION, SOLUTION INTRAMUSCULAR; INTRAVENOUS at 16:04

## 2023-03-30 RX ADMIN — OXYCODONE HYDROCHLORIDE 10 MG: 10 TABLET ORAL at 09:39

## 2023-03-30 RX ADMIN — POTASSIUM CHLORIDE 20 MEQ: 1500 TABLET, EXTENDED RELEASE ORAL at 05:25

## 2023-03-30 RX ADMIN — ASPIRIN 81 MG: 81 TABLET, COATED ORAL at 05:24

## 2023-03-30 RX ADMIN — OMEPRAZOLE 20 MG: 20 CAPSULE, DELAYED RELEASE ORAL at 05:25

## 2023-03-30 RX ADMIN — METOPROLOL TARTRATE 25 MG: 25 TABLET, FILM COATED ORAL at 05:25

## 2023-03-30 RX ADMIN — ACETAMINOPHEN 1000 MG: 500 TABLET, FILM COATED ORAL at 18:06

## 2023-03-30 RX ADMIN — IPRATROPIUM BROMIDE AND ALBUTEROL SULFATE 3 ML: .5; 2.5 SOLUTION RESPIRATORY (INHALATION) at 19:21

## 2023-03-30 RX ADMIN — IPRATROPIUM BROMIDE AND ALBUTEROL SULFATE 3 ML: .5; 2.5 SOLUTION RESPIRATORY (INHALATION) at 06:33

## 2023-03-30 RX ADMIN — Medication 1 APPLICATOR: at 18:10

## 2023-03-30 RX ADMIN — IPRATROPIUM BROMIDE AND ALBUTEROL SULFATE 3 ML: .5; 2.5 SOLUTION RESPIRATORY (INHALATION) at 03:45

## 2023-03-30 RX ADMIN — IPRATROPIUM BROMIDE AND ALBUTEROL SULFATE 3 ML: .5; 2.5 SOLUTION RESPIRATORY (INHALATION) at 10:40

## 2023-03-30 RX ADMIN — MAGNESIUM HYDROXIDE 30 ML: 400 SUSPENSION ORAL at 10:54

## 2023-03-30 RX ADMIN — TRAMADOL HYDROCHLORIDE 50 MG: 50 TABLET ORAL at 07:41

## 2023-03-30 RX ADMIN — OXYCODONE HYDROCHLORIDE 5 MG: 5 TABLET ORAL at 20:12

## 2023-03-30 RX ADMIN — FUROSEMIDE 20 MG: 10 INJECTION, SOLUTION INTRAMUSCULAR; INTRAVENOUS at 05:25

## 2023-03-30 RX ADMIN — Medication 1 APPLICATOR: at 05:39

## 2023-03-30 RX ADMIN — OXYCODONE HYDROCHLORIDE 10 MG: 10 TABLET ORAL at 16:04

## 2023-03-30 RX ADMIN — OXYCODONE HYDROCHLORIDE 5 MG: 5 TABLET ORAL at 05:24

## 2023-03-30 RX ADMIN — MAGNESIUM SULFATE HEPTAHYDRATE 1 G: 1 INJECTION, SOLUTION INTRAVENOUS at 05:31

## 2023-03-30 RX ADMIN — METOPROLOL TARTRATE 25 MG: 25 TABLET, FILM COATED ORAL at 18:08

## 2023-03-30 RX ADMIN — MUPIROCIN 1 APPLICATION: 20 OINTMENT TOPICAL at 05:39

## 2023-03-30 RX ADMIN — BUDESONIDE AND FORMOTEROL FUMARATE DIHYDRATE 2 PUFF: 80; 4.5 AEROSOL RESPIRATORY (INHALATION) at 19:24

## 2023-03-30 RX ADMIN — IPRATROPIUM BROMIDE AND ALBUTEROL SULFATE 3 ML: .5; 2.5 SOLUTION RESPIRATORY (INHALATION) at 14:41

## 2023-03-30 RX ADMIN — DOCUSATE SODIUM 50 MG AND SENNOSIDES 8.6 MG 2 TABLET: 8.6; 5 TABLET, FILM COATED ORAL at 05:24

## 2023-03-30 RX ADMIN — ACETAMINOPHEN 1000 MG: 500 TABLET, FILM COATED ORAL at 12:59

## 2023-03-30 RX ADMIN — ROSUVASTATIN CALCIUM 5 MG: 10 TABLET, FILM COATED ORAL at 05:24

## 2023-03-30 ASSESSMENT — COGNITIVE AND FUNCTIONAL STATUS - GENERAL
SUGGESTED CMS G CODE MODIFIER MOBILITY: CJ
MOBILITY SCORE: 20
CLIMB 3 TO 5 STEPS WITH RAILING: A LITTLE
WALKING IN HOSPITAL ROOM: A LITTLE
STANDING UP FROM CHAIR USING ARMS: A LITTLE
TURNING FROM BACK TO SIDE WHILE IN FLAT BAD: A LITTLE

## 2023-03-30 ASSESSMENT — PAIN DESCRIPTION - PAIN TYPE
TYPE: ACUTE PAIN
TYPE: ACUTE PAIN;SURGICAL PAIN
TYPE: ACUTE PAIN
TYPE: ACUTE PAIN;SURGICAL PAIN
TYPE: ACUTE PAIN
TYPE: ACUTE PAIN
TYPE: ACUTE PAIN;SURGICAL PAIN
TYPE: ACUTE PAIN;REFERRED PAIN
TYPE: ACUTE PAIN

## 2023-03-30 ASSESSMENT — ENCOUNTER SYMPTOMS
FEVER: 0
SHORTNESS OF BREATH: 1
STRIDOR: 0
VOMITING: 0
ABDOMINAL PAIN: 0
NAUSEA: 0
DIZZINESS: 0
FOCAL WEAKNESS: 0
BLURRED VISION: 0
SENSORY CHANGE: 0
COUGH: 0
MYALGIAS: 0
CHILLS: 0
SPUTUM PRODUCTION: 0

## 2023-03-30 ASSESSMENT — GAIT ASSESSMENTS
DEVIATION: OTHER (COMMENT)
DISTANCE (FEET): 60
GAIT LEVEL OF ASSIST: MINIMAL ASSIST
ASSISTIVE DEVICE: FRONT WHEEL WALKER

## 2023-03-30 ASSESSMENT — PATIENT HEALTH QUESTIONNAIRE - PHQ9
SUM OF ALL RESPONSES TO PHQ9 QUESTIONS 1 AND 2: 0
1. LITTLE INTEREST OR PLEASURE IN DOING THINGS: NOT AT ALL
2. FEELING DOWN, DEPRESSED, IRRITABLE, OR HOPELESS: NOT AT ALL

## 2023-03-30 ASSESSMENT — FIBROSIS 4 INDEX: FIB4 SCORE: 1.13

## 2023-03-30 NOTE — CARE PLAN
The patient is Stable - Low risk of patient condition declining or worsening    Shift Goals  Clinical Goals: pain control, rest, mobilize in AM  Patient Goals: rest/sleep  Family Goals: none present    Progress made toward(s) clinical / shift goals:  Pain controlled with scheduled tylenol and PRN oxycodone. Remains on HFNC. Limited on ambulation due to oxygen demands. Up to chair this morning.      Problem: Fall Risk  Goal: Patient will remain free from falls  Outcome: Progressing     Problem: Pain - Standard  Goal: Alleviation of pain or a reduction in pain to the patient’s comfort goal  Outcome: Progressing     Problem: Post Op Day 1 CABG/Heart Valve Replacement  Goal: Optimal care of the post op CABG/heart valve replacement Post Op Day 1  Intervention: Daily weights in the morning  Note: completed  Intervention: Up in chair for all meals  Note: completed  Intervention: IS q 1 hour while awake and record best IS volume  Note: Best IS: 1750  Intervention: EKG and CXR completed  Note: completed  Intervention: All valve patients: PT/INR daily  Note: complete  Intervention: Antibiotics are discontinued within 24 hours of anesthesia end time unless indication documented for continuation beyond 24 hours  Note: complete  Intervention: Ambulate in am if stable. First ambulation 25 feet. Repeat x 3 as tolerated  Note: Ambulation limited due to HFNC. Patient reports ambulating to doorway.  Intervention: Discontinue corona catheter unless documented reason for continuation  Note: Per CTS, keep corona in place  Intervention: Assess surgical dressing and check provider orders for potential removal  Note: Completed  Intervention: Ensure referal to intensive cardiac rehab is ordered, and smoking cessation education if appropriate  Note: completed  Intervention: [facility] trained RN to remove chest tubes if ordered by provider  Note: Per CTS, keep chest tubes in place  Intervention: Knee high ALY hose, on during the day, off at  night  Note: Complete  Intervention: Saline lock IV  Note: Complete  Intervention: Transfer to tele status, begin VS q 4 hours  Note: Per CTS, continue ICU status  Intervention: After 24th hour post-anesthesia end time, transition patient to Cardiac Surgery SQ Insulin Protocol  Note: complete  Intervention: If patient is CABG or on home beta-blocker, start/resume beta-blocker on POD 1 or POD 2 or document contraindication  Note: Metoprolol administered per MAR               room air

## 2023-03-30 NOTE — CARE PLAN
Problem: Post Op Day 1 CABG/Heart Valve Replacement  Goal: Optimal care of the post op CABG/heart valve replacement Post Op Day 1  3/29/2023 1914 by Zenaida Barrett R.N.  Outcome: Progressing  3/29/2023 1913 by Zenaida Barrett R.N.  Outcome: Progressing  Intervention: Daily weights in the morning  Note: Completed in AM  Intervention: Up in chair for all meals  Note: Up for all meals  Intervention: IS q 1 hour while awake and record best IS volume  Note: Best IS 1700 mL  Intervention: EKG and CXR completed  Note: Completed by NOC RN  Intervention: All valve patients: PT/INR daily  Note: Completed by NOC RN  Intervention: Antibiotics are discontinued within 24 hours of anesthesia end time unless indication documented for continuation beyond 24 hours  Note: Completed by NOC RN  Intervention: Ambulate in am if stable. First ambulation 25 feet. Repeat x 3 as tolerated  Note: Patient ambulated 25 ft. X 1. High O2 demands.  Intervention: Discontinue corona catheter unless documented reason for continuation  Note: To remain in place by APRN  Intervention: Assess surgical dressing and check provider orders for potential removal  Note: Island dressing removed, incision cleansed  Intervention: Ensure referal to intensive cardiac rehab is ordered, and smoking cessation education if appropriate  Note: Order placed    Intervention: [facility] trained RN to remove chest tubes if ordered by provider  Note: To remain in place per APRN  Intervention: Knee high ALY hose, on during the day, off at night  Note: Removed at night  Intervention: Saline lock IV  Note: Completed  Intervention: Transfer to [place] status, begin VS q 4 hours  Note: Patient on high flow nasal cannula  Intervention: After 24th hour post-anesthesia end time, transition patient to Cardiac Surgery SQ Insulin Protocol  Note: Completed by PharmD  Intervention: If patient is CABG or on home beta-blocker, start/resume beta-blocker on POD 1 or POD 2 or document  contraindication  Note: Metoprolol given per MAR     Problem: Pain - Standard  Goal: Alleviation of pain or a reduction in pain to the patient’s comfort goal  3/29/2023 1914 by Zenaida Barrett R.N.  Outcome: Progressing  3/29/2023 1913 by Zenaida Barrett R.N.  Outcome: Progressing  3/29/2023 1912 by Zenaida Barrett R.N.  Outcome: Progressing     The patient is Watcher - Medium risk of patient condition declining or worsening    Shift Goals  Clinical Goals: Decrease O2 demand  Patient Goals: Walk  Family Goals: ERNESTINA    Progress made toward(s) clinical / shift goals:  Ambulated 25 ft x1    Patient is not progressing towards the following goals:      Problem: Respiratory  Goal: Patient will achieve/maintain optimum respiratory ventilation and gas exchange  3/29/2023 1914 by Zenaida Barrett R.N.  Outcome: Not Progressing  Flowsheets  Taken 3/29/2023 1914  O2 Delivery Device: Heated High Flow Nasal Cannula  Taken 3/29/2023 1400  Incentive Spirometer: Effective  Note: Patient placed on HFNC  3/29/2023 1913 by Zenaida Barrett R.N.  Outcome: Progressing  3/29/2023 1912 by Zenaida Barrett R.N.  Outcome: Progressing

## 2023-03-30 NOTE — PROGRESS NOTES
I spoke with wife Ritesh, who, at this time has decided to move forward with taking Eduardo to INR draws post discharge and forego home health.  It was explained they would have one appointment with the coumadin clinic here at the hospital within 3 days of surgery and then could go to a lab location closer to home after that.  INR draw frequency was also discussed.    Call time 5 minutes

## 2023-03-30 NOTE — CARE PLAN
Problem: Humidified High Flow Nasal Cannula  Goal: Maintain adequate oxygenation dependent on patient condition  Description: Target End Date:  resolve prior to discharge or when underlying condition is resolved/stabilized    1.  Implement humidified high flow oxygen therapy  2.  Titrate high flow oxygen to maintain appropriate SpO2  Outcome: Progressing  PT remains on HHFNC 50 L  Fio2 70-55%       Problem: Bronchoconstriction  Goal: Improve in air movement and diminished wheezing  Description: Target End Date:  2 to 3 days    1.  Implement inhaled treatments  2.  Evaluate and manage medication effects  Outcome: Progressing    Albuterol Q4  Symbicort added today BID.

## 2023-03-30 NOTE — PROGRESS NOTES
Cardiovascular Surgery Progress Note    Name: Eduardo Aponte  MRN: [id number]  : 1955  Admit Date: 3/28/2023  5:17 AM  2 Days Post-Op     Procedure:  Procedure(s) and Anesthesia Type:     * MITRAL VALVE REPLACEMENT - General     * ECHOCARDIOGRAM, TRANSESOPHAGEAL, INTRAOPERATIVE - General     * EXCISION, LEFT ATRIAL APPENDAGE - General     * REPAIR, ATRIAL SEPTAL DEFECT - General    Vitals:  Vitals:    23 0800 23 0841 23 0900 23 1000   BP: 99/62 104/65 104/65 109/66   Pulse: 91 89 99 91   Resp:  20     Temp:       TempSrc: Bladder   Bladder   SpO2: 94% 93% 92% 96%   Weight:       Height:          No data recorded.      Respiratory:  Respiration: 20, Pulse Oximetry: 96 %     Fluids:    Intake/Output Summary (Last 24 hours) at 3/30/2023 1008  Last data filed at 3/30/2023 1000  Gross per 24 hour   Intake 1231.57 ml   Output 3015 ml   Net -1783.43 ml       Admit weight: Weight: 82.6 kg (182 lb 1.6 oz)  Current weight: Weight: 84.3 kg (185 lb 13.6 oz) (23 0415)    Labs:  Recent Labs     23  1031 23  1145 23  0455 232   WBC 7.4  --  22.1* 23.0*   RBC 7.01*  --  5.05 4.82   HEMOGLOBIN 20.5* 15.2 14.8 14.2   HEMATOCRIT 63.0* 44.4 44.2 42.1   MCV 89.9  --  87.5 87.3   MCH 29.2  --  29.3 29.5   MCHC 32.5*  --  33.5* 33.7   RDW 46.3  --  46.1 46.5   PLATELETCT 319 165 139* 134*   MPV 11.6  --  11.5 11.8       Recent Labs     23  1031 23  1610 23  2304 23  0455 23  0222   SODIUM 142  --   --  138 136   POTASSIUM 3.7   < > 4.5 4.6 4.2   CHLORIDE 103  --   --  105 101   CO2 24  --   --  19* 25   GLUCOSE 137*  --   --  150* 142*   BUN 12  --   --  16 17   CREATININE 0.99  --   --  0.76 0.81   CALCIUM 9.5  --   --  7.8* 8.1*    < > = values in this interval not displayed.       Recent Labs     23  1145 23  0445 23  0222   APTT 40.0*  --   --    INR 1.44* 1.18* 1.23*             Medications:  Scheduled Medications    Medication Dose Frequency    budesonide-formoterol  2 Puff BID    rosuvastatin  5 mg DAILY    furosemide  20 mg BID DIURETIC    potassium chloride SA  20 mEq DAILY    ipratropium-albuterol  3 mL Q4HRS (RT)    insulin regular  2-9 Units 4X/DAY ACHS    Nozin nasal  swab  1 Applicator BID    enoxaparin (LOVENOX) injection  40 mg DAILY AT 1800    mupirocin  1 Application. BID    aspirin EC  81 mg DAILY    Pharmacy Consult Request  1 Each PHARMACY TO DOSE    acetaminophen  1,000 mg Q6HRS    senna-docusate  2 Tablet BID    And    polyethylene glycol/lytes  1 Packet DAILY    And    magnesium hydroxide  30 mL DAILY    omeprazole  20 mg DAILY    metoprolol tartrate  25 mg BID    MD Alert...Warfarin per Pharmacy   PHARMACY TO DOSE        Exam:   Physical Exam  Vitals and nursing note reviewed.   Constitutional:       General: He is not in acute distress.     Appearance: Normal appearance. He is obese.   HENT:      Head: Normocephalic and atraumatic.      Right Ear: External ear normal.      Left Ear: External ear normal.      Mouth/Throat:      Mouth: Mucous membranes are moist.      Pharynx: Oropharynx is clear.   Eyes:      Pupils: Pupils are equal, round, and reactive to light.   Cardiovascular:      Rate and Rhythm: Normal rate and regular rhythm.      Pulses: Normal pulses.      Heart sounds: Normal heart sounds.   Pulmonary:      Effort: Pulmonary effort is normal.      Breath sounds: Decreased breath sounds present.   Abdominal:      General: Bowel sounds are decreased. There is no distension.      Palpations: Abdomen is soft.      Tenderness: There is no abdominal tenderness.   Musculoskeletal:      Cervical back: Normal range of motion and neck supple. No tenderness.      Right lower leg: No edema.      Left lower leg: No edema.      Comments: Bilateral upper extremity edema     Skin:     General: Skin is warm and dry.      Capillary Refill: Capillary refill takes less than 2 seconds.   Neurological:       General: No focal deficit present.      Mental Status: He is oriented to person, place, and time. Mental status is at baseline.   Psychiatric:         Mood and Affect: Mood normal.         Behavior: Behavior normal.         Thought Content: Thought content normal.       Cardiac Medications:    ASA - Yes    Plavix - No; contraindicated because of On Coumadin / NOAC    Post-operative Beta Blockers - Yes    Ace/ARB- No; contraindicated because of Normal EF    Statin - Yes    Aldactone- No; contraindicated because of Normal EF    Ejection Fraction:  60%    Telemetry:   3/29 SR  3/30 SR    Assessment/Plan:  POD 1  HDS- off pressors, SR, neuro intact, wounds intact, abdomen soft, fluid balance positive, wt up,  on 12 L oxymask. Received one dose of lasix overnight. 208 ml out of chest tubes overnight. Plan:  Diurese, encourage IS and mobility, HFNC. Keep chest tubes, pacing wires, and corona.     POD 2  HDS, SR, neuro intact, wounds intact, abdomen soft, fluid balance negative, wt up, HFNC 50L 60%. 80 mL out of chest tubes overnight. Plan:  Remove mediastinal chest tubes, keep pacing wires. Wean HFNC as tolerated. Continue diuresis.  IS/ambulate.     Disposition:  TBD

## 2023-03-30 NOTE — THERAPY
Physical Therapy   Initial Evaluation     Patient Name: Eduardo Aponte  Age:  67 y.o., Sex:  male  Medical Record #: [id number]  Today's Date: 3/30/2023     Precautions  Precautions: Fall Risk;Cardiac Precautions (See Comments);Sternal Precautions (See Comments)    Assessment  Pt is a 67 y.o. male with PMhx of asthma, hypertension, hyperlipidemia, severe MR and ASD who presented 3/28/2023 for an elective mitral valve replacement, left atrial appendage ligation and ASD repair with Dr. Sheppard.  His intraoperative course was uneventful, his total aortic cross-clamp time was 95 minutes and total cardiopulmonary bypass time was 110 minutes.  Post procedurally his LVEF was 60% with good transvalvular gradients.     Provided/reviewed [facility] handout with pt this date, discussing sternal precautions, phase I vs II cardiac rehab, implementation of walking program, indicators to terminate exercise, recovery process, and RPE scale. Pt was able to complete transfers with SBA, but required CGA-Yuliya for ambulation while covering 60' with FWW due to mild unsteadiness. Pt with good understanding of recovery process and remains motivated at this time. Anticipate good progress and ability to transition back home with spouse; however, will continue to follow to progress mobility as needed and ensure improvements occur.     Plan    Physical Therapy Initial Treatment Plan   Treatment Plan : Gait Training, Neuro Re-Education / Balance, Self Care / Home Evaluation, Therapeutic Activities, Therapeutic Exercise  Treatment Frequency: 4 Times per Week  Duration: Until Therapy Goals Met    DC Equipment Recommendations: Front-Wheel Walker  Discharge Recommendations: Other - (With improvements/progress, anticipate D/C home with family support and f/u with OP cardiac rehab as appropriate)       Subjective  Pt reported mild lightheadedness with position changes, and general feeling of unsteadiness. 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       Objective       23 1409   Charge Group   PT Evaluation PT Evaluation High   PT Self Care / Home Evaluation (Units) 1   Total Time Spent   PT Total Time Yes   PT Evaluation Time Spent (Mins) 15   PT Self Care/Home Evaluation Time Spent (Mins) 17   PT Total Time Spent (Calculated) 32   Initial Contact Note    Initial Contact Note Order Received and Verified, Physical Therapy Evaluation in Progress with Full Report to Follow.   Precautions   Precautions Fall Risk;Cardiac Precautions (See Comments);Sternal Precautions (See Comments)   Vitals   Pulse 97   Patient BP Position Supine   Blood Pressure  132/74  (BP sittin/72, standin/76)   Pulse Oximetry 92 %   O2 Delivery Device Heated High Flow Nasal Cannula  (50L/55%)   Vitals Comments Okay to transition to 15L oxymask per RN for ambulation, VSS and returned to [facility] upon completion.   Pain   Pain Scales 0 to 10 Scale    Pain 0 - 10 Group   Location Sternum   Pain Rating Scale (NPRS) 5   Prior Living Situation   Prior Services None   Housing / Facility 1 Story House   Steps Into Home 2  (1 porch step + 1 step through doorway)   Steps In Home 0   Equipment Owned None   Lives with - Patient's Self Care Capacity Spouse   Prior Level of Functional Mobility   Bed Mobility Independent   Transfer Status Independent   Ambulation Independent   Ambulation Distance   (community)   Assistive Devices Used None   History of Falls   History of Falls No   Cognition    Comments alert, pleasant, and cooperative   Active ROM Upper Body   Active ROM Upper Body  WDL   Strength Upper Body   Upper Body Strength  WDL   Sensation Upper Body   Upper Extremity Sensation  WDL   Active ROM Lower Body    Active ROM Lower Body  WDL   Strength Lower Body   Lower Body Strength  WDL   Sensation Lower Body   Lower Extremity Sensation   WDL   Bed Mobility    Supine to Sit Standby Assist   Rolling Standby Assist   Gait Analysis   Gait Level Of Assist Minimal Assist   Assistive Device Front  Wheel Walker   Distance (Feet) 60   # of Times Distance was Traveled 1   Deviation Other (Comment)  (Decreased cleo, increased reliance on FWW, and mild-mod postural sway due to inconsistent foot placement with stepping)   Comments Cues for safety, sequencing, and activity pacing   Functional Mobility   Sit to Stand Standby Assist  (FWW)   ICU Target Mobility Level   ICU Mobility - Targeted Level Level 4   How much difficulty does the patient currently have...   Turning over in bed (including adjusting bedclothes, sheets and blankets)? 3   Sitting down on and standing up from a chair with arms (e.g., wheelchair, bedside commode, etc.) 4   Moving from lying on back to sitting on the side of the bed? 4   How much help from another person does the patient currently need...   Moving to and from a bed to a chair (including a wheelchair)? 3   Need to walk in a hospital room? 3   Climbing 3-5 steps with a railing? 3   6 clicks Mobility Score 20   Activity Tolerance   Comments 3 minutes continuous walking   Short Term Goals    Short Term Goal # 1 Pt will verbalize indicators to teminate exercise to maximize safety in 6 visits.   Short Term Goal # 2 Pt will ambulate 150' supervision w/FWW in 6 visits to improve access to environment   Education Group   Education Provided Role of Physical Therapist;Sternal Precautions;Cardiac Precautions   Cardiac Precautions Patient Response Action Demonstration   Sternal Precautions Patient Response Action Demonstration   Role of Physical Therapist Patient Response Verbal Demonstration   Additional Comments Provided/reviewed [facility] handout with pt this date. Discussed sternal precautions, phase I vs II cardiac rehab, implementation of walking program, indicators to terminate exercise, recovery process, and RPE scale. Discussed need for FWW at this time   Physical Therapy Initial Treatment Plan    Treatment Plan  Gait Training;Neuro Re-Education / Balance;Self Care / Home  Evaluation;Therapeutic Activities;Therapeutic Exercise   Treatment Frequency 4 Times per Week   Duration Until Therapy Goals Met   Problem List    Problems Impaired Ambulation;Impaired Balance;Decreased Activity Tolerance   Anticipated Discharge Equipment and Recommendations   DC Equipment Recommendations Front-Wheel Walker   Discharge Recommendations Other -  (With improvements/progress, anticipate D/C home with family support and f/u with OP cardiac rehab as appropriate)   Interdisciplinary Plan of Care Collaboration   IDT Collaboration with  Nursing;Occupational Therapist   Patient Position at End of Therapy Seated;Call Light within Reach;Tray Table within Reach;Family / Friend in Room   Session Information   Date / Session Number  3/30- 1(1/4, 4/5)   Priority   ([facility])

## 2023-03-30 NOTE — PROGRESS NOTES
"Critical Care Progress Note    Date of admission  3/28/2023    Chief Complaint  67 y.o. male admitted 3/28/2023 with mitral regurgitation     Hospital Course  \"67 y.o. male with a past medical history of asthma, hypertension, hyperlipidemia, severe MR and ASD who presented 3/28/2023 for an elective mitral valve replacement, left atrial appendage ligation and ASD repair with Dr. Sheppard.  His intraoperative course was uneventful, his total aortic cross-clamp time was 95 minutes and total cardiopulmonary bypass time was 110 minutes.  Post procedurally his LVEF was 60% with good transvalvular gradients.  He arrives in the ICU on sedation, vasopressors and full mechanical ventilatory support.\"    Interval Problem Update  Reviewed last 24 hour events:   - Remains on HFNC, states breathing feels better after starting nebs   - Neuro: AOx4   - HR: 80s-90s   - SBP: 100s-130s   - GI: tolerating diet, last BM PTA   - UOP: 2.6 L/24 hrs   - Roberts: yes   - Tm: 37.5   - Lines: CVC, chest tubes, pacer   - PPx: GI PPI, DVT lovenox/coumadin   - HFNC 50L, 70% -> 55%. IS 1750 mL, PEP   - continue nebs   - CXR (personally reviewed and compared to prior): no new   - Continue weaning HFNC, increase pulmonary hygiene and ambulation    Yesterday   - Increasing hypoxia overnight, splinting respirations due to nebs   - Neuro: AOx4   - HR: 80s-90s   - SBP: 90s-110s, off levo   - GI: tolerating diet, last BM PTA   - UOP: 1.9 L since admit   - Roberts: yes   - Tm: 37.2   - Lines: CVC, chest tubes, pacer   - PPx: GI PPI, DVT held   - 12L oxymask, IS 1.7L, PEP   - ABG pH: 7.41, pCO2: 32.1, pO2: 54, HCO3: 20.4, BE: -3   - CXR (personally reviewed and compared to prior): Hypoinflation and bibasilar atelectasis   - Oxymask -> HFNC, scheduled nebs    Review of Systems  Review of Systems   Constitutional:  Positive for malaise/fatigue. Negative for chills and fever.   Eyes:  Negative for blurred vision.   Respiratory:  Positive for shortness of breath. " Negative for cough, sputum production and stridor.    Cardiovascular:  Positive for chest pain.   Gastrointestinal:  Negative for abdominal pain, nausea and vomiting.   Genitourinary:  Negative for dysuria.   Musculoskeletal:  Negative for myalgias.   Skin:  Negative for rash.   Neurological:  Negative for dizziness, sensory change and focal weakness.      Vital Signs for last 24 hours   Temp:  [36.3 °C (97.3 °F)] 36.3 °C (97.3 °F)  Pulse:  [] 90  Resp:  [16-20] 20  BP: ()/(57-79) 125/77  SpO2:  [88 %-95 %] 93 %    Hemodynamic parameters for last 24 hours       Respiratory Information for the last 24 hours       Physical Exam   Physical Exam  Vitals and nursing note reviewed.   Constitutional:       General: He is in acute distress.      Appearance: He is well-developed. He is ill-appearing.   HENT:      Head: Normocephalic and atraumatic.      Right Ear: External ear normal.      Left Ear: External ear normal.   Eyes:      Conjunctiva/sclera: Conjunctivae normal.      Pupils: Pupils are equal, round, and reactive to light.   Neck:      Vascular: No JVD.      Trachea: No tracheal deviation.      Comments: Right IJ dual-lumen catheter  Cardiovascular:      Rate and Rhythm: Normal rate and regular rhythm.      Pulses: Normal pulses.   Pulmonary:      Breath sounds: Decreased air movement present. Rales present. No wheezing.   Chest:       Abdominal:      General: Bowel sounds are normal. There is no distension.      Palpations: Abdomen is soft.   Genitourinary:     Comments: Clear urinary output from Roberts  Musculoskeletal:         General: No tenderness.      Cervical back: Neck supple.   Skin:     General: Skin is warm and dry.      Capillary Refill: Capillary refill takes less than 2 seconds.      Findings: No rash.   Neurological:      General: No focal deficit present.      Mental Status: He is alert and oriented to person, place, and time.      GCS: GCS eye subscore is 4. GCS verbal subscore is 5.  GCS motor subscore is 6.      Cranial Nerves: Cranial nerves 2-12 are intact.      Sensory: Sensation is intact.      Motor: Motor function is intact.   Psychiatric:         Mood and Affect: Mood and affect normal.       Medications  Current Facility-Administered Medications   Medication Dose Route Frequency Provider Last Rate Last Admin    rosuvastatin (CRESTOR) tablet 5 mg  5 mg Oral DAILY ANGÉLICA HuangP.R.N.   5 mg at 03/30/23 0524    furosemide (LASIX) injection 20 mg  20 mg Intravenous BID DIURETIC MING Huang.P.R.N.   20 mg at 03/30/23 0525    potassium chloride SA (Kdur) tablet 20 mEq  20 mEq Oral DAILY MING Huang.P.R.N.   20 mEq at 03/30/23 0525    ipratropium-albuterol (DUONEB) nebulizer solution  3 mL Nebulization Q4HRS (RT) Rajesh Dunn Jr., D.O.   3 mL at 03/30/23 0633    insulin regular (HumuLIN R,NovoLIN R) injection  2-9 Units Subcutaneous 4X/DAY KULDEEP Hebert M.D.   2 Units at 03/29/23 2048    And    dextrose 10 % BOLUS 25 g  25 g Intravenous Q15 MIN PRN Libertad Hebert M.D.        Nozin nasal  swab  1 Applicator Each Nostril BID ANGÉLICA HuangPMarielR.N.   1 Applicator at 03/30/23 0539    Respiratory Therapy Consult   Nebulization Continuous RT ANGÉLICA HuangPMarielRMarielN.        NS infusion   Intravenous Continuous ANGÉLICA HuangP.R.N. 10 mL/hr at 03/28/23 1200 Rate Verify at 03/28/23 1200    electrolyte-A (PLASMALYTE-A) infusion   Intravenous PRN ANGÉLICA HuangP.R.N.        enoxaparin (Lovenox) inj 40 mg  40 mg Subcutaneous DAILY AT 1800 MING Huang.P.R.N.   40 mg at 03/29/23 1752    mupirocin (BACTROBAN) 2 % ointment 1 Application.  1 Application. Topical BID ANGÉLICA HuangPMarielRMarielN.   1 Application. at 03/30/23 0539    aspirin EC (ECOTRIN) tablet 81 mg  81 mg Oral DAILY Corinne Mishra, MING.P.R.N.   81 mg at 03/30/23 0524    clevidipine (Cleviprex) IV emulsion  0-21 mg/hr Intravenous Continuous MING Huang.P.R.N.   Dose not  Required at 03/28/23 1200    nitroglycerin 50 mg in D5W 250 ml infusion  0-100 mcg/min Intravenous Continuous ANGÉLICA HuangP.R.STEFFEN   Dose not Required at 03/28/23 1200    Pharmacy Consult Request ...Pain Management Review 1 Each  1 Each Other PHARMACY TO DOSE ANGÉLICA HuangP.R.N.        acetaminophen (TYLENOL) tablet 1,000 mg  1,000 mg Oral Q6HRS MING Huang.P.R.N.   1,000 mg at 03/30/23 0524    Followed by    [START ON 4/2/2023] acetaminophen (TYLENOL) tablet 1,000 mg  1,000 mg Oral Q6HRS PRN MING Huang.P.R.N.        oxyCODONE immediate-release (ROXICODONE) tablet 5 mg  5 mg Oral Q3HRS PRN MING Huang.P.R.N.   5 mg at 03/30/23 0524    Or    oxyCODONE immediate release (ROXICODONE) tablet 10 mg  10 mg Oral Q3HRS PRN MING Huang.P.R.N.   10 mg at 03/28/23 2346    Or    fentaNYL (SUBLIMAZE) injection 50 mcg  50 mcg Intravenous Q3HRS PRN MING Huang.P.R.N.        traMADol (Ultram) 50 MG tablet 50 mg  50 mg Oral Q4HRS PRN MING Huang.P.R.N.   50 mg at 03/30/23 0741    midazolam (Versed) injection 2 mg  2 mg Intravenous Q HOUR PRN MING Huang.P.R.N.        dexmedetomidine (PRECEDEX) 400 mcg/100mL NS premix infusion  0-1.5 mcg/kg/hr (Ideal) Intravenous Continuous ANGÉLICA HuangP.R.N.   Stopped at 03/28/23 1358    sodium bicarbonate 8.4 % injection 50 mEq  50 mEq Intravenous Q HOUR PRN MING Huang.P.R.N.        morphine 4 MG/ML injection 4 mg  4 mg Intravenous Q HOUR PRN MING Huang.P.R.N.   4 mg at 03/28/23 1245    ondansetron (ZOFRAN) syringe/vial injection 8 mg  8 mg Intravenous Q6HRS PRN Corinne Mishra, MING.P.R.N.   8 mg at 03/28/23 1734    Or    prochlorperazine (COMPAZINE) injection 10 mg  10 mg Intravenous Q6HRS PRN Corinne Mishra, A.P.R.N.        senna-docusate (PERICOLACE or SENOKOT S) 8.6-50 MG per tablet 2 Tablet  2 Tablet Oral BID Corinne Mishra A.P.R.N.   2 Tablet at 03/30/23 0524    And    polyethylene glycol/lytes  (MIRALAX) PACKET 1 Packet  1 Packet Oral DAILY ANGÉLICA HuangP.R.N.   1 Packet at 03/30/23 0524    And    magnesium hydroxide (MILK OF MAGNESIA) suspension 30 mL  30 mL Oral DAILY MING Huang.P.R.N.        And    bisacodyl (DULCOLAX) suppository 10 mg  10 mg Rectal QDAY PRN ANGÉLICA HuangP.R.N.        omeprazole (PRILOSEC) capsule 20 mg  20 mg Oral DAILY MING Huang.P.R.N.   20 mg at 03/30/23 0525    mag hydrox-al hydrox-simeth (MAALOX PLUS ES or MYLANTA DS) suspension 30 mL  30 mL Oral Q4HRS PRN MING Huang.P.R.N.        diphenhydrAMINE (BENADRYL) tablet/capsule 25 mg  25 mg Oral HS PRN - MR X 1 ANGÉLICA HuangP.R.NMariel        metoprolol tartrate (LOPRESSOR) tablet 25 mg  25 mg Oral BID MING Huang.P.R.N.   25 mg at 03/30/23 0525    MD Alert...Warfarin per Pharmacy   Other PHARMACY TO DOSE ANGÉLICA HuangP.RSINGH        norepinephrine (Levophed) 8 mg in 250 mL NS infusion (premix)  0-1 mcg/kg/min (Ideal) Intravenous Continuous ANGÉLICA HuangP.R.N.   Stopped at 03/29/23 0228       Fluids    Intake/Output Summary (Last 24 hours) at 3/30/2023 0742  Last data filed at 3/30/2023 0631  Gross per 24 hour   Intake 1383.42 ml   Output 2790 ml   Net -1406.58 ml         Laboratory  Recent Labs     03/28/23  1150 03/28/23  1308 03/28/23  1411 03/29/23  0319   ISTATAPH 7.309* 7.367* 7.371* 7.411   ISTATAPCO2 44.7* 40.7* 36.0 32.1   ISTATAPO2 123* 89* 92* 54*   ISTATATCO2 24 25 22 21   YLIPAIO1HJV 98 97 97 89*   ISTATARTHCO3 22.5 23.4 20.9 20.4   ISTATARTBE -4 -2 -4 -3   ISTATTEMP 36.4 C 36.1 C 36.3 C 36.9 C   ISTATFIO2 100 100 95  --    ISTATSPEC Arterial Arterial Arterial Arterial   ISTATAPHTC 7.318* 7.380* 7.381* 7.413   [id number] 120* 84 88* 54*           Recent Labs     03/27/23  1031 03/28/23  1145 03/28/23  1610 03/28/23  2304 03/29/23  0455 03/30/23  0222   SODIUM 142  --   --   --  138 136   POTASSIUM 3.7  --    < > 4.5 4.6 4.2   CHLORIDE 103  --   --   --  105  101   CO2 24  --   --   --  19* 25   BUN 12  --   --   --  16 17   CREATININE 0.99  --   --   --  0.76 0.81   MAGNESIUM  --  2.8*  --   --   --   --    CALCIUM 9.5  --   --   --  7.8* 8.1*    < > = values in this interval not displayed.       Recent Labs     03/27/23  1031 03/29/23 0455 03/30/23 0222   ALTSGPT 23  --   --    ASTSGOT 11*  --   --    ALKPHOSPHAT 87  --   --    TBILIRUBIN 0.6  --   --    GLUCOSE 137* 150* 142*       Recent Labs     03/27/23  1031 03/29/23 0455 03/30/23 0222   WBC 7.4 22.1* 23.0*   NEUTSPOLYS 59.40  --   --    LYMPHOCYTES 29.40  --   --    MONOCYTES 8.30  --   --    EOSINOPHILS 1.40  --   --    BASOPHILS 1.10  --   --    ASTSGOT 11*  --   --    ALTSGPT 23  --   --    ALKPHOSPHAT 87  --   --    TBILIRUBIN 0.6  --   --        Recent Labs     03/27/23  1031 03/28/23  1145 03/29/23 0445 03/29/23 0455 03/30/23 0222   RBC 7.01*  --   --  5.05 4.82   HEMOGLOBIN 20.5* 15.2  --  14.8 14.2   HEMATOCRIT 63.0* 44.4  --  44.2 42.1   PLATELETCT 319 165  --  139* 134*   PROTHROMBTM  --  17.3* 14.9*  --  15.3*   APTT  --  40.0*  --   --   --    INR  --  1.44* 1.18*  --  1.23*         Imaging  X-Ray:  I have personally reviewed the images and compared with prior images.    Assessment/Plan  Encounter for weaning from ventilator (HCC)- (present on admission)  Assessment & Plan  Postoperative ventilator weaned  Continued hypoxia, weaning HFNC  Scheduled nebs  Pulmonary hygiene and ambulation encouraged    Pulmonary emphysema (HCC)- (present on admission)  Assessment & Plan  Continue Symbicort and albuterol  Continue scheduled nebs  PFT 2/28/23 show mild obstruction and no e/o restriction. Reduced DLCO and DL/VA are c/w his emphysema.  Long history of tobacco abuse and mining exposure    Moderate mitral regurgitation by prior echocardiogram- (present on admission)  Assessment & Plan  Mitral valve replacement on 3/28/2023 by Dr. Sheppard  POD #2  Weaned from ventilator on POD #0  Weaned from  vasopressor support  Post-operative pain control per CVS  Chest tube and pacemaker management per CVS  Insulin gtt  Optimize K and Mg  PT/OT consult  Continue aggressive pulmonary hygeine    Atrial fibrillation with RVR (HCC)- (present on admission)  Assessment & Plan  Status post ablation  Left atrial appendage ligation on 3/28/2023         VTE:  Contraindicated  Ulcer: PPI  Lines: Central Line  Ongoing indication addressed, Arterial Line  Ongoing indication addressed, and Roberts Catheter  Ongoing indication addressed    I have performed a physical exam and reviewed and updated ROS and Plan today (3/30/2023). In review of yesterday's note (3/29/2023), there are no changes except as documented above.     Discussed patient condition and risk of morbidity and/or mortality with RN, RT, Pharmacy, Code status disscussed, Charge nurse / hot rounds, Patient, and CVS    The patient remains critically ill.  Critical care time = 49 minutes in directly providing and coordinating critical care and extensive data review.  No time overlap and excludes procedures.

## 2023-03-30 NOTE — PROGRESS NOTES
Inpatient Anticoagulation Service Note for 3/30/2023    Reason for Anticoagulation: Bioprosthetic Valve Replacement, Atrial Fibrillation   [id number] VASc Score: 2  HAS-BLED Score: 2    Hemoglobin Value: 14.2  Hematocrit Value: 42.1  Lab Platelet Value: (Abnormal) 134  Target INR: 2.0 to 3.0    INR from last 7 days       Date/Time INR Value    03/30/23 0222 (Abnormal) 1.23    03/29/23 0445 (Abnormal) 1.18    03/28/23 1145 (Abnormal) 1.44          Dose from last 7 days       Date/Time Dose (mg)    03/30/23 1338 2.5    03/29/23 1302 5          Average Dose (mg):  (new start)  Significant Interactions: Antiplatelet Medications  Bridge Therapy: No   Reversal Agent Administered: Not Applicable    A/P  Sub-therapeutic INR as expected after starting warfarin yesterday.  Chest tubes to be removed today.  Pacer wires remain in place.  Start warfarin 2.5 mg PO daily.  INR tomorrow.    Education Material Provided?: No    Pharmacist suggested discharge dosing: TBD pending INR trends, perhaps warfarin 2.5 mg PO daily.  Recommend a follow-up PT/INR within 48-72 hours of discharge.     Lorraine Suarez, PharmD, BCPS, BCCCP

## 2023-03-31 ENCOUNTER — APPOINTMENT (OUTPATIENT)
Dept: RADIOLOGY | Facility: MEDICAL CENTER | Age: 68
DRG: 220 | End: 2023-03-31
Attending: INTERNAL MEDICINE
Payer: MEDICARE

## 2023-03-31 LAB
ANION GAP SERPL CALC-SCNC: 11 MMOL/L (ref 7–16)
BUN SERPL-MCNC: 15 MG/DL (ref 8–22)
CALCIUM SERPL-MCNC: 8.2 MG/DL (ref 8.5–10.5)
CHLORIDE SERPL-SCNC: 98 MMOL/L (ref 96–112)
CO2 SERPL-SCNC: 27 MMOL/L (ref 20–33)
CREAT SERPL-MCNC: 0.77 MG/DL (ref 0.5–1.4)
ERYTHROCYTE [DISTWIDTH] IN BLOOD BY AUTOMATED COUNT: 45.5 FL (ref 35.9–50)
GFR SERPLBLD CREATININE-BSD FMLA CKD-EPI: 98 ML/MIN/1.73 M 2
GLUCOSE SERPL-MCNC: 113 MG/DL (ref 65–99)
HCT VFR BLD AUTO: 40.7 % (ref 42–52)
HGB BLD-MCNC: 13.4 G/DL (ref 14–18)
INR PPP: 1.53 (ref 0.87–1.13)
MCH RBC QN AUTO: 28.8 PG (ref 27–33)
MCHC RBC AUTO-ENTMCNC: 32.9 G/DL (ref 33.7–35.3)
MCV RBC AUTO: 87.3 FL (ref 81.4–97.8)
PLATELET # BLD AUTO: 115 K/UL (ref 164–446)
PMV BLD AUTO: 12.4 FL (ref 9–12.9)
POTASSIUM SERPL-SCNC: 3.6 MMOL/L (ref 3.6–5.5)
PROTHROMBIN TIME: 18 SEC (ref 12–14.6)
RBC # BLD AUTO: 4.66 M/UL (ref 4.7–6.1)
SODIUM SERPL-SCNC: 136 MMOL/L (ref 135–145)
WBC # BLD AUTO: 12.9 K/UL (ref 4.8–10.8)

## 2023-03-31 PROCEDURE — 97166 OT EVAL MOD COMPLEX 45 MIN: CPT

## 2023-03-31 PROCEDURE — 700102 HCHG RX REV CODE 250 W/ 637 OVERRIDE(OP): Performed by: NURSE PRACTITIONER

## 2023-03-31 PROCEDURE — 770022 HCHG ROOM/CARE - ICU (200)

## 2023-03-31 PROCEDURE — 94640 AIRWAY INHALATION TREATMENT: CPT

## 2023-03-31 PROCEDURE — 99291 CRITICAL CARE FIRST HOUR: CPT | Performed by: INTERNAL MEDICINE

## 2023-03-31 PROCEDURE — 700111 HCHG RX REV CODE 636 W/ 250 OVERRIDE (IP): Performed by: NURSE PRACTITIONER

## 2023-03-31 PROCEDURE — 94669 MECHANICAL CHEST WALL OSCILL: CPT

## 2023-03-31 PROCEDURE — 80048 BASIC METABOLIC PNL TOTAL CA: CPT

## 2023-03-31 PROCEDURE — A9270 NON-COVERED ITEM OR SERVICE: HCPCS | Performed by: NURSE PRACTITIONER

## 2023-03-31 PROCEDURE — 85610 PROTHROMBIN TIME: CPT

## 2023-03-31 PROCEDURE — 71045 X-RAY EXAM CHEST 1 VIEW: CPT

## 2023-03-31 PROCEDURE — 85027 COMPLETE CBC AUTOMATED: CPT

## 2023-03-31 PROCEDURE — 700101 HCHG RX REV CODE 250: Performed by: INTERNAL MEDICINE

## 2023-03-31 PROCEDURE — 99024 POSTOP FOLLOW-UP VISIT: CPT | Performed by: NURSE PRACTITIONER

## 2023-03-31 RX ADMIN — OXYCODONE HYDROCHLORIDE 5 MG: 5 TABLET ORAL at 04:54

## 2023-03-31 RX ADMIN — Medication 1 APPLICATOR: at 17:36

## 2023-03-31 RX ADMIN — ACETAMINOPHEN 1000 MG: 500 TABLET, FILM COATED ORAL at 12:00

## 2023-03-31 RX ADMIN — METOPROLOL TARTRATE 25 MG: 25 TABLET, FILM COATED ORAL at 17:35

## 2023-03-31 RX ADMIN — ROSUVASTATIN CALCIUM 5 MG: 10 TABLET, FILM COATED ORAL at 04:55

## 2023-03-31 RX ADMIN — FUROSEMIDE 20 MG: 10 INJECTION, SOLUTION INTRAMUSCULAR; INTRAVENOUS at 04:54

## 2023-03-31 RX ADMIN — ACETAMINOPHEN 1000 MG: 500 TABLET, FILM COATED ORAL at 17:35

## 2023-03-31 RX ADMIN — ACETAMINOPHEN 1000 MG: 500 TABLET, FILM COATED ORAL at 01:14

## 2023-03-31 RX ADMIN — IPRATROPIUM BROMIDE AND ALBUTEROL SULFATE 3 ML: .5; 2.5 SOLUTION RESPIRATORY (INHALATION) at 14:21

## 2023-03-31 RX ADMIN — POTASSIUM CHLORIDE 20 MEQ: 1500 TABLET, EXTENDED RELEASE ORAL at 04:54

## 2023-03-31 RX ADMIN — ENOXAPARIN SODIUM 40 MG: 40 INJECTION SUBCUTANEOUS at 17:34

## 2023-03-31 RX ADMIN — FUROSEMIDE 20 MG: 10 INJECTION, SOLUTION INTRAMUSCULAR; INTRAVENOUS at 15:49

## 2023-03-31 RX ADMIN — IPRATROPIUM BROMIDE AND ALBUTEROL SULFATE 3 ML: .5; 2.5 SOLUTION RESPIRATORY (INHALATION) at 23:00

## 2023-03-31 RX ADMIN — OMEPRAZOLE 20 MG: 20 CAPSULE, DELAYED RELEASE ORAL at 04:55

## 2023-03-31 RX ADMIN — METOPROLOL TARTRATE 25 MG: 25 TABLET, FILM COATED ORAL at 04:54

## 2023-03-31 RX ADMIN — Medication 1 APPLICATOR: at 06:00

## 2023-03-31 RX ADMIN — DOCUSATE SODIUM 50 MG AND SENNOSIDES 8.6 MG 2 TABLET: 8.6; 5 TABLET, FILM COATED ORAL at 17:35

## 2023-03-31 RX ADMIN — IPRATROPIUM BROMIDE AND ALBUTEROL SULFATE 3 ML: .5; 2.5 SOLUTION RESPIRATORY (INHALATION) at 12:05

## 2023-03-31 RX ADMIN — WARFARIN SODIUM 2.5 MG: 2.5 TABLET ORAL at 17:35

## 2023-03-31 RX ADMIN — IPRATROPIUM BROMIDE AND ALBUTEROL SULFATE 3 ML: .5; 2.5 SOLUTION RESPIRATORY (INHALATION) at 19:00

## 2023-03-31 RX ADMIN — ASPIRIN 81 MG: 81 TABLET, COATED ORAL at 04:54

## 2023-03-31 RX ADMIN — BUDESONIDE AND FORMOTEROL FUMARATE DIHYDRATE 2 PUFF: 80; 4.5 AEROSOL RESPIRATORY (INHALATION) at 04:55

## 2023-03-31 RX ADMIN — IPRATROPIUM BROMIDE AND ALBUTEROL SULFATE 3 ML: .5; 2.5 SOLUTION RESPIRATORY (INHALATION) at 07:22

## 2023-03-31 RX ADMIN — IPRATROPIUM BROMIDE AND ALBUTEROL SULFATE 3 ML: .5; 2.5 SOLUTION RESPIRATORY (INHALATION) at 02:56

## 2023-03-31 RX ADMIN — OXYCODONE HYDROCHLORIDE 5 MG: 5 TABLET ORAL at 09:03

## 2023-03-31 RX ADMIN — DOCUSATE SODIUM 50 MG AND SENNOSIDES 8.6 MG 2 TABLET: 8.6; 5 TABLET, FILM COATED ORAL at 04:55

## 2023-03-31 RX ADMIN — ACETAMINOPHEN 1000 MG: 500 TABLET, FILM COATED ORAL at 04:55

## 2023-03-31 ASSESSMENT — ENCOUNTER SYMPTOMS
STRIDOR: 0
FEVER: 0
ABDOMINAL PAIN: 0
NAUSEA: 0
COUGH: 0
BLURRED VISION: 0
MYALGIAS: 0
VOMITING: 0
SENSORY CHANGE: 0
SHORTNESS OF BREATH: 1
CHILLS: 0
SPUTUM PRODUCTION: 0
DIZZINESS: 0
FOCAL WEAKNESS: 0

## 2023-03-31 ASSESSMENT — PAIN DESCRIPTION - PAIN TYPE
TYPE: ACUTE PAIN

## 2023-03-31 ASSESSMENT — COGNITIVE AND FUNCTIONAL STATUS - GENERAL
DRESSING REGULAR LOWER BODY CLOTHING: A LITTLE
SUGGESTED CMS G CODE MODIFIER DAILY ACTIVITY: CJ
HELP NEEDED FOR BATHING: A LITTLE
PERSONAL GROOMING: A LITTLE
DAILY ACTIVITIY SCORE: 20
TOILETING: A LITTLE

## 2023-03-31 ASSESSMENT — FIBROSIS 4 INDEX: FIB4 SCORE: 1.32

## 2023-03-31 ASSESSMENT — ACTIVITIES OF DAILY LIVING (ADL): TOILETING: INDEPENDENT

## 2023-03-31 NOTE — PROGRESS NOTES
"V wires removed by Corinne MATTHEW from CTS at bedside.  Patient stated that he \"did not want to bother RN and put self back to bed.\"  Advised to patient that he must be using call bell and reinforced education to call nurse when going back to bed due to HFNC and connected to the monitor.  Patient understanding and placed back OOB to chair for breakfast meal.     Call bell placed within patient's reach.   "

## 2023-03-31 NOTE — DISCHARGE PLANNING
Case Management Discharge Planning    Admission Date: 3/28/2023  GMLOS: 5.8  ALOS: 3    6-Clicks ADL Score: 18  6-Clicks Mobility Score: 20      Anticipated Discharge Dispo: Discharge Disposition: Discharged to home/self care (01)  Discharge Address: [address], [place] [address]  Discharge Contact Phone Number: [phone]    DME Needed: Yes    DME Ordered: Yes  Rolling Walker. Choice form faxed to [employer]/. No preference of vendors.    Action(s) Taken: Choice obtained and Referral(s) sent    Escalations Completed: n/a    Medically Clear: No    Next Steps: n/a    Barriers to Discharge: Medical clearance and DME    Is the patient up for discharge tomorrow: No    Patient will need delivery of rolling walker and to sign IMM prior to delivery.      Heike RUIZ RN Case Manager  [phone]

## 2023-03-31 NOTE — THERAPY
"Occupational Therapy   Initial Evaluation     Patient Name: Eduardo Aponte  Age:  67 y.o., Sex:  male  Medical Record #: [id number]  Today's Date: 3/31/2023     Precautions  Precautions: (P) Fall Risk, Sternal Precautions (See Comments), Cardiac Precautions (See Comments)    Assessment  Patient is 67 y.o. male who presents to acute s/p OHS w/ mitral valve replacement and repair of atrial septal defect. Pt primarily limited by HHFNC today, demo'd BADLs at SBA level, anticipate once pt is of high flow he will progress quickly. Ed/trained pt and spouse on energy conservation techniques. Will continue to follow while in house.     Plan    Occupational Therapy Initial Treatment Plan   Treatment Interventions: (P) Self Care / Activities of Daily Living, Neuro Re-Education / Balance, Therapeutic Exercises, Therapeutic Activity, Adaptive Equipment  Treatment Frequency: (P) 3 Times per Week  Duration: (P) Until Therapy Goals Met    DC Equipment Recommendations: (P) None  Discharge Recommendations: (P) Anticipate that the patient will have no further occupational therapy needs after discharge from the hospital     Subjective    \"I have an old chair, I think I can make it a walker\"      Objective       03/31/23 1530   Charge Group   OT Evaluation OT Evaluation Mod   Total Time Spent   OT Evaluation (Minutes) 30   Initial Contact Note    Initial Contact Note Order Received and Verified, Occupational Therapy Evaluation in Progress with Full Report to Follow.   Prior Living Situation   Prior Services None   Housing / Facility [address]   Bathroom Set up Bathtub / Shower Combination;Shower Chair   Equipment Owned None   Lives with - Patient's Self Care Capacity Spouse   Comments Spouse present and appears very supportive, reports she ordered a shower stool   Prior Level of ADL Function   Self Feeding Independent   Grooming / Hygiene Independent   Bathing Independent   Dressing Independent   Toileting Independent   Prior Level of " IADL Function   Medication Management Independent   Laundry Independent   Kitchen Mobility Independent   Finances Independent   Home Management Independent   Shopping Independent   Prior Level Of Mobility Independent Without Device in Community;Independent Without Device in Home   Driving / Transportation Driving Independent   Precautions   Precautions Fall Risk;Sternal Precautions (See Comments);Cardiac Precautions (See Comments)   Vitals   O2 Delivery Device Heated High Flow Nasal Cannula   Pain 0 - 10 Group   Therapist Pain Assessment Post Activity Pain Same as Prior to Activity;Nurse Notified  (no c/o pain during session)   Cognition    Cognition / Consciousness WDL   Level of Consciousness Alert   Comments pleasent and cooperative, dry sense of humor   Active ROM Upper Body   Active ROM Upper Body  WDL   Strength Upper Body   Upper Body Strength  WDL   Coordination Upper Body   Coordination WDL   Balance Assessment   Sitting Balance (Static) Good   Sitting Balance (Dynamic) Good   Standing Balance (Static) Fair   Standing Balance (Dynamic) Fair   Weight Shift Sitting Good   Weight Shift Standing Fair   Comments no AD   Bed Mobility    Sit to Supine Standby Assist   Scooting Supervised   ADL Assessment   Grooming Standby Assist;Standing   Lower Body Dressing Supervision  (demo'd ability to tailor sit)   How much help from another person does the patient currently need...   Putting on and taking off regular lower body clothing? 3   Bathing (including washing, rinsing, and drying)? 3   Toileting, which includes using a toilet, bedpan, or urinal? 3   Putting on and taking off regular upper body clothing? 4   Taking care of personal grooming such as brushing teeth? 3   Eating meals? 4   6 Clicks Daily Activity Score 20   Functional Mobility   Sit to Stand Standby Assist   Bed, Chair, Wheelchair Transfer Standby Assist   Mobility from Chair to bed w/ no AD   Activity Tolerance   Sitting in Chair up pre   Sitting Edge  of Bed 5 min   Standing 1-2 min total   Patient / Family Goals   Patient / Family Goal #1 To go home   Short Term Goals   Short Term Goal # 1 Pt will demo toilet txf w/ SPV   Short Term Goal # 2 Pt will tolerate 5 min standing ADL session w/ SPV and no c/o SOB   Education Group   Role of Occupational Therapist Patient Response Patient;Acceptance;Explanation;Demonstration;Verbal Demonstration;Action Demonstration;Family   ADL Patient Response Patient;Acceptance;Explanation;Demonstration;Verbal Demonstration;Action Demonstration;Family   Adaptive Equipment Patient Response Patient;Acceptance;Explanation;Demonstration;Verbal Demonstration;Action Demonstration;Family   Occupational Therapy Initial Treatment Plan    Treatment Interventions Self Care / Activities of Daily Living;Neuro Re-Education / Balance;Therapeutic Exercises;Therapeutic Activity;Adaptive Equipment   Treatment Frequency 3 Times per Week   Duration Until Therapy Goals Met   Problem List   Problem List Decreased Active Daily Living Skills;Decreased Homemaking Skills;Decreased Functional Mobility;Decreased Activity Tolerance;Impaired Postural Control / Balance   Anticipated Discharge Equipment and Recommendations   DC Equipment Recommendations None   Discharge Recommendations Anticipate that the patient will have no further occupational therapy needs after discharge from the hospital   Interdisciplinary Plan of Care Collaboration   IDT Collaboration with  Nursing   Patient Position at End of Therapy Call Light within Reach;Tray Table within Reach;Phone within Reach;In Bed;Family / Friend in Room   Collaboration Comments Report given   Session Information   Date / Session Number  3/31, 1, (1/3, 4/6)

## 2023-03-31 NOTE — PROGRESS NOTES
Pharmacy Warfarin Monitoring     Date: 3/31/2023  Reason for Anticoagulation: Bioprosthetic Valve Replacement, Atrial Fibrillation   Target INR: 2.0 to 3.0    [id number] VASc Score: 2  HAS-BLED Score: 2     Hemoglobin Value: (!) 13.4  Hematocrit Value: (!) 40.7  Lab Platelet Value: (!) 115    INR from last 7 days       Date/Time INR Value    03/31/23 0500 1.53    03/30/23 0222 1.23    03/29/23 0445 1.18    03/28/23 1145 1.44          Dose from last 7 days       Date/Time Dose (mg)    03/31/23 1602 2.5    03/30/23 1338 2.5    03/29/23 1302 5          Home dose: new start  Significant Interactions: Antiplatelet Medications  Bridge Therapy: Not indicated post cardiac surgery    Reversal Agent Administered: Not Applicable  Comments: Sub-therapeutic INR as expected after starting warfarin.  Continue warfarin 2.5 mg PO daily.  INR tomorrow.    Education Material Provided?: No  Pharmacist suggested discharge dosing:  TBD pending INR trends, perhaps warfarin 2.5 mg PO daily.  Recommend a follow-up PT/INR within 48-72 hours of discharge.     Thank you!  Gina Chavez, PharmD, BCCCP

## 2023-03-31 NOTE — PROGRESS NOTES
12 hour chart check complete.     Monitoring Summary:  Rhythm: NSR-ST  bpm  Ectopy: PVC(r)

## 2023-03-31 NOTE — DIETARY
Nutrition Services Brief Update:    Day 3 of admit.  Eduardo Aponte is a 67 y.o. male with admitting DX of Severe mitral valve regurgitation [I34.0]    Current Diet: Cardiac; Consistent CHO (diabetic)    Diet advanced beyond clear liquids evening of 3/29. PO intake % x 2 most recent meals documented.    Problem: Nutritional:  Goal: Achieve adequate nutritional intake  Description: Patient will consume >75% of meals  Outcome: Progressing    RD continues to follow.

## 2023-03-31 NOTE — CARE PLAN
The patient is Stable - Low risk of patient condition declining or worsening    Shift Goals  Clinical Goals: pain control, daily weight, ambulation  Patient Goals: rest/sleep  Family Goals: none present    Progress made toward(s) clinical / shift goals:    Problem: Knowledge Deficit - Standard  Goal: Patient and family/care givers will demonstrate understanding of plan of care, disease process/condition, diagnostic tests and medications  Outcome: Progressing     Problem: Skin Integrity  Goal: Skin integrity is maintained or improved  Outcome: Progressing     Problem: Pain - Standard  Goal: Alleviation of pain or a reduction in pain to the patient’s comfort goal  Outcome: Progressing     Problem: Post op day 2 CABG/Heart Valve Replacement  Goal: Optimal care of the post op CABG/heart valve replacement post op day 2  Outcome: Progressing  Intervention: Daily weights in the morning  Note: Completed   Intervention: IS q 1 hour while awake and record best IS volume  Note: Patient agreeable and self motivated, 1000-1500mL achieved  Intervention: FSBS: when 2 consecutive BS < 130 after post op day 2, discontinue FSBS unless patient is insulin dependent diabetic  Note: BS d/c  Intervention: Ambulate 4 times daily, increasing the distance each time  Note: Patient ambulated 10ft to hospital room sink, brushed teeth and ambulated to the chair with a FWW.  Intervention: Consider pacer wire removal by MD  Note: Pacer wires insulated and off pacer.   Intervention: Consider removal of corona and chest tube if not already done  Note: Corona and CT removed  3/30       Patient is not progressing towards the following goals:

## 2023-03-31 NOTE — CARE PLAN
The patient is Watcher - Medium risk of patient condition declining or worsening    Shift Goals  Clinical Goals: pain control, rest, mobilize in AM  Patient Goals: rest/sleep  Family Goals: none present    Progress made toward(s) clinical / shift goals:  Decrease HFNC settings over shift. Patient ambulated x4. Chest tube and corona removed.    Patient is not progressing towards the following goals:      Problem: Knowledge Deficit - Standard  Goal: Patient and family/care givers will demonstrate understanding of plan of care, disease process/condition, diagnostic tests and medications  Outcome: Progressing     Problem: Skin Integrity  Goal: Skin integrity is maintained or improved  Outcome: Progressing     Problem: Fall Risk  Goal: Patient will remain free from falls  Outcome: Progressing     Problem: Pain - Standard  Goal: Alleviation of pain or a reduction in pain to the patient’s comfort goal  Outcome: Progressing     Problem: Day of surgery post CABG/Heart valve replacement  Goal: Stabilization in immediate post op period  Outcome: Progressing     Problem: Post op day 2 CABG/Heart Valve Replacement  Goal: Optimal care of the post op CABG/heart valve replacement post op day 2  Outcome: Progressing  Intervention: Daily weights in the morning  Note: Done by NOC RN  Intervention: Up in chair for all meals  Note: Up for all meals  Intervention: IS q 1 hour while awake and record best IS volume  Note: Best IS 1500  Intervention: FSBS: when 2 consecutive BS < 130 after post op day 2, discontinue FSBS unless patient is insulin dependent diabetic  Note: Completed  Intervention: Ambulate 4 times daily, increasing the distance each time  Note: Patient ambulated x4  Intervention: Stand at sink and wash up with assistance.  Clean incisions twice daily with soap and water.  Note: CHG bath completed in AM  Intervention: Consider pacer wire removal by MD  Note: Per MD. Wires still in place.  Intervention: Consider removal of  corona and chest tube if not already done  Note: Completed     Problem: Hemodynamics  Goal: Patient's hemodynamics, fluid balance and neurologic status will be stable or improve  Outcome: Progressing     Problem: Respiratory  Goal: Patient will achieve/maintain optimum respiratory ventilation and gas exchange  Outcome: Progressing     Problem: Risk for Aspiration  Goal: Patient's risk for aspiration will be absent or decrease  Outcome: Progressing     Problem: Nutrition - Advanced  Goal: Patient will display progressive weight gain toward goal have adequate food and fluid intake  Outcome: Progressing     Problem: Self Care  Goal: Patient will have the ability to perform ADLs independently or with assistance (bathe, groom, dress, toilet and feed)  Outcome: Progressing     Problem: Bowel Elimination - Post Surgical  Goal: Patient will resume regular bowel sounds and function with no discomfort or distention  Outcome: Progressing

## 2023-03-31 NOTE — CARE PLAN
Problem: Humidified High Flow Nasal Cannula  Goal: Maintain adequate oxygenation dependent on patient condition  Description: Target End Date:  resolve prior to discharge or when underlying condition is resolved/stabilized    1.  Implement humidified high flow oxygen therapy  2.  Titrate high flow oxygen to maintain appropriate SpO2  Outcome: Progressing  40L / 50%     Problem: Bronchoconstriction  Goal: Improve in air movement and diminished wheezing  Description: Target End Date:  2 to 3 days    1.  Implement inhaled treatments  2.  Evaluate and manage medication effects  Outcome: Progressing    Respiratory Update    Treatment modality: Duoneb  Frequency: Q4    Pt tolerating current treatments well with no adverse reactions.

## 2023-03-31 NOTE — PROGRESS NOTES
Cardiovascular Surgery Progress Note    Name: Eduardo Aponte  MRN: [id number]  : 1955  Admit Date: 3/28/2023  5:17 AM  3 Days Post-Op     Procedure:  Procedure(s) and Anesthesia Type:     * MITRAL VALVE REPLACEMENT - General     * ECHOCARDIOGRAM, TRANSESOPHAGEAL, INTRAOPERATIVE - General     * EXCISION, LEFT ATRIAL APPENDAGE - General     * REPAIR, ATRIAL SEPTAL DEFECT - General    Vitals:  Vitals:    23 0800 23 0802 23 0900 23 0903   BP:  103/69  101/68   Pulse: 96 92 98 90   Resp:       Temp: 36.3 °C (97.4 °F)      TempSrc: Temporal      SpO2: 94% 92% 93% 93%   Weight:       Height:          Temp (24hrs), Av.3 °C (97.3 °F), Min:36.1 °C (96.9 °F), Max:36.4 °C (97.6 °F)      Respiratory:  Respiration: 18, Pulse Oximetry: 93 %     Fluids:    Intake/Output Summary (Last 24 hours) at 3/31/2023 0944  Last data filed at 3/31/2023 0800  Gross per 24 hour   Intake 700 ml   Output 1775 ml   Net -1075 ml       Admit weight: Weight: 82.6 kg (182 lb 1.6 oz)  Current weight: Weight: 83.5 kg (184 lb 1.4 oz) (23 0500)    Labs:  Recent Labs     23  0455 23  0222 23  0120   WBC 22.1* 23.0* 12.9*   RBC 5.05 4.82 4.66*   HEMOGLOBIN 14.8 14.2 13.4*   HEMATOCRIT 44.2 42.1 40.7*   MCV 87.5 87.3 87.3   MCH 29.3 29.5 28.8   MCHC 33.5* 33.7 32.9*   RDW 46.1 46.5 45.5   PLATELETCT 139* 134* 115*   MPV 11.5 11.8 12.4       Recent Labs     23  0455 23  0222 23  0120   SODIUM 138 136 136   POTASSIUM 4.6 4.2 3.6   CHLORIDE 105 101 98   CO2 19* 25 27   GLUCOSE 150* 142* 113*   BUN 16 17 15   CREATININE 0.76 0.81 0.77   CALCIUM 7.8* 8.1* 8.2*       Recent Labs     23  1145 23  0445 23  0222 23  0500   APTT 40.0*  --   --   --    INR 1.44* 1.18* 1.23* 1.53*             Medications:  Scheduled Medications   Medication Dose Frequency    budesonide-formoterol  2 Puff BID    warfarin  2.5 mg DAILY AT 1800    rosuvastatin  5 mg DAILY    furosemide  20 mg  BID DIURETIC    potassium chloride SA  20 mEq DAILY    ipratropium-albuterol  3 mL Q4HRS (RT)    Nozin nasal  swab  1 Applicator BID    enoxaparin (LOVENOX) injection  40 mg DAILY AT 1800    aspirin EC  81 mg DAILY    Pharmacy Consult Request  1 Each PHARMACY TO DOSE    acetaminophen  1,000 mg Q6HRS    senna-docusate  2 Tablet BID    And    polyethylene glycol/lytes  1 Packet DAILY    And    magnesium hydroxide  30 mL DAILY    omeprazole  20 mg DAILY    metoprolol tartrate  25 mg BID    MD Alert...Warfarin per Pharmacy   PHARMACY TO DOSE        Exam:   Physical Exam  Vitals and nursing note reviewed.   Constitutional:       General: He is not in acute distress.     Appearance: Normal appearance. He is obese.   HENT:      Head: Normocephalic and atraumatic.      Right Ear: External ear normal.      Left Ear: External ear normal.      Mouth/Throat:      Mouth: Mucous membranes are moist.      Pharynx: Oropharynx is clear.   Eyes:      Pupils: Pupils are equal, round, and reactive to light.   Cardiovascular:      Rate and Rhythm: Normal rate and regular rhythm.      Pulses: Normal pulses.      Heart sounds: Normal heart sounds.   Pulmonary:      Effort: Pulmonary effort is normal.      Breath sounds: Decreased breath sounds present.   Abdominal:      General: Bowel sounds are decreased. There is no distension.      Palpations: Abdomen is soft.      Tenderness: There is no abdominal tenderness.   Musculoskeletal:      Cervical back: Normal range of motion and neck supple. No tenderness.      Right lower leg: No edema.      Left lower leg: No edema.      Comments: Bilateral upper extremity edema     Skin:     General: Skin is warm and dry.      Capillary Refill: Capillary refill takes less than 2 seconds.   Neurological:      General: No focal deficit present.      Mental Status: He is oriented to person, place, and time. Mental status is at baseline.   Psychiatric:         Mood and Affect: Mood normal.          Behavior: Behavior normal.         Thought Content: Thought content normal.       Cardiac Medications:    ASA - Yes    Plavix - No; contraindicated because of On Coumadin / NOAC    Post-operative Beta Blockers - Yes    Ace/ARB- No; contraindicated because of Normal EF    Statin - Yes    Aldactone- No; contraindicated because of Normal EF    Ejection Fraction:  60%    Telemetry:   3/29 SR  3/30 SR  3/31 SR    Assessment/Plan:  POD 1  HDS- off pressors, SR, neuro intact, wounds intact, abdomen soft, fluid balance positive, wt up,  on 12 L oxymask. Received one dose of lasix overnight. 208 ml out of chest tubes overnight. Plan:  Diurese, encourage IS and mobility, HFNC. Keep chest tubes, pacing wires, and corona.     POD 2  HDS, SR, neuro intact, wounds intact, abdomen soft, fluid balance negative, wt up, HFNC 50L 60%. 80 mL out of chest tubes overnight. Plan:  Remove mediastinal chest tubes, keep pacing wires. Wean HFNC as tolerated. Continue diuresis.  IS/ambulate.     POD 3  HDS, SR, neuro intact, wounds intact, abdomen soft, fluid balance negative, wt down. Remains on HFNC 50L 55%, CXR with atelectasis, trace pleural effusions. Pacing wires removed, patient tolerated well. Plan: Wean HFNC as tolerated. IS/ambulate.     Disposition:  Home per PT

## 2023-03-31 NOTE — PROGRESS NOTES
"Critical Care Progress Note    Date of admission  3/28/2023    Chief Complaint  67 y.o. male admitted 3/28/2023 with mitral regurgitation     Hospital Course  \"67 y.o. male with a past medical history of asthma, hypertension, hyperlipidemia, severe MR and ASD who presented 3/28/2023 for an elective mitral valve replacement, left atrial appendage ligation and ASD repair with Dr. Sheppard.  His intraoperative course was uneventful, his total aortic cross-clamp time was 95 minutes and total cardiopulmonary bypass time was 110 minutes.  Post procedurally his LVEF was 60% with good transvalvular gradients.  He arrives in the ICU on sedation, vasopressors and full mechanical ventilatory support.\"    Interval Problem Update  Reviewed last 24 hour events:   - No acute events overnight   - Neuro: AOx4   - HR: 80s-100s   - SBP: 100s-120s   - GI: tolerating diet, last BM PTA   - UOP: 2 L/24 hrs   - Roberts: no   - Tm: 37.7   - Lines: CVC, Chest tubes   - PPx: GI PPI, DVT lovenox/coumadin   - HFNC 50%, 50L   - CXR (personally reviewed and compared to prior): improve atx   - Continue to wean O2    Yesterday   - Remains on HFNC, states breathing feels better after starting nebs   - Neuro: AOx4   - HR: 80s-90s   - SBP: 100s-130s   - GI: tolerating diet, last BM PTA   - UOP: 2.6 L/24 hrs   - Roberts: yes   - Tm: 37.5   - Lines: CVC, chest tubes, pacer   - PPx: GI PPI, DVT lovenox/coumadin   - HFNC 50L, 70% -> 55%. IS 1750 mL, PEP   - continue nebs   - CXR (personally reviewed and compared to prior): no new   - Continue weaning HFNC, increase pulmonary hygiene and ambulation    Review of Systems  Review of Systems   Constitutional:  Positive for malaise/fatigue. Negative for chills and fever.   Eyes:  Negative for blurred vision.   Respiratory:  Positive for shortness of breath. Negative for cough, sputum production and stridor.    Cardiovascular:  Positive for chest pain.   Gastrointestinal:  Negative for abdominal pain, nausea and " vomiting.   Genitourinary:  Negative for dysuria.   Musculoskeletal:  Negative for myalgias.   Skin:  Negative for rash.   Neurological:  Negative for dizziness, sensory change and focal weakness.      Vital Signs for last 24 hours   Temp:  [36.1 °C (96.9 °F)-36.4 °C (97.6 °F)] 36.3 °C (97.3 °F)  Pulse:  [] 92  Resp:  [20] 20  BP: ()/(53-77) 129/68  SpO2:  [87 %-96 %] 93 %    Hemodynamic parameters for last 24 hours       Respiratory Information for the last 24 hours       Physical Exam   Physical Exam  Vitals and nursing note reviewed.   Constitutional:       General: He is in acute distress.      Appearance: He is well-developed. He is ill-appearing.   HENT:      Head: Normocephalic and atraumatic.      Right Ear: External ear normal.      Left Ear: External ear normal.   Eyes:      Conjunctiva/sclera: Conjunctivae normal.      Pupils: Pupils are equal, round, and reactive to light.   Neck:      Vascular: No JVD.      Trachea: No tracheal deviation.      Comments: Right IJ dual-lumen catheter  Cardiovascular:      Rate and Rhythm: Normal rate and regular rhythm.      Pulses: Normal pulses.   Pulmonary:      Breath sounds: Decreased air movement present. Rales present. No wheezing.   Chest:       Abdominal:      General: Bowel sounds are normal. There is no distension.      Palpations: Abdomen is soft.   Genitourinary:     Comments: Roberts removed  Musculoskeletal:         General: No tenderness.      Cervical back: Neck supple.   Skin:     General: Skin is warm and dry.      Capillary Refill: Capillary refill takes less than 2 seconds.      Findings: No rash.   Neurological:      General: No focal deficit present.      Mental Status: He is alert and oriented to person, place, and time.      GCS: GCS eye subscore is 4. GCS verbal subscore is 5. GCS motor subscore is 6.      Cranial Nerves: Cranial nerves 2-12 are intact.      Sensory: Sensation is intact.      Motor: Motor function is intact.    Psychiatric:         Mood and Affect: Mood and affect normal.       Medications  Current Facility-Administered Medications   Medication Dose Route Frequency Provider Last Rate Last Admin    budesonide-formoterol (SYMBICORT) 80-4.5 MCG/ACT inhaler 2 Puff  2 Puff Inhalation BID MARGARITA Stanford Jr..O.   2 Puff at 03/31/23 0455    albuterol inhaler 2 Puff  2 Puff Inhalation Q6HRS PRN PRAVEEN Stanford Jr.O.        warfarin (COUMADIN) tablet 2.5 mg  2.5 mg Oral DAILY AT 1800 MING Huang.P.R.N.   2.5 mg at 03/30/23 1807    rosuvastatin (CRESTOR) tablet 5 mg  5 mg Oral DAILY MING Huang.P.R.N.   5 mg at 03/31/23 0455    furosemide (LASIX) injection 20 mg  20 mg Intravenous BID DIURETIC MING Huang.P.R.N.   20 mg at 03/31/23 0454    potassium chloride SA (Kdur) tablet 20 mEq  20 mEq Oral DAILY MING Huang.P.R.N.   20 mEq at 03/31/23 0454    ipratropium-albuterol (DUONEB) nebulizer solution  3 mL Nebulization Q4HRS (RT) Rajesh Dunn Jr., D.O.   3 mL at 03/31/23 0256    Nozin nasal  swab  1 Applicator Each Nostril BID ANGÉLICA HuangP.R.N.   1 Applicator at 03/31/23 0600    Respiratory Therapy Consult   Nebulization Continuous RT ANGÉLICA HuangP.R.N.        NS infusion   Intravenous Continuous MING Huang.P.R.N. 10 mL/hr at 03/28/23 1200 Rate Verify at 03/28/23 1200    electrolyte-A (PLASMALYTE-A) infusion   Intravenous PRN MING Huang.P.R.N.        enoxaparin (Lovenox) inj 40 mg  40 mg Subcutaneous DAILY AT 1800 MING Huang.P.R.N.   40 mg at 03/30/23 1805    aspirin EC (ECOTRIN) tablet 81 mg  81 mg Oral DAILY MING Huang.P.R.N.   81 mg at 03/31/23 0454    Pharmacy Consult Request ...Pain Management Review 1 Each  1 Each Other PHARMACY TO DOSE CARMEN Huang        acetaminophen (TYLENOL) tablet 1,000 mg  1,000 mg Oral Q6HRS CARMEN Huang   1,000 mg at 03/31/23 0455    Followed by    [START ON 4/2/2023]  acetaminophen (TYLENOL) tablet 1,000 mg  1,000 mg Oral Q6HRS PRN Corinne Mishra, A.P.R.N.        oxyCODONE immediate-release (ROXICODONE) tablet 5 mg  5 mg Oral Q3HRS PRN Corinne Mishra, A.P.R.N.   5 mg at 03/31/23 0454    Or    oxyCODONE immediate release (ROXICODONE) tablet 10 mg  10 mg Oral Q3HRS PRN Corinne Mishra, A.P.R.N.   10 mg at 03/30/23 1604    Or    fentaNYL (SUBLIMAZE) injection 50 mcg  50 mcg Intravenous Q3HRS PRN Corinne Mishra, A.P.R.N.        traMADol (Ultram) 50 MG tablet 50 mg  50 mg Oral Q4HRS PRN Corinne Mishra, A.P.R.N.   50 mg at 03/30/23 0741    ondansetron (ZOFRAN) syringe/vial injection 8 mg  8 mg Intravenous Q6HRS PRN Corinne Mishra A.P.R.N.   8 mg at 03/28/23 1734    Or    prochlorperazine (COMPAZINE) injection 10 mg  10 mg Intravenous Q6HRS PRN Corinne Mishra, A.P.R.N.        senna-docusate (PERICOLACE or SENOKOT S) 8.6-50 MG per tablet 2 Tablet  2 Tablet Oral BID MING Huang.P.R.N.   2 Tablet at 03/31/23 0455    And    polyethylene glycol/lytes (MIRALAX) PACKET 1 Packet  1 Packet Oral DAILY MING Huang.P.R.N.   1 Packet at 03/30/23 0524    And    magnesium hydroxide (MILK OF MAGNESIA) suspension 30 mL  30 mL Oral DAILY Corinne Mishra A.P.R.N.   30 mL at 03/30/23 1054    And    bisacodyl (DULCOLAX) suppository 10 mg  10 mg Rectal QDAY PRN Corinne Mishra A.P.R.N.        omeprazole (PRILOSEC) capsule 20 mg  20 mg Oral DAILY Corinne Mishra A.P.R.N.   20 mg at 03/31/23 0455    mag hydrox-al hydrox-simeth (MAALOX PLUS ES or MYLANTA DS) suspension 30 mL  30 mL Oral Q4HRS PRN ANGÉLICA HuangPMarielRMarielN.        diphenhydrAMINE (BENADRYL) tablet/capsule 25 mg  25 mg Oral HS PRN - MR X 1 SARAH HuangRLEONARDO.        metoprolol tartrate (LOPRESSOR) tablet 25 mg  25 mg Oral BID ANGÉLICA HuangP.RMarielNMariel   25 mg at 03/31/23 0454    MD Alert...Warfarin per Pharmacy   Other PHARMACY TO DOSE ANGÉLICA [email or web address]           Fluids    Intake/Output Summary  (Last 24 hours) at 3/31/2023 0711  Last data filed at 3/31/2023 0500  Gross per 24 hour   Intake 600 ml   Output 2000 ml   Net -1400 ml         Laboratory  Recent Labs     03/28/23  1150 03/28/23  1308 03/28/23  1411 03/29/23  0319   ISTATAPH 7.309* 7.367* 7.371* 7.411   ISTATAPCO2 44.7* 40.7* 36.0 32.1   ISTATAPO2 123* 89* 92* 54*   ISTATATCO2 24 25 22 21   [id number] 98 97 97 89*   ISTATARTHCO3 22.5 23.4 20.9 20.4   ISTATARTBE -4 -2 -4 -3   ISTATTEMP 36.4 C 36.1 C 36.3 C 36.9 C   ISTATFIO2 100 100 95  --    ISTATSPEC Arterial Arterial Arterial Arterial   ISTATAPHTC 7.318* 7.380* 7.381* 7.413   [id number] 120* 84 88* 54*           Recent Labs     03/28/23  1145 03/28/23  1610 03/29/23  0455 03/30/23  0222 03/31/23  0120   SODIUM  --   --  138 136 136   POTASSIUM  --    < > 4.6 4.2 3.6   CHLORIDE  --   --  105 101 98   CO2  --   --  19* 25 27   BUN  --   --  16 17 15   CREATININE  --   --  0.76 0.81 0.77   MAGNESIUM 2.8*  --   --   --   --    CALCIUM  --   --  7.8* 8.1* 8.2*    < > = values in this interval not displayed.       Recent Labs     03/29/23  0455 03/30/23  0222 03/31/23  0120   GLUCOSE 150* 142* 113*       Recent Labs     03/29/23  0455 03/30/23  0222 03/31/23  0120   WBC 22.1* 23.0* 12.9*       Recent Labs     03/28/23  1145 03/29/23  0445 03/29/23  0455 03/30/23  0222 03/31/23  0120 03/31/23  0500   RBC  --   --  5.05 4.82 4.66*  --    HEMOGLOBIN 15.2  --  14.8 14.2 13.4*  --    HEMATOCRIT 44.4  --  44.2 42.1 40.7*  --    PLATELETCT 165  --  139* 134* 115*  --    PROTHROMBTM 17.3* 14.9*  --  15.3*  --  18.0*   APTT 40.0*  --   --   --   --   --    INR 1.44* 1.18*  --  1.23*  --  1.53*         Imaging  X-Ray:  I have personally reviewed the images and compared with prior images.    Assessment/Plan  Encounter for weaning from ventilator (HCC)- (present on admission)  Assessment & Plan  Postoperative ventilator weaned  Continued hypoxia, weaning HFNC  Scheduled nebs  Pulmonary hygiene and ambulation  encouraged    Pulmonary emphysema (HCC)- (present on admission)  Assessment & Plan  Continue Symbicort and albuterol  Continue scheduled nebs  PFT 2/28/23 show mild obstruction and no e/o restriction. Reduced DLCO and DL/VA are c/w his emphysema.  Long history of tobacco abuse and mining exposure    Moderate mitral regurgitation by prior echocardiogram- (present on admission)  Assessment & Plan  Mitral valve replacement on 3/28/2023 by Dr. Sheppard  POD #3  Weaned from ventilator on POD #0  Weaned from vasopressor support  Post-operative pain control per CVS  Chest tube and pacemaker removed  SSI  Optimize K and Mg  PT/OT consult  Continue aggressive pulmonary hygeine    Atrial fibrillation with RVR (HCC)- (present on admission)  Assessment & Plan  Status post ablation  Left atrial appendage ligation on 3/28/2023         VTE:  Lovenox and Coumadin  Ulcer: PPI  Lines: Central Line  Ongoing indication addressed    I have performed a physical exam and reviewed and updated ROS and Plan today (3/31/2023). In review of yesterday's note (3/30/2023), there are no changes except as documented above.     Discussed patient condition and risk of morbidity and/or mortality with RN, RT, Pharmacy, Code status disscussed, Charge nurse / hot rounds, Patient, and CVS    The patient remains critically ill.  Critical care time = 43 minutes in directly providing and coordinating critical care and extensive data review.  No time overlap and excludes procedures.

## 2023-04-01 LAB
ANION GAP SERPL CALC-SCNC: 11 MMOL/L (ref 7–16)
BUN SERPL-MCNC: 14 MG/DL (ref 8–22)
CALCIUM SERPL-MCNC: 8 MG/DL (ref 8.5–10.5)
CHLORIDE SERPL-SCNC: 98 MMOL/L (ref 96–112)
CO2 SERPL-SCNC: 26 MMOL/L (ref 20–33)
CREAT SERPL-MCNC: 0.67 MG/DL (ref 0.5–1.4)
ERYTHROCYTE [DISTWIDTH] IN BLOOD BY AUTOMATED COUNT: 45.8 FL (ref 35.9–50)
GFR SERPLBLD CREATININE-BSD FMLA CKD-EPI: 103 ML/MIN/1.73 M 2
GLUCOSE SERPL-MCNC: 132 MG/DL (ref 65–99)
HCT VFR BLD AUTO: 41.4 % (ref 42–52)
HGB BLD-MCNC: 13.6 G/DL (ref 14–18)
INR PPP: 1.54 (ref 0.87–1.13)
MCH RBC QN AUTO: 29.1 PG (ref 27–33)
MCHC RBC AUTO-ENTMCNC: 32.9 G/DL (ref 33.7–35.3)
MCV RBC AUTO: 88.5 FL (ref 81.4–97.8)
PLATELET # BLD AUTO: 132 K/UL (ref 164–446)
PMV BLD AUTO: 11.8 FL (ref 9–12.9)
POTASSIUM SERPL-SCNC: 3.6 MMOL/L (ref 3.6–5.5)
PROTHROMBIN TIME: 18.1 SEC (ref 12–14.6)
RBC # BLD AUTO: 4.68 M/UL (ref 4.7–6.1)
SODIUM SERPL-SCNC: 135 MMOL/L (ref 135–145)
WBC # BLD AUTO: 9.6 K/UL (ref 4.8–10.8)

## 2023-04-01 PROCEDURE — 94640 AIRWAY INHALATION TREATMENT: CPT

## 2023-04-01 PROCEDURE — 700101 HCHG RX REV CODE 250: Performed by: INTERNAL MEDICINE

## 2023-04-01 PROCEDURE — 94669 MECHANICAL CHEST WALL OSCILL: CPT

## 2023-04-01 PROCEDURE — 770020 HCHG ROOM/CARE - TELE (206)

## 2023-04-01 PROCEDURE — 700102 HCHG RX REV CODE 250 W/ 637 OVERRIDE(OP): Performed by: NURSE PRACTITIONER

## 2023-04-01 PROCEDURE — 99291 CRITICAL CARE FIRST HOUR: CPT | Performed by: INTERNAL MEDICINE

## 2023-04-01 PROCEDURE — 700111 HCHG RX REV CODE 636 W/ 250 OVERRIDE (IP): Performed by: NURSE PRACTITIONER

## 2023-04-01 PROCEDURE — 80048 BASIC METABOLIC PNL TOTAL CA: CPT

## 2023-04-01 PROCEDURE — 700101 HCHG RX REV CODE 250: Performed by: THORACIC SURGERY (CARDIOTHORACIC VASCULAR SURGERY)

## 2023-04-01 PROCEDURE — 99024 POSTOP FOLLOW-UP VISIT: CPT | Performed by: NURSE PRACTITIONER

## 2023-04-01 PROCEDURE — 85610 PROTHROMBIN TIME: CPT

## 2023-04-01 PROCEDURE — A9270 NON-COVERED ITEM OR SERVICE: HCPCS | Performed by: NURSE PRACTITIONER

## 2023-04-01 PROCEDURE — 85027 COMPLETE CBC AUTOMATED: CPT

## 2023-04-01 RX ORDER — IPRATROPIUM BROMIDE AND ALBUTEROL SULFATE 2.5; .5 MG/3ML; MG/3ML
3 SOLUTION RESPIRATORY (INHALATION) 4 TIMES DAILY
Status: DISCONTINUED | OUTPATIENT
Start: 2023-04-01 | End: 2023-04-03

## 2023-04-01 RX ADMIN — POLYETHYLENE GLYCOL 3350 1 PACKET: 17 POWDER, FOR SOLUTION ORAL at 04:42

## 2023-04-01 RX ADMIN — Medication 1 APPLICATOR: at 04:43

## 2023-04-01 RX ADMIN — POTASSIUM CHLORIDE 20 MEQ: 1500 TABLET, EXTENDED RELEASE ORAL at 04:42

## 2023-04-01 RX ADMIN — METOPROLOL TARTRATE 25 MG: 25 TABLET, FILM COATED ORAL at 04:43

## 2023-04-01 RX ADMIN — IPRATROPIUM BROMIDE AND ALBUTEROL SULFATE 3 ML: .5; 2.5 SOLUTION RESPIRATORY (INHALATION) at 11:13

## 2023-04-01 RX ADMIN — ACETAMINOPHEN 1000 MG: 500 TABLET, FILM COATED ORAL at 04:43

## 2023-04-01 RX ADMIN — IPRATROPIUM BROMIDE AND ALBUTEROL SULFATE 3 ML: 2.5; .5 SOLUTION RESPIRATORY (INHALATION) at 21:03

## 2023-04-01 RX ADMIN — FUROSEMIDE 20 MG: 10 INJECTION, SOLUTION INTRAMUSCULAR; INTRAVENOUS at 04:44

## 2023-04-01 RX ADMIN — OXYCODONE HYDROCHLORIDE 5 MG: 5 TABLET ORAL at 04:43

## 2023-04-01 RX ADMIN — BUDESONIDE AND FORMOTEROL FUMARATE DIHYDRATE 2 PUFF: 80; 4.5 AEROSOL RESPIRATORY (INHALATION) at 04:52

## 2023-04-01 RX ADMIN — METOPROLOL TARTRATE 25 MG: 25 TABLET, FILM COATED ORAL at 17:13

## 2023-04-01 RX ADMIN — ASPIRIN 81 MG: 81 TABLET, COATED ORAL at 04:42

## 2023-04-01 RX ADMIN — DOCUSATE SODIUM 50 MG AND SENNOSIDES 8.6 MG 2 TABLET: 8.6; 5 TABLET, FILM COATED ORAL at 04:42

## 2023-04-01 RX ADMIN — MAGNESIUM HYDROXIDE 30 ML: 400 SUSPENSION ORAL at 04:42

## 2023-04-01 RX ADMIN — DOCUSATE SODIUM 50 MG AND SENNOSIDES 8.6 MG 2 TABLET: 8.6; 5 TABLET, FILM COATED ORAL at 17:14

## 2023-04-01 RX ADMIN — FUROSEMIDE 20 MG: 10 INJECTION, SOLUTION INTRAMUSCULAR; INTRAVENOUS at 17:13

## 2023-04-01 RX ADMIN — ACETAMINOPHEN 1000 MG: 500 TABLET, FILM COATED ORAL at 17:13

## 2023-04-01 RX ADMIN — BUDESONIDE AND FORMOTEROL FUMARATE DIHYDRATE 2 PUFF: 80; 4.5 AEROSOL RESPIRATORY (INHALATION) at 18:00

## 2023-04-01 RX ADMIN — WARFARIN SODIUM 2.5 MG: 2.5 TABLET ORAL at 17:13

## 2023-04-01 RX ADMIN — ROSUVASTATIN CALCIUM 5 MG: 10 TABLET, FILM COATED ORAL at 04:44

## 2023-04-01 RX ADMIN — IPRATROPIUM BROMIDE AND ALBUTEROL SULFATE 3 ML: .5; 2.5 SOLUTION RESPIRATORY (INHALATION) at 15:51

## 2023-04-01 RX ADMIN — OMEPRAZOLE 20 MG: 20 CAPSULE, DELAYED RELEASE ORAL at 04:42

## 2023-04-01 RX ADMIN — ACETAMINOPHEN 1000 MG: 500 TABLET, FILM COATED ORAL at 12:26

## 2023-04-01 RX ADMIN — Medication 1 APPLICATOR: at 18:00

## 2023-04-01 RX ADMIN — ENOXAPARIN SODIUM 40 MG: 40 INJECTION SUBCUTANEOUS at 17:13

## 2023-04-01 RX ADMIN — OXYCODONE HYDROCHLORIDE 5 MG: 5 TABLET ORAL at 19:28

## 2023-04-01 ASSESSMENT — ENCOUNTER SYMPTOMS
SHORTNESS OF BREATH: 1
BLURRED VISION: 0
NAUSEA: 0
FEVER: 0
COUGH: 0
STRIDOR: 0
SENSORY CHANGE: 0
CHILLS: 0
SPUTUM PRODUCTION: 0
FOCAL WEAKNESS: 0
ABDOMINAL PAIN: 0
DIZZINESS: 0
VOMITING: 0
MYALGIAS: 0

## 2023-04-01 ASSESSMENT — PAIN DESCRIPTION - PAIN TYPE
TYPE: ACUTE PAIN
TYPE: SURGICAL PAIN
TYPE: ACUTE PAIN
TYPE: SURGICAL PAIN
TYPE: ACUTE PAIN
TYPE: SURGICAL PAIN
TYPE: ACUTE PAIN
TYPE: CHRONIC PAIN

## 2023-04-01 ASSESSMENT — FIBROSIS 4 INDEX: FIB4 SCORE: 1.146829277313911195

## 2023-04-01 NOTE — PROGRESS NOTES
"Critical Care Progress Note    Date of admission  3/28/2023    Chief Complaint  67 y.o. male admitted 3/28/2023 with mitral regurgitation     Hospital Course  \"67 y.o. male with a past medical history of asthma, hypertension, hyperlipidemia, severe MR and ASD who presented 3/28/2023 for an elective mitral valve replacement, left atrial appendage ligation and ASD repair with Dr. Sheppard.  His intraoperative course was uneventful, his total aortic cross-clamp time was 95 minutes and total cardiopulmonary bypass time was 110 minutes.  Post procedurally his LVEF was 60% with good transvalvular gradients.  He arrives in the ICU on sedation, vasopressors and full mechanical ventilatory support.\"    Interval Problem Update  Reviewed last 24 hour events:   - O2 weaned to NC   - Neuro: AOx4   - HR: 90s-110s   - SBP: 100s-120s   - GI: tolerating diet, last BM PTA   - UOP: 2.9 L/24 hrs   - Roberts: no   - Tm: 36.5   - Lines: CVC, Chest tubes   - PPx: GI PPI, DVT lovenox/coumadin   - 4L NC   - CXR (personally reviewed and compared to prior): no new   - difficulty walking, PT/OT ordered    Yesterday   - No acute events overnight   - Neuro: AOx4   - HR: 80s-100s   - SBP: 100s-120s   - GI: tolerating diet, last BM PTA   - UOP: 2 L/24 hrs   - Roberts: no   - Tm: 37.7   - Lines: CVC, Chest tubes   - PPx: GI PPI, DVT lovenox/coumadin   - HFNC 50%, 50L   - CXR (personally reviewed and compared to prior): improve atx   - Continue to wean O2    Review of Systems  Review of Systems   Constitutional:  Positive for malaise/fatigue. Negative for chills and fever.   Eyes:  Negative for blurred vision.   Respiratory:  Positive for shortness of breath. Negative for cough, sputum production and stridor.    Cardiovascular:  Positive for chest pain.   Gastrointestinal:  Negative for abdominal pain, nausea and vomiting.   Genitourinary:  Negative for dysuria.   Musculoskeletal:  Negative for myalgias.   Skin:  Negative for rash.   Neurological:  " Negative for dizziness, sensory change and focal weakness.      Vital Signs for last 24 hours   Temp:  [36.1 °C (97 °F)-36.5 °C (97.7 °F)] 36.1 °C (97 °F)  Pulse:  [] 85  Resp:  [16-20] 18  BP: ()/(62-89) 112/76  SpO2:  [87 %-96 %] 92 %    Hemodynamic parameters for last 24 hours       Respiratory Information for the last 24 hours       Physical Exam   Physical Exam  Vitals and nursing note reviewed.   Constitutional:       General: He is in acute distress.      Appearance: He is well-developed. He is ill-appearing.      Interventions: Nasal cannula in place.   HENT:      Head: Normocephalic and atraumatic.      Right Ear: External ear normal.      Left Ear: External ear normal.   Eyes:      Conjunctiva/sclera: Conjunctivae normal.      Pupils: Pupils are equal, round, and reactive to light.   Neck:      Vascular: No JVD.      Trachea: No tracheal deviation.      Comments: Right IJ dual-lumen catheter  Cardiovascular:      Rate and Rhythm: Normal rate and regular rhythm.      Pulses: Normal pulses.   Pulmonary:      Breath sounds: Decreased air movement present. Rales present. No wheezing.   Chest:       Abdominal:      General: Bowel sounds are normal. There is no distension.      Palpations: Abdomen is soft.   Genitourinary:     Comments: Roberts removed  Musculoskeletal:         General: No tenderness.      Cervical back: Neck supple.   Skin:     General: Skin is warm and dry.      Capillary Refill: Capillary refill takes less than 2 seconds.      Findings: No rash.   Neurological:      General: No focal deficit present.      Mental Status: He is alert and oriented to person, place, and time.      GCS: GCS eye subscore is 4. GCS verbal subscore is 5. GCS motor subscore is 6.      Cranial Nerves: Cranial nerves 2-12 are intact.      Sensory: Sensation is intact.      Motor: Motor function is intact.   Psychiatric:         Mood and Affect: Mood and affect normal.       Medications  Current  Facility-Administered Medications   Medication Dose Route Frequency Provider Last Rate Last Admin    budesonide-formoterol (SYMBICORT) 80-4.5 MCG/ACT inhaler 2 Puff  2 Puff Inhalation BID MARGARITA Stanford Jr..OMariel   2 Puff at 04/01/23 0452    albuterol inhaler 2 Puff  2 Puff Inhalation Q6HRS PRN MARGARITA Stanford Jr..O.        warfarin (COUMADIN) tablet 2.5 mg  2.5 mg Oral DAILY AT 1800 MING Huang.P.R.N.   2.5 mg at 03/31/23 1735    rosuvastatin (CRESTOR) tablet 5 mg  5 mg Oral DAILY MING Huang.P.R.N.   5 mg at 04/01/23 0444    furosemide (LASIX) injection 20 mg  20 mg Intravenous BID DIURETIC MING Huang.P.R.N.   20 mg at 04/01/23 0444    potassium chloride SA (Kdur) tablet 20 mEq  20 mEq Oral DAILY MING Huang.P.R.N.   20 mEq at 04/01/23 0442    ipratropium-albuterol (DUONEB) nebulizer solution  3 mL Nebulization Q4HRS (RT) PRAVEEN Stanford Jr.OMariel   3 mL at 03/31/23 2300    Nozin nasal  swab  1 Applicator Each Nostril BID ANGÉLICA HuangP.R.N.   1 Applicator at 04/01/23 0443    Respiratory Therapy Consult   Nebulization Continuous RT ANGÉLICA HuangP.RMarielN.        NS infusion   Intravenous Continuous ANGÉLICA HuangP.R.N. 10 mL/hr at 03/28/23 1200 Rate Verify at 03/28/23 1200    electrolyte-A (PLASMALYTE-A) infusion   Intravenous PRN MING Huang.P.R.N.        enoxaparin (Lovenox) inj 40 mg  40 mg Subcutaneous DAILY AT 1800 MING Huang.P.R.N.   40 mg at 03/31/23 1734    aspirin EC (ECOTRIN) tablet 81 mg  81 mg Oral DAILY MING Huang.P.R.N.   81 mg at 04/01/23 0442    Pharmacy Consult Request ...Pain Management Review 1 Each  1 Each Other PHARMACY TO DOSE CARMEN Huang        acetaminophen (TYLENOL) tablet 1,000 mg  1,000 mg Oral Q6HRS Corinne Mishra A.P.R.NMariel   1,000 mg at 04/01/23 0443    Followed by    [START ON 4/2/2023] acetaminophen (TYLENOL) tablet 1,000 mg  1,000 mg Oral Q6HRS PRN BINU Huang.         oxyCODONE immediate-release (ROXICODONE) tablet 5 mg  5 mg Oral Q3HRS PRN Corinne Mishra, A.P.R.N.   5 mg at 04/01/23 0443    Or    oxyCODONE immediate release (ROXICODONE) tablet 10 mg  10 mg Oral Q3HRS PRN Corinne Mishra, A.P.R.N.   10 mg at 03/30/23 1604    Or    fentaNYL (SUBLIMAZE) injection 50 mcg  50 mcg Intravenous Q3HRS PRN Corinne Mishra, A.P.R.N.        traMADol (Ultram) 50 MG tablet 50 mg  50 mg Oral Q4HRS PRN Corinne Mishra, A.P.R.N.   50 mg at 03/30/23 0741    ondansetron (ZOFRAN) syringe/vial injection 8 mg  8 mg Intravenous Q6HRS PRN Corinne Mishra, A.P.R.N.   8 mg at 03/28/23 1734    Or    prochlorperazine (COMPAZINE) injection 10 mg  10 mg Intravenous Q6HRS PRN Corinne Mishra, A.P.R.N.        senna-docusate (PERICOLACE or SENOKOT S) 8.6-50 MG per tablet 2 Tablet  2 Tablet Oral BID Corinne Mishra A.P.R.N.   2 Tablet at 04/01/23 0442    And    polyethylene glycol/lytes (MIRALAX) PACKET 1 Packet  1 Packet Oral DAILY Corinne Mishra A.P.R.N.   1 Packet at 04/01/23 0442    And    magnesium hydroxide (MILK OF MAGNESIA) suspension 30 mL  30 mL Oral DAILY Corinne Mishra, A.P.R.N.   30 mL at 04/01/23 0442    And    bisacodyl (DULCOLAX) suppository 10 mg  10 mg Rectal QDAY PRN Corinne Mishra, A.P.R.N.        omeprazole (PRILOSEC) capsule 20 mg  20 mg Oral DAILY Corinne Mishra, A.P.R.N.   20 mg at 04/01/23 0442    mag hydrox-al hydrox-simeth (MAALOX PLUS ES or MYLANTA DS) suspension 30 mL  30 mL Oral Q4HRS PRN CARMEN Huang        diphenhydrAMINE (BENADRYL) tablet/capsule 25 mg  25 mg Oral HS PRN - MR X 1 CARMEN Huang        metoprolol tartrate (LOPRESSOR) tablet 25 mg  25 mg Oral BID CARMEN Huang   25 mg at 04/01/23 0443    MD Alert...Warfarin per Pharmacy   Other PHARMACY TO DOSE CARMEN Huang           Fluids    Intake/Output Summary (Last 24 hours) at 4/1/2023 0639  Last data filed at 4/1/2023 0500  Gross per 24 hour   Intake  1230 ml   Output 2900 ml   Net -1670 ml         Laboratory            Recent Labs     03/30/23  0222 03/31/23  0120 04/01/23  0000   SODIUM 136 136 135   POTASSIUM 4.2 3.6 3.6   CHLORIDE 101 98 98   CO2 25 27 26   BUN 17 15 14   CREATININE 0.81 0.77 0.67   CALCIUM 8.1* 8.2* 8.0*       Recent Labs     03/30/23  0222 03/31/23  0120 04/01/23  0000   GLUCOSE 142* 113* 132*       Recent Labs     03/30/23  0222 03/31/23  0120 04/01/23  0000   WBC 23.0* 12.9* 9.6       Recent Labs     03/30/23  0222 03/31/23  0120 03/31/23  0500 04/01/23  0000 04/01/23  0456   RBC 4.82 4.66*  --  4.68*  --    HEMOGLOBIN 14.2 13.4*  --  13.6*  --    HEMATOCRIT 42.1 40.7*  --  41.4*  --    PLATELETCT 134* 115*  --  132*  --    PROTHROMBTM 15.3*  --  18.0*  --  18.1*   INR 1.23*  --  1.53*  --  1.54*         Imaging  X-Ray:  I have personally reviewed the images and compared with prior images.    Assessment/Plan  * Moderate mitral regurgitation by prior echocardiogram- (present on admission)  Assessment & Plan  Mitral valve replacement on 3/28/2023 by Dr. Sheppard  POD #4  Weaned from ventilator on POD #0  Weaned from vasopressor support  Post-operative pain control per CVS  Chest tube and pacemaker removed  SSI  Optimize K and Mg  PT/OT consult  Continue aggressive pulmonary hygeine    Encounter for weaning from ventilator (HCC)- (present on admission)  Assessment & Plan  Postoperative ventilator weaned  Continued hypoxia, weaned to NC  Scheduled nebs  Pulmonary hygiene and ambulation encouraged    Pulmonary emphysema (HCC)- (present on admission)  Assessment & Plan  Continue Symbicort and albuterol  Continue scheduled nebs  PFT 2/28/23 show mild obstruction and no e/o restriction. Reduced DLCO and DL/VA are c/w his emphysema.  Long history of tobacco abuse and mining exposure    Atrial fibrillation with RVR ([facility])- (present on admission)  Assessment & Plan  Status post ablation  Left atrial appendage ligation on 3/28/2023         VTE:   Lovenox and Coumadin  Ulcer: PPI  Lines: Central Line  Ongoing indication addressed    I have performed a physical exam and reviewed and updated ROS and Plan today (4/1/2023). In review of yesterday's note (3/31/2023), there are no changes except as documented above.     Discussed patient condition and risk of morbidity and/or mortality with RN, RT, Pharmacy, Code status disscussed, Charge nurse / hot rounds, Patient, and CVS    The patient remains critically ill.  Critical care time = 44 minutes in directly providing and coordinating critical care and extensive data review.  No time overlap and excludes procedures.    Please note that this dictation was created using voice recognition software. The accuracy of the dictation is limited to the abilities of the software. I have made every reasonable attempt to correct obvious errors, but I expect that there are errors of grammar and possibly content that I did not discover before finalizing the note.

## 2023-04-01 NOTE — CARE PLAN
"The patient is Stable - Low risk of patient condition declining or worsening    Shift Goals  Clinical Goals: walking o2 test, utilize IS  Patient Goals: sleep, \"go home\"  Family Goals: no family present    Progress made toward(s) clinical / shift goals:    Problem: Post Op Day 3 CABG/Heart Valve replacement  Goal: Optimal care of the post op CABG/Heart Valve replacement post op day 3  Intervention: Daily weights in the morning  Note: Completed with AM walk  Intervention: Shower daily and clean incisions twice daily with soap and water  Note: Pt showered, incision care completed x2  Intervention: Up in chair for all meals  Note: Pt up to chair for breakfast.   Intervention: Ambulate 4 times daily, increasing the distance each time  Note: Morning walk complete. Pt leans to right side/drags right foot, unsteady.   Intervention: IS q 1 hour while awake and record best IS volume  Note: Pt self motivated with IS, best 1500  Intervention: Consider removal of corona, chest tube and pacer wires if not already done  Note: Corona out, chest tubes out, pacer wires out.  Intervention: Assess need for case management and  for discharge planning  Note: completed  Intervention: Discharge planning (See discharge barriers/planning problem on care plan)  Note: Complete.            "

## 2023-04-01 NOTE — PROGRESS NOTES
Cardiovascular Surgery Progress Note    Name: Eduardo Aponte  MRN: [id number]  : 1955  Admit Date: 3/28/2023  5:17 AM  4 Days Post-Op     Procedure:  Procedure(s) and Anesthesia Type:     * MITRAL VALVE REPLACEMENT - General     * ECHOCARDIOGRAM, TRANSESOPHAGEAL, INTRAOPERATIVE - General     * EXCISION, LEFT ATRIAL APPENDAGE - General     * REPAIR, ATRIAL SEPTAL DEFECT - General    Vitals:  Vitals:    23 0500 23 0600 23 0700 23 0800   BP: 106/69 112/76 (!) 87/58 105/70   Pulse: (!) 112 85 87 95   Resp:  18     Temp:       TempSrc:       SpO2: 93% 92% 93% 90%   Weight:       Height:          Temp (24hrs), Av.2 °C (97.2 °F), Min:36.1 °C (97 °F), Max:36.5 °C (97.7 °F)      Respiratory:  Respiration: 18, Pulse Oximetry: 90 %     Fluids:    Intake/Output Summary (Last 24 hours) at 2023 0848  Last data filed at 2023 0500  Gross per 24 hour   Intake 1130 ml   Output 2725 ml   Net -1595 ml       Admit weight: Weight: 82.6 kg (182 lb 1.6 oz)  Current weight: Weight: 82.6 kg (182 lb 1.6 oz) (23 0457)    Labs:  Recent Labs     23  0222 23  0120 23  0000   WBC 23.0* 12.9* 9.6   RBC 4.82 4.66* 4.68*   HEMOGLOBIN 14.2 13.4* 13.6*   HEMATOCRIT 42.1 40.7* 41.4*   MCV 87.3 87.3 88.5   MCH 29.5 28.8 29.1   MCHC 33.7 32.9* 32.9*   RDW 46.5 45.5 45.8   PLATELETCT 134* 115* 132*   MPV 11.8 12.4 11.8       Recent Labs     23  0222 23  0120 23  0000   SODIUM 136 136 135   POTASSIUM 4.2 3.6 3.6   CHLORIDE 101 98 98   CO2 25 27 26   GLUCOSE 142* 113* 132*   BUN 17 15 14   CREATININE 0.81 0.77 0.67   CALCIUM 8.1* 8.2* 8.0*       Recent Labs     23  0222 23  0500 23  0456   INR 1.23* 1.53* 1.54*             Medications:  Scheduled Medications   Medication Dose Frequency    budesonide-formoterol  2 Puff BID    warfarin  2.5 mg DAILY AT 1800    rosuvastatin  5 mg DAILY    furosemide  20 mg BID DIURETIC    potassium chloride SA  20 mEq DAILY     ipratropium-albuterol  3 mL Q4HRS (RT)    Nozin nasal  swab  1 Applicator BID    enoxaparin (LOVENOX) injection  40 mg DAILY AT 1800    aspirin EC  81 mg DAILY    Pharmacy Consult Request  1 Each PHARMACY TO DOSE    acetaminophen  1,000 mg Q6HRS    senna-docusate  2 Tablet BID    And    polyethylene glycol/lytes  1 Packet DAILY    And    magnesium hydroxide  30 mL DAILY    omeprazole  20 mg DAILY    metoprolol tartrate  25 mg BID    MD Alert...Warfarin per Pharmacy   PHARMACY TO DOSE        Exam:   Physical Exam  Vitals and nursing note reviewed.   Constitutional:       General: He is not in acute distress.     Appearance: Normal appearance. He is obese.   HENT:      Head: Normocephalic and atraumatic.      Right Ear: External ear normal.      Left Ear: External ear normal.      Mouth/Throat:      Mouth: Mucous membranes are moist.      Pharynx: Oropharynx is clear.   Eyes:      Pupils: Pupils are equal, round, and reactive to light.   Cardiovascular:      Rate and Rhythm: Normal rate and regular rhythm.      Pulses: Normal pulses.      Heart sounds: Normal heart sounds.   Pulmonary:      Effort: Pulmonary effort is normal.      Breath sounds: Decreased breath sounds present.   Abdominal:      General: Bowel sounds are decreased. There is no distension.      Palpations: Abdomen is soft.      Tenderness: There is no abdominal tenderness.   Musculoskeletal:      Cervical back: Normal range of motion and neck supple. No tenderness.      Right lower leg: No edema.      Left lower leg: No edema.      Comments: Bilateral upper extremity edema     Skin:     General: Skin is warm and dry.      Capillary Refill: Capillary refill takes less than 2 seconds.   Neurological:      General: No focal deficit present.      Mental Status: He is oriented to person, place, and time. Mental status is at baseline.   Psychiatric:         Mood and Affect: Mood normal.         Behavior: Behavior normal.         Thought Content:  Thought content normal.       Cardiac Medications:    ASA - Yes    Plavix - No; contraindicated because of On Coumadin / NOAC    Post-operative Beta Blockers - Yes    Ace/ARB- No; contraindicated because of Normal EF    Statin - Yes    Aldactone- No; contraindicated because of Normal EF    Ejection Fraction:  60%    Telemetry:   3/29 SR  3/30 SR  3/31 SR  4/1 SR    Assessment/Plan:  POD 1  HDS- off pressors, SR, neuro intact, wounds intact, abdomen soft, fluid balance positive, wt up,  on 12 L oxymask. Received one dose of lasix overnight. 208 ml out of chest tubes overnight. Plan:  Diurese, encourage IS and mobility, HFNC. Keep chest tubes, pacing wires, and corona.     POD 2  HDS, SR, neuro intact, wounds intact, abdomen soft, fluid balance negative, wt up, HFNC 50L 60%. 80 mL out of chest tubes overnight. Plan:  Remove mediastinal chest tubes, keep pacing wires. Wean HFNC as tolerated. Continue diuresis.  IS/ambulate.     POD 3  HDS, SR, neuro intact, wounds intact, abdomen soft, fluid balance negative, wt down. Remains on HFNC 50L 55%, CXR with atelectasis, trace pleural effusions. Pacing wires removed, patient tolerated well. Plan: Wean HFNC as tolerated. IS/ambulate.     POD 4 HDS. SR.  Neuro intact.  Wounds CDI.  Abdomen soft no BM.  Cr 0.67 Hgb 13.6.  INR subtherapeutic.  Off high flow but on 6L this AM.  Weight down fluid negative.  Plan: Escalate bowel therapy.  When 5L or below ok to transfer to tele.  Encourage IS/ambulation.  Continue diuresis.      Disposition:  Home per PT

## 2023-04-01 NOTE — PROGRESS NOTES
Pharmacy Warfarin Monitoring     Date: 4/1/2023  Reason for Anticoagulation: Bioprosthetic Valve Replacement, Atrial Fibrillation   Target INR: 2.0 to 3.0    [id number] VASc Score: 2  HAS-BLED Score: 2     Hemoglobin Value: (!) 13.6  Hematocrit Value: (!) 41.4  Lab Platelet Value: (!) 132    INR from last 7 days       Date/Time INR Value    04/01/23 0456 1.54    03/31/23 0500 1.53    03/30/23 0222 1.23    03/29/23 0445 1.18    03/28/23 1145 1.44          Dose from last 7 days       Date/Time Dose (mg)    04/01/23 0755 2.5    03/31/23 1602 2.5    03/30/23 1338 2.5    03/29/23 1302 5          Home dose: New start  Significant Interactions: Antiplatelet Medications  Bridge Therapy: Not indicated post cardiac surgery    Reversal Agent Administered: Not Applicable    Comments: Sub-therapeutic INR as expected after starting warfarin, however beginning to mobilize.  Continue warfarin 2.5 mg PO daily.  INR tomorrow.    Education Material Provided?: No  Pharmacist suggested discharge dosing: TBD pending INR trends, perhaps warfarin 2.5 mg PO daily.  Recommend a follow-up PT/INR within 48-72 hours of discharge.     Thank you!  Gina Chavez, PharmD, BCCCP

## 2023-04-01 NOTE — CARE PLAN
The patient is Watcher - Medium risk of patient condition declining or worsening    Shift Goals  Clinical Goals: titrate HFNC as tolerated per patient; pain control; mobilize; IS use  Patient Goals: rest; control pain  Family Goals: no family present    Progress made toward(s) clinical / shift goals:    Problem: Knowledge Deficit - Standard  Goal: Patient and family/care givers will demonstrate understanding of plan of care, disease process/condition, diagnostic tests and medications  Outcome: Progressing  Note: Education provided to patient, able to verbalize understanding appropriately.     Problem: Skin Integrity  Goal: Skin integrity is maintained or improved  Outcome: Progressing  Note: Mid sternal incision cleansed, site clean dry intact.  Chest tube sites covered with gauze and tape, intact.     Problem: Fall Risk  Goal: Patient will remain free from falls  Outcome: Progressing  Note: Call bell within reach, bed/chair alarm in place.     Problem: Pain - Standard  Goal: Alleviation of pain or a reduction in pain to the patient’s comfort goal  Outcome: Progressing  Note: Pain levels addressed with patient, able to notify RN when needing med PRN to achieve adequate pain levels, declines when not needing medication.     Problem: Post Op Day 3 CABG/Heart Valve replacement  Goal: Optimal care of the post op CABG/Heart Valve replacement post op day 3  Outcome: Progressing  Intervention: Daily weights in the morning  Note: Completed at 6am with stand-up scale and placed to chair per NOC RN  Intervention: Shower daily and clean incisions twice daily with soap and water  Note: Patient showered on days, incision site cleansed to mid sternal and chest tube sites.  Intervention: Up in chair for all meals  Note: Patient up for breakfast and lunch in chair, placed back to bed post meals per patient request.  Intervention: Ambulate 4 times daily, increasing the distance each time  Note: Patient ambulated x2 with this RN  "today and once for NOC RN. Ambulated x1 up to bathroom and showered.  Intervention: IS q 1 hour while awake and record best IS volume  Note: IS completed with motivation per patient to help decrease O2 requirements, pulling 1500 on IS and educated return demonstration for usage of IS.  Intervention: Consider removal of corona, chest tube and pacer wires if not already done  Note: Pacer wires removed in AM by Corinne MATTHEW per CTS team, patient tolerated well.  Intervention: Assess need for case management and  for discharge planning  Note: Patient seen by social work, patient was concerned he \"scared of social work by trying to use the urinal and asked her to come back.\"     Problem: Hemodynamics  Goal: Patient's hemodynamics, fluid balance and neurologic status will be stable or improve  Outcome: Progressing  Note: VSS     Problem: Respiratory  Goal: Patient will achieve/maintain optimum respiratory ventilation and gas exchange  Outcome: Progressing  Note: Slowly weaning O2 requirements on HFNC.  Received patient on 50L/55% O2; patient now weaned down to 40L/50%.       Problem: Risk for Aspiration  Goal: Patient's risk for aspiration will be absent or decrease  Outcome: Progressing     Problem: Nutrition - Advanced  Goal: Patient will display progressive weight gain toward goal have adequate food and fluid intake  Outcome: Progressing  Note: Tolerated breakfast and lunch meals since switched from liquids to full diet. Eating 50-75% of meals.     Problem: Self Care  Goal: Patient will have the ability to perform ADLs independently or with assistance (bathe, groom, dress, toilet and feed)  Outcome: Progressing  Note: Able to do simple ADLs: brushing hair, able to use urinal and ask for help with wires when needing to ambulate out of bed to chair or to bathroom, able to brush teeth by self, able to feed self.  Asks for minimal assistance.       Patient is not progressing towards the following " "goals:      Problem: Bowel Elimination - Post Surgical  Goal: Patient will resume regular bowel sounds and function with no discomfort or distention  Outcome: Not Progressing  Note: Bowel sounds hypoactive in all quadrants, patient stating he is passing flatus throughout the day.  When placed to commode, patient passed some flatus by stated \"false alarm.\"  Continuing bowel regimen and educated to patient the need to take stool softeners as ordered to increase bowel motility and pass his bowels post surgery.  Patient understanding, states \"he only had three full meals, I do not have anything to pass in my system.\"     "

## 2023-04-01 NOTE — CARE PLAN
"The patient is Stable - Low risk of patient condition declining or worsening    Shift Goals  Clinical Goals: walking o2 test, utilize IS  Patient Goals: sleep, \"go home\"  Family Goals: no family present    Progress made toward(s) clinical / shift goals:    Problem: Skin Integrity  Goal: Skin integrity is maintained or improved  Outcome: Progressing     Problem: Pain - Standard  Goal: Alleviation of pain or a reduction in pain to the patient’s comfort goal  Outcome: Progressing     Problem: Post Op Day 4 CABG/Heart Valve Replacement  Goal: Optimal care of the Post Op CABG/Heart Valve replacement Post Op Day 4  Outcome: Progressing  Intervention: Daily weights in the morning  Note: Completed by night shift nurse  Intervention: Shower daily and clean incisions twice daily with soap and water  Note: Showered cleaned incisions twice  Intervention: Up in chair for all meals  Note: Pt has been up to chair x4 today  Intervention: Ambulate 4 times daily, increasing the distance each time  Note: Patient has walked x4 with increased distance  Intervention: IS q 1 hour while awake and record best IS volume  Note: IS 1800 using q1  Intervention: Consider removal of corona, chest tube and pacer wires if not already done  Note: Already completed  Intervention: Discharge Education  Note: Education provided. Discussed O2 needs and wound care   Intervention: Add special instructions for cardiac surgery discharge instructions to after visit summary and review with patient  Note: Not discharged     Problem: Respiratory  Goal: Patient will achieve/maintain optimum respiratory ventilation and gas exchange  Outcome: Progressing       Patient is not progressing towards the following goals:      "

## 2023-04-01 NOTE — DISCHARGE PLANNING
Case Management Discharge Planning    Admission Date: 3/28/2023  GMLOS: 5.8  ALOS: 4    6-Clicks ADL Score: 20  6-Clicks Mobility Score: 20      Anticipated Discharge Dispo: Discharge Disposition: Discharged to home/self care (01)  Discharge Address: [facility] [place] [address] [place] [address]  Discharge Contact Phone Number: [phone]      IMM given at 1205 on 4/1/23

## 2023-04-01 NOTE — CARE PLAN
Problem: Bronchoconstriction  Goal: Improve in air movement and diminished wheezing  Description: Target End Date:  2 to 3 days    1.  Implement inhaled treatments  2.  Evaluate and manage medication effects  Outcome: Progressing       Respiratory Update    Treatment modality: Duoneb  Frequency: Q4    Pt tolerating current treatments well with no adverse reactions.

## 2023-04-02 ENCOUNTER — APPOINTMENT (OUTPATIENT)
Dept: RADIOLOGY | Facility: MEDICAL CENTER | Age: 68
DRG: 220 | End: 2023-04-02
Attending: THORACIC SURGERY (CARDIOTHORACIC VASCULAR SURGERY)
Payer: MEDICARE

## 2023-04-02 LAB
ANION GAP SERPL CALC-SCNC: 12 MMOL/L (ref 7–16)
BUN SERPL-MCNC: 11 MG/DL (ref 8–22)
CALCIUM SERPL-MCNC: 8.4 MG/DL (ref 8.5–10.5)
CHLORIDE SERPL-SCNC: 99 MMOL/L (ref 96–112)
CO2 SERPL-SCNC: 25 MMOL/L (ref 20–33)
CREAT SERPL-MCNC: 0.65 MG/DL (ref 0.5–1.4)
ERYTHROCYTE [DISTWIDTH] IN BLOOD BY AUTOMATED COUNT: 44.8 FL (ref 35.9–50)
GFR SERPLBLD CREATININE-BSD FMLA CKD-EPI: 104 ML/MIN/1.73 M 2
GLUCOSE SERPL-MCNC: 108 MG/DL (ref 65–99)
HCT VFR BLD AUTO: 42.2 % (ref 42–52)
HGB BLD-MCNC: 13.8 G/DL (ref 14–18)
INR PPP: 1.67 (ref 0.87–1.13)
MCH RBC QN AUTO: 29 PG (ref 27–33)
MCHC RBC AUTO-ENTMCNC: 32.7 G/DL (ref 33.7–35.3)
MCV RBC AUTO: 88.7 FL (ref 81.4–97.8)
PLATELET # BLD AUTO: 159 K/UL (ref 164–446)
PMV BLD AUTO: 11.6 FL (ref 9–12.9)
POTASSIUM SERPL-SCNC: 3.7 MMOL/L (ref 3.6–5.5)
PROTHROMBIN TIME: 19.3 SEC (ref 12–14.6)
RBC # BLD AUTO: 4.76 M/UL (ref 4.7–6.1)
SODIUM SERPL-SCNC: 136 MMOL/L (ref 135–145)
WBC # BLD AUTO: 8.7 K/UL (ref 4.8–10.8)

## 2023-04-02 PROCEDURE — 700101 HCHG RX REV CODE 250: Performed by: THORACIC SURGERY (CARDIOTHORACIC VASCULAR SURGERY)

## 2023-04-02 PROCEDURE — A9270 NON-COVERED ITEM OR SERVICE: HCPCS | Performed by: NURSE PRACTITIONER

## 2023-04-02 PROCEDURE — A9270 NON-COVERED ITEM OR SERVICE: HCPCS | Performed by: INTERNAL MEDICINE

## 2023-04-02 PROCEDURE — 94640 AIRWAY INHALATION TREATMENT: CPT

## 2023-04-02 PROCEDURE — 700102 HCHG RX REV CODE 250 W/ 637 OVERRIDE(OP): Performed by: NURSE PRACTITIONER

## 2023-04-02 PROCEDURE — 700102 HCHG RX REV CODE 250 W/ 637 OVERRIDE(OP): Performed by: INTERNAL MEDICINE

## 2023-04-02 PROCEDURE — 94760 N-INVAS EAR/PLS OXIMETRY 1: CPT

## 2023-04-02 PROCEDURE — 770020 HCHG ROOM/CARE - TELE (206)

## 2023-04-02 PROCEDURE — A9270 NON-COVERED ITEM OR SERVICE: HCPCS

## 2023-04-02 PROCEDURE — 71046 X-RAY EXAM CHEST 2 VIEWS: CPT

## 2023-04-02 PROCEDURE — 700102 HCHG RX REV CODE 250 W/ 637 OVERRIDE(OP)

## 2023-04-02 PROCEDURE — 80048 BASIC METABOLIC PNL TOTAL CA: CPT

## 2023-04-02 PROCEDURE — 99024 POSTOP FOLLOW-UP VISIT: CPT | Performed by: NURSE PRACTITIONER

## 2023-04-02 PROCEDURE — 85610 PROTHROMBIN TIME: CPT

## 2023-04-02 PROCEDURE — 85027 COMPLETE CBC AUTOMATED: CPT

## 2023-04-02 PROCEDURE — 700111 HCHG RX REV CODE 636 W/ 250 OVERRIDE (IP): Performed by: NURSE PRACTITIONER

## 2023-04-02 RX ORDER — METOPROLOL TARTRATE 50 MG/1
50 TABLET, FILM COATED ORAL 2 TIMES DAILY
Status: DISCONTINUED | OUTPATIENT
Start: 2023-04-02 | End: 2023-04-04 | Stop reason: HOSPADM

## 2023-04-02 RX ORDER — METOPROLOL TARTRATE 50 MG/1
TABLET, FILM COATED ORAL
Status: COMPLETED
Start: 2023-04-02 | End: 2023-04-02

## 2023-04-02 RX ADMIN — ACETAMINOPHEN 1000 MG: 500 TABLET, FILM COATED ORAL at 00:00

## 2023-04-02 RX ADMIN — METOPROLOL TARTRATE 50 MG: 50 TABLET, FILM COATED ORAL at 16:59

## 2023-04-02 RX ADMIN — BUDESONIDE AND FORMOTEROL FUMARATE DIHYDRATE 2 PUFF: 80; 4.5 AEROSOL RESPIRATORY (INHALATION) at 16:58

## 2023-04-02 RX ADMIN — WARFARIN SODIUM 2.5 MG: 2.5 TABLET ORAL at 17:01

## 2023-04-02 RX ADMIN — FUROSEMIDE 20 MG: 10 INJECTION, SOLUTION INTRAMUSCULAR; INTRAVENOUS at 05:37

## 2023-04-02 RX ADMIN — ENOXAPARIN SODIUM 40 MG: 40 INJECTION SUBCUTANEOUS at 17:00

## 2023-04-02 RX ADMIN — Medication 1 APPLICATOR: at 05:34

## 2023-04-02 RX ADMIN — OXYCODONE HYDROCHLORIDE 10 MG: 10 TABLET ORAL at 10:45

## 2023-04-02 RX ADMIN — OMEPRAZOLE 20 MG: 20 CAPSULE, DELAYED RELEASE ORAL at 05:35

## 2023-04-02 RX ADMIN — OXYCODONE HYDROCHLORIDE 5 MG: 5 TABLET ORAL at 01:25

## 2023-04-02 RX ADMIN — ACETAMINOPHEN 1000 MG: 500 TABLET, FILM COATED ORAL at 13:03

## 2023-04-02 RX ADMIN — DOCUSATE SODIUM 50 MG AND SENNOSIDES 8.6 MG 2 TABLET: 8.6; 5 TABLET, FILM COATED ORAL at 17:01

## 2023-04-02 RX ADMIN — POLYETHYLENE GLYCOL 3350 1 PACKET: 17 POWDER, FOR SOLUTION ORAL at 05:35

## 2023-04-02 RX ADMIN — BUDESONIDE AND FORMOTEROL FUMARATE DIHYDRATE 2 PUFF: 80; 4.5 AEROSOL RESPIRATORY (INHALATION) at 06:58

## 2023-04-02 RX ADMIN — ASPIRIN 81 MG: 81 TABLET, COATED ORAL at 05:37

## 2023-04-02 RX ADMIN — IPRATROPIUM BROMIDE AND ALBUTEROL SULFATE 3 ML: 2.5; .5 SOLUTION RESPIRATORY (INHALATION) at 11:16

## 2023-04-02 RX ADMIN — MAGNESIUM HYDROXIDE 30 ML: 400 SUSPENSION ORAL at 05:37

## 2023-04-02 RX ADMIN — ROSUVASTATIN CALCIUM 5 MG: 10 TABLET, FILM COATED ORAL at 05:36

## 2023-04-02 RX ADMIN — FUROSEMIDE 20 MG: 10 INJECTION, SOLUTION INTRAMUSCULAR; INTRAVENOUS at 17:01

## 2023-04-02 RX ADMIN — IPRATROPIUM BROMIDE AND ALBUTEROL SULFATE 3 ML: 2.5; .5 SOLUTION RESPIRATORY (INHALATION) at 19:53

## 2023-04-02 RX ADMIN — POTASSIUM CHLORIDE 20 MEQ: 1500 TABLET, EXTENDED RELEASE ORAL at 05:35

## 2023-04-02 RX ADMIN — IPRATROPIUM BROMIDE AND ALBUTEROL SULFATE 3 ML: 2.5; .5 SOLUTION RESPIRATORY (INHALATION) at 14:49

## 2023-04-02 RX ADMIN — Medication 1 APPLICATOR: at 18:50

## 2023-04-02 RX ADMIN — ACETAMINOPHEN 1000 MG: 500 TABLET, FILM COATED ORAL at 05:34

## 2023-04-02 RX ADMIN — DOCUSATE SODIUM 50 MG AND SENNOSIDES 8.6 MG 2 TABLET: 8.6; 5 TABLET, FILM COATED ORAL at 05:35

## 2023-04-02 ASSESSMENT — COGNITIVE AND FUNCTIONAL STATUS - GENERAL
SUGGESTED CMS G CODE MODIFIER DAILY ACTIVITY: CJ
TOILETING: A LITTLE
DRESSING REGULAR UPPER BODY CLOTHING: A LITTLE
DRESSING REGULAR LOWER BODY CLOTHING: A LITTLE
SUGGESTED CMS G CODE MODIFIER MOBILITY: CK
MOVING FROM LYING ON BACK TO SITTING ON SIDE OF FLAT BED: A LITTLE
STANDING UP FROM CHAIR USING ARMS: A LITTLE
HELP NEEDED FOR BATHING: A LITTLE
MOBILITY SCORE: 19
DAILY ACTIVITIY SCORE: 20
WALKING IN HOSPITAL ROOM: A LITTLE
CLIMB 3 TO 5 STEPS WITH RAILING: A LITTLE
MOVING TO AND FROM BED TO CHAIR: A LITTLE

## 2023-04-02 ASSESSMENT — PAIN DESCRIPTION - PAIN TYPE
TYPE: ACUTE PAIN;SURGICAL PAIN
TYPE: ACUTE PAIN

## 2023-04-02 ASSESSMENT — FIBROSIS 4 INDEX: FIB4 SCORE: 0.95

## 2023-04-02 NOTE — CARE PLAN
Problem: Pain - Standard  Goal: Alleviation of pain or a reduction in pain to the patient’s comfort goal  Description: Target End Date:  Prior to discharge or change in level of care    Document on Vitals flowsheet    1.  Document pain using the appropriate pain scale per order or unit policy  2.  Educate and implement non-pharmacologic comfort measures (i.e. relaxation, distraction, massage, cold/heat therapy, etc.)  3.  Pain management medications as ordered  4.  Reassess pain after pain med administration per policy  5.  If opiods administered assess patient's response to pain medication is appropriate per POSS sedation scale  6.  Follow pain management plan developed in collaboration with patient and interdisciplinary team (including palliative care or pain specialists if applicable)  Outcome: Progressing

## 2023-04-02 NOTE — CARE PLAN
The patient is Watcher - Medium risk of patient condition declining or worsening    Shift Goals  Clinical Goals: POD # 5 OHS care plan, titrate O2 as tolerated, IS, ambulate, shower  Patient Goals: Rest  Family Goals: na    Progress made toward(s) clinical / shift goals:    Titrating O2 as tolerated patient on 4.5 L NC sating in the 90's-92%.     Problem: Knowledge Deficit - Standard  Goal: Patient and family/care givers will demonstrate understanding of plan of care, disease process/condition, diagnostic tests and medications  Outcome: Progressing  Note: Discuss and review POC with patient/family. Re-educate as needed.      Problem: Pain - Standard  Goal: Alleviation of pain or a reduction in pain to the patient’s comfort goal  Outcome: Progressing  Note: Assess and monitor for pain. Provide pharmacological and non-pharmacological interventions as appropriate. Re-evaluate and continue to monitor.  Refer to MAR for pain control.      Problem: Post Op Day 5 CABG/Heart Valve Replacement  Goal: Optimal care of the Post Op CABG/Heart Valve replacement Post Op Day 5  Intervention: Daily weights in the morning  Note: Weights completed this morning.   Intervention: Shower daily and clean incisions twice daily with soap and water  Note: Incision care done once, and patient to shower this afternoon and completed incision care.   Intervention: IS q 1 hour while awake and record best IS volume  Note: Patient pulling [id number] on IS, educated on importance of using 10 breaths Q hour while awake.        Patient is not progressing towards the following goals:      Problem: Bowel Elimination - Post Surgical  Goal: Patient will resume regular bowel sounds and function with no discomfort or distention  Outcome: Not Progressing  Note: Patient has not had BM, escalating BM protocol per order.

## 2023-04-02 NOTE — PROGRESS NOTES
4 Eyes Skin Assessment Completed by Rosalina GARCIA RN and SASKIA Rothman.    Head WDL  Ears Redness and Blanching  Nose WDL  Mouth WDL  Neck WDL  Breast/Chest Redness, Scab, and Incision-from mitral valve repair surgery-midline incision (open to air) with chest tube port sites (dressing clean dry and intact)  Shoulder Blades Redness-from rash/dermitis issue per pt  Spine Redness-from rash/dermitis issue per pt  (R) Arm/Elbow/Hand Bruising  (L) Arm/Elbow/Hand Bruising  Abdomen WDL  Groin WDL  Scrotum/Coccyx/Buttocks WDL  (R) Leg Redness-from rash/dermitis issue per pt  (L) Leg Redness-from rash/dermitis issue per pt  (R) Heel/Foot/Toe WDL  (L) Heel/Foot/Toe WDL          Devices In Places Tele Box, Blood Pressure Cuff, Pulse Ox, and Nasal Cannula      Interventions In Place NC W/Ear Foams and Pillows    Possible Skin Injury No    Pictures Uploaded Into Epic N/A  Wound Consult Placed N/A  RN Wound Prevention Protocol Ordered No

## 2023-04-02 NOTE — FACE TO FACE
"Face to Face Note  -  Durable Medical Equipment    Bill RONNY CARMEN Estevez - NPI: [id number]  I certify that this patient is under my care and that they had a durable medical equipment(DME)face to face encounter by myself that meets the physician DME face-to-face encounter requirements with this patient on:    Date of encounter:   Patient:                    MRN:                       YOB: 2022  [facility]  [id number]  1955     The encounter with the patient was in whole, or in part, for the following medical condition, which is the primary reason for durable medical equipment:  COPD, Cardiac Disease, and Post-Op Surgery    I certify that, based on my findings, the following durable medical equipment is medically necessary:    Oxygen   HOME O2 Saturation Measurements:(Values must be present for Home Oxygen orders)        With liters of O2: 4, O2 sat at rest with O2: 92  With Liters of O2: 8, O2 sat with amb with O2 : 79  Is the patient mobile?: Yes  If patient feels more short of breath, they can go up to 6 liters per minute and contact healthcare provider.    Supporting Symptoms: The patient requires supplemental oxygen, as the following interventions have been tried with limited or no improvement: \"Bronchodilators and/or steroid inhalers, \"Ambulation with oximetry, and \"Incentive spirometry.    My Clinical findings support the need for the above equipment due to:  Other - acute respiratory failure  "

## 2023-04-02 NOTE — PROGRESS NOTES
Assumed care of pt. Telephome report received from RNJaja. PT trasnported to unit via ALCS RN, pt on telebox and 4 lpm oxygen. Transferred to wall oxygen and unit specific telebox, monitor room notified. Pt was updated on plan of care. AOx4. Pt pain level 4/10, just received pain medication from other unit. Call light, phone and personal belongings in reach. Bed alarm on and working properly, bed in lowest position, and locked.

## 2023-04-02 NOTE — PROGRESS NOTES
Assumed care of pt, received bedside report from Rosalina KRUGER. Pt A/Ox4, on 5 L NC sating 90%, SR on monitor HR 94, pulling 1250 IS. Fall and safety precautions in place. POC discussed with pt, pt verbalizes understanding. No further needs at this time. Encouraging patient with IS use. Hourly rounding in place.

## 2023-04-02 NOTE — PROGRESS NOTES
Assumed care of patient from roman RN, patient resting in bed comfortably, no drips running, complains of 6/10 pain will medicate per MAR, patient has bed on tele floor, will call report.

## 2023-04-02 NOTE — CARE PLAN
Problem: Humidified High Flow Nasal Cannula  Goal: Maintain adequate oxygenation dependent on patient condition  Description: Target End Date:  resolve prior to discharge or when underlying condition is resolved/stabilized    1.  Implement humidified high flow oxygen therapy  2.  Titrate high flow oxygen to maintain appropriate SpO2  Outcome: Met     Problem: Bronchoconstriction  Goal: Improve in air movement and diminished wheezing  Description: Target End Date:  2 to 3 days    1.  Implement inhaled treatments  2.  Evaluate and manage medication effects  Outcome: Progressing

## 2023-04-02 NOTE — CARE PLAN
"The patient is Watcher - Medium risk of patient condition declining or worsening    Shift Goals  Clinical Goals: monitor VS, promote self care, encourage mobility, follow post op day 4 protocol  Patient Goals: rest, home soon  Family Goals: no family present    Progress made toward(s) clinical / shift goals:    Problem: Post Op Day 5 CABG/Heart Valve Replacement  Goal: Optimal care of the Post Op CABG/Heart Valve replacement Post Op Day 5  Intervention: Daily weights in the morning  Note: AM weight completed  Intervention: Shower daily and clean incisions twice daily with soap and water  Note: Cleaned chest in BR at sink  Intervention: Ambulate 4 times daily, increasing the distance each time  Note: AM ambulation completed  Intervention: IS q 1 hour while awake and record best IS volume  Note: IS completed 1700 highest   Intervention: Complete \"Home O2 Assessment' in vitals flowsheets if unable to wean off O2  Flowsheets (Taken 4/2/2023 4319)  O2 sat at rest with O2: 92  With liters of O2: 4  O2 sat with amb with O2: 79  With Liters of O2: 8  Is the patient mobile?: Yes       Patient is not progressing towards the following goals:      "

## 2023-04-02 NOTE — PROGRESS NOTES
Cardiovascular Surgery Progress Note    Name: Eduardo Aponte  MRN: [id number]  : 1955  Admit Date: 3/28/2023  5:17 AM  5 Days Post-Op     Procedure:  Procedure(s) and Anesthesia Type:     * MITRAL VALVE REPLACEMENT - General     * ECHOCARDIOGRAM, TRANSESOPHAGEAL, INTRAOPERATIVE - General     * EXCISION, LEFT ATRIAL APPENDAGE - General     * REPAIR, ATRIAL SEPTAL DEFECT - General    Vitals:  Vitals:    23 0320 23 0540 23 0551 23 0732   BP: 95/62   111/65   Pulse: 90   94   Resp: 20      Temp: 36.7 °C (98.1 °F)   36.8 °C (98.2 °F)   TempSrc: Temporal   Temporal   SpO2: 90% (!) 82%  90%   Weight:   82.3 kg (181 lb 7 oz)    Height:          Temp (24hrs), Av.8 °C (98.2 °F), Min:36.7 °C (98.1 °F), Max:36.9 °C (98.4 °F)      Respiratory:  Respiration: 20, Pulse Oximetry: 90 %     Fluids:    Intake/Output Summary (Last 24 hours) at 2023 0914  Last data filed at 2023 2200  Gross per 24 hour   Intake 1040 ml   Output 900 ml   Net 140 ml       Admit weight: Weight: 82.6 kg (182 lb 1.6 oz)  Current weight: Weight: 82.3 kg (181 lb 7 oz) (23 0551)    Labs:  Recent Labs     23  0120 23  0000 23  0530   WBC 12.9* 9.6 8.7   RBC 4.66* 4.68* 4.76   HEMOGLOBIN 13.4* 13.6* 13.8*   HEMATOCRIT 40.7* 41.4* 42.2   MCV 87.3 88.5 88.7   MCH 28.8 29.1 29.0   MCHC 32.9* 32.9* 32.7*   RDW 45.5 45.8 44.8   PLATELETCT 115* 132* 159*   MPV 12.4 11.8 11.6       Recent Labs     23  0120 23  0000 23  0530   SODIUM 136 135 136   POTASSIUM 3.6 3.6 3.7   CHLORIDE 98 98 99   CO2 27 26 25   GLUCOSE 113* 132* 108*   BUN 15 14 11   CREATININE 0.77 0.67 0.65   CALCIUM 8.2* 8.0* 8.4*       Recent Labs     23  0500 23  0456 23  0530   INR 1.53* 1.54* 1.67*             Medications:  Scheduled Medications   Medication Dose Frequency    ipratropium-albuterol  3 mL 4X/DAY    budesonide-formoterol  2 Puff BID    warfarin  2.5 mg DAILY AT 1800    rosuvastatin  5 mg  DAILY    furosemide  20 mg BID DIURETIC    potassium chloride SA  20 mEq DAILY    Nozin nasal  swab  1 Applicator BID    enoxaparin (LOVENOX) injection  40 mg DAILY AT 1800    aspirin EC  81 mg DAILY    Pharmacy Consult Request  1 Each PHARMACY TO DOSE    acetaminophen  1,000 mg Q6HRS    senna-docusate  2 Tablet BID    And    polyethylene glycol/lytes  1 Packet DAILY    And    magnesium hydroxide  30 mL DAILY    omeprazole  20 mg DAILY    metoprolol tartrate  25 mg BID    MD Alert...Warfarin per Pharmacy   PHARMACY TO DOSE        Exam:   Physical Exam  Vitals and nursing note reviewed.   Constitutional:       General: He is not in acute distress.     Appearance: Normal appearance. He is obese.   HENT:      Head: Normocephalic and atraumatic.      Right Ear: External ear normal.      Left Ear: External ear normal.      Mouth/Throat:      Mouth: Mucous membranes are moist.      Pharynx: Oropharynx is clear.   Eyes:      Pupils: Pupils are equal, round, and reactive to light.   Cardiovascular:      Rate and Rhythm: Normal rate and regular rhythm.      Pulses: Normal pulses.      Heart sounds: Normal heart sounds.   Pulmonary:      Effort: Pulmonary effort is normal.      Breath sounds: Decreased breath sounds present.   Abdominal:      General: Bowel sounds are decreased. There is no distension.      Palpations: Abdomen is soft.      Tenderness: There is no abdominal tenderness.   Musculoskeletal:      Cervical back: Normal range of motion and neck supple. No tenderness.      Right lower leg: No edema.      Left lower leg: No edema.      Comments: Bilateral upper extremity edema     Skin:     General: Skin is warm and dry.      Capillary Refill: Capillary refill takes less than 2 seconds.   Neurological:      General: No focal deficit present.      Mental Status: He is oriented to person, place, and time. Mental status is at baseline.   Psychiatric:         Mood and Affect: Mood normal.         Behavior:  Behavior normal.         Thought Content: Thought content normal.       Cardiac Medications:    ASA - Yes    Plavix - No; contraindicated because of On Coumadin / NOAC    Post-operative Beta Blockers - Yes    Ace/ARB- No; contraindicated because of Normal EF    Statin - Yes    Aldactone- No; contraindicated because of Normal EF    Ejection Fraction:  60%    Telemetry:   3/29 SR  3/30 SR  3/31 SR  4/1 SR  4/2 SR/ST    Assessment/Plan:  POD 1  HDS- off pressors, SR, neuro intact, wounds intact, abdomen soft, fluid balance positive, wt up,  on 12 L oxymask. Received one dose of lasix overnight. 208 ml out of chest tubes overnight. Plan:  Diurese, encourage IS and mobility, HFNC. Keep chest tubes, pacing wires, and corona.     POD 2  HDS, SR, neuro intact, wounds intact, abdomen soft, fluid balance negative, wt up, HFNC 50L 60%. 80 mL out of chest tubes overnight. Plan:  Remove mediastinal chest tubes, keep pacing wires. Wean HFNC as tolerated. Continue diuresis.  IS/ambulate.     POD 3  HDS, SR, neuro intact, wounds intact, abdomen soft, fluid balance negative, wt down. Remains on HFNC 50L 55%, CXR with atelectasis, trace pleural effusions. Pacing wires removed, patient tolerated well. Plan: Wean HFNC as tolerated. IS/ambulate.     POD 4 HDS. SR.  Neuro intact.  Wounds CDI.  Abdomen soft no BM.  Cr 0.67 Hgb 13.6.  INR subtherapeutic.  Off high flow but on 6L this AM.  Weight down fluid negative.  Plan: Escalate bowel therapy.  When 5L or below ok to transfer to [facility].  Encourage IS/ambulation.  Continue diuresis.      POD 5 HDS.  SR/ST.  On 5LNC.  Wounds CDI.  Abdomen soft nontender.  Weight down below admit.  Fluid negative.  Cr 0.65 Hgb 13.8.  INR subtherapeutic.  Plan: Wean oxygen as tolerated.  Increase metoprolol for rate control.  Home oxygen test.  CXR today.      Disposition:  Home per PT/OT

## 2023-04-02 NOTE — PROGRESS NOTES
Inpatient Anticoagulation Service Note for 4/2/2023  Reason for Anticoagulation: Atrial Fibrillation, Bioprosthetic Valve Replacement, Other (GENET ligation; ASD repair)   [id number] VASc Score: 2  HAS-BLED Score: 2  Hemoglobin Value: (!) 13.8  Hematocrit Value: 42.2  Lab Platelet Value: (!) 159  Target INR: 2.0 to 3.0    INR from last 7 days       Date/Time INR Value    04/02/23 0530 1.67    04/01/23 0456 1.54    03/31/23 0500 1.53    03/30/23 0222 1.23    03/29/23 0445 1.18    03/28/23 1145 1.44          Dose from last 7 days       Date/Time Dose (mg)    04/02/23 1507 2.5    04/01/23 0755 2.5    03/31/23 1602 2.5    03/30/23 1338 2.5    03/29/23 1302 5          Average Dose (mg): TBD (new start warfarin therapy)  Significant Interactions: Antiplatelet Medications  Bridge Therapy: No (enoxaparin VTE PPx only)  Reversal Agent Administered: Not Applicable  Comments: INR below goal. Noted bleed concern. H/H low/improved. PLT low/improved. Warfarin interactions noted. Enoxaparin for VTE prophylaxis noted. Will give warfarin 2.5 mg today. INR with AM labs. May need ongoing dose adjustments.    Plan:  warfarin 2.5 mg 4/2/23  Education Material Provided?: No (needs education prior to discharge)    Pharmacist suggested discharge dosing: warfarin 2.5 mg daily     Christopher Fernandez, PharmD

## 2023-04-02 NOTE — CARE PLAN
The patient is Watcher - Medium risk of patient condition declining or worsening    Shift Goals  Clinical Goals: transfer to tele, wean oxygen  Patient Goals: go home  Family Goals: no family present    Progress made toward(s) clinical / shift goals:    Problem: Pain - Standard  Goal: Alleviation of pain or a reduction in pain to the patient’s comfort goal  Outcome: Progressing  Flowsheets  Taken 4/1/2023 1928 by Alexandra Benítez RSINGH  Pain Rating Scale (NPRS): 6  Taken 4/1/2023 0400 by Violet Vale RMarielNMariel  Non Verbal Scale:   Calm   Unlabored Breathing   Sleeping  Taken 3/28/2023 1600 by Eduardo Vazquez RMarielNMariel  Critical-Care Pain Observation Score: 0  Note: Patient has moderate sternum pain from surgery, medicated per mar      Problem: Post Op Day 4 CABG/Heart Valve Replacement  Goal: Optimal care of the Post Op CABG/Heart Valve replacement Post Op Day 4  Outcome: Progressing  Intervention: Shower daily and clean incisions twice daily with soap and water  Note: Done on days   Intervention: Up in chair for all meals  Note: Up in chair for day shift, patient transferred to tele this evening   Intervention: Ambulate 4 times daily, increasing the distance each time  Note: Patient ambulated 3 times on days and then ambulated once for me 120 feet prior to tele transfer   Intervention: IS q 1 hour while awake and record best IS volume  Note: 1500 best   Intervention: Consider removal of corona, chest tube and pacer wires if not already done  Note: Corona, pacer wires and chest tube all removed  Patient has voided since corona        Patient is not progressing towards the following goals:

## 2023-04-03 ENCOUNTER — APPOINTMENT (OUTPATIENT)
Dept: RADIOLOGY | Facility: MEDICAL CENTER | Age: 68
DRG: 220 | End: 2023-04-03
Attending: THORACIC SURGERY (CARDIOTHORACIC VASCULAR SURGERY)
Payer: MEDICARE

## 2023-04-03 LAB
ANION GAP SERPL CALC-SCNC: 12 MMOL/L (ref 7–16)
BUN SERPL-MCNC: 12 MG/DL (ref 8–22)
CALCIUM SERPL-MCNC: 8.9 MG/DL (ref 8.5–10.5)
CHLORIDE SERPL-SCNC: 97 MMOL/L (ref 96–112)
CO2 SERPL-SCNC: 25 MMOL/L (ref 20–33)
CREAT SERPL-MCNC: 0.71 MG/DL (ref 0.5–1.4)
ERYTHROCYTE [DISTWIDTH] IN BLOOD BY AUTOMATED COUNT: 44.4 FL (ref 35.9–50)
GFR SERPLBLD CREATININE-BSD FMLA CKD-EPI: 101 ML/MIN/1.73 M 2
GLUCOSE SERPL-MCNC: 121 MG/DL (ref 65–99)
HCT VFR BLD AUTO: 44.5 % (ref 42–52)
HGB BLD-MCNC: 15 G/DL (ref 14–18)
INR PPP: 1.88 (ref 0.87–1.13)
MCH RBC QN AUTO: 29.1 PG (ref 27–33)
MCHC RBC AUTO-ENTMCNC: 33.7 G/DL (ref 33.7–35.3)
MCV RBC AUTO: 86.4 FL (ref 81.4–97.8)
PLATELET # BLD AUTO: 195 K/UL (ref 164–446)
PMV BLD AUTO: 11.7 FL (ref 9–12.9)
POTASSIUM SERPL-SCNC: 3.8 MMOL/L (ref 3.6–5.5)
PROTHROMBIN TIME: 21.1 SEC (ref 12–14.6)
RBC # BLD AUTO: 5.15 M/UL (ref 4.7–6.1)
SODIUM SERPL-SCNC: 134 MMOL/L (ref 135–145)
WBC # BLD AUTO: 9.1 K/UL (ref 4.8–10.8)

## 2023-04-03 PROCEDURE — 99024 POSTOP FOLLOW-UP VISIT: CPT | Performed by: NURSE PRACTITIONER

## 2023-04-03 PROCEDURE — 700111 HCHG RX REV CODE 636 W/ 250 OVERRIDE (IP): Performed by: NURSE PRACTITIONER

## 2023-04-03 PROCEDURE — 85027 COMPLETE CBC AUTOMATED: CPT

## 2023-04-03 PROCEDURE — 700102 HCHG RX REV CODE 250 W/ 637 OVERRIDE(OP): Performed by: NURSE PRACTITIONER

## 2023-04-03 PROCEDURE — A9270 NON-COVERED ITEM OR SERVICE: HCPCS | Performed by: NURSE PRACTITIONER

## 2023-04-03 PROCEDURE — 71046 X-RAY EXAM CHEST 2 VIEWS: CPT

## 2023-04-03 PROCEDURE — 97116 GAIT TRAINING THERAPY: CPT

## 2023-04-03 PROCEDURE — 700101 HCHG RX REV CODE 250: Performed by: THORACIC SURGERY (CARDIOTHORACIC VASCULAR SURGERY)

## 2023-04-03 PROCEDURE — 80048 BASIC METABOLIC PNL TOTAL CA: CPT

## 2023-04-03 PROCEDURE — 770020 HCHG ROOM/CARE - TELE (206)

## 2023-04-03 PROCEDURE — 85610 PROTHROMBIN TIME: CPT

## 2023-04-03 RX ADMIN — BUDESONIDE AND FORMOTEROL FUMARATE DIHYDRATE 2 PUFF: 80; 4.5 AEROSOL RESPIRATORY (INHALATION) at 04:17

## 2023-04-03 RX ADMIN — BUDESONIDE AND FORMOTEROL FUMARATE DIHYDRATE 2 PUFF: 80; 4.5 AEROSOL RESPIRATORY (INHALATION) at 17:18

## 2023-04-03 RX ADMIN — OMEPRAZOLE 20 MG: 20 CAPSULE, DELAYED RELEASE ORAL at 04:16

## 2023-04-03 RX ADMIN — ASPIRIN 81 MG: 81 TABLET, COATED ORAL at 04:16

## 2023-04-03 RX ADMIN — METOPROLOL TARTRATE 50 MG: 50 TABLET, FILM COATED ORAL at 04:15

## 2023-04-03 RX ADMIN — FUROSEMIDE 20 MG: 10 INJECTION, SOLUTION INTRAMUSCULAR; INTRAVENOUS at 04:17

## 2023-04-03 RX ADMIN — ROSUVASTATIN CALCIUM 5 MG: 10 TABLET, FILM COATED ORAL at 04:16

## 2023-04-03 RX ADMIN — Medication 1 APPLICATOR: at 17:26

## 2023-04-03 RX ADMIN — FUROSEMIDE 20 MG: 10 INJECTION, SOLUTION INTRAMUSCULAR; INTRAVENOUS at 17:18

## 2023-04-03 RX ADMIN — WARFARIN SODIUM 2.5 MG: 2.5 TABLET ORAL at 17:19

## 2023-04-03 RX ADMIN — ENOXAPARIN SODIUM 40 MG: 40 INJECTION SUBCUTANEOUS at 17:18

## 2023-04-03 RX ADMIN — IPRATROPIUM BROMIDE AND ALBUTEROL SULFATE 3 ML: 2.5; .5 SOLUTION RESPIRATORY (INHALATION) at 08:35

## 2023-04-03 RX ADMIN — POTASSIUM CHLORIDE 20 MEQ: 1500 TABLET, EXTENDED RELEASE ORAL at 04:15

## 2023-04-03 RX ADMIN — METOPROLOL TARTRATE 50 MG: 50 TABLET, FILM COATED ORAL at 17:19

## 2023-04-03 RX ADMIN — Medication 1 APPLICATOR: at 04:15

## 2023-04-03 RX ADMIN — OXYCODONE HYDROCHLORIDE 5 MG: 5 TABLET ORAL at 11:59

## 2023-04-03 ASSESSMENT — PAIN DESCRIPTION - PAIN TYPE
TYPE: ACUTE PAIN
TYPE: ACUTE PAIN

## 2023-04-03 ASSESSMENT — FIBROSIS 4 INDEX
FIB4 SCORE: 0.78

## 2023-04-03 ASSESSMENT — COGNITIVE AND FUNCTIONAL STATUS - GENERAL
MOVING TO AND FROM BED TO CHAIR: A LITTLE
TURNING FROM BACK TO SIDE WHILE IN FLAT BAD: A LITTLE
MOVING FROM LYING ON BACK TO SITTING ON SIDE OF FLAT BED: A LITTLE
SUGGESTED CMS G CODE MODIFIER MOBILITY: CJ
MOBILITY SCORE: 21

## 2023-04-03 ASSESSMENT — GAIT ASSESSMENTS
GAIT LEVEL OF ASSIST: SUPERVISED
DISTANCE (FEET): 150
ASSISTIVE DEVICE: FRONT WHEEL WALKER

## 2023-04-03 NOTE — PROGRESS NOTES
Cardiovascular Surgery Progress Note    Name: Eduardo Aponte  MRN: [id number]  : 1955  Admit Date: 3/28/2023  5:17 AM  6 Days Post-Op     Procedure:  Procedure(s) and Anesthesia Type:     * MITRAL VALVE REPLACEMENT - General     * ECHOCARDIOGRAM, TRANSESOPHAGEAL, INTRAOPERATIVE - General     * EXCISION, LEFT ATRIAL APPENDAGE - General     * REPAIR, ATRIAL SEPTAL DEFECT - General    Vitals:  Vitals:    23 0500 23 0547 23 0600 23 0728   BP:   114/78 101/67   Pulse: 100  95 93   Resp:    18   Temp:    36.5 °C (97.7 °F)   TempSrc:    Temporal   SpO2: 93%  93% 92%   Weight:  80.5 kg (177 lb 7.5 oz)  80.8 kg (178 lb 2.1 oz)   Height:          Temp (24hrs), Av.6 °C (97.9 °F), Min:36.4 °C (97.5 °F), Max:36.9 °C (98.4 °F)      Respiratory:  Respiration: 18, Pulse Oximetry: 92 %     Fluids:    Intake/Output Summary (Last 24 hours) at 4/3/2023 1048  Last data filed at 4/3/2023 0750  Gross per 24 hour   Intake --   Output 1440 ml   Net -1440 ml       Admit weight: Weight: 82.6 kg (182 lb 1.6 oz)  Current weight: Weight: 80.8 kg (178 lb 2.1 oz) (23)    Labs:  Recent Labs     23  0000 23  0530 23  0407   WBC 9.6 8.7 9.1   RBC 4.68* 4.76 5.15   HEMOGLOBIN 13.6* 13.8* 15.0   HEMATOCRIT 41.4* 42.2 44.5   MCV 88.5 88.7 86.4   MCH 29.1 29.0 29.1   MCHC 32.9* 32.7* 33.7   RDW 45.8 44.8 44.4   PLATELETCT 132* 159* 195   MPV 11.8 11.6 11.7       Recent Labs     23  0000 23  0530 23  040   SODIUM 135 136 134*   POTASSIUM 3.6 3.7 3.8   CHLORIDE 98 99 97   CO2 26 25 25   GLUCOSE 132* 108* 121*   BUN 14 11 12   CREATININE 0.67 0.65 0.71   CALCIUM 8.0* 8.4* 8.9       Recent Labs     23  0456 23  0530 23  0407   INR 1.54* 1.67* 1.88*             Medications:  Scheduled Medications   Medication Dose Frequency    metoprolol tartrate  50 mg BID    budesonide-formoterol  2 Puff BID    warfarin  2.5 mg DAILY AT 1800    rosuvastatin  5 mg DAILY     furosemide  20 mg BID DIURETIC    potassium chloride SA  20 mEq DAILY    Nozin nasal  swab  1 Applicator BID    enoxaparin (LOVENOX) injection  40 mg DAILY AT 1800    aspirin EC  81 mg DAILY    Pharmacy Consult Request  1 Each PHARMACY TO DOSE    senna-docusate  2 Tablet BID    And    polyethylene glycol/lytes  1 Packet DAILY    And    magnesium hydroxide  30 mL DAILY    omeprazole  20 mg DAILY    MD Alert...Warfarin per Pharmacy   PHARMACY TO DOSE        Exam:   Physical Exam  Vitals and nursing note reviewed.   Constitutional:       General: He is not in acute distress.     Appearance: Normal appearance. He is obese.   HENT:      Head: Normocephalic and atraumatic.      Right Ear: External ear normal.      Left Ear: External ear normal.      Mouth/Throat:      Mouth: Mucous membranes are moist.      Pharynx: Oropharynx is clear.   Eyes:      Pupils: Pupils are equal, round, and reactive to light.   Cardiovascular:      Rate and Rhythm: Normal rate and regular rhythm.      Pulses: Normal pulses.      Heart sounds: Normal heart sounds.   Pulmonary:      Effort: Pulmonary effort is normal.      Breath sounds: Decreased breath sounds present.   Abdominal:      General: Bowel sounds are decreased. There is no distension.      Palpations: Abdomen is soft.      Tenderness: There is no abdominal tenderness.   Musculoskeletal:      Cervical back: Normal range of motion and neck supple. No tenderness.      Right lower leg: No edema.      Left lower leg: No edema.   Skin:     General: Skin is warm and dry.      Capillary Refill: Capillary refill takes less than 2 seconds.   Neurological:      General: No focal deficit present.      Mental Status: He is oriented to person, place, and time. Mental status is at baseline.   Psychiatric:         Mood and Affect: Mood normal.         Behavior: Behavior normal.         Thought Content: Thought content normal.       Cardiac Medications:    ASA - Yes    Plavix - No;  contraindicated because of On Coumadin / NOAC    Post-operative Beta Blockers - Yes    Ace/ARB- No; contraindicated because of Normal EF    Statin - Yes    Aldactone- No; contraindicated because of Normal EF    Ejection Fraction:  60%    Telemetry:   3/29 SR  3/30 SR  3/31 SR  4/1 SR  4/2 SR/ST  4/3 SR/ST    Assessment/Plan:  POD 1  HDS- off pressors, SR, neuro intact, wounds intact, abdomen soft, fluid balance positive, wt up,  on 12 L oxymask. Received one dose of lasix overnight. 208 ml out of chest tubes overnight. Plan:  Diurese, encourage IS and mobility, HFNC. Keep chest tubes, pacing wires, and corona.     POD 2  HDS, SR, neuro intact, wounds intact, abdomen soft, fluid balance negative, wt up, HFNC 50L 60%. 80 mL out of chest tubes overnight. Plan:  Remove mediastinal chest tubes, keep pacing wires. Wean HFNC as tolerated. Continue diuresis.  IS/ambulate.     POD 3  HDS, SR, neuro intact, wounds intact, abdomen soft, fluid balance negative, wt down. Remains on HFNC 50L 55%, CXR with atelectasis, trace pleural effusions. Pacing wires removed, patient tolerated well. Plan: Wean HFNC as tolerated. IS/ambulate.     POD 4 HDS. SR.  Neuro intact.  Wounds CDI.  Abdomen soft no BM.  Cr 0.67 Hgb 13.6.  INR subtherapeutic.  Off high flow but on 6L this AM.  Weight down fluid negative.  Plan: Escalate bowel therapy.  When 5L or below ok to transfer to [facility].  Encourage IS/ambulation.  Continue diuresis.      POD 5 HDS.  SR/ST.  On 5LNC.  Wounds CDI.  Abdomen soft nontender.  Weight down below admit.  Fluid negative.  Cr 0.65 Hgb 13.8.  INR subtherapeutic.  Plan: Wean oxygen as tolerated.  Increase metoprolol for rate control.  Home oxygen test.  CXR today.      POD 6  HDS, SR/ST, neuro intact, wounds intact, abdomen soft +BM, fluid balance negative, wt below admission weight,  4 L NC.  CXR with atelectasis.  Plan:  Continue IS, wean oxygen as tolerated. Plan for discharge tomorrow.    Disposition:  Home per PT/OT

## 2023-04-03 NOTE — PROGRESS NOTES
Inpatient Anticoagulation Service Note for 4/3/2023  Reason for Anticoagulation: Atrial Fibrillation, Bioprosthetic Valve Replacement, Other (GENET ligation; ASD repair)   [id number] VASc Score: 2  HAS-BLED Score: 2  Hemoglobin Value: 15  Hematocrit Value: 44.5  Lab Platelet Value: 195  Target INR: 2.0 to 3.0    INR from last 7 days       Date/Time INR Value    04/03/23 0407 1.88    04/02/23 0530 1.67    04/01/23 0456 1.54    03/31/23 0500 1.53    03/30/23 0222 1.23    03/29/23 0445 1.18    03/28/23 1145 1.44          Dose from last 7 days       Date/Time Dose (mg)    04/03/23 1438 2.5    04/02/23 1507 2.5    04/01/23 0755 2.5    03/31/23 1602 2.5    03/30/23 1338 2.5    03/29/23 1302 5          Average Dose (mg): TBD (new start warfarin therapy)  Significant Interactions: Antiplatelet Medications  Bridge Therapy: No (enoxaparin VTE PPx only)   Reversal Agent Administered: Not Applicable  Comments: INR below goal. Noted bleed concern. H/H improved. PLT improved. Warfarin interactions noted. Enoxaparin for VTE prophylaxis noted. Will give warfarin 2.5 mg today. INR with AM labs. May need ongoing dose adjustments.    Plan:  warfarin 2.5 mg 4/3/23  Education Material Provided?: No (needs education prior to discharge)    Pharmacist suggested discharge dosing: warfarin 2.5 mg daily     Christopher Fernandez, PharmD

## 2023-04-03 NOTE — PROGRESS NOTES
Bedside report received from Rosalina MARTIN RN. Pt A&Ox4. No current complaints of pain. On 4 liters silicone nasal cannula. Sinus tachycardia 103 on the monitor. Home oxygen evaluation completed. POC discussed with pt. Pt verbalizes understanding. Call light and belongings within reach. Bed locked and in lowest position. Alarm and fall precautions in place.

## 2023-04-03 NOTE — DISCHARGE PLANNING
"Observed order for home oxygen.  Reviewed chart and CM entered \"no preference of vendors\" for DME in 3/28/2023 discharge planning note.  Referral sent to [employer] for home oxygen and rolling walker.    "

## 2023-04-04 ENCOUNTER — PHARMACY VISIT (OUTPATIENT)
Dept: PHARMACY | Facility: MEDICAL CENTER | Age: 68
End: 2023-04-04
Payer: COMMERCIAL

## 2023-04-04 VITALS
SYSTOLIC BLOOD PRESSURE: 101 MMHG | HEIGHT: 72 IN | HEART RATE: 110 BPM | OXYGEN SATURATION: 90 % | TEMPERATURE: 98.1 F | RESPIRATION RATE: 16 BRPM | DIASTOLIC BLOOD PRESSURE: 64 MMHG | WEIGHT: 177.03 LBS | BODY MASS INDEX: 23.98 KG/M2

## 2023-04-04 LAB
ANION GAP SERPL CALC-SCNC: 11 MMOL/L (ref 7–16)
BUN SERPL-MCNC: 16 MG/DL (ref 8–22)
CALCIUM SERPL-MCNC: 8.7 MG/DL (ref 8.5–10.5)
CHLORIDE SERPL-SCNC: 99 MMOL/L (ref 96–112)
CO2 SERPL-SCNC: 26 MMOL/L (ref 20–33)
CREAT SERPL-MCNC: 0.74 MG/DL (ref 0.5–1.4)
ERYTHROCYTE [DISTWIDTH] IN BLOOD BY AUTOMATED COUNT: 43.6 FL (ref 35.9–50)
GFR SERPLBLD CREATININE-BSD FMLA CKD-EPI: 100 ML/MIN/1.73 M 2
GLUCOSE SERPL-MCNC: 96 MG/DL (ref 65–99)
HCT VFR BLD AUTO: 44 % (ref 42–52)
HGB BLD-MCNC: 14.8 G/DL (ref 14–18)
INR PPP: 1.79 (ref 0.87–1.13)
MCH RBC QN AUTO: 28.8 PG (ref 27–33)
MCHC RBC AUTO-ENTMCNC: 33.6 G/DL (ref 33.7–35.3)
MCV RBC AUTO: 85.6 FL (ref 81.4–97.8)
PLATELET # BLD AUTO: 236 K/UL (ref 164–446)
PMV BLD AUTO: 11.1 FL (ref 9–12.9)
POTASSIUM SERPL-SCNC: 4 MMOL/L (ref 3.6–5.5)
PROTHROMBIN TIME: 20.4 SEC (ref 12–14.6)
RBC # BLD AUTO: 5.14 M/UL (ref 4.7–6.1)
SODIUM SERPL-SCNC: 136 MMOL/L (ref 135–145)
WBC # BLD AUTO: 11.5 K/UL (ref 4.8–10.8)

## 2023-04-04 PROCEDURE — A9270 NON-COVERED ITEM OR SERVICE: HCPCS | Performed by: NURSE PRACTITIONER

## 2023-04-04 PROCEDURE — 700102 HCHG RX REV CODE 250 W/ 637 OVERRIDE(OP): Performed by: NURSE PRACTITIONER

## 2023-04-04 PROCEDURE — 85610 PROTHROMBIN TIME: CPT

## 2023-04-04 PROCEDURE — 99024 POSTOP FOLLOW-UP VISIT: CPT | Performed by: NURSE PRACTITIONER

## 2023-04-04 PROCEDURE — 80048 BASIC METABOLIC PNL TOTAL CA: CPT

## 2023-04-04 PROCEDURE — 85027 COMPLETE CBC AUTOMATED: CPT

## 2023-04-04 PROCEDURE — 97535 SELF CARE MNGMENT TRAINING: CPT

## 2023-04-04 PROCEDURE — RXMED WILLOW AMBULATORY MEDICATION CHARGE: Performed by: NURSE PRACTITIONER

## 2023-04-04 RX ORDER — POTASSIUM CHLORIDE 20 MEQ/1
20 TABLET, EXTENDED RELEASE ORAL DAILY
Qty: 30 TABLET | Refills: 0 | Status: SHIPPED | OUTPATIENT
Start: 2023-04-05 | End: 2023-04-26

## 2023-04-04 RX ORDER — WARFARIN SODIUM 5 MG/1
2.5 TABLET ORAL DAILY
Qty: 30 TABLET | Refills: 2 | Status: ACTIVE | OUTPATIENT
Start: 2023-04-04 | End: 2023-10-10

## 2023-04-04 RX ORDER — OMEPRAZOLE 20 MG/1
20 CAPSULE, DELAYED RELEASE ORAL DAILY
Qty: 30 CAPSULE | Refills: 2 | Status: SHIPPED | OUTPATIENT
Start: 2023-04-05 | End: 2023-04-24

## 2023-04-04 RX ORDER — ACETAMINOPHEN 500 MG
1000 TABLET ORAL EVERY 6 HOURS PRN
Qty: 30 TABLET | Refills: 0 | COMMUNITY
Start: 2023-04-04

## 2023-04-04 RX ORDER — WARFARIN SODIUM 4 MG/1
4 TABLET ORAL DAILY
Status: DISCONTINUED | OUTPATIENT
Start: 2023-04-04 | End: 2023-04-04 | Stop reason: HOSPADM

## 2023-04-04 RX ORDER — BENZONATATE 100 MG/1
100 CAPSULE ORAL 3 TIMES DAILY PRN
Qty: 60 CAPSULE | Refills: 0 | Status: SHIPPED | OUTPATIENT
Start: 2023-04-04 | End: 2023-04-24

## 2023-04-04 RX ORDER — ASPIRIN 81 MG/1
81 TABLET ORAL DAILY
Qty: 30 TABLET | Refills: 2 | Status: SHIPPED | OUTPATIENT
Start: 2023-04-05 | End: 2023-04-26 | Stop reason: SDUPTHER

## 2023-04-04 RX ORDER — BENZONATATE 100 MG/1
100 CAPSULE ORAL 3 TIMES DAILY PRN
Status: DISCONTINUED | OUTPATIENT
Start: 2023-04-04 | End: 2023-04-04 | Stop reason: HOSPADM

## 2023-04-04 RX ORDER — OXYCODONE HYDROCHLORIDE 5 MG/1
5 TABLET ORAL
Qty: 56 TABLET | Refills: 0 | Status: SHIPPED | OUTPATIENT
Start: 2023-04-04 | End: 2023-04-18

## 2023-04-04 RX ADMIN — OMEPRAZOLE 20 MG: 20 CAPSULE, DELAYED RELEASE ORAL at 04:24

## 2023-04-04 RX ADMIN — BUDESONIDE AND FORMOTEROL FUMARATE DIHYDRATE 2 PUFF: 80; 4.5 AEROSOL RESPIRATORY (INHALATION) at 04:24

## 2023-04-04 RX ADMIN — BENZONATATE 100 MG: 100 CAPSULE ORAL at 10:12

## 2023-04-04 RX ADMIN — POTASSIUM CHLORIDE 20 MEQ: 1500 TABLET, EXTENDED RELEASE ORAL at 04:24

## 2023-04-04 RX ADMIN — ROSUVASTATIN CALCIUM 5 MG: 10 TABLET, FILM COATED ORAL at 04:24

## 2023-04-04 RX ADMIN — ASPIRIN 81 MG: 81 TABLET, COATED ORAL at 04:24

## 2023-04-04 RX ADMIN — Medication 1 APPLICATOR: at 04:24

## 2023-04-04 ASSESSMENT — COGNITIVE AND FUNCTIONAL STATUS - GENERAL
DRESSING REGULAR UPPER BODY CLOTHING: A LITTLE
HELP NEEDED FOR BATHING: A LITTLE
SUGGESTED CMS G CODE MODIFIER DAILY ACTIVITY: CJ
DAILY ACTIVITIY SCORE: 22

## 2023-04-04 ASSESSMENT — FIBROSIS 4 INDEX: FIB4 SCORE: 0.64

## 2023-04-04 NOTE — CARE PLAN
The patient is Watcher - Medium risk of patient condition declining or worsening    Shift Goals  Clinical Goals: Wean O2, imporve ambulation, monitor neurological assessment  Patient Goals: Go home  Family Goals: N/A    Progress made toward(s) clinical / shift goals:      Problem: Post Op Day 6 CABG/Heart Valve Replacement  Goal: Optimal care of the Post Op CABG/Heart Valve replacement Post Op Day 6  Outcome: Progressing  Note: Best IS 1900. PT completed PM walk. PT refused AM walk, education provided.       Problem: Knowledge Deficit - Standard  Goal: Patient and family/care givers will demonstrate understanding of plan of care, disease process/condition, diagnostic tests and medications  Outcome: Progressing  Note: PT educated on ambulation and importance of keeping oxygen on. PT educated on use of IS       Problem: Skin Integrity  Goal: Skin integrity is maintained or improved  Outcome: Progressing  Note: Skin assessed. Skin breakdown preventions in place.       Problem: Fall Risk  Goal: Patient will remain free from falls  Outcome: Progressing  Note: Patient educated to utilize call light. Patient verbalized understanding. All fall precautions in place. Bed alarm is on.       Patient is not progressing towards the following goals:

## 2023-04-04 NOTE — THERAPY
"Occupational Therapy  Daily Treatment     Patient Name: Eduardo Aponte  Age:  67 y.o., Sex:  male  Medical Record #: [id number]  Today's Date: 4/4/2023     Precautions  Precautions: (P) Fall Risk, Cardiac Precautions (See Comments), Sternal Precautions (See Comments)    Assessment    Seen for OT tx and is making functional progress but continues to be dyspneic with light functional activity, requiring extended seated rest break to recover. Instructed pt in safe simulated tub transfer, pt return demo with no LOB. Continues to maintain sternal precautions, reinforced energy conservation. Safe to DC home. Would benefit from continued acute OT to progress his activity tolerance and employment of energy conservation strategies. Would benefit from HH OT to maximize his functional independence at activity progression.     Plan    Treatment Plan Status: (P) Continue Current Treatment Plan  Type of Treatment: (P) Self Care / Activities of Daily Living, Therapeutic Activity  Treatment Frequency: (P) 3 Times per Week  Treatment Duration: (P) Until Therapy Goals Met    DC Equipment Recommendations: (P) Tub / Shower Seat  Discharge Recommendations: (P) Recommend home health for continued occupational therapy services    Subjective    \"I think I am going home today, I have been here 8 days already.. Lyn has set up a shower chair in the tub. My only question is about the oxygen...\"     Objective       04/04/23 1046   Treatment Charges   OT Self Care / ADL (Units) 1   Total Time Spent   OT Time Spent Yes   OT Self Care / ADL (Minutes) 15    Services   Is patient using  services for this encounter? No   Precautions   Precautions Fall Risk;Cardiac Precautions (See Comments);Sternal Precautions (See Comments)   Vitals   Pulse (!) 110   Patient BP Position Supine   Pulse Oximetry 90 %   O2 (LPM) 3   O2 Delivery Device Silicone Nasal Cannula   Vitals Comments desat to 88% at EOB, quickly recovered once returned " supine   Pain 0 - 10 Group   Location Sternum   Therapist Pain Assessment   (c/o of sternal pain with coughing)   Non Verbal Descriptors   Non Verbal Scale  Calm   Cognition    Cognition / Consciousness WDL   Level of Consciousness Alert   Comments eager to get home, good recall of sternal precautions   Balance   Sitting Balance (Static) Good   Sitting Balance (Dynamic) Good   Standing Balance (Static) Fair +   Standing Balance (Dynamic) Fair +   Weight Shift Sitting Good   Weight Shift Standing Good   Comments Ambulated to wall without AD to perform simulated tub transfer with BUE on wall, SBA. No LOB.   Bed Mobility    Supine to Sit Supervised   Sit to Supine Supervised   Scooting Supervised   Activities of Daily Living   Lower Body Dressing Supervision   How much help from another person does the patient currently need...   Putting on and taking off regular lower body clothing? 4   Bathing (including washing, rinsing, and drying)? 3   Toileting, which includes using a toilet, bedpan, or urinal? 4   Putting on and taking off regular upper body clothing? 3   Taking care of personal grooming such as brushing teeth? 4   Eating meals? 4   6 Clicks Daily Activity Score 22   Functional Mobility   Sit to Stand Supervised   Comments maintains sternal precautions   Activity Tolerance   Comments SOB with light activity, extended seated recovery. Reports been up in chair for a couple hours and ambulated in [place] this morning. Requests to return to bed to rest before DC today.   Patient / Family Goals   Patient / Family Goal #1 To go home   Goal #1 Outcome Progressing as expected   Short Term Goals   Short Term Goal # 1 Pt will demo toilet txf w/ SPV   Goal Outcome # 1 Progressing as expected   Short Term Goal # 2 Pt will tolerate 5 min standing ADL session w/ SPV and no c/o SOB   Goal Outcome # 2 Progressing slower than expected   Education Group   Education Provided Role of Occupational Therapist;Home Safety;Energy  Conservation   Role of Occupational Therapist Patient Response Patient;Acceptance;Explanation;Demonstration;Verbal Demonstration;Action Demonstration;Family   Energy Conservation Patient Response Patient;Acceptance;Explanation;Verbal Demonstration   Home Safety Patient Response Patient;Acceptance;Explanation;Verbal Demonstration   ADL Patient Response Patient;Acceptance;Explanation;Verbal Demonstration   Occupational Therapy Treatment Plan    O.T. Treatment Plan Continue Current Treatment Plan   Treatment Interventions Self Care / Activities of Daily Living;Therapeutic Activity   Treatment Frequency 3 Times per Week   Duration Until Therapy Goals Met   Anticipated Discharge Equipment and Recommendations   DC Equipment Recommendations Tub / Shower Seat   Discharge Recommendations Recommend home health for continued occupational therapy services   Interdisciplinary Plan of Care Collaboration   IDT Collaboration with  Nursing   Patient Position at End of Therapy In Bed;Bed Alarm On;Call Light within Reach;Tray Table within Reach;Phone within Reach   Collaboration Comments RN notified   Session Information   Date / Session Number  4/4, 2 (2/3, 4/6)

## 2023-04-04 NOTE — THERAPY
Physical Therapy   Discharge     Patient Name: Eduardo Aponte  Age:  67 y.o., Sex:  male  Medical Record #: [id number]  Today's Date: 4/3/2023    Precautions: Cardiac Precautions;Sternal Precautions    Assessment  Pt completed mobility without assist, ambulated with FWW, no LOB, able to negotiate steps. Pt maintains sternal precautions throughout and verbalizes understanding of appropriate activity progression and self-monitoring activity tolerance with return home. No further acute PT needs. Recommend FWW at DC.     Plan  Discharge Secondary to Goals Met  DC Equipment Recommendations: Front-Wheel Walker  Discharge Recommendations: outpatient cardiac rehab     04/03/23 1618   Cognition    Level of Consciousness Alert   Comments motivated to go home, good recall of sternal prec and activity progression edu   Balance   Sitting Balance (Static) Good   Sitting Balance (Dynamic) Good   Standing Balance (Static) Fair +   Standing Balance (Dynamic) Fair +   Weight Shift Sitting Good   Weight Shift Standing Fair   Comments standing with FWW   Bed Mobility    Supine to Sit Supervised   Sit to Supine Supervised   Scooting Supervised   Gait Analysis   Gait Level Of Assist Supervised   Assistive Device Front Wheel Walker   Distance (Feet) 150   # of Stairs Climbed 2   Level of Assist with Stairs Supervised   Weight Bearing Status no restrictions   Skilled Intervention Verbal Cuing   Comments no LOB, mild fatigue   Functional Mobility   Sit to Stand Supervised   Comments maintains sternal prec   Short Term Goals    Short Term Goal # 1 Pt will verbalize indicators to teminate exercise to maximize safety in 6 visits.   Goal Outcome # 1 Goal met   Short Term Goal # 2 Pt will ambulate 150' supervision w/FWW in 6 visits to improve access to environment   Goal Outcome # 2 Goal met

## 2023-04-04 NOTE — DISCHARGE SUMMARY
DISCHARGE SUMMARY    ADMISSION DATE: 3/28/2023    DISCHARGE DATE: 4/4/23    ADMITTING DIAGNOSES: Severe symptomatic mitral regurgitation (4+, degenerative), mitral valve prolapse, atrial fibrillation status post ablation, atrial septal defect, chronic obstructive pulmonary disease, polycythemia       DISCHARGE DIAGNOSES: Severe symptomatic mitral regurgitation (4+, degenerative), mitral valve prolapse, atrial fibrillation status post ablation, atrial septal defect, chronic obstructive pulmonary disease, polycythemia       PROCEDURES PERFORMED:   3/28/23 Dr. Hebert  MITRAL VALVE REPLACEMENT (29 mm Mitris Rivas bovine pericardial valve), LEFT ATRIAL APPENDAGE EXCISION/LIGATION, ATRIAL SEPTAL DEFECT REPAIR and intraoperative transesophageal echocardiography    HISTORY OF PRESENT ILLNESS:  The patient is a 67 y.o. male with history of atrial fibrillation on eliquis, polycythemia, COPD on inhalers, hyperlipidemia, atrial septal defect and severe mitral regurgitation. Today, he states he is having increasing shortness of breath with exertion for the last year. He has occasional chest pressure with especially when has flatulence. He has dizziness with position changes especially since in atrial fibrillation. He denies lower extremity edema, dizziness, syncope, orthopnea, or PND.    HOSPITAL COURSE:   POD 1  HDS- off pressors, SR, neuro intact, wounds intact, abdomen soft, fluid balance positive, wt up,  on 12 L oxymask. Received one dose of lasix overnight. 208 ml out of chest tubes overnight. Plan:  Diurese, encourage IS and mobility, HFNC. Keep chest tubes, pacing wires, and corona.      POD 2  HDS, SR, neuro intact, wounds intact, abdomen soft, fluid balance negative, wt up, HFNC 50L 60%. 80 mL out of chest tubes overnight. Plan:  Remove mediastinal chest tubes, keep pacing wires. Wean HFNC as tolerated. Continue diuresis.  IS/ambulate.      POD 3  HDS, SR, neuro intact, wounds intact, abdomen soft, fluid balance  negative, wt down. Remains on HFNC 50L 55%, CXR with atelectasis, trace pleural effusions. Pacing wires removed, patient tolerated well. Plan: Wean HFNC as tolerated. IS/ambulate.      POD 4 HDS. SR.  Neuro intact.  Wounds CDI.  Abdomen soft no BM.  Cr 0.67 Hgb 13.6.  INR subtherapeutic.  Off high flow but on 6L this AM.  Weight down fluid negative.  Plan: Escalate bowel therapy.  When 5L or below ok to transfer to [facility].  Encourage IS/ambulation.  Continue diuresis.       POD 5 HDS.  SR/ST.  On 5LNC.  Wounds CDI.  Abdomen soft nontender.  Weight down below admit.  Fluid negative.  Cr 0.65 Hgb 13.8.  INR subtherapeutic.  Plan: Wean oxygen as tolerated.  Increase metoprolol for rate control.  Home oxygen test.  CXR today.       POD 6  HDS, SR/ST, neuro intact, wounds intact, abdomen soft +BM, fluid balance negative, wt below admission weight,  4 L NC.  CXR with atelectasis.  Plan:  Continue IS, wean oxygen as tolerated. Plan for discharge tomorrow.    POD 7  HDS, SR, neuro intact, wounds intact, abdomen soft, fluid balance  negative, wt below admission weight,  4 L NC.  Plan:  Discharge home on supplemental oxygen. Patient to follow up with coumadin clinic      TELEMETRY:  3/29 SR  3/30 SR  3/31 SR  4/1 SR  4/2 SR/ST  4/3 SR/ST  4/4 SR    RECENT LABS:     Lab Results   Component Value Date/Time    SODIUM 136 04/04/2023 04:33 AM    POTASSIUM 4.0 04/04/2023 04:33 AM    CHLORIDE 99 04/04/2023 04:33 AM    CO2 26 04/04/2023 04:33 AM    GLUCOSE 96 04/04/2023 04:33 AM    BUN 16 04/04/2023 04:33 AM    CREATININE 0.74 04/04/2023 04:33 AM      Lab Results   Component Value Date/Time    WBC 11.5 (H) 04/04/2023 04:33 AM    RBC 5.14 04/04/2023 04:33 AM    HEMOGLOBIN 14.8 04/04/2023 04:33 AM    HEMATOCRIT 44.0 04/04/2023 04:33 AM    MCV 85.6 04/04/2023 04:33 AM    MCH 28.8 04/04/2023 04:33 AM    MCHC 33.6 (L) 04/04/2023 04:33 AM    MPV 11.1 04/04/2023 04:33 AM    NEUTSPOLYS 59.40 03/27/2023 10:31 AM    LYMPHOCYTES 29.40 03/27/2023  10:31 AM    MONOCYTES 8.30 03/27/2023 10:31 AM    EOSINOPHILS 1.40 03/27/2023 10:31 AM    BASOPHILS 1.10 03/27/2023 10:31 AM      Lab Results   Component Value Date/Time    PROTHROMBTM 20.4 (H) 04/04/2023 04:33 AM    INR 1.79 (H) 04/04/2023 04:33 AM        Fluids:    Intake/Output Summary (Last 24 hours) at 4/4/2023 1033  Last data filed at 4/3/2023 1820  Gross per 24 hour   Intake 120 ml   Output 800 ml   Net -680 ml     Admit weight: Weight: 82.6 kg (182 lb 1.6 oz)  Current weight: Weight: 80.3 kg (177 lb 0.5 oz) (04/04/23 0740)    ALLERGIES:     Bee and Other environmental    EJECTION FRACTION:  60%    CARDIAC MEDICATIONS:    ASA - Yes    Plavix - No; contraindicated because of On Coumadin / NOAC    Post-operative Beta Blockers - Yes    Ace/ARB- No; contraindicated because of Normal EF    Statin - Yes    Aldactone- No; contraindicated because of Normal EF    DISCHARGE MEDICATIONS:      Medication List        START taking these medications        Instructions   acetaminophen 500 MG Tabs  Commonly known as: TYLENOL   Take 2 Tablets by mouth every 6 hours as needed for Mild Pain or Moderate Pain.  Dose: 1,000 mg     aspirin 81 MG EC tablet  Start taking on: April 5, 2023   Take 1 Tablet by mouth every day.  Dose: 81 mg     benzonatate 100 MG Caps  Commonly known as: TESSALON   Take 1 Capsule by mouth 3 times a day as needed for Cough.  Dose: 100 mg     omeprazole 20 MG delayed-release capsule  Start taking on: April 5, 2023  Commonly known as: PRILOSEC   Take 1 Capsule by mouth every day.  Dose: 20 mg     oxyCODONE immediate-release 5 MG Tabs  Commonly known as: ROXICODONE   Take 1 Tablet by mouth every 3 hours as needed for Severe Pain for up to 14 days.  Dose: 5 mg     potassium chloride SA 20 MEQ Tbcr  Start taking on: April 5, 2023  Commonly known as: Kdur   Take 1 Tablet by mouth every day.  Dose: 20 mEq     warfarin 5 MG Tabs  Commonly known as: COUMADIN   Take 0.5 Tablets by mouth every day at 6 PM.  Dose:  2.5 mg            CONTINUE taking these medications        Instructions   albuterol 108 (90 Base) MCG/ACT Aers inhalation aerosol   Inhale 2 Puffs every 6 hours as needed for Shortness of Breath.  Dose: 2 Puff     budesonide-formoterol 80-4.5 MCG/ACT Aero  Commonly known as: Symbicort   Inhale 2 Puffs 2 times a day. Use spacer. Rinse mouth after each use.  Dose: 2 Puff     clobetasol 0.05 % Crea  Commonly known as: TEMOVATE   Apply  topically 2 times a day.     furosemide 20 MG Tabs  Commonly known as: LASIX   Take 1 Tablet by mouth every day.  Dose: 20 mg     Jardiance 10 MG Tabs tablet  Generic drug: Empagliflozin   Doctor's comments: To reduce risk of major adverse cardiovascular events and reduce the risk of CV death and hospitalization for HF in adults with HF with NYHA class II-IV as Class 2a rec. from ACC guidelines for HFpEF  Take 1 Tablet by mouth every day.  Dose: 10 mg     metoprolol tartrate 50 MG Tabs  Commonly known as: LOPRESSOR   Take 50 mg by mouth 2 times a day.  Dose: 50 mg     rosuvastatin 5 MG Tabs  Commonly known as: CRESTOR   Take 5 mg by mouth every day.  Dose: 5 mg            STOP taking these medications      Eliquis 5mg Tabs  Generic drug: apixaban     famotidine 20 MG Tabs  Commonly known as: PEPCID     traMADol 50 MG Tabs  Commonly known as: Ultram              NARCOTIC PAIN MEDICATIONS:   In prescribing controlled substances to this patient, I certify that I have obtained and reviewed their medical history. I have also made a good glen effort to obtain applicable records from other providers who have treated the patient .    I have conducted a physical exam and documented it. I have reviewed the patient's prescription history as maintained by the [place] Prescription Monitoring Program.     I have assessed the patient’s risk for abuse, dependency, and addiction using the validated Opioid Risk Tool.    Given the above, I believe the benefits of controlled substance therapy outweigh the  risks. The reasons for prescribing controlled substances include non-narcotic, oral analgesic alternatives have been inadequate for pain control. Accordingly, I have discussed the risk and benefits, treatment plan, and alternative therapies with the patient.     Pt understands this prescription is a controlled substance which is potentially habit-forming and its use is regulated by the [facility]. It must be submitted to the pharmacy within 5 days of the date written and can not be called in or faxed to the pharmacy. Refills are subject to terms of a medicine agreement. Any refill requires a new prescription that must be obtained from this office during regular office hours Monday through Thursday 7 am to 4 pm. We ask for 72 hours notice to get an appointment for a narcotic pain medication refill. This medicine can cause nausea, significant constipation, sedation, confusion.     DIET:   Cardiac diet    DISCHARGE INSTRUCTIONS DISCUSSED WITH THE PATIENT:      1. NO driving for 4 weeks after surgery. You may ride as a passenger.  2. NO lifting of any item over 10 lbs (e.g. gallon of milk) for 6 weeks after surgery.  3. DO walk as much as possible! Walk a minimum of once a day. Depending on your fatigue and comfort level, you may walk as much as you wish. There is no maximum.  4. Other physical activities (sex, housework, gardening, etc.) are OK after 4 weeks   5. Continue using incentive spirometer for 2 weeks, especially if going home on oxygen.    Incision Care:  1. SHOWER ONLY - no baths. Clean incision daily with plain Ivory ® soap or any other dye or perfume free soap. Then pat incision dry with clean towel. Avoid creams or lotions on the incision(s).  a. If there is any increase in redness or swelling, or separation of the incision line, or thick drainage* from any of the incisions, call right away  * Clear, thin drainage is not abnormal especially from the leg incision and/or                         chest tube  sites.  2. Continue to wear your ALY Stockings for 4 weeks. You may take off the stockings when in bed or when the legs are elevated.    Patient instructed to call [facility] cardiac surgery at [phone]  if any increased shortness of breath, uncontrolled pain, weight gain greater than 3 pounds in 1 day or 5 pounds in 1 week, SBP >140, HR <60 or redness swelling or drainage of incisions.      FOLLOW-UP:   Future Appointments   Date Time Provider Department Center   4/5/2023  2:15 PM [facility] EXAM 5 [facility] None   4/12/2023 10:20 AM Sherice Glover M.D. [facility] None   4/26/2023  9:30 AM MING Boudreaux.P.R.N. [facility] None   5/1/2023 12:30 PM CT RESOURCE PROVIDER [facility] None   6/20/2023  9:30 AM PHARMACIST-CAM B [facility] None   9/8/2023  7:45 AM MING Puckett.P.RMarielN. [facility] None

## 2023-04-04 NOTE — CARE PLAN
"  Problem: Post Op Day 5 CABG/Heart Valve Replacement  Goal: Optimal care of the Post Op CABG/Heart Valve replacement Post Op Day 5  Outcome: Progressing  Intervention: Up in chair for all meals  Note: Patient sitting up in chair for all meals   Intervention: Ambulate 4 times daily, increasing the distance each time  Note: Patient ambulated the halls x 4     The patient is Watcher - Medium risk of patient condition declining or worsening    Shift Goals: Increase mobility  Clinical Goals: Home oxygen evaluation and monitor neurological assessment  Patient Goals: \"Right side\"  Family Goals: na    Progress made toward(s) clinical / shift goals:  Patient ambulating the halls, showered and washed incision sites.     Patient is not progressing towards the following goals: Patient tilting to the right when ambulating, Cardiothoracic MADDY Sun notified. Patient requiring 6 liters of oxygen to ambulate and frequently stopping to catch breath.       "

## 2023-04-04 NOTE — PROGRESS NOTES
Patient discharged home with wife. All personal belongings collected. IV access removed. Monitor removed, monitor room notified. Discharge instructions discussed. Medications reviewed; medications to bedside delivered. Follow up appointments scheduled. Oxygen delivered to bedside. Walker sent with patient. Patient escorted off unit via wheelchair without incident.

## 2023-04-04 NOTE — DISCHARGE PLANNING
TC belinda johnson [facility] DME who reports he delivered the portable oxygen tank to the patient's room yesterday and has consulted with the patient/family about discharge today and delivering the oxygen concentrator to the home.      No other discharge needs identified at this writing.

## 2023-04-04 NOTE — DIETARY
Nutrition Services Brief Update:    Day 7 of admit.  Eduardo Aponte is a 67 y.o. male with admitting DX of Severe mitral valve regurgitation [I34.0]    Current Diet: Cardiac, consistent CHO.  PO % of meals.    Problem: Nutritional:  Goal: Achieve adequate nutritional intake  Description: Patient will consume >50% of meals  Outcome: met    RD to monitor per department policy.

## 2023-04-04 NOTE — CARE PLAN
"  Problem: Post Op Day 5 CABG/Heart Valve Replacement  Goal: Optimal care of the Post Op CABG/Heart Valve replacement Post Op Day 5  Outcome: Progressing  Intervention: Discharge Education  Note: Discharge education will be completed with patient and wife   Intervention: Add special instructions for cardiac surgery discharge instructions to after visit summary and review with patient  Note: Cardiac surgery discharge education will be provided      The patient is Stable - Low risk of patient condition declining or worsening    Shift Goals: Discharge planning  Clinical Goals: Increase mobility  Patient Goals: \"Rest\"  Family Goals: N/A    Progress made toward(s) clinical / shift goals:  Walker and oxygen delivered     Patient is not progressing towards the following goals: Cough      "

## 2023-04-04 NOTE — PROGRESS NOTES
Bedside report received from Funmilayo MARTIN RN. Pt A&Ox4, fatigued. No current complaints of pain. Sinus rhythm on the monitor. On 4 liters silicone nasal cannula. POC discussed with pt. Pt verbalizes understanding. Call light and belongings within reach. Bed locked and in lowest position. Alarm and fall precautions in place.

## 2023-04-04 NOTE — PROGRESS NOTES
Assumed care of patient at bedside report from day shift RN. Updated on POC. Patient currently A & O x 4; Patient denies pain at this time; Call light within reach. Whiteboard updated. Fall precautions in place. Bed locked and in lowest position. All questions answered. No other needs indicated at this time.

## 2023-04-05 ENCOUNTER — DOCUMENTATION (OUTPATIENT)
Dept: VASCULAR LAB | Facility: MEDICAL CENTER | Age: 68
End: 2023-04-05

## 2023-04-05 ENCOUNTER — ANTICOAGULATION VISIT (OUTPATIENT)
Dept: VASCULAR LAB | Facility: MEDICAL CENTER | Age: 68
End: 2023-04-05
Attending: INTERNAL MEDICINE
Payer: MEDICARE

## 2023-04-05 ENCOUNTER — TELEPHONE (OUTPATIENT)
Dept: MEDICAL GROUP | Facility: PHYSICIAN GROUP | Age: 68
End: 2023-04-05
Payer: MEDICARE

## 2023-04-05 VITALS — HEART RATE: 95 BPM | SYSTOLIC BLOOD PRESSURE: 105 MMHG | DIASTOLIC BLOOD PRESSURE: 55 MMHG | RESPIRATION RATE: 16 BRPM

## 2023-04-05 DIAGNOSIS — I48.91 ATRIAL FIBRILLATION WITH RVR (HCC): ICD-10-CM

## 2023-04-05 DIAGNOSIS — Z95.3 HISTORY OF MITRAL VALVE REPLACEMENT WITH BIOPROSTHETIC VALVE: ICD-10-CM

## 2023-04-05 DIAGNOSIS — D68.59 HYPERCOAGULABLE STATE (HCC): ICD-10-CM

## 2023-04-05 LAB — INR PPP: 1.8 (ref 2–3.5)

## 2023-04-05 PROCEDURE — 85610 PROTHROMBIN TIME: CPT

## 2023-04-05 PROCEDURE — 99213 OFFICE O/P EST LOW 20 MIN: CPT

## 2023-04-05 NOTE — TELEPHONE ENCOUNTER
Called pt after receiving report that he was discharged from the hospital. Pt has Zuleima Nguyen listed as his pcp currently and she is no longer at [facility].   Asked Eduardo if he followed Wendy to her new practice? Eduardo stated he did, he has an appt with her in approx 2 weeks. No further action needed. Thanked Eduardo for his time.

## 2023-04-05 NOTE — PROGRESS NOTES
.  Anticoagulation Summary  As of 2023      INR goal:  2.0-3.0   TTR:  --   INR used for dosin.80 (2023)   Warfarin maintenance plan:  2.5 mg (5 mg x 0.5) every day   Weekly warfarin total:  17.5 mg   Plan last modified:  CARMEN Thomas (2023)   Next INR check:  2023   Target end date:  2023    Indications    Atrial fibrillation with RVR (HCC) [I48.91]  History of mitral valve replacement with bioprosthetic valve [Z95.3]                 Anticoagulation Episode Summary       INR check location:      Preferred lab:      Send INR reminders to:      Comments:  warfarin for 3 months then back to DOAC, ASA for life            Previous dosing and INRs per hospital records:    INR from last 7 days         Date/Time INR Value     23 0407 1.88     23 0530 1.67     23 0456 1.54     23 0500 1.53     23 0222 1.23     23 0445 1.18     23 1145 1.44             Dose from last 7 days         Date/Time Dose (mg)     23 1438 2.5     23 1507 2.5     23 0755 2.5     23 1602 2.5     23 1338 2.5     23 1302 5         Anticoagulation Patient Findings      PCP Swati Nguyen DO  Cardiologist Dr. Hua  Pt hx Afib, bioprosthetic valve replacement, ASD repair  CHADSVASC = 2  HAS-BLED = 2  DOAC = previously on Eliquis for Afib, tolerated well    Pt is new to warfarin and new to [facility]. Discussed:   Indication for warfarin therapy and INR goal range.   Importance of monitoring and compliance.   Monitoring parameters, signs and symptoms of bleeding or clotting.  Warfarin therapy, side effects, potential DDIs, OTC medications  Hormonal therapy   Pregnancy n/a  DDI   Pt on antiplatelet/NSAID therapy Aspirin 81mg for MVR indefinitely .   Importance of diet consistency, ie vitamin K intake, supplements  Lifestyle safety, ie smoking, ETOH, hobby safety, fall safety/prevention  Procedures for missed doses or suspected missed doses,  surgeries/procedures, travel, dental work, any medication changes    Pt denies any unusual s/s of bleeding, bruising, clotting or any changes to diet or medications.    INR is sub therapeutic today.   Will continue to titrate pt's warfarin dose. Pt's been taking 2.5mg daily   Pt to take warfarin as follows  Take 5mg (full tablet) tonight.    Take 2.5mg (1/2 tablet tomorrow)  Recheck INR in 2 days.    Erum Diggs, MING.P.RMarielN.      Added [facility] to care team: yes    CC  Dr Bloch

## 2023-04-06 NOTE — PROGRESS NOTES
Initial anticoagulation clinic note and most recent discharge summary reviewed    Patient with history of atrial fibrillation now status post bioprosthetic mitral valve replacement    Pending further recommendations from cardiology, we will continue with indefinite anticoagulation.  We will use warfarin for 3 months and then consider change back to DOAC depending upon recommendation of cardiology    Pending further recommendations from cardiology we will continue with the least 3 months of concomitant aspirin and then once again back to them for further recommendations    Michael Bloch, MD  Anticoagulation Clinic

## 2023-04-07 ENCOUNTER — ANTICOAGULATION VISIT (OUTPATIENT)
Dept: VASCULAR LAB | Facility: MEDICAL CENTER | Age: 68
End: 2023-04-07
Attending: INTERNAL MEDICINE
Payer: MEDICARE

## 2023-04-07 ENCOUNTER — TELEPHONE (OUTPATIENT)
Dept: CARDIOTHORACIC SURGERY | Facility: MEDICAL CENTER | Age: 68
End: 2023-04-07

## 2023-04-07 DIAGNOSIS — I48.91 ATRIAL FIBRILLATION WITH RVR (HCC): ICD-10-CM

## 2023-04-07 DIAGNOSIS — Z95.3 HISTORY OF MITRAL VALVE REPLACEMENT WITH BIOPROSTHETIC VALVE: ICD-10-CM

## 2023-04-07 LAB — INR PPP: 2.2 (ref 2–3.5)

## 2023-04-07 PROCEDURE — 85610 PROTHROMBIN TIME: CPT

## 2023-04-07 PROCEDURE — 99212 OFFICE O/P EST SF 10 MIN: CPT

## 2023-04-07 NOTE — TELEPHONE ENCOUNTER
"Hospital follow up call    he states that all incisions are healing well and approximated with no s/s of infection (redness, puss, fever, purulent drainage, or tenderness). He does complain of itching, he was told this is normal.    he is using the IS; volume is improved. Current volume is 2750 ml.    he is walking everyday, distance is increased from the hospital.    he is obtaining daily weights. Current weight is 179.5 lbs which is less than the first home weight of 179.5 lbs.    he is taking all prescribed meds as directed.  He has no medication questions.    he is taking vitals (HR and BP).    BP's: he is not taking BP's Coumadin clinic did take it.  HR's: 80's     he has had a bowel movement since discharge.    he is showering everyday or every other day and is not wearing the compression/ALY hose as they are too small and \"cutting off my circulation\". He denies LE edema.    he states that the post op pain is well controlled.  Narcotics are being utilized. Tylenol is being utilized.    he has no additional questions at this time. Follow up education was needed on not sleeping on his side. Follow up appointments were reviewed and I ensured they have my number if issues or concerns arise.      Follow up call time was 12 minutes.        "

## 2023-04-07 NOTE — PROGRESS NOTES
Anticoagulation Summary  As of 2023      INR goal:  2.0-3.0   TTR:  --   INR used for dosin.20 (2023)   Warfarin maintenance plan:  5 mg (5 mg x 1) every Mon, Fri; 2.5 mg (5 mg x 0.5) all other days   Weekly warfarin total:  22.5 mg   Plan last modified:  Bob Chung PharmD (2023)   Next INR check:  4/10/2023   Target end date:  2023    Indications    Atrial fibrillation with RVR (HCC) [I48.91]  History of mitral valve replacement with bioprosthetic valve [Z95.3]                 Anticoagulation Episode Summary       INR check location:      Preferred lab:      Send INR reminders to:      Comments:  warfarin for 3 months then back to DOAC, ASA for life                  Refer to Patient Findings for HPI:  Patient Findings       Negatives:  Signs/symptoms of thrombosis, Signs/symptoms of bleeding, Laboratory test error suspected, Change in health, Change in alcohol use, Change in activity, Upcoming invasive procedure, Emergency department visit, Upcoming dental procedure, Missed doses, Extra doses, Change in medications, Change in diet/appetite, Hospital admission, Bruising, Other complaints            There were no vitals filed for this visit.    Verified current warfarin dosing schedule.    Medications reconciled   Pt is on ASA 81 mg once daily as antiplatelet therapy for MVR - to be reviewed again w/ cards around 23.      A/P   INR  therapeutic.     Warfarin dosing recommendation: Instructed pt to begin newly increased regimen of 5 mg Mon/Fri and 2.5 mg ROW.    Pt educated to contact our clinic with any changes in medications or s/s of bleeding or thrombosis. Pt is aware to seek immediate medical attention for falls, head injury or deep cuts.    Follow up appointment in 2 days.    Bob Chung, JorgeD, BCACP

## 2023-04-10 ENCOUNTER — ANTICOAGULATION VISIT (OUTPATIENT)
Dept: VASCULAR LAB | Facility: MEDICAL CENTER | Age: 68
End: 2023-04-10
Attending: INTERNAL MEDICINE
Payer: MEDICARE

## 2023-04-10 VITALS — HEART RATE: 96 BPM | DIASTOLIC BLOOD PRESSURE: 82 MMHG | SYSTOLIC BLOOD PRESSURE: 124 MMHG

## 2023-04-10 DIAGNOSIS — I48.91 ATRIAL FIBRILLATION WITH RVR (HCC): ICD-10-CM

## 2023-04-10 DIAGNOSIS — Z95.3 HISTORY OF MITRAL VALVE REPLACEMENT WITH BIOPROSTHETIC VALVE: ICD-10-CM

## 2023-04-10 DIAGNOSIS — D68.69 SECONDARY HYPERCOAGULABLE STATE (HCC): ICD-10-CM

## 2023-04-10 LAB — INR PPP: 2.8 (ref 2–3.5)

## 2023-04-10 PROCEDURE — 85610 PROTHROMBIN TIME: CPT

## 2023-04-10 PROCEDURE — 99212 OFFICE O/P EST SF 10 MIN: CPT | Performed by: NURSE PRACTITIONER

## 2023-04-10 NOTE — PROGRESS NOTES
Anticoagulation Summary  As of 4/10/2023      INR goal:  2.0-3.0   TTR:  --   INR used for dosin.80 (4/10/2023)   Warfarin maintenance plan:  5 mg (5 mg x 1) every Mon, Fri; 2.5 mg (5 mg x 0.5) all other days   Weekly warfarin total:  22.5 mg   Plan last modified:  Jorge PerezD (2023)   Next INR check:  2023   Target end date:  2023    Indications    Atrial fibrillation with RVR (HCC) [I48.91]  History of mitral valve replacement with bioprosthetic valve [Z95.3]  Secondary hypercoagulable state (HCC) [D68.69]                 Anticoagulation Episode Summary       INR check location:      Preferred lab:      Send INR reminders to:      Comments:  warfarin for 3 months then back to [facility] Hein for life                  Refer to Patient Findings for HPI:  Patient Findings       Positives:  Signs/symptoms of bleeding    Negatives:  Signs/symptoms of thrombosis, Laboratory test error suspected, Change in health, Change in alcohol use, Change in activity, Upcoming invasive procedure, Emergency department visit, Upcoming dental procedure, Missed doses, Extra doses, Change in medications, Change in diet/appetite, Hospital admission, Bruising, Other complaints    Comments:  Has had a couple of minor episodes of epistaxis. No excessive bleeding. Is currently on 2L oxygen by NC.             Vitals:    04/10/23 1054   BP: 124/82   Pulse: 96       Verified current warfarin dosing schedule.    Medications reconciled   Pt is on ASA 81 mg once daily as antiplatelet therapy for MVR - to be reviewed again w/ cards around 23.      A/P   INR  -therapeutic. INR 2.8 today. Trended up from 2.2 on Friday. Will reduce his dose.    Warfarin dosing recommendation: Take 2.5 mg tonight and tomorrow.    Pt educated to contact our clinic with any changes in medications or s/s of bleeding or thrombosis. Pt is aware to seek immediate medical attention for falls, head injury or deep cuts.    Follow up appointment on  Wednesday to coordinate with his cardiology f/u.    GLADIS Grossman.

## 2023-04-12 ENCOUNTER — ANTICOAGULATION VISIT (OUTPATIENT)
Dept: VASCULAR LAB | Facility: MEDICAL CENTER | Age: 68
End: 2023-04-12
Attending: INTERNAL MEDICINE
Payer: MEDICARE

## 2023-04-12 ENCOUNTER — OFFICE VISIT (OUTPATIENT)
Dept: CARDIOLOGY | Facility: MEDICAL CENTER | Age: 68
End: 2023-04-12
Payer: MEDICARE

## 2023-04-12 VITALS
OXYGEN SATURATION: 97 % | WEIGHT: 178 LBS | SYSTOLIC BLOOD PRESSURE: 110 MMHG | DIASTOLIC BLOOD PRESSURE: 70 MMHG | BODY MASS INDEX: 24.11 KG/M2 | HEIGHT: 72 IN | RESPIRATION RATE: 14 BRPM | HEART RATE: 83 BPM

## 2023-04-12 DIAGNOSIS — Z95.3 HISTORY OF MITRAL VALVE REPLACEMENT WITH BIOPROSTHETIC VALVE: ICD-10-CM

## 2023-04-12 DIAGNOSIS — I48.19 PERSISTENT ATRIAL FIBRILLATION (HCC): ICD-10-CM

## 2023-04-12 DIAGNOSIS — D68.69 SECONDARY HYPERCOAGULABLE STATE (HCC): ICD-10-CM

## 2023-04-12 DIAGNOSIS — I48.91 ATRIAL FIBRILLATION WITH RVR (HCC): ICD-10-CM

## 2023-04-12 LAB — INR PPP: 1.8 (ref 2–3.5)

## 2023-04-12 PROCEDURE — 99214 OFFICE O/P EST MOD 30 MIN: CPT | Performed by: INTERNAL MEDICINE

## 2023-04-12 PROCEDURE — 85610 PROTHROMBIN TIME: CPT

## 2023-04-12 PROCEDURE — 99212 OFFICE O/P EST SF 10 MIN: CPT

## 2023-04-12 ASSESSMENT — FIBROSIS 4 INDEX: FIB4 SCORE: 0.64

## 2023-04-12 NOTE — PROGRESS NOTES
Anticoagulation Summary  As of 2023      INR goal:  2.0-3.0   TTR:  --   INR used for dosin.80 (2023)   Warfarin maintenance plan:  5 mg (5 mg x 1) every Fri; 2.5 mg (5 mg x 0.5) all other days   Weekly warfarin total:  20 mg   Plan last modified:  NEFTALY Grossman (4/10/2023)   Next INR check:  2023   Target end date:  2023    Indications    Atrial fibrillation with RVR (HCC) [I48.91]  History of mitral valve replacement with bioprosthetic valve [Z95.3]  Secondary hypercoagulable state (HCC) [D68.69]                 Anticoagulation Episode Summary       INR check location:      Preferred lab:      Send INR reminders to:      Comments:  warfarin for 3 months then back to [facility]                  Refer to Patient Findings for HPI:  Patient Findings       Negatives:  Signs/symptoms of thrombosis, Signs/symptoms of bleeding, Laboratory test error suspected, Change in health, Change in alcohol use, Change in activity, Upcoming invasive procedure, Emergency department visit, Upcoming dental procedure, Missed doses, Extra doses, Change in medications, Change in diet/appetite, Hospital admission, Bruising, Other complaints            There were no vitals filed for this visit.    Verified current warfarin dosing schedule.    Medications reconciled   Pt is on ASA as antiplatelet therapy for MVR per cardiology    A/P   INR  sub-therapeutic.     Warfarin dosing recommendation: Take 5mg tonight, then 2.5mg tomorrow.    Pt educated to contact our clinic with any changes in medications or s/s of bleeding or thrombosis. Pt is aware to seek immediate medical attention for falls, head injury or deep cuts.    Follow up appointment in 2 day(s).    Corina Perez, JorgeD

## 2023-04-12 NOTE — PROGRESS NOTES
Arrhythmia Clinic Note (Established patient)    DOS: 4/12/2023    Chief complaint/Reason for consult: F/u AF    Interval History:  Pt is a 66 yo M. History of persistent fib, s/p ablation with me in 10/22 at that time PVs, PW were isolated along with mitral lines and CTI line. Subsequently worsening SOB despite maintenance of sinus rhythm, mitral regurg felt to be contributing, underwent mitral valve replacement with bioprosthetic just recently with Dr. Hebert. Residual ASD was repaired along with GENET ligated. Started on warfarin post. Remains on 2L home O2 still.     ROS (+ highlighted in red):  General--Negative for fatigue, weight loss or weight gain  Cardiovascular--Negative for CP, orthopnea, PND    Past Medical History:   Diagnosis Date    Arrhythmia     A FIB    Arthritis     HANDS AND SHOULDERS    Breath shortness     WITH EXERTION    Cataract     IOL 5 YEARS AGO    Dermatitis     chronic    Emphysema of lung (HCC) 02/10/2023    daily and prn inhalers    Heart burn     Heart valve disease 10/2022    High cholesterol     medicated    Indigestion 05/2022    Shortness of breath        Past Surgical History:   Procedure Laterality Date    MITRAL VALVE REPLACE N/A 3/28/2023    Procedure: MITRAL VALVE REPLACEMENT;  Surgeon: Libertad Hebert M.D.;  Location: [facility];  Service: Cardiothoracic    ECHOCARDIOGRAM, TRANSESOPHAGEAL, INTRAOPERATIVE N/A 3/28/2023    Procedure: ECHOCARDIOGRAM, TRANSESOPHAGEAL, INTRAOPERATIVE;  Surgeon: Libertad Hebert M.D.;  Location: [facility];  Service: Cardiothoracic    CLIPPING, LEFT ATRIAL APPENDAGE N/A 3/28/2023    Procedure: EXCISION, LEFT ATRIAL APPENDAGE;  Surgeon: Libertad Hebert M.D.;  Location: [facility];  Service: Cardiothoracic    ATRIAL SEPTAL DEFECT REPAIR N/A 3/28/2023    Procedure: REPAIR, ATRIAL SEPTAL DEFECT;  Surgeon: Libertad Hebert M.D.;  Location: [facility];  Service: Cardiothoracic    ANGIOGRAM  03/2023    OTHER CARDIAC  SURGERY  10/13/2022    ablation    MASTECTOMY BILATERAL SUBQ Bilateral     at age 15 for gynecomastia    OTHER Bilateral     cataract extraction with IOL       Social History     Socioeconomic History    Marital status:      Spouse name: Not on file    Number of children: Not on file    Years of education: Not on file    Highest education level: Not on file   Occupational History    Not on file   Tobacco Use    Smoking status: Former     Packs/day: 1.00     Years: 45.00     Pack years: 45.00     Types: Cigarettes     Quit date: 2022     Years since quittin.0    Smokeless tobacco: Never   Vaping Use    Vaping Use: Never used   Substance and Sexual Activity    Alcohol use: Not Currently    Drug use: Never    Sexual activity: Not on file     Comment:    Other Topics Concern    Not on file   Social History Narrative    Not on file     Social Determinants of Health     Financial Resource Strain: Not on file   Food Insecurity: Not on file   Transportation Needs: Not on file   Physical Activity: Not on file   Stress: Not on file   Social Connections: Not on file   Intimate Partner Violence: Not on file   Housing Stability: Not on file       Family History   Problem Relation Age of Onset    Cancer Mother     Arterial Aneurysm Father        Allergies   Allergen Reactions    Bee Swelling     Bee Stings- swelling    Other Environmental      Horses- eyes will swell shut, sneezing  Russian Thistle- itching/sneezing, watery eyes       Current Outpatient Medications   Medication Sig Dispense Refill    acetaminophen (TYLENOL) 500 MG Tab Take 2 Tablets by mouth every 6 hours as needed for Mild Pain or Moderate Pain. 30 Tablet 0    aspirin 81 MG EC tablet Take 1 Tablet by mouth every day. 30 Tablet 2    benzonatate (TESSALON) 100 MG Cap Take 1 Capsule by mouth 3 times a day as needed for Cough. 60 Capsule 0    omeprazole (PRILOSEC) 20 MG delayed-release capsule Take 1 Capsule by mouth every day. 30 Capsule 2     oxyCODONE immediate-release (ROXICODONE) 5 MG Tab Take 1 Tablet by mouth every 3 hours as needed for Severe Pain for up to 14 days. 56 Tablet 0    potassium chloride SA (KDUR) 20 MEQ Tab CR Take 1 Tablet by mouth every day. 30 Tablet 0    warfarin (COUMADIN) 5 MG Tab Take 0.5 Tablets by mouth every day at 6 PM. 30 Tablet 2    clobetasol (TEMOVATE) 0.05 % Cream Apply  topically 2 times a day.      metoprolol tartrate (LOPRESSOR) 50 MG Tab Take 50 mg by mouth 2 times a day.      albuterol 108 (90 Base) MCG/ACT Aero Soln inhalation aerosol Inhale 2 Puffs every 6 hours as needed for Shortness of Breath. 18 g 3    budesonide-formoterol (SYMBICORT) 80-4.5 MCG/ACT Aerosol Inhale 2 Puffs 2 times a day. Use spacer. Rinse mouth after each use. 41.72 g 3    rosuvastatin (CRESTOR) 5 MG Tab Take 5 mg by mouth every day.      Empagliflozin (JARDIANCE) 10 MG Tab Take 1 Tablet by mouth every day. 30 Tablet 6    furosemide (LASIX) 20 MG Tab Take 1 Tablet by mouth every day. 90 Tablet 3     No current facility-administered medications for this visit.       Physical Exam:  Vitals:    04/12/23 1006   BP: 110/70   BP Location: Left arm   Patient Position: Sitting   BP Cuff Size: Adult   Pulse: 83   Resp: 14   SpO2: 97%   Weight: 80.7 kg (178 lb)   Height: 1.829 m (6')     General appearance: NAD, conversant  HEENT: PERRL, neck is supple with FROM  Lungs: Clear to auscultation, normal respiratory effort  CV: RRR, 2/6 systolic murmur, no JVD  Abdomen: Soft, non-tender with normal bowel sounds  Extremities: No peripheral edema, no clubbing or cyanosis  Skin: No rash, lesions, or ulcers  Psych: Alert and oriented to person, place and time    Data:  Labs reviewed    Prior echo/stress reviewed:  Severe MR    EKG interpreted by me:  Sinus    Impression/Plan:  1. Persistent atrial fibrillation (HCC)  EKG      2. History of mitral valve replacement with bioprosthetic valve          -I think he is doing as well as can be expected post MVR  -I  discussed that post GENET ligation if no evidence of residual fib at significant burden, not unreasonable to be off OAC long term  -Will see him in 3 mo to decide, stays on warfarin for now    Sherice Glover MD

## 2023-04-14 ENCOUNTER — ANTICOAGULATION VISIT (OUTPATIENT)
Dept: VASCULAR LAB | Facility: MEDICAL CENTER | Age: 68
End: 2023-04-14
Attending: INTERNAL MEDICINE
Payer: MEDICARE

## 2023-04-14 ENCOUNTER — HOSPITAL ENCOUNTER (OUTPATIENT)
Dept: LAB | Facility: MEDICAL CENTER | Age: 68
End: 2023-04-14
Attending: FAMILY MEDICINE
Payer: MEDICARE

## 2023-04-14 DIAGNOSIS — D68.69 SECONDARY HYPERCOAGULABLE STATE (HCC): ICD-10-CM

## 2023-04-14 DIAGNOSIS — I48.91 ATRIAL FIBRILLATION WITH RVR (HCC): ICD-10-CM

## 2023-04-14 DIAGNOSIS — Z95.3 HISTORY OF MITRAL VALVE REPLACEMENT WITH BIOPROSTHETIC VALVE: ICD-10-CM

## 2023-04-14 LAB
ALBUMIN SERPL BCP-MCNC: 4 G/DL (ref 3.2–4.9)
ALBUMIN/GLOB SERPL: 1.1 G/DL
ALP SERPL-CCNC: 105 U/L (ref 30–99)
ALT SERPL-CCNC: 20 U/L (ref 2–50)
ANION GAP SERPL CALC-SCNC: 9 MMOL/L (ref 7–16)
AST SERPL-CCNC: 21 U/L (ref 12–45)
BASOPHILS # BLD AUTO: 1.1 % (ref 0–1.8)
BASOPHILS # BLD: 0.07 K/UL (ref 0–0.12)
BILIRUB SERPL-MCNC: 0.3 MG/DL (ref 0.1–1.5)
BUN SERPL-MCNC: 15 MG/DL (ref 8–22)
CALCIUM ALBUM COR SERPL-MCNC: 9.4 MG/DL (ref 8.5–10.5)
CALCIUM SERPL-MCNC: 9.4 MG/DL (ref 8.5–10.5)
CHLORIDE SERPL-SCNC: 102 MMOL/L (ref 96–112)
CHOLEST SERPL-MCNC: 178 MG/DL (ref 100–199)
CO2 SERPL-SCNC: 26 MMOL/L (ref 20–33)
CREAT SERPL-MCNC: 0.94 MG/DL (ref 0.5–1.4)
EOSINOPHIL # BLD AUTO: 0.12 K/UL (ref 0–0.51)
EOSINOPHIL NFR BLD: 1.8 % (ref 0–6.9)
ERYTHROCYTE [DISTWIDTH] IN BLOOD BY AUTOMATED COUNT: 45.9 FL (ref 35.9–50)
FASTING STATUS PATIENT QL REPORTED: NORMAL
GFR SERPLBLD CREATININE-BSD FMLA CKD-EPI: 89 ML/MIN/1.73 M 2
GLOBULIN SER CALC-MCNC: 3.5 G/DL (ref 1.9–3.5)
GLUCOSE SERPL-MCNC: 108 MG/DL (ref 65–99)
HCT VFR BLD AUTO: 49.7 % (ref 42–52)
HDLC SERPL-MCNC: 42 MG/DL
HGB BLD-MCNC: 15.9 G/DL (ref 14–18)
IMM GRANULOCYTES # BLD AUTO: 0.02 K/UL (ref 0–0.11)
IMM GRANULOCYTES NFR BLD AUTO: 0.3 % (ref 0–0.9)
INR PPP: 2.2 (ref 2–3.5)
LDLC SERPL CALC-MCNC: 117 MG/DL
LYMPHOCYTES # BLD AUTO: 1.58 K/UL (ref 1–4.8)
LYMPHOCYTES NFR BLD: 24 % (ref 22–41)
MCH RBC QN AUTO: 28.6 PG (ref 27–33)
MCHC RBC AUTO-ENTMCNC: 32 G/DL (ref 33.7–35.3)
MCV RBC AUTO: 89.5 FL (ref 81.4–97.8)
MONOCYTES # BLD AUTO: 0.88 K/UL (ref 0–0.85)
MONOCYTES NFR BLD AUTO: 13.4 % (ref 0–13.4)
NEUTROPHILS # BLD AUTO: 3.92 K/UL (ref 1.82–7.42)
NEUTROPHILS NFR BLD: 59.4 % (ref 44–72)
NRBC # BLD AUTO: 0 K/UL
NRBC BLD-RTO: 0 /100 WBC
PLATELET # BLD AUTO: 502 K/UL (ref 164–446)
PMV BLD AUTO: 10.8 FL (ref 9–12.9)
POTASSIUM SERPL-SCNC: 4.7 MMOL/L (ref 3.6–5.5)
PROT SERPL-MCNC: 7.5 G/DL (ref 6–8.2)
RBC # BLD AUTO: 5.55 M/UL (ref 4.7–6.1)
SODIUM SERPL-SCNC: 137 MMOL/L (ref 135–145)
TRIGL SERPL-MCNC: 97 MG/DL (ref 0–149)
WBC # BLD AUTO: 6.6 K/UL (ref 4.8–10.8)

## 2023-04-14 PROCEDURE — 80061 LIPID PANEL: CPT

## 2023-04-14 PROCEDURE — 85610 PROTHROMBIN TIME: CPT

## 2023-04-14 PROCEDURE — 36415 COLL VENOUS BLD VENIPUNCTURE: CPT

## 2023-04-14 PROCEDURE — 80053 COMPREHEN METABOLIC PANEL: CPT

## 2023-04-14 PROCEDURE — 99212 OFFICE O/P EST SF 10 MIN: CPT

## 2023-04-14 PROCEDURE — 85025 COMPLETE CBC W/AUTO DIFF WBC: CPT

## 2023-04-14 NOTE — PROGRESS NOTES
Anticoagulation Summary  As of 2023      INR goal:  2.0-3.0   TTR:  --   INR used for dosin.20 (2023)   Warfarin maintenance plan:  5 mg (5 mg x 1) every Mon, Fri; 2.5 mg (5 mg x 0.5) all other days   Weekly warfarin total:  22.5 mg   Plan last modified:  Bartolome Lazo PharmD (2023)   Next INR check:  2023   Target end date:  2023    Indications    Atrial fibrillation with RVR (HCC) [I48.91]  History of mitral valve replacement with bioprosthetic valve [Z95.3]  Secondary hypercoagulable state (HCC) [D68.69]                 Anticoagulation Episode Summary       INR check location:      Preferred lab:      Send INR reminders to:      Comments:  warfarin for 3 months then back to [facility]                  Refer to Patient Findings for HPI:        Verified current warfarin dosing schedule.    Medications reconciled   Pt is on ASA as antiplatelet therapy for MVR per cardiology      A/P   INR  therapeutic.     Warfarin dosing recommendation: Begin increased regimen.     Pt educated to contact our clinic with any changes in medications or s/s of bleeding or thrombosis. Pt is aware to seek immediate medical attention for falls, head injury or deep cuts.    Follow up appointment in 1 week(s).    Bartolome Lazo, JorgeD

## 2023-04-19 ENCOUNTER — ANTICOAGULATION VISIT (OUTPATIENT)
Dept: VASCULAR LAB | Facility: MEDICAL CENTER | Age: 68
End: 2023-04-19
Attending: INTERNAL MEDICINE
Payer: MEDICARE

## 2023-04-19 DIAGNOSIS — Z95.3 HISTORY OF MITRAL VALVE REPLACEMENT WITH BIOPROSTHETIC VALVE: ICD-10-CM

## 2023-04-19 DIAGNOSIS — I48.91 ATRIAL FIBRILLATION WITH RVR (HCC): ICD-10-CM

## 2023-04-19 DIAGNOSIS — D68.69 SECONDARY HYPERCOAGULABLE STATE (HCC): ICD-10-CM

## 2023-04-19 LAB — INR PPP: 2.3 (ref 2–3.5)

## 2023-04-19 PROCEDURE — 99211 OFF/OP EST MAY X REQ PHY/QHP: CPT

## 2023-04-19 PROCEDURE — 85610 PROTHROMBIN TIME: CPT

## 2023-04-19 NOTE — PROGRESS NOTES
Anticoagulation Summary  As of 2023      INR goal:  2.0-3.0   TTR:  100.0 % (4 d)   INR used for dosin.30 (2023)   Warfarin maintenance plan:  5 mg (5 mg x 1) every Mon, Fri; 2.5 mg (5 mg x 0.5) all other days   Weekly warfarin total:  22.5 mg   Plan last modified:  Jorge GallegosD (2023)   Next INR check:  2023   Target end date:  2023    Indications    Atrial fibrillation with RVR (HCC) [I48.91]  History of mitral valve replacement with bioprosthetic valve [Z95.3]  Secondary hypercoagulable state (HCC) [D68.69]                 Anticoagulation Episode Summary       INR check location:      Preferred lab:      Send INR reminders to:      Comments:  warfarin for 3 months then back to [facility]                  Refer to Patient Findings for HPI:  Patient Findings       Negatives:  Signs/symptoms of thrombosis, Signs/symptoms of bleeding, Laboratory test error suspected, Change in health, Change in alcohol use, Change in activity, Upcoming invasive procedure, Emergency department visit, Upcoming dental procedure, Missed doses, Extra doses, Change in medications, Change in diet/appetite, Hospital admission, Bruising, Other complaints            There were no vitals filed for this visit.    Verified current warfarin dosing schedule.    Medications reconciled   Pt is on ASA as antiplatelet therapy for MVR per cardiology      A/P   INR  therapeutic.     Warfarin dosing recommendation: Instructed pt to continue on with current regimen.    Pt educated to contact our clinic with any changes in medications or s/s of bleeding or thrombosis. Pt is aware to seek immediate medical attention for falls, head injury or deep cuts.    Follow up appointment in 1 week(s).    Bob Chung, PharmD, BCACP

## 2023-04-20 SDOH — ECONOMIC STABILITY: FOOD INSECURITY: WITHIN THE PAST 12 MONTHS, YOU WORRIED THAT YOUR FOOD WOULD RUN OUT BEFORE YOU GOT MONEY TO BUY MORE.: NEVER TRUE

## 2023-04-20 SDOH — ECONOMIC STABILITY: HOUSING INSECURITY
IN THE LAST 12 MONTHS, WAS THERE A TIME WHEN YOU DID NOT HAVE A STEADY PLACE TO SLEEP OR SLEPT IN A SHELTER (INCLUDING NOW)?: NO

## 2023-04-20 SDOH — ECONOMIC STABILITY: FOOD INSECURITY: WITHIN THE PAST 12 MONTHS, THE FOOD YOU BOUGHT JUST DIDN'T LAST AND YOU DIDN'T HAVE MONEY TO GET MORE.: NEVER TRUE

## 2023-04-20 SDOH — ECONOMIC STABILITY: INCOME INSECURITY: HOW HARD IS IT FOR YOU TO PAY FOR THE VERY BASICS LIKE FOOD, HOUSING, MEDICAL CARE, AND HEATING?: NOT VERY HARD

## 2023-04-20 SDOH — ECONOMIC STABILITY: TRANSPORTATION INSECURITY
IN THE PAST 12 MONTHS, HAS LACK OF TRANSPORTATION KEPT YOU FROM MEETINGS, WORK, OR FROM GETTING THINGS NEEDED FOR DAILY LIVING?: NO

## 2023-04-20 SDOH — ECONOMIC STABILITY: HOUSING INSECURITY: IN THE LAST 12 MONTHS, HOW MANY PLACES HAVE YOU LIVED?: 1

## 2023-04-20 SDOH — ECONOMIC STABILITY: TRANSPORTATION INSECURITY
IN THE PAST 12 MONTHS, HAS LACK OF RELIABLE TRANSPORTATION KEPT YOU FROM MEDICAL APPOINTMENTS, MEETINGS, WORK OR FROM GETTING THINGS NEEDED FOR DAILY LIVING?: NO

## 2023-04-20 SDOH — ECONOMIC STABILITY: INCOME INSECURITY: IN THE LAST 12 MONTHS, WAS THERE A TIME WHEN YOU WERE NOT ABLE TO PAY THE MORTGAGE OR RENT ON TIME?: NO

## 2023-04-20 SDOH — ECONOMIC STABILITY: TRANSPORTATION INSECURITY
IN THE PAST 12 MONTHS, HAS THE LACK OF TRANSPORTATION KEPT YOU FROM MEDICAL APPOINTMENTS OR FROM GETTING MEDICATIONS?: NO

## 2023-04-20 SDOH — HEALTH STABILITY: MENTAL HEALTH
STRESS IS WHEN SOMEONE FEELS TENSE, NERVOUS, ANXIOUS, OR CAN'T SLEEP AT NIGHT BECAUSE THEIR MIND IS TROUBLED. HOW STRESSED ARE YOU?: TO SOME EXTENT

## 2023-04-20 SDOH — HEALTH STABILITY: PHYSICAL HEALTH: ON AVERAGE, HOW MANY MINUTES DO YOU ENGAGE IN EXERCISE AT THIS LEVEL?: 30 MIN

## 2023-04-20 SDOH — HEALTH STABILITY: PHYSICAL HEALTH: ON AVERAGE, HOW MANY DAYS PER WEEK DO YOU ENGAGE IN MODERATE TO STRENUOUS EXERCISE (LIKE A BRISK WALK)?: 3 DAYS

## 2023-04-20 ASSESSMENT — SOCIAL DETERMINANTS OF HEALTH (SDOH)
HOW OFTEN DO YOU ATTEND CHURCH OR RELIGIOUS SERVICES?: NEVER
HOW HARD IS IT FOR YOU TO PAY FOR THE VERY BASICS LIKE FOOD, HOUSING, MEDICAL CARE, AND HEATING?: NOT VERY HARD
WITHIN THE PAST 12 MONTHS, YOU WORRIED THAT YOUR FOOD WOULD RUN OUT BEFORE YOU GOT THE MONEY TO BUY MORE: NEVER TRUE
HOW OFTEN DO YOU HAVE A DRINK CONTAINING ALCOHOL: NEVER
HOW OFTEN DO YOU ATTENT MEETINGS OF THE CLUB OR ORGANIZATION YOU BELONG TO?: MORE THAN 4 TIMES PER YEAR
HOW OFTEN DO YOU ATTEND CHURCH OR RELIGIOUS SERVICES?: NEVER
HOW OFTEN DO YOU GET TOGETHER WITH FRIENDS OR RELATIVES?: ONCE A WEEK
DO YOU BELONG TO ANY CLUBS OR ORGANIZATIONS SUCH AS CHURCH GROUPS UNIONS, FRATERNAL OR ATHLETIC GROUPS, OR SCHOOL GROUPS?: YES
DO YOU BELONG TO ANY CLUBS OR ORGANIZATIONS SUCH AS CHURCH GROUPS UNIONS, FRATERNAL OR ATHLETIC GROUPS, OR SCHOOL GROUPS?: YES
HOW MANY DRINKS CONTAINING ALCOHOL DO YOU HAVE ON A TYPICAL DAY WHEN YOU ARE DRINKING: PATIENT DOES NOT DRINK
HOW OFTEN DO YOU HAVE SIX OR MORE DRINKS ON ONE OCCASION: NEVER
IN A TYPICAL WEEK, HOW MANY TIMES DO YOU TALK ON THE PHONE WITH FAMILY, FRIENDS, OR NEIGHBORS?: TWICE A WEEK
HOW OFTEN DO YOU GET TOGETHER WITH FRIENDS OR RELATIVES?: ONCE A WEEK
IN A TYPICAL WEEK, HOW MANY TIMES DO YOU TALK ON THE PHONE WITH FAMILY, FRIENDS, OR NEIGHBORS?: TWICE A WEEK
HOW OFTEN DO YOU ATTENT MEETINGS OF THE CLUB OR ORGANIZATION YOU BELONG TO?: MORE THAN 4 TIMES PER YEAR

## 2023-04-20 ASSESSMENT — LIFESTYLE VARIABLES
SKIP TO QUESTIONS 9-10: 1
HOW MANY STANDARD DRINKS CONTAINING ALCOHOL DO YOU HAVE ON A TYPICAL DAY: PATIENT DOES NOT DRINK
AUDIT-C TOTAL SCORE: 0
HOW OFTEN DO YOU HAVE SIX OR MORE DRINKS ON ONE OCCASION: NEVER
HOW OFTEN DO YOU HAVE A DRINK CONTAINING ALCOHOL: NEVER

## 2023-04-20 NOTE — PROGRESS NOTES
CHIEF COMPLAINT: Post-op visit     PROCEDURE:   3/28/23 Dr. Hebert  MITRAL VALVE REPLACEMENT (29 mm Mitris Rivas bovine pericardial valve), LEFT ATRIAL APPENDAGE EXCISION/LIGATION, ATRIAL SEPTAL DEFECT REPAIR and intraoperative transesophageal echocardiography    HPI: Doing well. Walking and staying active at home.     /80 (BP Location: Left arm, Patient Position: Sitting, BP Cuff Size: Adult)   Pulse (!) 105   Temp 36.8 °C (98.3 °F) (Temporal)   Ht 1.829 m (6')   Wt 77.3 kg (170 lb 8 oz)   SpO2 95%     PHYSICAL EXAM:  Physical Exam   Cardiac: S1S2  Neuro:  AAO x 3  Resp:  CTA  Wounds:  CDI  Sternum:  Stable    PLAN:   Continue coumadin for 3 months or per your Cardiologist's recommendations.  We reviewed post operative sternal precautions, weight limits and driving precautions moving forward.  We reviewed SBE prophylaxis.      Overall, we are very pleased with the patient’s progress and we have instructed the patient to follow-up with us in the future should they have any concerns related to their surgery. Otherwise, we will see the patient on a PRN basis. The patient will continue to follow-up with their Cardiologist and PCP.  The patient has been informed that any further prescription refills should be done through their primary care physician and/or cardiologist.  They acknowledged understanding.  Thank you for allowing us to participate in the care of this very pleasant patient and please let us know if there is any way we may be of further assistance.

## 2023-04-24 ENCOUNTER — OFFICE VISIT (OUTPATIENT)
Dept: MEDICAL GROUP | Facility: PHYSICIAN GROUP | Age: 68
End: 2023-04-24
Attending: INTERNAL MEDICINE
Payer: MEDICARE

## 2023-04-24 VITALS
OXYGEN SATURATION: 95 % | HEIGHT: 72 IN | HEART RATE: 97 BPM | TEMPERATURE: 98.4 F | SYSTOLIC BLOOD PRESSURE: 132 MMHG | DIASTOLIC BLOOD PRESSURE: 72 MMHG | BODY MASS INDEX: 24 KG/M2 | WEIGHT: 177.2 LBS

## 2023-04-24 DIAGNOSIS — Z95.3 HISTORY OF MITRAL VALVE REPLACEMENT WITH BIOPROSTHETIC VALVE: ICD-10-CM

## 2023-04-24 DIAGNOSIS — I48.20 CHRONIC ATRIAL FIBRILLATION (HCC): ICD-10-CM

## 2023-04-24 DIAGNOSIS — J30.1 SEASONAL ALLERGIC RHINITIS DUE TO POLLEN: ICD-10-CM

## 2023-04-24 DIAGNOSIS — L76.82 PAIN AT SURGICAL INCISION: ICD-10-CM

## 2023-04-24 DIAGNOSIS — J43.9 PULMONARY EMPHYSEMA, UNSPECIFIED EMPHYSEMA TYPE (HCC): ICD-10-CM

## 2023-04-24 PROBLEM — I50.1 PULMONARY EDEMA CARDIAC CAUSE (HCC): Status: RESOLVED | Noted: 2022-10-11 | Resolved: 2023-04-24

## 2023-04-24 PROBLEM — I34.0 MODERATE MITRAL REGURGITATION BY PRIOR ECHOCARDIOGRAM: Status: RESOLVED | Noted: 2022-10-24 | Resolved: 2023-04-24

## 2023-04-24 PROBLEM — D75.1 ERYTHROCYTOSIS: Status: RESOLVED | Noted: 2022-10-11 | Resolved: 2023-04-24

## 2023-04-24 PROBLEM — Z99.11 ENCOUNTER FOR WEANING FROM VENTILATOR (HCC): Status: RESOLVED | Noted: 2023-03-28 | Resolved: 2023-04-24

## 2023-04-24 PROBLEM — R06.09 DYSPNEA ON EXERTION: Status: RESOLVED | Noted: 2023-02-10 | Resolved: 2023-04-24

## 2023-04-24 PROCEDURE — 99204 OFFICE O/P NEW MOD 45 MIN: CPT | Performed by: FAMILY MEDICINE

## 2023-04-24 RX ORDER — OXYCODONE HYDROCHLORIDE 5 MG/1
5 TABLET ORAL EVERY 6 HOURS PRN
Qty: 56 TABLET | Refills: 0 | Status: SHIPPED | OUTPATIENT
Start: 2023-04-24 | End: 2023-05-08

## 2023-04-24 RX ORDER — TRIAMCINOLONE ACETONIDE 40 MG/ML
40 INJECTION, SUSPENSION INTRA-ARTICULAR; INTRAMUSCULAR ONCE
Status: COMPLETED | OUTPATIENT
Start: 2023-04-24 | End: 2023-04-24

## 2023-04-24 RX ADMIN — TRIAMCINOLONE ACETONIDE 40 MG: 40 INJECTION, SUSPENSION INTRA-ARTICULAR; INTRAMUSCULAR at 15:41

## 2023-04-24 ASSESSMENT — FIBROSIS 4 INDEX: FIB4 SCORE: 0.62

## 2023-04-24 NOTE — PROGRESS NOTES
CHIEF COMPLAINT / REASON FOR VISIT  Eduardo Aponte is a 67 y.o. male that presents today to [facility] care.    HISTORY OF PRESENT ILLNESS  On 3/28/23 Dr. Hebert performed MITRAL VALVE REPLACEMENT (29 mm Mitris Rivas bovine pericardial bioprosthetic valve), LEFT ATRIAL APPENDAGE EXCISION/LIGATION, and ATRIAL SEPTAL DEFECT REPAIR.  He has been discharged 3 weeks from hospital.  Does report persistent post-surgical pain (at site of sternotomy) that is uncontrolled with tylenol 1000 mg every 6 hours. Was told not to take NSAIDs due to being anticoagulated with warfarin. He is requesting a refill of the oxycodone that was prescribed for post-surgical pain.   Is weaning off nasal O2 (only using as needed). His exertional dyspnea is improving overall.    Past Surgical History:   Procedure Laterality Date    MITRAL VALVE REPLACE N/A 3/28/2023    Procedure: MITRAL VALVE REPLACEMENT;  Surgeon: Libertad Hebert M.D.;  Location: [facility];  Service: Cardiothoracic    ECHOCARDIOGRAM, TRANSESOPHAGEAL, INTRAOPERATIVE N/A 3/28/2023    Procedure: ECHOCARDIOGRAM, TRANSESOPHAGEAL, INTRAOPERATIVE;  Surgeon: Libertad Hebert M.D.;  Location: SURGERY [facility];  Service: Cardiothoracic    CLIPPING, LEFT ATRIAL APPENDAGE N/A 3/28/2023    Procedure: EXCISION, LEFT ATRIAL APPENDAGE;  Surgeon: Libertad Hebert M.D.;  Location: [facility];  Service: Cardiothoracic    ATRIAL SEPTAL DEFECT REPAIR N/A 3/28/2023    Procedure: REPAIR, ATRIAL SEPTAL DEFECT;  Surgeon: Libertad Hebert M.D.;  Location: [facility];  Service: Cardiothoracic    ANGIOGRAM  03/2023    OTHER CARDIAC SURGERY  10/13/2022    ablation    MASTECTOMY BILATERAL SUBQ Bilateral     at age 15 for gynecomastia    OTHER Bilateral     cataract extraction with IOL         Social History     Tobacco Use    Smoking status: Former     Packs/day: 1.00     Years: 45.00     Pack years: 45.00     Types: Cigarettes     Quit date: 4/1/2022     Years since quitting:  1.0    Smokeless tobacco: Never   Vaping Use    Vaping Use: Never used   Substance Use Topics    Alcohol use: Not Currently    Drug use: Never       OBJECTIVE    /72 (BP Location: Right arm, Patient Position: Sitting, BP Cuff Size: Adult)   Pulse 97   Temp 36.9 °C (98.4 °F) (Temporal)   Ht 1.829 m (6')   Wt 80.4 kg (177 lb 3.2 oz)   SpO2 95%   BMI 24.03 kg/m²     PHYSICAL EXAM  Constitutional: Sitting comfortably, in no acute distress, responds to questions appropriately.  Head: Normocephalic  Mouth: Oral mucosa moist. Good dentition  Throat: Oropharynx clear without erythema or tonsillar exudates  Neck: No cervical lymphadenopathy  Chest: sternotomy scar clean, dry, intact  Heart: Regular S1 S2, faint systolic murmur  Lungs: Clear to auscultation bilaterally, no wheezes, rales, or rhonchi  Abdomen: Soft, nontender, nondistended  Extremities: No lower extremity edema  Skin: Warm and dry, no rashes or lesions on face or exposed upper extremities    ASSESSMENT & PLAN  1. Pain at surgical incision  Persistent surgical site pain at his sternotomy.  He requests refill of oxycodone for this.  Advised that oxycodone would not be a favorable long-term option, but that an extended 14-day course would be reasonable.  He continues on Tylenol 1000 mg every 6 hours.  - oxyCODONE immediate-release (ROXICODONE) 5 MG Tab; Take 1 Tablet by mouth every 6 hours as needed for Severe Pain for up to 14 days. Indications: Acute Pain, post-surgical pain (sternotomy)  Dispense: 56 Tablet; Refill: 0    2. History of mitral valve replacement with bioprosthetic valve  Recovering well from his open mitral valve replacement on 3/20/2023.  His exertional dyspnea is improving.  He will continue following with cardiology.    3. Chronic atrial fibrillation (HCC)  status post ablation 10/13/2022, following with electrophysiology.  Anticoagulated with warfarin.  He is status post left atrial appendage ligation on 3/28/2023.    4. Pulmonary  emphysema, unspecified emphysema type (HCC)  COPD due to cigarette smoking, quit 1 year ago, 45-year pack year history.  Symbicort 1 puff twice daily, as needed albuterol.  PFTs 2/20/2023 showed mild obstruction, no restriction, reduced DLCO.    5. Seasonal allergic rhinitis due to pollen  He request Kenalog shot for his seasonal allergies.  In addition to this, I recommended for maintenance use nasal fluticasone spray 2 to 3 puffs each nostril daily  - triamcinolone acetonide (KENALOG-40) injection 40 mg

## 2023-04-26 ENCOUNTER — ANTICOAGULATION VISIT (OUTPATIENT)
Dept: VASCULAR LAB | Facility: MEDICAL CENTER | Age: 68
End: 2023-04-26
Attending: INTERNAL MEDICINE
Payer: MEDICARE

## 2023-04-26 ENCOUNTER — OFFICE VISIT (OUTPATIENT)
Dept: CARDIOLOGY | Facility: MEDICAL CENTER | Age: 68
End: 2023-04-26
Payer: MEDICARE

## 2023-04-26 VITALS
HEART RATE: 81 BPM | HEIGHT: 72 IN | BODY MASS INDEX: 23.7 KG/M2 | OXYGEN SATURATION: 95 % | WEIGHT: 175 LBS | SYSTOLIC BLOOD PRESSURE: 104 MMHG | RESPIRATION RATE: 16 BRPM | DIASTOLIC BLOOD PRESSURE: 74 MMHG

## 2023-04-26 DIAGNOSIS — D68.69 SECONDARY HYPERCOAGULABLE STATE (HCC): ICD-10-CM

## 2023-04-26 DIAGNOSIS — E78.49 OTHER HYPERLIPIDEMIA: ICD-10-CM

## 2023-04-26 DIAGNOSIS — Z95.3 HISTORY OF MITRAL VALVE REPLACEMENT WITH BIOPROSTHETIC VALVE: ICD-10-CM

## 2023-04-26 DIAGNOSIS — I48.20 CHRONIC ATRIAL FIBRILLATION (HCC): ICD-10-CM

## 2023-04-26 DIAGNOSIS — I25.10 CORONARY ARTERY DISEASE INVOLVING NATIVE CORONARY ARTERY OF NATIVE HEART WITHOUT ANGINA PECTORIS: ICD-10-CM

## 2023-04-26 DIAGNOSIS — Z95.2 S/P MVR (MITRAL VALVE REPLACEMENT): ICD-10-CM

## 2023-04-26 DIAGNOSIS — Z98.890 H/O CARDIAC RADIOFREQUENCY ABLATION: ICD-10-CM

## 2023-04-26 LAB
EKG IMPRESSION: NORMAL
INR PPP: 2 (ref 2–3.5)

## 2023-04-26 PROCEDURE — 99211 OFF/OP EST MAY X REQ PHY/QHP: CPT

## 2023-04-26 PROCEDURE — 99214 OFFICE O/P EST MOD 30 MIN: CPT | Performed by: NURSE PRACTITIONER

## 2023-04-26 PROCEDURE — 93000 ELECTROCARDIOGRAM COMPLETE: CPT | Performed by: INTERNAL MEDICINE

## 2023-04-26 PROCEDURE — 85610 PROTHROMBIN TIME: CPT

## 2023-04-26 RX ORDER — ROSUVASTATIN CALCIUM 10 MG/1
5 TABLET, COATED ORAL EVERY EVENING
Qty: 100 TABLET | Refills: 3 | Status: SHIPPED | OUTPATIENT
Start: 2023-04-26 | End: 2023-06-13 | Stop reason: SDUPTHER

## 2023-04-26 RX ORDER — ASPIRIN 81 MG/1
81 TABLET ORAL DAILY
Qty: 100 TABLET | Refills: 3 | Status: SHIPPED | OUTPATIENT
Start: 2023-04-26

## 2023-04-26 RX ORDER — METOPROLOL TARTRATE 50 MG/1
50 TABLET, FILM COATED ORAL 2 TIMES DAILY
Qty: 200 TABLET | Refills: 3 | Status: SHIPPED | OUTPATIENT
Start: 2023-04-26

## 2023-04-26 ASSESSMENT — FIBROSIS 4 INDEX: FIB4 SCORE: 0.62

## 2023-04-26 ASSESSMENT — ENCOUNTER SYMPTOMS
ORTHOPNEA: 0
DIZZINESS: 0
COUGH: 0
PND: 0
ABDOMINAL PAIN: 0
FEVER: 0
SHORTNESS OF BREATH: 1
CLAUDICATION: 0
MYALGIAS: 0
PALPITATIONS: 0

## 2023-04-26 NOTE — PROGRESS NOTES
Anticoagulation Summary  As of 2023      INR goal:  2.0-3.0   TTR:  100.0 % (1.6 wk)   INR used for dosin.00 (2023)   Warfarin maintenance plan:  5 mg (5 mg x 1) every Mon, Fri; 2.5 mg (5 mg x 0.5) all other days   Weekly warfarin total:  22.5 mg   Plan last modified:  Bartolome Lazo, PharmD (2023)   Next INR check:  5/10/2023   Target end date:  2023    Indications    Chronic atrial fibrillation (HCC) [I48.20]  History of mitral valve replacement with bioprosthetic valve [Z95.3]  Secondary hypercoagulable state (HCC) [D68.69]                 Anticoagulation Episode Summary       INR check location:      Preferred lab:      Send INR reminders to:      Comments:  warfarin for 3 months then back to [facility]                  Refer to Patient Findings for HPI:  Patient Findings       Positives:  Change in medications (Stopped Jardiance, Lasix and KCl)    Negatives:  Signs/symptoms of thrombosis, Signs/symptoms of bleeding, Laboratory test error suspected, Change in health, Change in alcohol use, Change in activity, Upcoming invasive procedure, Emergency department visit, Upcoming dental procedure, Missed doses, Extra doses, Change in diet/appetite, Hospital admission, Bruising, Other complaints            There were no vitals filed for this visit.    Verified current warfarin dosing schedule.    Medications reconciled   Pt is on ASA as antiplatelet therapy for MVR per cardiology    A/P   INR  -therapeutic.     Warfarin dosing recommendation: Instructed pt to continue on with current regimen.      Pt educated to contact our clinic with any changes in medications or s/s of bleeding or thrombosis. Pt is aware to seek immediate medical attention for falls, head injury or deep cuts.    Follow up appointment in 2 week(s).    Gonzales Bright, pharmacy intern    Bob Chung, JorgeD, BCACP

## 2023-04-26 NOTE — PROGRESS NOTES
Chief Complaint   Patient presents with    Atrial Fibrillation     Subjective     Eduardo Aponte is a 67 y.o. male who presents today for 1 month post mitral valve replacement on 3/28/23.    He is a patient of Dr. Hua and Dr. Glover.    Hx of HLD with CAD (40% stenosis in LAD), PAF with prior ablation in '22 and now GENET ligation with chronic anticoagulation, S/P MV replacement, and emphysema.    He presents today alone. He is overall feeling well and has no issues post-operatively.    He has noticed some improvement in his breathing. His weight is stable. No other symptoms to report.    His incision is healing well. He is wanting to get urgent dental work. He will send over this clearance.    He has no chest pain, edema, dizziness/lightheadedness, or palpitations.    Past Medical History:   Diagnosis Date    Arrhythmia     A FIB    Arthritis     HANDS AND SHOULDERS    Breath shortness     WITH EXERTION    Cataract     IOL 5 YEARS AGO    Dermatitis     chronic    Emphysema of lung (HCC) 02/10/2023    daily and prn inhalers    Heart burn     Heart valve disease 10/2022    High cholesterol     medicated    Indigestion 05/2022    Moderate mitral regurgitation by prior echocardiogram 10/24/2022    Shortness of breath      Past Surgical History:   Procedure Laterality Date    MITRAL VALVE REPLACE N/A 3/28/2023    Procedure: MITRAL VALVE REPLACEMENT;  Surgeon: Libertad Hebert M.D.;  Location: SURGERY [facility];  Service: Cardiothoracic    ECHOCARDIOGRAM, TRANSESOPHAGEAL, INTRAOPERATIVE N/A 3/28/2023    Procedure: ECHOCARDIOGRAM, TRANSESOPHAGEAL, INTRAOPERATIVE;  Surgeon: Libertad Hebert M.D.;  Location: SURGERY [facility];  Service: Cardiothoracic    CLIPPING, LEFT ATRIAL APPENDAGE N/A 3/28/2023    Procedure: EXCISION, LEFT ATRIAL APPENDAGE;  Surgeon: Libertad Hebert M.D.;  Location: SURGERY [facility];  Service: Cardiothoracic    ATRIAL SEPTAL DEFECT REPAIR N/A 3/28/2023    Procedure: REPAIR, ATRIAL SEPTAL  DEFECT;  Surgeon: Libertad Hebert M.D.;  Location: SURGERY [facility];  Service: Cardiothoracic    ANGIOGRAM  2023    OTHER CARDIAC SURGERY  10/13/2022    ablation    MASTECTOMY BILATERAL SUBQ Bilateral     at age 15 for gynecomastia    OTHER Bilateral     cataract extraction with IOL     Family History   Problem Relation Age of Onset    Cancer Mother     Arterial Aneurysm Father      Social History     Socioeconomic History    Marital status:      Spouse name: Not on file    Number of children: Not on file    Years of education: Not on file    Highest education level: Bachelor's degree (e.g., BA, AB, BS)   Occupational History    Not on file   Tobacco Use    Smoking status: Former     Packs/day: 1.00     Years: 45.00     Pack years: 45.00     Types: Cigarettes     Quit date: 2022     Years since quittin.0    Smokeless tobacco: Never   Vaping Use    Vaping Use: Never used   Substance and Sexual Activity    Alcohol use: Not Currently    Drug use: Never    Sexual activity: Not Currently     Comment:    Other Topics Concern    Not on file   Social History Narrative    Not on file     Social Determinants of Health     Financial Resource Strain: Low Risk     Difficulty of Paying Living Expenses: Not very hard   Food Insecurity: No Food Insecurity    Worried About Running Out of Food in the Last Year: Never true    Ran Out of Food in the Last Year: Never true   Transportation Needs: No Transportation Needs    Lack of Transportation (Medical): No    Lack of Transportation (Non-Medical): No   Physical Activity: Insufficiently Active    Days of Exercise per Week: 3 days    Minutes of Exercise per Session: 30 min   Stress: Stress Concern Present    Feeling of Stress : To some extent   Social Connections: Moderately Integrated    Frequency of Communication with Friends and Family: Twice a week    Frequency of Social Gatherings with Friends and Family: Once a week    Attends Sabianist Services: Never     Active Member of Clubs or Organizations: Yes    Attends Club or Organization Meetings: More than 4 times per year    Marital Status:    Intimate Partner Violence: Not on file   Housing Stability: Low Risk     Unable to Pay for Housing in the Last Year: No    Number of Places Lived in the Last Year: 1    Unstable Housing in the Last Year: No     Allergies   Allergen Reactions    Bee Swelling     Bee Stings- swelling    Other Environmental      Horses- eyes will swell shut, sneezing  Russian Thistle- itching/sneezing, watery eyes     Outpatient Encounter Medications as of 4/26/2023   Medication Sig Dispense Refill    aspirin 81 MG EC tablet Take 1 Tablet by mouth every day. 100 Tablet 3    rosuvastatin (CRESTOR) 10 MG Tab Take 0.5 Tablets by mouth every evening. 100 Tablet 3    metoprolol tartrate (LOPRESSOR) 50 MG Tab Take 1 Tablet by mouth 2 times a day. 200 Tablet 3    oxyCODONE immediate-release (ROXICODONE) 5 MG Tab Take 1 Tablet by mouth every 6 hours as needed for Severe Pain for up to 14 days. Indications: Acute Pain, post-surgical pain (sternotomy) 56 Tablet 0    acetaminophen (TYLENOL) 500 MG Tab Take 2 Tablets by mouth every 6 hours as needed for Mild Pain or Moderate Pain. 30 Tablet 0    warfarin (COUMADIN) 5 MG Tab Take 0.5 Tablets by mouth every day at 6 PM. 30 Tablet 2    clobetasol (TEMOVATE) 0.05 % Cream Apply  topically 2 times a day.      albuterol 108 (90 Base) MCG/ACT Aero Soln inhalation aerosol Inhale 2 Puffs every 6 hours as needed for Shortness of Breath. 18 g 3    budesonide-formoterol (SYMBICORT) 80-4.5 MCG/ACT Aerosol Inhale 2 Puffs 2 times a day. Use spacer. Rinse mouth after each use. 41.72 g 3    [DISCONTINUED] aspirin 81 MG EC tablet Take 1 Tablet by mouth every day. 30 Tablet 2    [DISCONTINUED] potassium chloride SA (KDUR) 20 MEQ Tab CR Take 1 Tablet by mouth every day. 30 Tablet 0    [DISCONTINUED] metoprolol tartrate (LOPRESSOR) 50 MG Tab Take 50 mg by mouth 2 times a day.       [DISCONTINUED] rosuvastatin (CRESTOR) 5 MG Tab Take 5 mg by mouth every day.      [DISCONTINUED] Empagliflozin (JARDIANCE) 10 MG Tab Take 1 Tablet by mouth every day. 30 Tablet 6    [DISCONTINUED] furosemide (LASIX) 20 MG Tab Take 1 Tablet by mouth every day. 90 Tablet 3     No facility-administered encounter medications on file as of 4/26/2023.     Review of Systems   Constitutional:  Negative for fever and malaise/fatigue.   Respiratory:  Positive for shortness of breath. Negative for cough.    Cardiovascular:  Negative for chest pain, palpitations, orthopnea, claudication, leg swelling and PND.   Gastrointestinal:  Negative for abdominal pain.   Musculoskeletal:  Negative for myalgias.   Neurological:  Negative for dizziness.            Objective     /74 (BP Location: Left arm, Patient Position: Sitting, BP Cuff Size: Adult)   Pulse 81   Resp 16   Ht 1.829 m (6')   Wt 79.4 kg (175 lb)   SpO2 95%   BMI 23.73 kg/m²     Physical Exam  Vitals and nursing note reviewed.   Constitutional:       Appearance: Normal appearance. He is well-developed and normal weight.   HENT:      Head: Normocephalic and atraumatic.   Neck:      Vascular: No JVD.   Cardiovascular:      Rate and Rhythm: Normal rate and regular rhythm.      Pulses: Normal pulses.      Heart sounds: Normal heart sounds.   Pulmonary:      Effort: Pulmonary effort is normal.      Breath sounds: Normal breath sounds.   Musculoskeletal:         General: Normal range of motion.   Skin:     General: Skin is warm and dry.      Capillary Refill: Capillary refill takes less than 2 seconds.   Neurological:      General: No focal deficit present.      Mental Status: He is alert and oriented to person, place, and time. Mental status is at baseline.   Psychiatric:         Mood and Affect: Mood normal.         Behavior: Behavior normal.         Thought Content: Thought content normal.         Judgment: Judgment normal.              Assessment & Plan     1.  Chronic atrial fibrillation (HCC)  EKG      2. Secondary hypercoagulable state (HCC)        3. H/O cardiac radiofrequency ablation 10/18/22 Dr Glover        4. History of mitral valve replacement with bioprosthetic valve        5. Coronary artery disease involving native coronary artery of native heart without angina pectoris  Lipid Profile    Basic Metabolic Panel      6. S/P MVR (mitral valve replacement)  aspirin 81 MG EC tablet        Medical Decision Making: Today's Assessment/Status/Plan:      1. S/P MV replacement in March'23  -doing well post-op  -cont asa lifelong  -incision CDI, CTS follow up next week  -SBE prophylaxis understands  -echo in 1 year  -reviewed hospital imaging, labs, and EKG  -cardiac rehab referral placed  -EKG shows SR  -OK to stop jardiance-no longer in HF  -OK to stop furosemide/potassium and watch for HF symptoms, if HF symptoms recur-he will go back on this    2. HLD with CAD (40% stenosis in LAD-angiogram in '23)  -no angina or ELMORE  -cont asa, statin  -increase rosuvastatin to 10 mg QPM and repeat lipid/cmp in 6 months  -LDL goal <70    3. PAF with prior ablation in '22 with GENET ligation in '23  -asymptomatic and no recurrence since surgery per patient  -cont coumadin 3 months  -OK to remain on ASA lifelong  -cont metoprolol 50 mg BID  -follow symptoms    Patient is to follow up with Dr. Hua/L[place] CASTAÑEDA in 6 months with labs, Dr. Glover in 3 months to discuss discontinuation of OAC.

## 2023-05-08 ENCOUNTER — OFFICE VISIT (OUTPATIENT)
Dept: CARDIOTHORACIC SURGERY | Facility: MEDICAL CENTER | Age: 68
End: 2023-05-08
Payer: MEDICARE

## 2023-05-08 VITALS
DIASTOLIC BLOOD PRESSURE: 80 MMHG | BODY MASS INDEX: 23.09 KG/M2 | TEMPERATURE: 98.3 F | SYSTOLIC BLOOD PRESSURE: 136 MMHG | WEIGHT: 170.5 LBS | HEART RATE: 105 BPM | OXYGEN SATURATION: 95 % | HEIGHT: 72 IN

## 2023-05-08 DIAGNOSIS — Z09 POSTOP CHECK: ICD-10-CM

## 2023-05-08 PROCEDURE — 99024 POSTOP FOLLOW-UP VISIT: CPT | Performed by: NURSE PRACTITIONER

## 2023-05-08 ASSESSMENT — FIBROSIS 4 INDEX: FIB4 SCORE: 0.62

## 2023-05-10 ENCOUNTER — ANTICOAGULATION VISIT (OUTPATIENT)
Dept: VASCULAR LAB | Facility: MEDICAL CENTER | Age: 68
End: 2023-05-10
Attending: INTERNAL MEDICINE
Payer: MEDICARE

## 2023-05-10 DIAGNOSIS — Z95.3 HISTORY OF MITRAL VALVE REPLACEMENT WITH BIOPROSTHETIC VALVE: ICD-10-CM

## 2023-05-10 DIAGNOSIS — I48.20 CHRONIC ATRIAL FIBRILLATION (HCC): ICD-10-CM

## 2023-05-10 DIAGNOSIS — D68.69 SECONDARY HYPERCOAGULABLE STATE (HCC): ICD-10-CM

## 2023-05-10 LAB — INR PPP: 3 (ref 2–3.5)

## 2023-05-10 PROCEDURE — 85610 PROTHROMBIN TIME: CPT

## 2023-05-10 PROCEDURE — 99212 OFFICE O/P EST SF 10 MIN: CPT

## 2023-05-10 NOTE — PROGRESS NOTES
Anticoagulation Summary  As of 5/10/2023      INR goal:  2.0-3.0   TTR:  100.0 % (3.6 wk)   INR used for dosing:  3.00 (5/10/2023)   Warfarin maintenance plan:  5 mg (5 mg x 1) every Mon, Fri; 2.5 mg (5 mg x 0.5) all other days   Weekly warfarin total:  22.5 mg   Plan last modified:  Jorge GallegosD (4/14/2023)   Next INR check:  5/24/2023   Target end date:  7/5/2023    Indications    Chronic atrial fibrillation (HCC) [I48.20]  History of mitral valve replacement with bioprosthetic valve [Z95.3]  Secondary hypercoagulable state (HCC) [D68.69]                 Anticoagulation Episode Summary       INR check location:      Preferred lab:      Send INR reminders to:      Comments:  warfarin for 3 months then back to [facility] for life                  Refer to Patient Findings for HPI:  Patient Findings       Negatives:  Signs/symptoms of thrombosis, Signs/symptoms of bleeding, Laboratory test error suspected, Change in health, Change in alcohol use, Change in activity, Upcoming invasive procedure, Emergency department visit, Upcoming dental procedure, Missed doses, Extra doses, Change in medications, Change in diet/appetite, Hospital admission, Bruising, Other complaints            There were no vitals filed for this visit.   pt declined vitals    Verified current warfarin dosing schedule.    Medications reconciled   Pt is on ASA as antiplatelet therapy for MVR per cardiology      A/P   INR  -therapeutic. Increased from prior due to decreased vegetable intake.      Warfarin dosing recommendation: Decrease warfarin this week to 2.5 mg on Friday and then resume previous warfarin regimen.     Pt educated to contact our clinic with any changes in medications or s/s of bleeding or thrombosis. Pt is aware to seek immediate medical attention for falls, head injury or deep cuts.    Follow up appointment in 2 week(s) at [facility].    Jorge MorrisD

## 2023-05-12 PROCEDURE — RXMED WILLOW AMBULATORY MEDICATION CHARGE: Performed by: NURSE PRACTITIONER

## 2023-05-13 ENCOUNTER — PHARMACY VISIT (OUTPATIENT)
Dept: PHARMACY | Facility: MEDICAL CENTER | Age: 68
End: 2023-05-13
Payer: COMMERCIAL

## 2023-05-24 ENCOUNTER — OFFICE VISIT (OUTPATIENT)
Dept: MEDICAL GROUP | Facility: PHYSICIAN GROUP | Age: 68
End: 2023-05-24
Payer: MEDICARE

## 2023-05-24 VITALS
BODY MASS INDEX: 22.01 KG/M2 | SYSTOLIC BLOOD PRESSURE: 120 MMHG | WEIGHT: 162.5 LBS | TEMPERATURE: 98.1 F | DIASTOLIC BLOOD PRESSURE: 82 MMHG | OXYGEN SATURATION: 96 % | HEART RATE: 83 BPM | HEIGHT: 72 IN

## 2023-05-24 DIAGNOSIS — Z00.00 MEDICARE ANNUAL WELLNESS VISIT, SUBSEQUENT: Primary | ICD-10-CM

## 2023-05-24 PROCEDURE — 3079F DIAST BP 80-89 MM HG: CPT | Performed by: FAMILY MEDICINE

## 2023-05-24 PROCEDURE — 3074F SYST BP LT 130 MM HG: CPT | Performed by: FAMILY MEDICINE

## 2023-05-24 PROCEDURE — G0439 PPPS, SUBSEQ VISIT: HCPCS | Performed by: FAMILY MEDICINE

## 2023-05-24 ASSESSMENT — ENCOUNTER SYMPTOMS: GENERAL WELL-BEING: FAIR

## 2023-05-24 ASSESSMENT — FIBROSIS 4 INDEX: FIB4 SCORE: 0.62

## 2023-05-24 ASSESSMENT — ACTIVITIES OF DAILY LIVING (ADL): BATHING_REQUIRES_ASSISTANCE: 0

## 2023-05-24 ASSESSMENT — PATIENT HEALTH QUESTIONNAIRE - PHQ9: CLINICAL INTERPRETATION OF PHQ2 SCORE: 0

## 2023-05-24 NOTE — PROGRESS NOTES
Chief Complaint   Patient presents with    Annual Wellness Visit     Medicare annual     HPI:  Eduardo Aponte is a 67 y.o. here for Medicare Annual Wellness Visit     Patient Active Problem List    Diagnosis Date Noted    Coronary artery disease involving native coronary artery of native heart without angina pectoris 04/26/2023    Other hyperlipidemia 04/26/2023    Secondary hypercoagulable state (HCC) 04/10/2023    History of mitral valve replacement with bioprosthetic valve 04/05/2023    Pulmonary emphysema (HCC) 02/10/2023    H/O cardiac radiofrequency ablation 10/18/22 Dr Golver 11/29/2022    Chronic atrial fibrillation (HCC) 10/11/2022       Current Outpatient Medications   Medication Sig Dispense Refill    aspirin 81 MG EC tablet Take 1 Tablet by mouth every day. 100 Tablet 3    rosuvastatin (CRESTOR) 10 MG Tab Take 0.5 Tablets by mouth every evening. 100 Tablet 3    metoprolol tartrate (LOPRESSOR) 50 MG Tab Take 1 Tablet by mouth 2 times a day. 200 Tablet 3    acetaminophen (TYLENOL) 500 MG Tab Take 2 Tablets by mouth every 6 hours as needed for Mild Pain or Moderate Pain. 30 Tablet 0    warfarin (COUMADIN) 5 MG Tab Take 0.5 Tablets by mouth every day at 6 PM. 30 Tablet 2    albuterol 108 (90 Base) MCG/ACT Aero Soln inhalation aerosol Inhale 2 Puffs every 6 hours as needed for Shortness of Breath. 18 g 3    budesonide-formoterol (SYMBICORT) 80-4.5 MCG/ACT Aerosol Inhale 2 Puffs 2 times a day. Use spacer. Rinse mouth after each use. 41.72 g 3    clobetasol (TEMOVATE) 0.05 % Cream Apply  topically 2 times a day. (Patient not taking: Reported on 5/24/2023)       No current facility-administered medications for this visit.          Current supplements as per medication list.     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 His smoking use included cigarettes. He has  a 45.00 pack-year smoking history. He has never used smokeless tobacco. He reports that he does not currently use alcohol. He reports that he does not use drugs.  Counseling given: Not Answered      ROS:    Gait: Uses no assistive device  Ostomy: No  Other tubes: No  Amputations: No  Chronic oxygen use: No  Last eye exam: 2017  Wears hearing aids: No   : Denies any urinary leakage during the last 6 months    Screenin2023  Depression Screening  Little interest or pleasure in doing things?  0 - not at all  Feeling down, depressed , or hopeless? 0 - not at all  Trouble falling or staying asleep, or sleeping too much?     Feeling tired or having little energy?     Poor appetite or overeating?     Feeling bad about yourself - or that you are a failure or have let yourself or your family down?    Trouble concentrating on things, such as reading the newspaper or watching television?    Moving or speaking so slowly that other people could have noticed.  Or the opposite - being so fidgety or restless that you have been moving around a lot more than usual?     Thoughts that you would be better off dead, or of hurting yourself?     Patient Health Questionnaire Score:      If depressive symptoms identified deferred to follow up visit unless specifically addressed in assessment and plan.    Interpretation of PHQ-9 Total Score   Score Severity   1-4 No Depression   5-9 Mild Depression   10-14 Moderate Depression   15-19 Moderately Severe Depression   20-27 Severe Depression    Screening for Cognitive Impairment  Three Minute Recall (Banana, Sunrise, Chair) 1/3 banana  Agustin clock face with all 12 numbers and set the hands to show 20 past 8.       Cognitive concerns identified deferred for follow up unless specifically addressed in assessment and plan.    Fall Risk Assessment  Has the patient had two or more falls in the last year or any fall with injury in the last year?  Yes    Safety Assessment  Throw rugs on floor.   Yes  Handrails on all stairs.  Yes  Good lighting in all hallways.  Yes  Difficulty hearing.  Yes  Patient counseled about all safety risks that were identified.    Functional Assessment ADLs  Are there any barriers preventing you from cooking for yourself or meeting nutritional needs?  No.    Are there any barriers preventing you from driving safely or obtaining transportation?  No.    Are there any barriers preventing you from using a telephone or calling for help?  No    Are there any barriers preventing you from shopping?  No.    Are there any barriers preventing you from taking care of your own finances?  No    Are there any barriers preventing you from managing your medications?  No    Are there any barriers preventing you from showering, bathing or dressing yourself? No    Are you currently engaging in any exercise or physical activity?  Yes.    What is your perception of your health? Fair    Advance Care Planning  Do you have an Advance Directive, Living Will, Durable Power of , or POLST? No                 Health Maintenance Summary            Overdue - Annual Wellness Visit (Every 366 Days) Overdue - never done      No completion history exists for this topic.              Overdue - HEPATITIS C SCREENING (Once) Overdue - never done      No completion history exists for this topic.              Overdue - COLORECTAL CANCER SCREENING (COLONOSCOPY - Every 10 Years) Overdue - never done      No completion history exists for this topic.              Postponed - IMM ZOSTER VACCINES (1 of 2) Postponed until 9/24/2023      No completion history exists for this topic.              Postponed - COVID-19 Vaccine (4 - Booster for Pfizer series) Postponed until 2/8/2024      10/18/2021  Imm Admin: PFIZER PURPLE CAP SARS-COV-2 VACCINATION (12+)    04/18/2021  Imm Admin: PFIZER PURPLE CAP SARS-COV-2 VACCINATION (12+)    03/28/2021  Imm Admin: PFIZER PURPLE CAP SARS-COV-2 VACCINATION (12+)              Annual  Pulmonary Function Test / Spirometry (Yearly) Next due on 2023  PULMONARY FUNCTION TESTS -Test requested: Complete Pulmonary Function Test              IMM DTaP/Tdap/Td Vaccine (2 - Td or Tdap) Next due on 10/14/2029      10/14/2019  Imm Admin: Tdap Vaccine              ABDOMINAL AORTIC ANEURYSM (AAA) SCREEN  Completed      10/11/2022  CT-CTA COMPLETE THORACOABDOMINAL AORTA              IMM INFLUENZA (Series Information) Completed      10/14/2022  Imm Admin: Influenza Vaccine Adult HD    10/16/2021  Imm Admin: Influenza Vaccine Adult HD    2019  Imm Admin: Influenza Vaccine Quad Recombinant    10/22/2018  Imm Admin: Influenza Vaccine Quad Inj (Preserved)              IMM PNEUMOCOCCAL VACCINE: 65+ Years (Series Information) Completed      10/14/2022  Imm Admin: Pneumococcal Conjugate Vaccine (PCV20)              IMM HEP B VACCINE (Series Information) Aged Out      No completion history exists for this topic.              HPV Vaccines (Series Information) Aged Out      No completion history exists for this topic.              IMM MENINGOCOCCAL ACWY VACCINE (Series Information) Aged Out      No completion history exists for this topic.                    Patient Care Team:  Peter Iglesias M.D. as PCP - General (Family Medicine)  [facility]      Social History     Tobacco Use    Smoking status: Former     Packs/day: 1.00     Years: 45.00     Pack years: 45.00     Types: Cigarettes     Quit date: 2022     Years since quittin.1    Smokeless tobacco: Never   Vaping Use    Vaping Use: Never used   Substance Use Topics    Alcohol use: Not Currently    Drug use: Never     Family History   Problem Relation Age of Onset    Cancer Mother     Arterial Aneurysm Father      He  has a past medical history of Arrhythmia, Arthritis, Breath shortness, Cataract, Dermatitis, Emphysema of lung (HCC) (02/10/2023), Heart burn, Heart valve disease (10/2022), High cholesterol,  Indigestion (05/2022), Moderate mitral regurgitation by prior echocardiogram (10/24/2022), and Shortness of breath.    He has no past medical history of Cough, GERD (gastroesophageal reflux disease), Painful breathing, Sputum production, Ulcer, or Wheezing.   Past Surgical History:   Procedure Laterality Date    MITRAL VALVE REPLACE N/A 3/28/2023    Procedure: MITRAL VALVE REPLACEMENT;  Surgeon: Libertad Hebert M.D.;  Location: SURGERY [facility];  Service: Cardiothoracic    ECHOCARDIOGRAM, TRANSESOPHAGEAL, INTRAOPERATIVE N/A 3/28/2023    Procedure: ECHOCARDIOGRAM, TRANSESOPHAGEAL, INTRAOPERATIVE;  Surgeon: Libertad Hebert M.D.;  Location: SURGERY [facility];  Service: Cardiothoracic    CLIPPING, LEFT ATRIAL APPENDAGE N/A 3/28/2023    Procedure: EXCISION, LEFT ATRIAL APPENDAGE;  Surgeon: Libertad Hebert M.D.;  Location: SURGERY [facility];  Service: Cardiothoracic    ATRIAL SEPTAL DEFECT REPAIR N/A 3/28/2023    Procedure: REPAIR, ATRIAL SEPTAL DEFECT;  Surgeon: Libertad Hebert M.D.;  Location: SURGERY [facility];  Service: Cardiothoracic    ANGIOGRAM  03/2023    OTHER CARDIAC SURGERY  10/13/2022    ablation    MASTECTOMY BILATERAL SUBQ Bilateral     at age 15 for gynecomastia    OTHER Bilateral     cataract extraction with IOL     Exam:   /82 (BP Location: Left arm, Patient Position: Sitting, BP Cuff Size: Adult)   Pulse 83   Temp 36.7 °C (98.1 °F) (Temporal)   Ht 1.829 m (6')   Wt 73.7 kg (162 lb 8 oz)   SpO2 96%  Body mass index is 22.04 kg/m².    Hearing good.    Dentition good  Alert, oriented in no acute distress.  Eye contact is good, speech goal directed, affect calm    Assessment and Plan.   1. Medicare annual wellness visit, subsequent  Discussed health care maintenance and provided anticipatory guidance regarding topics including routine lab screenings, cancer screening, healthy diet, regular aerobic exercise.   - He is going to be moving to [place] soon and so is going to need to  establish with a new primary care provider and cardiologist.  Generally he is doing well, in 1 month we will be discontinuing warfarin after his ablation and atrial appendage closure.  Services suggested: No services needed at this time  Health Care Screening: Age-appropriate preventive services recommended by USPTF and ACIP covered by Medicare were discussed today. Services ordered if indicated and agreed upon by the patient.   - He is due for colonoscopy but is going to wait until he moves to [place] to have this done.  Referrals offered: Community-based lifestyle interventions to reduce health risks and promote self-management and wellness, fall prevention, nutrition, physical activity, tobacco-use cessation, weight loss, and mental health services as per orders if indicated.    Discussion today about general wellness and lifestyle habits:    Prevent falls and reduce trip hazards; Cautioned about securing or removing rugs.  Have a working fire alarm and carbon monoxide detector;   Engage in regular physical activity and social activities

## 2023-05-25 ENCOUNTER — ANTICOAGULATION VISIT (OUTPATIENT)
Dept: MEDICAL GROUP | Facility: PHYSICIAN GROUP | Age: 68
End: 2023-05-25
Payer: MEDICARE

## 2023-05-25 DIAGNOSIS — D68.69 SECONDARY HYPERCOAGULABLE STATE (HCC): ICD-10-CM

## 2023-05-25 DIAGNOSIS — Z95.3 HISTORY OF MITRAL VALVE REPLACEMENT WITH BIOPROSTHETIC VALVE: ICD-10-CM

## 2023-05-25 DIAGNOSIS — I48.20 CHRONIC ATRIAL FIBRILLATION (HCC): ICD-10-CM

## 2023-05-25 LAB — INR PPP: 2.3 (ref 2–3.5)

## 2023-05-25 PROCEDURE — 93793 ANTICOAG MGMT PT WARFARIN: CPT | Performed by: FAMILY MEDICINE

## 2023-05-25 PROCEDURE — 85610 PROTHROMBIN TIME: CPT | Performed by: FAMILY MEDICINE

## 2023-05-25 NOTE — PROGRESS NOTES
OP Anticoagulation Service Note    Date: 2023    Anticoagulation Summary  As of 2023      INR goal:  2.0-3.0   TTR:  97.1 % (1.6 mo)   INR used for dosin.30 (2023)   Warfarin maintenance plan:  5 mg (5 mg x 1) every Mon, Fri; 2.5 mg (5 mg x 0.5) all other days   Weekly warfarin total:  22.5 mg   Plan last modified:  Bartolome Lazo, PharmD (2023)   Next INR check:  2023   Target end date:  2023    Indications    Chronic atrial fibrillation (HCC) [I48.20]  History of mitral valve replacement with bioprosthetic valve [Z95.3]  Secondary hypercoagulable state (HCC) [D68.69]                 Anticoagulation Episode Summary       INR check location:      Preferred lab:      Send INR reminders to:      Comments:  warfarin for 3 months per Dr. Glover, then  ASA for life          Anticoagulation Patient Findings  Patient Findings       Negatives:  Signs/symptoms of thrombosis, Signs/symptoms of bleeding, Laboratory test error suspected, Change in health, Change in alcohol use, Change in activity, Upcoming invasive procedure, Emergency department visit, Upcoming dental procedure, Missed doses, Extra doses, Change in medications, Change in diet/appetite, Hospital admission, Bruising, Other complaints            [facility]      HPI:     Eduardo Aponte is in the Anticoagulation Clinic today for a INR check on their anticoagulation therapy.     The reason for today's visit is to prevent morbidity and mortality from a blood clot and/or stroke and to reduce the risk of bleeding while on a anticoagulant.     PCP:  Peter Iglesias M.D.  [address]  [facility] [id number] [phone]    3 vitals included with today's appt-unless patient declined:  (BP, HR, weight, ht, RR)   There were no vitals filed for this visit.    Assessment:     INR  therapeutic.   Is pt on antiplatelet therapy: y  Is pt on NSAID: n      Current Outpatient Medications:     aspirin, 81 mg, Oral,  DAILY    rosuvastatin, 5 mg, Oral, Q EVENING    metoprolol tartrate, 50 mg, Oral, BID    acetaminophen, 1,000 mg, Oral, Q6HRS PRN    warfarin, 2.5 mg, Oral, DAILY AT 1800    clobetasol, Apply  topically 2 times a day. (Patient not taking: Reported on 5/24/2023)    albuterol, 2 Puff, Inhalation, Q6HRS PRN    budesonide-formoterol, 2 Puff, Inhalation, BID  Lab Results   Component Value Date/Time    HBA1C 5.9 (H) 03/27/2023 10:31 AM      Lab Results   Component Value Date/Time    CHOLSTRLTOT 178 04/14/2023 09:51 AM     (H) 04/14/2023 09:51 AM    HDL 42 04/14/2023 09:51 AM    TRIGLYCERIDE 97 04/14/2023 09:51 AM       Lab Results   Component Value Date/Time    SODIUM 137 04/14/2023 09:51 AM    POTASSIUM 4.7 04/14/2023 09:51 AM    CHLORIDE 102 04/14/2023 09:51 AM    CO2 26 04/14/2023 09:51 AM    GLUCOSE 108 (H) 04/14/2023 09:51 AM    BUN 15 04/14/2023 09:51 AM    CREATININE 0.94 04/14/2023 09:51 AM     Lab Results   Component Value Date/Time    ASTSGOT 21 04/14/2023 09:51 AM    ALTSGPT 20 04/14/2023 09:51 AM    TBILIRUBIN 0.3 04/14/2023 09:51 AM    INR 2.30 05/25/2023 09:49 AM    ALBUMIN 4.0 04/14/2023 09:51 AM      Lab Results   Component Value Date/Time    RBC 5.55 04/14/2023 09:51 AM    HEMOGLOBIN 15.9 04/14/2023 09:51 AM    HEMATOCRIT 49.7 04/14/2023 09:51 AM      No results found for: MALBCRT, MICROALBUR    Plan:     Continue the same warfarin dose, as noted above.       Follow-up:     On date seen above  This decision was made using shared decision making with the pt and benefits vs risks were discussed.      Additional information discussed with patient:     Pt educated to contact our clinic with any changes in medications or s/s of bleeding or thrombosis.  Education was provided today regarding tips to reduce their bleed risk and dietary constraints while on a anticoagulant.    National recommendations regarding anticoagulation therapy:     The CHEST guidelines recommends frequent INR monitoring at regular  intervals (a few days up to a max of 12 weeks) to ensure patients are on the proper dose of warfarin, and patients are not having any complications from therapy.  INRs can dramatically change over a short time period due to diet, medications, and medical conditions.     Aayush Jacques, PharmD, MS, BCACP, [facility]  Phone: [phone]  Fax: [phone]  On call: [phone]  General scheduling/information [phone]  For emergencies please dial 911  Please do not use Igea for urgent matters, call the phone numbers listed above.    This note was created using voice recognition software (Dragon). The accuracy of the dictation is limited by the abilities of the software. I have reviewed the note prior to signing, however some errors in grammar and context are still possible. If you have any questions related to this note please do not hesitate to contact our office.

## 2023-06-12 ENCOUNTER — PATIENT MESSAGE (OUTPATIENT)
Dept: CARDIOLOGY | Facility: MEDICAL CENTER | Age: 68
End: 2023-06-12
Payer: MEDICARE

## 2023-06-12 DIAGNOSIS — E78.49 OTHER HYPERLIPIDEMIA: ICD-10-CM

## 2023-06-13 RX ORDER — ROSUVASTATIN CALCIUM 10 MG/1
10 TABLET, COATED ORAL EVERY EVENING
Qty: 100 TABLET | Refills: 3 | Status: SHIPPED | OUTPATIENT
Start: 2023-06-13

## 2023-06-13 RX ORDER — ROSUVASTATIN CALCIUM 10 MG/1
10 TABLET, COATED ORAL EVERY EVENING
Qty: 90 TABLET | Refills: 3 | Status: CANCELLED | OUTPATIENT
Start: 2023-06-13

## 2023-06-16 LAB — EKG IMPRESSION: NORMAL

## 2023-06-16 PROCEDURE — 93000 ELECTROCARDIOGRAM COMPLETE: CPT | Performed by: INTERNAL MEDICINE

## 2023-07-19 ENCOUNTER — OFFICE VISIT (OUTPATIENT)
Dept: CARDIOLOGY | Facility: MEDICAL CENTER | Age: 68
End: 2023-07-19
Attending: INTERNAL MEDICINE
Payer: MEDICARE

## 2023-07-19 VITALS
WEIGHT: 153 LBS | HEART RATE: 75 BPM | HEIGHT: 72 IN | SYSTOLIC BLOOD PRESSURE: 116 MMHG | BODY MASS INDEX: 20.72 KG/M2 | RESPIRATION RATE: 16 BRPM | OXYGEN SATURATION: 98 % | DIASTOLIC BLOOD PRESSURE: 72 MMHG

## 2023-07-19 DIAGNOSIS — Z95.3 HISTORY OF MITRAL VALVE REPLACEMENT WITH BIOPROSTHETIC VALVE: ICD-10-CM

## 2023-07-19 DIAGNOSIS — Z98.890 H/O CARDIAC RADIOFREQUENCY ABLATION: ICD-10-CM

## 2023-07-19 DIAGNOSIS — I48.20 CHRONIC ATRIAL FIBRILLATION (HCC): ICD-10-CM

## 2023-07-19 PROCEDURE — 3078F DIAST BP <80 MM HG: CPT | Performed by: INTERNAL MEDICINE

## 2023-07-19 PROCEDURE — 99212 OFFICE O/P EST SF 10 MIN: CPT | Performed by: INTERNAL MEDICINE

## 2023-07-19 PROCEDURE — 99214 OFFICE O/P EST MOD 30 MIN: CPT | Performed by: INTERNAL MEDICINE

## 2023-07-19 PROCEDURE — 93010 ELECTROCARDIOGRAM REPORT: CPT | Performed by: INTERNAL MEDICINE

## 2023-07-19 PROCEDURE — 3074F SYST BP LT 130 MM HG: CPT | Performed by: INTERNAL MEDICINE

## 2023-07-19 PROCEDURE — 93005 ELECTROCARDIOGRAM TRACING: CPT | Performed by: INTERNAL MEDICINE

## 2023-07-19 ASSESSMENT — FIBROSIS 4 INDEX: FIB4 SCORE: 0.62

## 2023-07-19 NOTE — PROGRESS NOTES
Arrhythmia Clinic Note (Established patient)    DOS: 7/19/2023    Chief complaint/Reason for consult: F/u MVR    Interval History:  Pt is a 66 yo M. History of AF s/p prior ablation with me, then severe mitral regurg s/p bioprosthetic MVR and GENET ligation. Iatrogenic ASD from prior trans-septal was also repaired at the time. He has been doing well with maintenance of sinus rhythm. Feels good. Here for follow-up before he moves up to [place] permanently. No complaints today.    ROS (+ highlighted in red):  General--Negative for fatigue, weight loss or weight gain  Cardiovascular--Negative for CP, orthopnea, PND    Past Medical History:   Diagnosis Date    Arrhythmia     A FIB    Arthritis     HANDS AND SHOULDERS    Breath shortness     WITH EXERTION    Cataract     IOL 5 YEARS AGO    Dermatitis     chronic    Emphysema of lung (HCC) 02/10/2023    daily and prn inhalers    Heart burn     Heart valve disease 10/2022    High cholesterol     medicated    Indigestion 05/2022    Moderate mitral regurgitation by prior echocardiogram 10/24/2022    Shortness of breath        Past Surgical History:   Procedure Laterality Date    MITRAL VALVE REPLACE N/A 3/28/2023    Procedure: MITRAL VALVE REPLACEMENT;  Surgeon: Libertad Hebert M.D.;  Location: SURGERY [facility];  Service: Cardiothoracic    ECHOCARDIOGRAM, TRANSESOPHAGEAL, INTRAOPERATIVE N/A 3/28/2023    Procedure: ECHOCARDIOGRAM, TRANSESOPHAGEAL, INTRAOPERATIVE;  Surgeon: Libertad Hebert M.D.;  Location: SURGERY [facility];  Service: Cardiothoracic    CLIPPING, LEFT ATRIAL APPENDAGE N/A 3/28/2023    Procedure: EXCISION, LEFT ATRIAL APPENDAGE;  Surgeon: Libertad Hebert M.D.;  Location: SURGERY [facility];  Service: Cardiothoracic    ATRIAL SEPTAL DEFECT REPAIR N/A 3/28/2023    Procedure: REPAIR, ATRIAL SEPTAL DEFECT;  Surgeon: Libertad Hebert M.D.;  Location: SURGERY [facility];  Service: Cardiothoracic    ANGIOGRAM  03/2023    OTHER CARDIAC SURGERY  10/13/2022     ablation    MASTECTOMY BILATERAL SUBQ Bilateral     at age 15 for gynecomastia    OTHER Bilateral     cataract extraction with IOL       Social History     Socioeconomic History    Marital status:      Spouse name: Not on file    Number of children: Not on file    Years of education: Not on file    Highest education level: Bachelor's degree (e.g., BA, AB, BS)   Occupational History    Not on file   Tobacco Use    Smoking status: Every Day     Packs/day: 1.00     Years: 45.00     Pack years: 45.00     Types: Cigarettes     Last attempt to quit: 2022     Years since quittin.2    Smokeless tobacco: Never   Vaping Use    Vaping Use: Never used   Substance and Sexual Activity    Alcohol use: Not Currently    Drug use: Never    Sexual activity: Not Currently     Comment:    Other Topics Concern    Not on file   Social History Narrative    Not on file     Social Determinants of Health     Financial Resource Strain: Low Risk  (2023)    Overall Financial Resource Strain (CARDIA)     Difficulty of Paying Living Expenses: Not very hard   Food Insecurity: No Food Insecurity (2023)    Hunger Vital Sign     Worried About Running Out of Food in the Last Year: Never true     Ran Out of Food in the Last Year: Never true   Transportation Needs: No Transportation Needs (2023)    PRAPARE - Transportation     Lack of Transportation (Medical): No     Lack of Transportation (Non-Medical): No   Physical Activity: Insufficiently Active (2023)    Exercise Vital Sign     Days of Exercise per Week: 3 days     Minutes of Exercise per Session: 30 min   Stress: Stress Concern Present (2023)    [employer] - Occupational Stress Questionnaire     Feeling of Stress : To some extent   Social Connections: Moderately Integrated (2023)    Social Connection and Isolation Panel [NHANES]     Frequency of Communication with Friends and Family: Twice a week     Frequency of  Social Gatherings with Friends and Family: Once a week     Attends Spiritism Services: Never     Active Member of Clubs or Organizations: Yes     Attends Club or Organization Meetings: More than 4 times per year     Marital Status:    Intimate Partner Violence: Not on file   Housing Stability: Low Risk  (4/20/2023)    Housing Stability Vital Sign     Unable to Pay for Housing in the Last Year: No     Number of Places Lived in the Last Year: 1     Unstable Housing in the Last Year: No       Family History   Problem Relation Age of Onset    Cancer Mother     Arterial Aneurysm Father        Allergies   Allergen Reactions    Bee Swelling     Bee Stings- swelling    Other Environmental      Horses- eyes will swell shut, sneezing  Russian Thistle- itching/sneezing, watery eyes       Current Outpatient Medications   Medication Sig Dispense Refill    rosuvastatin (CRESTOR) 10 MG Tab Take 1 Tablet by mouth every evening. 100 Tablet 3    aspirin 81 MG EC tablet Take 1 Tablet by mouth every day. 100 Tablet 3    metoprolol tartrate (LOPRESSOR) 50 MG Tab Take 1 Tablet by mouth 2 times a day. 200 Tablet 3    acetaminophen (TYLENOL) 500 MG Tab Take 2 Tablets by mouth every 6 hours as needed for Mild Pain or Moderate Pain. 30 Tablet 0    warfarin (COUMADIN) 5 MG Tab Take 0.5 Tablets by mouth every day at 6 PM. 30 Tablet 2    albuterol 108 (90 Base) MCG/ACT Aero Soln inhalation aerosol Inhale 2 Puffs every 6 hours as needed for Shortness of Breath. 18 g 3    budesonide-formoterol (SYMBICORT) 80-4.5 MCG/ACT Aerosol Inhale 2 Puffs 2 times a day. Use spacer. Rinse mouth after each use. 41.72 g 3    clobetasol (TEMOVATE) 0.05 % Cream Apply  topically 2 times a day. (Patient not taking: Reported on 5/24/2023)       No current facility-administered medications for this visit.       Physical Exam:  Vitals:    07/19/23 0756   BP: 116/72   BP Location: Left arm   Patient Position: Sitting   BP Cuff Size: Adult   Pulse: 75   Resp: 16    SpO2: 98%   Weight: 69.4 kg (153 lb)   Height: 1.829 m (6')     General appearance: NAD, conversant  HEENT: PERRL, neck is supple with FROM  Lungs: Clear to auscultation, normal respiratory effort  CV: RRR, 2/6 systolic murmur, no JVD  Abdomen: Soft, non-tender with normal bowel sounds  Extremities: No peripheral edema, no clubbing or cyanosis  Skin: No rash, lesions, or ulcers  Psych: Alert and oriented to person, place and time    Data:  Labs reviewed    EKG interpreted by me:  NSR    Impression/Plan:  1. Chronic atrial fibrillation (HCC)  EKG      2. History of mitral valve replacement with bioprosthetic valve        3. H/O cardiac radiofrequency ablation 10/18/22 Dr Glover          -With bioprosthetic now > 3 mo post MVR and LA appendage ligated with no further detected fib I think not unreasonable to consider stopping OAC  -I did mention risk is not zero but low given maintenance of sinus and lack of GENET  -He expressed understanding and we will stop the warfarin, continue ASA 81  -He will f/u with cardiologist in [place]    Sherice Glover MD

## 2023-08-18 DIAGNOSIS — R06.09 DYSPNEA ON EXERTION: ICD-10-CM

## 2023-08-18 NOTE — TELEPHONE ENCOUNTER
Requesting mail order  Have we ever prescribed this med? Yes.  If yes, what date? 02/08/23    Last OV: 02/09/23 with Dr Castellanos    Next OV: No Pending appt.     DX:     Medications:   Requested Prescriptions     Pending Prescriptions Disp Refills    budesonide-formoterol (SYMBICORT) 80-4.5 MCG/ACT Aerosol 41.72 g 3     Sig: Inhale 2 Puffs 2 times a day. Use spacer. Rinse mouth after each use.

## 2023-08-21 RX ORDER — BUDESONIDE AND FORMOTEROL FUMARATE DIHYDRATE 80; 4.5 UG/1; UG/1
2 AEROSOL RESPIRATORY (INHALATION) 2 TIMES DAILY
Qty: 41.72 G | Refills: 3 | Status: SHIPPED | OUTPATIENT
Start: 2023-08-21

## 2023-08-21 RX ORDER — ALBUTEROL SULFATE 90 UG/1
2 AEROSOL, METERED RESPIRATORY (INHALATION) EVERY 6 HOURS PRN
Qty: 18 G | Refills: 3 | Status: SHIPPED | OUTPATIENT
Start: 2023-08-21

## 2023-08-21 NOTE — TELEPHONE ENCOUNTER
Have we ever prescribed this med? Yes.  If yes, what date? 02/09/23    Last OV: 02/09/23 with Dr Castellanos    Next OV: No Pending appt    DX:     Medications:   Requested Prescriptions     Pending Prescriptions Disp Refills    albuterol 108 (90 Base) MCG/ACT Aero Soln inhalation aerosol 18 g 3     Sig: Inhale 2 Puffs every 6 hours as needed for Shortness of Breath.

## 2023-08-29 LAB — EKG IMPRESSION: NORMAL

## 2023-08-30 ENCOUNTER — PHARMACY VISIT (OUTPATIENT)
Dept: PHARMACY | Facility: MEDICAL CENTER | Age: 68
End: 2023-08-30
Payer: COMMERCIAL

## 2023-08-30 PROCEDURE — RXMED WILLOW AMBULATORY MEDICATION CHARGE: Performed by: STUDENT IN AN ORGANIZED HEALTH CARE EDUCATION/TRAINING PROGRAM

## 2023-09-13 ENCOUNTER — ANTICOAGULATION MONITORING (OUTPATIENT)
Dept: VASCULAR LAB | Facility: MEDICAL CENTER | Age: 68
End: 2023-09-13
Payer: MEDICARE

## 2023-09-13 DIAGNOSIS — D68.69 SECONDARY HYPERCOAGULABLE STATE (HCC): ICD-10-CM

## 2023-09-13 DIAGNOSIS — I48.20 CHRONIC ATRIAL FIBRILLATION (HCC): ICD-10-CM

## 2023-09-13 DIAGNOSIS — Z95.3 HISTORY OF MITRAL VALVE REPLACEMENT WITH BIOPROSTHETIC VALVE: ICD-10-CM

## 2023-09-13 NOTE — PROGRESS NOTES
Per chart review, warfarin d/c at last cardiology visit.  Anticoagulation episode resolved.    Vanita Kowalski PharmD, BCACP      Addendum;  Chart review. Removed warfarin form med list     Aayush Jacques PharmD, MS, BCACP, [facility]  Phone: [phone]  Fax: [phone]  On call: [phone]  General scheduling/information [phone]  For emergencies please dial 911  Please do not use Rkylin for urgent matters, call the phone numbers listed above.  Rkylin messages are answered Monday through Friday.     This note was created using voice recognition software (Dragon). The accuracy of the dictation is limited by the abilities of the software. I have reviewed the note prior to signing, however some errors in grammar and context are still possible. If you have any questions related to this note please do not hesitate to contact our office.

## 2023-09-22 PROCEDURE — RXMED WILLOW AMBULATORY MEDICATION CHARGE: Performed by: NURSE PRACTITIONER

## 2023-09-25 ENCOUNTER — PHARMACY VISIT (OUTPATIENT)
Dept: PHARMACY | Facility: MEDICAL CENTER | Age: 68
End: 2023-09-25
Payer: COMMERCIAL

## (undated) DEVICE — SODIUM CHL IRRIGATION 0.9% 1000ML (12EA/CA)

## (undated) DEVICE — SLEEVE, VASO, THIGH, MED

## (undated) DEVICE — LACTATED RINGERS INJ 1000 ML - (14EA/CA 60CA/PF)

## (undated) DEVICE — GLOVE BIOGEL SZ 8.5 SURGICAL PF LTX - (50PR/BX 4BX/CA)

## (undated) DEVICE — ELECTRODE RADIOLUCNT SOLID GEL DEFIB PADS (12EA/CA)

## (undated) DEVICE — DRAPE SLUSH WARMER DISC (24EA/CA)

## (undated) DEVICE — SUTURE 6-0 PROLENE RB-2 D/A 30 (36PK/BX)"

## (undated) DEVICE — DERMABOND ADVANCED - (12EA/BX)

## (undated) DEVICE — ORGANIZER SUTURE GABBAY-FRAT - ER STERILE (3/SET 4ST/BX)

## (undated) DEVICE — SET BIFURCATED BLOOD - (48EA/CS)

## (undated) DEVICE — INSERT STEALTH #5 - (10/BX)

## (undated) DEVICE — TUBING CLEARLINK DUO-VENT - C-FLO (48EA/CA)

## (undated) DEVICE — PAD LAP STERILE 18 X 18 - (5/PK 40PK/CA)

## (undated) DEVICE — SUTURE 4-0 PROLENE RB-1 D/A 36 (36PK/BX)"

## (undated) DEVICE — SET LEADWIRE 5 LEAD BEDSIDE DISPOSABLE ECG (1SET OF 5/EA)

## (undated) DEVICE — FIBRILLAR SURGICEL 4X4 - 10/CA

## (undated) DEVICE — D-5-W INJ. 500 ML - (24EA/CA)

## (undated) DEVICE — TRAY SURESTEP FOLEY TEMP SENSING 16FR (10EA/CA) ORDER  #18764 FOR TEMP FOLEY ONLY

## (undated) DEVICE — WIRE STEEL 5-0 B&S 20 OHS - 5/PK 12PK/BX ITEM. D5329 OR D6625 CAN BE USED AS A SUB

## (undated) DEVICE — CATHETER THERMALDILUTION SWAN - (5EA/CA)

## (undated) DEVICE — SUTURE 2-0 ETHIBOND V-5 - (6/BX)

## (undated) DEVICE — TUBE CHEST 32FR. RIGHT ANGLED (10EA/CA)

## (undated) DEVICE — MICRODRIP PRIMARY VENTED 60 (48EA/CA) THIS WAS PART #2C8428 WHICH WAS DISCONTINUED

## (undated) DEVICE — SODIUM CHL. INJ. 0.9% 500ML (24EA/CA 50CA/PF)

## (undated) DEVICE — TOWEL STOP TIMEOUT SAFETY FLAG (40EA/CA)

## (undated) DEVICE — Device

## (undated) DEVICE — SUTURE 5-0 PROLENE C-1 D/A 24 (36PK/BX)"

## (undated) DEVICE — SUTURE OHS

## (undated) DEVICE — SET FLUID WARMING STANDARD FLOW - (10/CA)

## (undated) DEVICE — GLOVE BIOGEL SZ 7.5 SURGICAL PF LTX - (50PR/BX 4BX/CA)

## (undated) DEVICE — STOPCOCK MALE 4-WAY - (50/CA)

## (undated) DEVICE — KIT RADIAL ARTERY 20GA W/MAX BARRIER AND BIOPATCH  (5EA/CA) #10740 IS FOR THE SET RADIAL ARTERIAL

## (undated) DEVICE — SUTURE 4-0 PROLENE V-7 D/A (36PK/BX)

## (undated) DEVICE — ARMBOARD  SMALL IV 9 INLONG - (25EA/CA)

## (undated) DEVICE — ELECTRODE DUAL RETURN W/ CORD - (50/PK)

## (undated) DEVICE — SENSOR CEREBRAL AND SOMATIC MONITORING (20/CA)

## (undated) DEVICE — GOWN WARMING STANDARD FLEX - (30/CA)

## (undated) DEVICE — SUTURE 2-0 ETHIBOND V-5 30 (12EA/BX)"

## (undated) DEVICE — KIT CATHETERIZATION TWO-LUMEN CENTRAL VENOUS PI CVC (5EA/CA)

## (undated) DEVICE — CHLORAPREP 26 ML APPLICATOR - ORANGE TINT(25/CA)

## (undated) DEVICE — SET EXTENSION WITH 2 PORTS (48EA/CA) ***PART #2C8610 IS A SUBSTITUTE*****

## (undated) DEVICE — BAG RESUSCITATION DISPOSABLE - WITH MASK (10 EA/CA)

## (undated) DEVICE — BLADE SURGICAL #15 - (50/BX 3BX/CA)

## (undated) DEVICE — SUCTION INSTRUMENT YANKAUER BULBOUS TIP W/O VENT (50EA/CA)

## (undated) DEVICE — KIT INTROPERCUTANEOUS SHEATH - 8.5 FR W/MAX BARRIER AND BIOPATCH  (5/CA)

## (undated) DEVICE — BLADE STERNUM SAW SURGICAL 32.0 X 6.4 MM STERILE (1/EA)

## (undated) DEVICE — SYSTEM CHEST DRAIN ADULT/PEDS W/AUTO TRANSFUSION CAPABILITY SAHARA (6EA/CA)

## (undated) DEVICE — TRANSDUCER BIFURCATED MONITORING KIT (10EA/CA)

## (undated) DEVICE — CANISTER SUCTION 3000ML MECHANICAL FILTER AUTO SHUTOFF MEDI-VAC NONSTERILE LF DISP  (40EA/CA)

## (undated) DEVICE — LEAD PACING TEMP MYO - (12/BX)

## (undated) DEVICE — SENSOR OXIMETER ADULT SPO2 RD SET (20EA/BX)

## (undated) DEVICE — SYS DLV COST CLS RM TEMP - INJECTATE (CO-SET II) (10EA/CA)

## (undated) DEVICE — SUTURE 4-0 30CM STRATAFIX SPIRAL PS-2 (12EA/BX)

## (undated) DEVICE — TUBE CHEST 32FR. STRAIGHT - (10EA/CA)

## (undated) DEVICE — BANDAGE ELASTIC 4 IN X 5 YDS - LATEX FREE(10/BX 5BX/CA)

## (undated) DEVICE — SUTURE 5-0 PROLENE RB-1 D/A 36 (36PK/BX)"

## (undated) DEVICE — COVER LIGHT HANDLE ALC PLUS DISP (18EA/BX)

## (undated) DEVICE — GLOVE BIOGEL INDICATOR SZ 8 SURGICAL PF LTX - (50/BX 4BX/CA)

## (undated) DEVICE — DEVICE KIT COR-KNOT COMBO2 DEVICES & 12 KNOTS PER KIT (6KT/CA)

## (undated) DEVICE — SOD. CHL. INJ. 0.9% 1000 ML - (14EA/CA 60CA/PF)

## (undated) DEVICE — PACK CV DRAPING/BASIN 2PART - (1/CA)